# Patient Record
Sex: FEMALE | Race: WHITE | Employment: OTHER | URBAN - METROPOLITAN AREA
[De-identification: names, ages, dates, MRNs, and addresses within clinical notes are randomized per-mention and may not be internally consistent; named-entity substitution may affect disease eponyms.]

---

## 2016-02-28 LAB — EJECTION FRACTION: 63

## 2017-01-01 ENCOUNTER — HOSPITAL ENCOUNTER (OUTPATIENT)
Dept: ULTRASOUND IMAGING | Facility: CLINIC | Age: 57
Discharge: HOME | End: 2017-11-15
Attending: INTERNAL MEDICINE | Admitting: INTERNAL MEDICINE
Payer: MEDICARE

## 2017-01-01 ENCOUNTER — TRANSFERRED RECORDS (OUTPATIENT)
Dept: HEALTH INFORMATION MANAGEMENT | Facility: CLINIC | Age: 57
End: 2017-01-01

## 2017-01-01 ENCOUNTER — TRANSFERRED RECORDS (OUTPATIENT)
Dept: SURGERY | Facility: CLINIC | Age: 57
End: 2017-01-01

## 2017-01-01 ENCOUNTER — OFFICE VISIT (OUTPATIENT)
Dept: SURGERY | Facility: CLINIC | Age: 57
End: 2017-01-01
Payer: MEDICARE

## 2017-01-01 VITALS
HEIGHT: 65 IN | WEIGHT: 127 LBS | DIASTOLIC BLOOD PRESSURE: 84 MMHG | OXYGEN SATURATION: 99 % | BODY MASS INDEX: 21.16 KG/M2 | HEART RATE: 107 BPM | SYSTOLIC BLOOD PRESSURE: 126 MMHG

## 2017-01-01 DIAGNOSIS — C50.919 BREAST CANCER (H): ICD-10-CM

## 2017-01-01 DIAGNOSIS — C50.919 BREAST CANCER METASTASIZED TO MULTIPLE SITES, UNSPECIFIED LATERALITY (H): Primary | ICD-10-CM

## 2017-01-01 LAB
COPATH REPORT: NORMAL
COPATH REPORT: NORMAL

## 2017-01-01 PROCEDURE — 00000159 ZZHCL STATISTIC H-SEND OUTS PREP: Performed by: RADIOLOGY

## 2017-01-01 PROCEDURE — 88342 IMHCHEM/IMCYTCHM 1ST ANTB: CPT | Performed by: RADIOLOGY

## 2017-01-01 PROCEDURE — 88360 TUMOR IMMUNOHISTOCHEM/MANUAL: CPT | Mod: 26 | Performed by: RADIOLOGY

## 2017-01-01 PROCEDURE — 88377 M/PHMTRC ALYS ISHQUANT/SEMIQ: CPT | Performed by: PATHOLOGY

## 2017-01-01 PROCEDURE — 88342 IMHCHEM/IMCYTCHM 1ST ANTB: CPT | Mod: 26 | Performed by: RADIOLOGY

## 2017-01-01 PROCEDURE — 88307 TISSUE EXAM BY PATHOLOGIST: CPT | Mod: 26,76 | Performed by: RADIOLOGY

## 2017-01-01 PROCEDURE — 99214 OFFICE O/P EST MOD 30 MIN: CPT | Performed by: SURGERY

## 2017-01-01 PROCEDURE — 88341 IMHCHEM/IMCYTCHM EA ADD ANTB: CPT | Performed by: RADIOLOGY

## 2017-01-01 PROCEDURE — 88307 TISSUE EXAM BY PATHOLOGIST: CPT | Performed by: RADIOLOGY

## 2017-01-01 PROCEDURE — 76942 ECHO GUIDE FOR BIOPSY: CPT

## 2017-01-01 PROCEDURE — 88341 IMHCHEM/IMCYTCHM EA ADD ANTB: CPT | Mod: 26,XU | Performed by: RADIOLOGY

## 2017-01-01 PROCEDURE — 88360 TUMOR IMMUNOHISTOCHEM/MANUAL: CPT | Mod: 26,76 | Performed by: RADIOLOGY

## 2017-01-01 PROCEDURE — 88360 TUMOR IMMUNOHISTOCHEM/MANUAL: CPT | Performed by: RADIOLOGY

## 2017-01-01 PROCEDURE — 25000125 ZZHC RX 250: Performed by: INTERNAL MEDICINE

## 2017-01-01 PROCEDURE — 00000158 ZZHCL STATISTIC H-FISH PROCESS B/S: Performed by: RADIOLOGY

## 2017-01-01 RX ORDER — LIDOCAINE HYDROCHLORIDE 10 MG/ML
10 INJECTION, SOLUTION EPIDURAL; INFILTRATION; INTRACAUDAL; PERINEURAL ONCE
Status: COMPLETED | OUTPATIENT
Start: 2017-01-01 | End: 2017-01-01

## 2017-01-01 RX ADMIN — LIDOCAINE HYDROCHLORIDE 100 MG: 10 INJECTION, SOLUTION EPIDURAL; INFILTRATION; INTRACAUDAL; PERINEURAL at 14:38

## 2017-01-01 ASSESSMENT — ENCOUNTER SYMPTOMS: NAUSEA: 1

## 2017-01-04 ENCOUNTER — TRANSFERRED RECORDS (OUTPATIENT)
Dept: HEALTH INFORMATION MANAGEMENT | Facility: CLINIC | Age: 57
End: 2017-01-04

## 2017-01-12 ENCOUNTER — TELEPHONE (OUTPATIENT)
Dept: FAMILY MEDICINE | Facility: CLINIC | Age: 57
End: 2017-01-12
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
## 2017-01-12 NOTE — TELEPHONE ENCOUNTER
Called # [phone]    Left  A non detailed VM     Elsie Mares RN, BSN  [facility]

## 2017-01-13 NOTE — TELEPHONE ENCOUNTER
Called # [phone]    Left a non detailed VM   3rd attempt - encounter closed    Elsie Mares RN, BSN  [facility]

## 2017-01-25 ENCOUNTER — TRANSFERRED RECORDS (OUTPATIENT)
Dept: HEALTH INFORMATION MANAGEMENT | Facility: CLINIC | Age: 57
End: 2017-01-25

## 2017-02-15 ENCOUNTER — TRANSFERRED RECORDS (OUTPATIENT)
Dept: HEALTH INFORMATION MANAGEMENT | Facility: CLINIC | Age: 57
End: 2017-02-15

## 2017-02-28 ENCOUNTER — HOSPITAL ENCOUNTER (OUTPATIENT)
Dept: CARDIOLOGY | Facility: CLINIC | Age: 57
Discharge: HOME | End: 2017-02-28
Attending: NURSE PRACTITIONER | Admitting: NURSE PRACTITIONER
Payer: MEDICARE

## 2017-02-28 DIAGNOSIS — C79.51 BONE METASTASIS: ICD-10-CM

## 2017-02-28 DIAGNOSIS — Z79.899 LONG TERM USE OF DRUG: ICD-10-CM

## 2017-02-28 DIAGNOSIS — C50.919 MALIGNANT NEOPLASM OF FEMALE BREAST, UNSPECIFIED LATERALITY, UNSPECIFIED SITE OF BREAST: ICD-10-CM

## 2017-02-28 PROCEDURE — 93306 TTE W/DOPPLER COMPLETE: CPT

## 2017-02-28 PROCEDURE — 0399T ZZHC MYOCARDIAL STRAIN IMAGING: CPT | Performed by: INTERNAL MEDICINE

## 2017-02-28 PROCEDURE — 93306 TTE W/DOPPLER COMPLETE: CPT | Mod: 26 | Performed by: INTERNAL MEDICINE

## 2017-03-08 ENCOUNTER — TRANSFERRED RECORDS (OUTPATIENT)
Dept: HEALTH INFORMATION MANAGEMENT | Facility: CLINIC | Age: 57
End: 2017-03-08

## 2017-03-29 ENCOUNTER — TRANSFERRED RECORDS (OUTPATIENT)
Dept: HEALTH INFORMATION MANAGEMENT | Facility: CLINIC | Age: 57
End: 2017-03-29

## 2017-04-19 ENCOUNTER — TRANSFERRED RECORDS (OUTPATIENT)
Dept: HEALTH INFORMATION MANAGEMENT | Facility: CLINIC | Age: 57
End: 2017-04-19

## 2017-04-27 DIAGNOSIS — I10 ESSENTIAL HYPERTENSION WITH GOAL BLOOD PRESSURE LESS THAN 140/90: Primary | ICD-10-CM

## 2017-05-01 NOTE — TELEPHONE ENCOUNTER
Pt has not followed through from last refill. Break in medication as well.    LOV 7/25/16. Advised to follow up in 1-2 months per PCP.     Attempt #1.    Olga Dove contacted Eyal on 05/01/17 and left a message. If patient calls back please schedule appointment as soon as possible and contact RN team.  Jaclyn Dove RN

## 2017-05-01 NOTE — TELEPHONE ENCOUNTER
Atenolol      Last Written Prescription Date: 09/14/2016  Last Fill Quantity: 30, # refills: 0  Last Office Visit with G, P or [facility] prescribing provider: 07/25/2016       Potassium   Date Value Ref Range Status   07/25/2016 4.5 3.4 - 5.3 mmol/L Final     Creatinine   Date Value Ref Range Status   07/25/2016 0.56 0.52 - 1.04 mg/dL Final     BP Readings from Last 3 Encounters:   09/26/16 (!) 135/94   08/09/16 (!) 123/93   08/01/16 124/80

## 2017-05-02 ENCOUNTER — TRANSFERRED RECORDS (OUTPATIENT)
Dept: HEALTH INFORMATION MANAGEMENT | Facility: CLINIC | Age: 57
End: 2017-05-02

## 2017-05-07 RX ORDER — ATENOLOL 25 MG/1
TABLET ORAL
Qty: 30 TABLET | Refills: 0 | Status: SHIPPED | OUTPATIENT
Start: 2017-05-07 | End: 2017-06-30

## 2017-05-08 ENCOUNTER — HOSPITAL ENCOUNTER (OUTPATIENT)
Dept: MRI IMAGING | Facility: CLINIC | Age: 57
End: 2017-05-08
Attending: NURSE PRACTITIONER
Payer: MEDICARE

## 2017-05-08 ENCOUNTER — HOSPITAL ENCOUNTER (OUTPATIENT)
Dept: CT IMAGING | Facility: CLINIC | Age: 57
Discharge: HOME | End: 2017-05-08
Attending: NURSE PRACTITIONER | Admitting: NURSE PRACTITIONER
Payer: MEDICARE

## 2017-05-08 DIAGNOSIS — C50.919 MALIGNANT NEOPLASM OF FEMALE BREAST, UNSPECIFIED LATERALITY, UNSPECIFIED SITE OF BREAST: ICD-10-CM

## 2017-05-08 DIAGNOSIS — R51.9 HEADACHE, UNSPECIFIED HEADACHE TYPE: ICD-10-CM

## 2017-05-08 DIAGNOSIS — R11.2 NAUSEA AND VOMITING: ICD-10-CM

## 2017-05-08 PROCEDURE — 74177 CT ABD & PELVIS W/CONTRAST: CPT

## 2017-05-08 PROCEDURE — A9585 GADOBUTROL INJECTION: HCPCS | Performed by: RADIOLOGY

## 2017-05-08 PROCEDURE — 25000128 H RX IP 250 OP 636: Performed by: RADIOLOGY

## 2017-05-08 PROCEDURE — 25500064 ZZH RX 255 OP 636: Performed by: RADIOLOGY

## 2017-05-08 PROCEDURE — 70553 MRI BRAIN STEM W/O & W/DYE: CPT

## 2017-05-08 PROCEDURE — 71260 CT THORAX DX C+: CPT

## 2017-05-08 RX ORDER — GADOBUTROL 604.72 MG/ML
7.5 INJECTION INTRAVENOUS ONCE
Status: COMPLETED | OUTPATIENT
Start: 2017-05-08 | End: 2017-05-08

## 2017-05-08 RX ORDER — IOPAMIDOL 755 MG/ML
500 INJECTION, SOLUTION INTRAVASCULAR ONCE
Status: COMPLETED | OUTPATIENT
Start: 2017-05-08 | End: 2017-05-08

## 2017-05-08 RX ADMIN — GADOBUTROL 6 ML: 604.72 INJECTION INTRAVENOUS at 18:41

## 2017-05-08 RX ADMIN — SODIUM CHLORIDE 56 ML: 9 INJECTION, SOLUTION INTRAVENOUS at 14:23

## 2017-05-08 RX ADMIN — IOPAMIDOL 63 ML: 755 INJECTION, SOLUTION INTRAVENOUS at 14:23

## 2017-05-08 NOTE — TELEPHONE ENCOUNTER
done x 1 month only. Pt needs recheck office visit before more refills. Please inform pt and  Please assist pt in making appt to be seen.   BP Readings from Last 3 Encounters:   09/26/16 (!) 135/94   08/09/16 (!) 123/93   08/01/16 124/80

## 2017-05-08 NOTE — TELEPHONE ENCOUNTER
The patient indicates understanding of these issues and agrees with the plan.  Pt did not want to schedule at this time  Jaclyn Dove RN

## 2017-05-10 ENCOUNTER — TRANSFERRED RECORDS (OUTPATIENT)
Dept: HEALTH INFORMATION MANAGEMENT | Facility: CLINIC | Age: 57
End: 2017-05-10

## 2017-05-31 ENCOUNTER — TRANSFERRED RECORDS (OUTPATIENT)
Dept: HEALTH INFORMATION MANAGEMENT | Facility: CLINIC | Age: 57
End: 2017-05-31

## 2017-06-21 ENCOUNTER — TRANSFERRED RECORDS (OUTPATIENT)
Dept: HEALTH INFORMATION MANAGEMENT | Facility: CLINIC | Age: 57
End: 2017-06-21

## 2017-06-30 DIAGNOSIS — I10 ESSENTIAL HYPERTENSION WITH GOAL BLOOD PRESSURE LESS THAN 140/90: ICD-10-CM

## 2017-06-30 NOTE — TELEPHONE ENCOUNTER
atenolol (TENORMIN) 25 MG tablet      Last Written Prescription Date: 5/7/2017  Last Fill Quantity: 30 tablet, # refills: 0    Last Office Visit with [facility], [facility] or [facility] prescribing provider:  7/25/2016   Future Office Visit:        BP Readings from Last 3 Encounters:   09/26/16 (!) 135/94   08/09/16 (!) 123/93   08/01/16 124/80

## 2017-07-03 RX ORDER — ATENOLOL 25 MG/1
TABLET ORAL
Qty: 15 TABLET | Refills: 0 | Status: SHIPPED | OUTPATIENT
Start: 2017-07-03 | End: 2017-07-30

## 2017-07-03 NOTE — TELEPHONE ENCOUNTER
Due for an Office visit for further refills, only fill for 30 days     Elsie Mares RN, BSN  [facility]

## 2017-07-05 ENCOUNTER — MEDICAL CORRESPONDENCE (OUTPATIENT)
Dept: CARDIOLOGY | Facility: CLINIC | Age: 57
End: 2017-07-05

## 2017-07-18 ENCOUNTER — TRANSFERRED RECORDS (OUTPATIENT)
Dept: HEALTH INFORMATION MANAGEMENT | Facility: CLINIC | Age: 57
End: 2017-07-18

## 2017-07-24 ENCOUNTER — HOSPITAL ENCOUNTER (OUTPATIENT)
Dept: CARDIOLOGY | Facility: CLINIC | Age: 57
Discharge: HOME | End: 2017-07-24
Attending: NURSE PRACTITIONER | Admitting: NURSE PRACTITIONER
Payer: MEDICARE

## 2017-07-24 DIAGNOSIS — Z09 CHEMOTHERAPY FOLLOW-UP EXAMINATION: ICD-10-CM

## 2017-07-24 PROCEDURE — 93321 DOPPLER ECHO F-UP/LMTD STD: CPT | Mod: 26 | Performed by: INTERNAL MEDICINE

## 2017-07-24 PROCEDURE — 93308 TTE F-UP OR LMTD: CPT

## 2017-07-24 PROCEDURE — 93325 DOPPLER ECHO COLOR FLOW MAPG: CPT | Mod: 26 | Performed by: INTERNAL MEDICINE

## 2017-07-24 PROCEDURE — 93308 TTE F-UP OR LMTD: CPT | Mod: 26 | Performed by: INTERNAL MEDICINE

## 2017-08-08 ENCOUNTER — TELEPHONE (OUTPATIENT)
Dept: FAMILY MEDICINE | Facility: CLINIC | Age: 57
End: 2017-08-08

## 2017-08-08 ENCOUNTER — TRANSFERRED RECORDS (OUTPATIENT)
Dept: HEALTH INFORMATION MANAGEMENT | Facility: CLINIC | Age: 57
End: 2017-08-08

## 2017-08-08 NOTE — TELEPHONE ENCOUNTER
8/8/2017    Call Regarding Preventive Health Screening Colonoscopy    Attempt 1    Message on voicemail     Comments:       Outreach   Nettie Guido

## 2017-08-23 ENCOUNTER — TELEPHONE (OUTPATIENT)
Dept: FAMILY MEDICINE | Facility: CLINIC | Age: 57
End: 2017-08-23

## 2017-08-23 DIAGNOSIS — I10 ESSENTIAL HYPERTENSION WITH GOAL BLOOD PRESSURE LESS THAN 140/90: Primary | ICD-10-CM

## 2017-08-23 RX ORDER — METOPROLOL SUCCINATE 25 MG/1
12.5 TABLET, EXTENDED RELEASE ORAL DAILY
Qty: 15 TABLET | Refills: 0 | Status: SHIPPED | OUTPATIENT
Start: 2017-08-23 | End: 2017-09-29

## 2017-08-23 NOTE — TELEPHONE ENCOUNTER
Due to national shortage of atenolol [facility] needs new comparable rx sent in.  Pharmacy pended.  Please advise.  Lori Slade

## 2017-08-29 ENCOUNTER — TRANSFERRED RECORDS (OUTPATIENT)
Dept: HEALTH INFORMATION MANAGEMENT | Facility: CLINIC | Age: 57
End: 2017-08-29

## 2017-09-06 ENCOUNTER — TRANSFERRED RECORDS (OUTPATIENT)
Dept: HEALTH INFORMATION MANAGEMENT | Facility: CLINIC | Age: 57
End: 2017-09-06

## 2017-09-19 ENCOUNTER — TRANSFERRED RECORDS (OUTPATIENT)
Dept: SURGERY | Facility: CLINIC | Age: 57
End: 2017-09-19

## 2017-09-19 ENCOUNTER — TRANSFERRED RECORDS (OUTPATIENT)
Dept: HEALTH INFORMATION MANAGEMENT | Facility: CLINIC | Age: 57
End: 2017-09-19

## 2017-09-29 ENCOUNTER — TELEPHONE (OUTPATIENT)
Dept: FAMILY MEDICINE | Facility: CLINIC | Age: 57
End: 2017-09-29

## 2017-09-29 DIAGNOSIS — I10 ESSENTIAL HYPERTENSION WITH GOAL BLOOD PRESSURE LESS THAN 140/90: ICD-10-CM

## 2017-09-29 NOTE — LETTER
[address] [address]                                                                                                       [phone]    October 10, 2017    Eyal Turner  [address] [facility] [address]      To Whom it May Concern:    My staff have been attempting to reach you in regards to a recent refill request for: metoprolol (TOPROL-XL) 25 MG 24 hr tablet.  After reviewing your chart, you are over due for a fasting physical.  You can schedule this via [employer] or by calling the clinic at [phone].     Thank you for your time.      Sincerely,        Sonny Restrepo M.D./ADELITA RN

## 2017-09-29 NOTE — TELEPHONE ENCOUNTER
Disp Refills Start End KEVIN   metoprolol (TOPROL-XL) 25 MG 24 hr tablet 15 tablet 0 8/23/2017  --   Sig: Take 0.5 tablets (12.5 mg) by mouth daily     Last Office Visit with G, P or [facility] prescribing provider:  07/25/2016     Future Office Visit:    None Scheduled    BP Readings from Last 3 Encounters:   09/26/16 (!) 135/94   08/09/16 (!) 123/93   08/01/16 124/80     Patient already given 1 x kadeem refill for atenolol and told to FU (patient due for fasting physical)    Attempt #1  Called   Telephone Information:   Mobile [phone]    Left a non-detailed message to call back and speak with any triage nurse.    Radha Momin RN  [facility]

## 2017-10-09 NOTE — TELEPHONE ENCOUNTER
Attempt #2  Called # below - Left a non-detailed message to call back and speak with any triage nurse.    Radha Momin RN  [place] [facility]

## 2017-10-10 NOTE — TELEPHONE ENCOUNTER
Attempt #3  called # below - Left a non-detailed message to call back and speak with any triage nurse.    Letter sent    Routing to PCP for further review/recommendations/orders - would you like to refill without a recent OV?    Radha Momin RN  [facility]

## 2017-10-11 RX ORDER — METOPROLOL SUCCINATE 25 MG/1
TABLET, EXTENDED RELEASE ORAL
Qty: 15 TABLET | Refills: 0 | Status: SHIPPED | OUTPATIENT
Start: 2017-10-11 | End: 2017-10-11

## 2017-10-11 RX ORDER — METOPROLOL SUCCINATE 25 MG/1
TABLET, EXTENDED RELEASE ORAL
Qty: 30 TABLET | Refills: 0 | Status: SHIPPED | OUTPATIENT
Start: 2017-10-11 | End: 2018-01-01

## 2017-10-11 NOTE — TELEPHONE ENCOUNTER
I believe these bp's were at her oncologist's office.   Ok to refill again and Recheck your blood pressure in our pharmacy in 1 week or sooner if needed.  Have pharmacy send me their note.    Then have pt see me for annual px in the next 1-2 months.

## 2017-10-23 NOTE — PROGRESS NOTES
I have been asked by Paulina Lundberg and Kade to evaluate this patient with known metastatic breast cancer regarding chest wall masses.  These have been increasing in size.  The patient's been on continuous chemotherapy for years.  The patient is known to me from a previous biopsy.    The patient's past medical and surgical histories are reviewed.    The patient's right neck reveals a large mass at the base of her sternocleidomastoid anterior head.  This appears to have fixation to the underlying bone as well.  There is also a 1.5 x 3 cm mass over the mid sternum.  This is also somewhat fixed to underlying tissue.  There is relatively little laxity of the surrounding skin.    This is a patient with probable metastatic disease.  The patient reports that she recently had a PET scan done in [place], though those results are not available to me or to Dr. Lundberg.  I did discuss the case with Dr. Lundberg while the patient was in the office today.  I explained that I did not think that either one of these masses was probably amenable to removal.  I think needle biopsy would therefore be most appropriate.  Hopefully adequate tissue could be obtained from the larger lesion under ultrasound guidance.  Fine-needle aspiration might be possible the chest wall lesion, but I'm concerned that an attempted excision here would result in an open wound.  This might be particularly problematic if there is malignancy at the base.  The patient will be getting the results of the PET scan to the oncology office.  Please let me know if I can be of further assistance.    Danie Forrester MD  [facility]    Please route or send letter to:  Primary Care Provider (PCP) and Dr. Lundberg

## 2017-10-23 NOTE — MR AVS SNAPSHOT
After Visit Summary   10/23/2017    Eyal Turner    MRN: [id number]           Patient Information     Date Of Birth          1960        Visit Information        Provider Department      10/23/2017 10:30 AM Danie Forrester MD [facility] General Surgery      Today's Diagnoses     Breast cancer metastasized to multiple sites, unspecified laterality (H)    -  1       Follow-ups after your visit        Future tests that were ordered for you today     Open Future Orders        Priority Expected Expires Ordered    MR Lumbar Spine w/o & w Contrast Routine  10/23/2018 10/23/2017    MR Thoracic Spine w/o & w Contrast Routine  10/23/2018 10/23/2017            Who to contact     If you have questions or need follow up information about today's clinic visit or your schedule please contact [facility] directly at [phone].  Normal or non-critical lab and imaging results will be communicated to you by Casenethart, letter or phone within 4 business days after the clinic has received the results. If you do not hear from us within 7 days, please contact the clinic through Casenethart or phone. If you have a critical or abnormal lab result, we will notify you by phone as soon as possible.  Submit refill requests through Qbaka or call your pharmacy and they will forward the refill request to us. Please allow 3 business days for your refill to be completed.          Additional Information About Your Visit        MyChart Information     Qbaka gives you secure access to your electronic health record. If you see a primary care provider, you can also send messages to your care team and make appointments. If you have questions, please call your primary care clinic.  If you do not have a primary care provider, please call [phone] and they will assist you.        Care EveryWhere ID     This is your Care EveryWhere ID. This could be used by  "other organizations to access your [place] medical records  [id number]        Your Vitals Were     Pulse Height Pulse Oximetry BMI (Body Mass Index)          107 5' 5\" (1.651 m) 99% 21.13 kg/m2         Blood Pressure from Last 3 Encounters:   10/23/17 126/84   09/26/16 (!) 135/94   08/09/16 (!) 123/93    Weight from Last 3 Encounters:   10/23/17 127 lb (57.6 kg)   08/09/16 127 lb (57.6 kg)   08/01/16 127 lb (57.6 kg)              Today, you had the following     No orders found for display       Primary Care Provider Office Phone # Fax #    Angie Heredia [phone]       [facility] [facility] [address]        Equal Access to Services     [facility] YOSELYN : Hadii pamella davis Solorena, waaxda luqadaha, qaybta kaalmada marvin, guillermina lawson . So [facility] [phone].    ATENCIÓN: Si habla español, tiene a arevalo disposición servicios gratuitos de asistencia lingüística. [facility] [phone].    We comply with applicable federal civil rights laws and [place] laws. We do not discriminate on the basis of race, color, national origin, age, disability, sex, sexual orientation, or gender identity.            Thank you!     Thank you for choosing [facility]  for your care. Our goal is always to provide you with excellent care. Hearing back from our patients is one way we can continue to improve our services. Please take a few minutes to complete the written survey that you may receive in the mail after your visit with us. Thank you!             Your Updated Medication List - Protect others around you: Learn how to safely use, store and throw away your medicines at www.disposemymeds.org.          This list is accurate as of: 10/23/17 12:52 PM.  Always use your most recent med list.                   Brand Name Dispense Instructions for use Diagnosis    ACIDOPHILUS PROBIOTIC BLEND Caps     90 capsule    Take 1 capsule by mouth daily.    " CARDIOVASCULAR SCREENING; LDL GOAL LESS THAN 160       metoprolol 25 MG 24 hr tablet    TOPROL-XL    30 tablet    TAKE 1/2 TABLET(12.5 MG) BY MOUTH DAILY    Essential hypertension with goal blood pressure less than 140/90       ondansetron 4 MG ODT tab    ZOFRAN ODT    20 tablet    Take 1-2 tablets (4-8 mg) by mouth every 8 hours as needed for nausea    Hematemesis       oxyCODONE 5 MG IR tablet    ROXICODONE    20 tablet    Take 1-2 tablets (5-10 mg) by mouth every 3 hours as needed for pain or other (Moderate to Severe)    Invasive ductal carcinoma of breast, unspecified laterality (H)       prenatal multivitamin plus iron 27-0.8 MG Tabs per tablet      Take 1 tablet by mouth daily

## 2017-10-23 NOTE — PROGRESS NOTES
HPI      ROS (Review of Systems):     Cardiovascular: Positive for hypertension.   GASTROINTESTINAL: Positive for nausea.   MUSCULOSKELETAL: Positive for back pain.          Physical Exam

## 2017-10-23 NOTE — LETTER
2017    [facility] 1960    I have been asked by Paulina Lundberg and Kade to evaluate this patient with known metastatic breast cancer regarding chest wall masses.  These have been increasing in size.  The patient's been on continuous chemotherapy for years.  The patient is known to me from a previous biopsy.     The patient's past medical and surgical histories are reviewed.     The patient's right neck reveals a large mass at the base of her sternocleidomastoid anterior head.  This appears to have fixation to the underlying bone as well.  There is also a 1.5 x 3 cm mass over the mid sternum.  This is also somewhat fixed to underlying tissue.  There is relatively little laxity of the surrounding skin.     This is a patient with probable metastatic disease.  The patient reports that she recently had a PET scan done in [place], though those results are not available to me or to Dr. Lundberg.  I did discuss the case with Dr. Lundberg while the patient was in the office today.  I explained that I did not think that either one of these masses was probably amenable to removal.  I think needle biopsy would therefore be most appropriate.  Hopefully adequate tissue could be obtained from the larger lesion under ultrasound guidance.  Fine-needle aspiration might be possible the chest wall lesion, but I'm concerned that an attempted excision here would result in an open wound.  This might be particularly problematic if there is malignancy at the base.  The patient will be getting the results of the PET scan to the oncology office.  Please let me know if I can be of further assistance.     Danie Forrester MD  [facility]

## 2017-11-15 NOTE — PROGRESS NOTES
Pt tolerated 2 chest wall lesions biopsy by DR. Brito well.  Sterile dressings to sites.  Pt states understanding of site care instructions.  DC ambulatory to home with .  No evident bleeding on discharge.  All questions at this time answered.

## 2018-01-01 ENCOUNTER — APPOINTMENT (OUTPATIENT)
Dept: CT IMAGING | Facility: CLINIC | Age: 58
DRG: 853 | End: 2018-01-01
Attending: INTERNAL MEDICINE
Payer: MEDICARE

## 2018-01-01 ENCOUNTER — APPOINTMENT (OUTPATIENT)
Dept: GENERAL RADIOLOGY | Facility: CLINIC | Age: 58
DRG: 853 | End: 2018-01-01
Attending: INTERNAL MEDICINE
Payer: MEDICARE

## 2018-01-01 ENCOUNTER — TRANSFERRED RECORDS (OUTPATIENT)
Dept: HEALTH INFORMATION MANAGEMENT | Facility: CLINIC | Age: 58
End: 2018-01-01

## 2018-01-01 ENCOUNTER — APPOINTMENT (OUTPATIENT)
Dept: CT IMAGING | Facility: CLINIC | Age: 58
DRG: 853 | End: 2018-01-01
Attending: ANESTHESIOLOGY
Payer: MEDICARE

## 2018-01-01 ENCOUNTER — HOSPITAL ENCOUNTER (EMERGENCY)
Facility: CLINIC | Age: 58
Discharge: HOME | End: 2018-06-15
Attending: EMERGENCY MEDICINE | Admitting: EMERGENCY MEDICINE
Payer: MEDICARE

## 2018-01-01 ENCOUNTER — APPOINTMENT (OUTPATIENT)
Dept: GENERAL RADIOLOGY | Facility: CLINIC | Age: 58
DRG: 853 | End: 2018-01-01
Attending: ANESTHESIOLOGY
Payer: MEDICARE

## 2018-01-01 ENCOUNTER — TELEPHONE (OUTPATIENT)
Dept: FAMILY MEDICINE | Facility: CLINIC | Age: 58
End: 2018-01-01

## 2018-01-01 ENCOUNTER — ANESTHESIA EVENT (OUTPATIENT)
Dept: INTENSIVE CARE | Facility: CLINIC | Age: 58
DRG: 853 | End: 2018-01-01
Payer: MEDICARE

## 2018-01-01 ENCOUNTER — APPOINTMENT (OUTPATIENT)
Dept: CARDIOLOGY | Facility: CLINIC | Age: 58
DRG: 853 | End: 2018-01-01
Attending: ANESTHESIOLOGY
Payer: MEDICARE

## 2018-01-01 ENCOUNTER — HOSPITAL ENCOUNTER (INPATIENT)
Facility: CLINIC | Age: 58
LOS: 20 days | DRG: 853 | End: 2018-10-12
Attending: INTERNAL MEDICINE | Admitting: INTERNAL MEDICINE
Payer: MEDICARE

## 2018-01-01 ENCOUNTER — ANESTHESIA (OUTPATIENT)
Dept: INTENSIVE CARE | Facility: CLINIC | Age: 58
DRG: 853 | End: 2018-01-01
Payer: MEDICARE

## 2018-01-01 ENCOUNTER — APPOINTMENT (OUTPATIENT)
Dept: CARDIOLOGY | Facility: CLINIC | Age: 58
DRG: 853 | End: 2018-01-01
Attending: INTERNAL MEDICINE
Payer: MEDICARE

## 2018-01-01 ENCOUNTER — APPOINTMENT (OUTPATIENT)
Dept: ULTRASOUND IMAGING | Facility: CLINIC | Age: 58
DRG: 853 | End: 2018-01-01
Attending: ANESTHESIOLOGY
Payer: MEDICARE

## 2018-01-01 ENCOUNTER — HOSPITAL ENCOUNTER (OUTPATIENT)
Dept: CARDIOLOGY | Facility: CLINIC | Age: 58
Discharge: HOME | End: 2018-02-07
Attending: INTERNAL MEDICINE | Admitting: INTERNAL MEDICINE
Payer: MEDICARE

## 2018-01-01 ENCOUNTER — APPOINTMENT (OUTPATIENT)
Dept: CT IMAGING | Facility: CLINIC | Age: 58
End: 2018-01-01
Attending: NURSE PRACTITIONER
Payer: MEDICARE

## 2018-01-01 VITALS
TEMPERATURE: 99.1 F | WEIGHT: 166.45 LBS | HEIGHT: 66 IN | HEART RATE: 123 BPM | DIASTOLIC BLOOD PRESSURE: 65 MMHG | SYSTOLIC BLOOD PRESSURE: 101 MMHG | OXYGEN SATURATION: 81 % | BODY MASS INDEX: 26.75 KG/M2 | RESPIRATION RATE: 19 BRPM

## 2018-01-01 VITALS
SYSTOLIC BLOOD PRESSURE: 135 MMHG | RESPIRATION RATE: 16 BRPM | OXYGEN SATURATION: 98 % | TEMPERATURE: 98.6 F | DIASTOLIC BLOOD PRESSURE: 99 MMHG

## 2018-01-01 DIAGNOSIS — K72.00 ACUTE LIVER FAILURE WITHOUT HEPATIC COMA: ICD-10-CM

## 2018-01-01 DIAGNOSIS — N39.0 URINARY TRACT INFECTION WITHOUT HEMATURIA, SITE UNSPECIFIED: ICD-10-CM

## 2018-01-01 DIAGNOSIS — S09.90XA MINOR HEAD INJURY, INITIAL ENCOUNTER: ICD-10-CM

## 2018-01-01 DIAGNOSIS — R79.89 ELEVATED LFTS: ICD-10-CM

## 2018-01-01 DIAGNOSIS — C50.919 METASTATIC BREAST CANCER: ICD-10-CM
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
## 2018-01-01 DIAGNOSIS — D64.9 ANEMIA, UNSPECIFIED TYPE: ICD-10-CM

## 2018-01-01 DIAGNOSIS — S40.021A CONTUSION OF RIGHT UPPER ARM, INITIAL ENCOUNTER: ICD-10-CM

## 2018-01-01 DIAGNOSIS — R55 SYNCOPE, UNSPECIFIED SYNCOPE TYPE: ICD-10-CM

## 2018-01-01 DIAGNOSIS — E86.0 DEHYDRATION: ICD-10-CM

## 2018-01-01 DIAGNOSIS — I10 ESSENTIAL HYPERTENSION WITH GOAL BLOOD PRESSURE LESS THAN 140/90: ICD-10-CM

## 2018-01-01 DIAGNOSIS — F10.939 ALCOHOL WITHDRAWAL SYNDROME WITH COMPLICATION (H): ICD-10-CM

## 2018-01-01 DIAGNOSIS — B37.0 CANDIDIASIS OF MOUTH: ICD-10-CM

## 2018-01-01 DIAGNOSIS — R53.83 FATIGUE, UNSPECIFIED TYPE: ICD-10-CM

## 2018-01-01 DIAGNOSIS — Z51.11 MAINTENANCE CHEMOTHERAPY: ICD-10-CM

## 2018-01-01 LAB
ABO + RH BLD: NORMAL
ACID FAST STN SPEC QL: NORMAL
ALBUMIN SERPL-MCNC: 1.6 G/DL (ref 3.4–5)
ALBUMIN SERPL-MCNC: 1.7 G/DL (ref 3.4–5)
ALBUMIN SERPL-MCNC: 1.8 G/DL (ref 3.4–5)
ALBUMIN SERPL-MCNC: 1.9 G/DL (ref 3.4–5)
ALBUMIN SERPL-MCNC: 2 G/DL (ref 3.4–5)
ALBUMIN SERPL-MCNC: 2.1 G/DL (ref 3.4–5)
ALBUMIN SERPL-MCNC: 2.4 G/DL (ref 3.4–5)
ALBUMIN SERPL-MCNC: 2.5 G/DL (ref 3.4–5)
ALBUMIN SERPL-MCNC: 2.7 G/DL (ref 3.4–5)
ALBUMIN SERPL-MCNC: 3.3 G/DL (ref 3.4–5)
ALBUMIN SERPL-MCNC: 3.3 G/DL (ref 3.4–5)
ALBUMIN SERPL-MCNC: 3.4 G/DL (ref 3.4–5)
ALBUMIN UR-MCNC: 30 MG/DL
ALBUMIN UR-MCNC: 30 MG/DL
ALBUMIN UR-MCNC: NEGATIVE MG/DL
ALBUMIN UR-MCNC: NEGATIVE MG/DL
ALP SERPL-CCNC: 300 U/L (ref 40–150)
ALP SERPL-CCNC: 333 U/L (ref 40–150)
ALP SERPL-CCNC: 401 U/L (ref 40–150)
ALP SERPL-CCNC: 409 U/L (ref 40–150)
ALP SERPL-CCNC: 464 U/L (ref 40–150)
ALP SERPL-CCNC: 482 U/L (ref 40–150)
ALP SERPL-CCNC: 500 U/L (ref 40–150)
ALP SERPL-CCNC: 518 U/L (ref 40–150)
ALP SERPL-CCNC: 548 U/L (ref 40–150)
ALP SERPL-CCNC: 549 U/L (ref 40–150)
ALP SERPL-CCNC: 581 U/L (ref 40–150)
ALP SERPL-CCNC: 594 U/L (ref 40–150)
ALP SERPL-CCNC: 624 U/L (ref 40–150)
ALP SERPL-CCNC: 630 U/L (ref 40–150)
ALP SERPL-CCNC: 667 U/L (ref 40–150)
ALP SERPL-CCNC: 688 U/L (ref 40–150)
ALT SERPL W P-5'-P-CCNC: 101 U/L (ref 0–50)
ALT SERPL W P-5'-P-CCNC: 127 U/L (ref 0–50)
ALT SERPL W P-5'-P-CCNC: 157 U/L (ref 0–50)
ALT SERPL W P-5'-P-CCNC: 163 U/L (ref 0–50)
ALT SERPL W P-5'-P-CCNC: 172 U/L (ref 0–50)
ALT SERPL W P-5'-P-CCNC: 57 U/L (ref 0–50)
ALT SERPL W P-5'-P-CCNC: 59 U/L (ref 0–50)
ALT SERPL W P-5'-P-CCNC: 60 U/L (ref 0–50)
ALT SERPL W P-5'-P-CCNC: 62 U/L (ref 0–50)
ALT SERPL W P-5'-P-CCNC: 66 U/L (ref 0–50)
ALT SERPL W P-5'-P-CCNC: 69 U/L (ref 0–50)
ALT SERPL W P-5'-P-CCNC: 70 U/L (ref 0–50)
ALT SERPL W P-5'-P-CCNC: 76 U/L (ref 0–50)
ALT SERPL W P-5'-P-CCNC: 86 U/L (ref 0–50)
ALT SERPL W P-5'-P-CCNC: 94 U/L (ref 0–50)
ALT SERPL W P-5'-P-CCNC: 94 U/L (ref 0–50)
AMMONIA PLAS-SCNC: 36 UMOL/L (ref 10–50)
AMMONIA PLAS-SCNC: 50 UMOL/L (ref 10–50)
ANION GAP SERPL CALCULATED.3IONS-SCNC: 10 MMOL/L (ref 3–14)
ANION GAP SERPL CALCULATED.3IONS-SCNC: 12 MMOL/L (ref 3–14)
ANION GAP SERPL CALCULATED.3IONS-SCNC: 2 MMOL/L (ref 3–14)
ANION GAP SERPL CALCULATED.3IONS-SCNC: 2 MMOL/L (ref 3–14)
ANION GAP SERPL CALCULATED.3IONS-SCNC: 22 MMOL/L (ref 3–14)
ANION GAP SERPL CALCULATED.3IONS-SCNC: 3 MMOL/L (ref 3–14)
ANION GAP SERPL CALCULATED.3IONS-SCNC: 4 MMOL/L (ref 3–14)
ANION GAP SERPL CALCULATED.3IONS-SCNC: 5 MMOL/L (ref 3–14)
ANION GAP SERPL CALCULATED.3IONS-SCNC: 5 MMOL/L (ref 3–14)
ANION GAP SERPL CALCULATED.3IONS-SCNC: 6 MMOL/L (ref 3–14)
ANION GAP SERPL CALCULATED.3IONS-SCNC: 7 MMOL/L (ref 3–14)
ANION GAP SERPL CALCULATED.3IONS-SCNC: 8 MMOL/L (ref 3–14)
ANION GAP SERPL CALCULATED.3IONS-SCNC: 8 MMOL/L (ref 3–14)
ANION GAP SERPL CALCULATED.3IONS-SCNC: 9 MMOL/L (ref 3–14)
ANION GAP SERPL CALCULATED.3IONS-SCNC: 9 MMOL/L (ref 3–14)
ANION GAP SERPL CALCULATED.3IONS-SCNC: NORMAL MMOL/L (ref 6–17)
ANISOCYTOSIS BLD QL SMEAR: ABNORMAL
ANISOCYTOSIS BLD QL SMEAR: SLIGHT
APPEARANCE FLD: NORMAL
APPEARANCE UR: ABNORMAL
APPEARANCE UR: CLEAR
APTT PPP: 29 SEC (ref 22–37)
AST SERPL W P-5'-P-CCNC: 106 U/L (ref 0–45)
AST SERPL W P-5'-P-CCNC: 113 U/L (ref 0–45)
AST SERPL W P-5'-P-CCNC: 136 U/L (ref 0–45)
AST SERPL W P-5'-P-CCNC: 137 U/L (ref 0–45)
AST SERPL W P-5'-P-CCNC: 137 U/L (ref 0–45)
AST SERPL W P-5'-P-CCNC: 145 U/L (ref 0–45)
AST SERPL W P-5'-P-CCNC: 158 U/L (ref 0–45)
AST SERPL W P-5'-P-CCNC: 164 U/L (ref 0–45)
AST SERPL W P-5'-P-CCNC: 166 U/L (ref 0–45)
AST SERPL W P-5'-P-CCNC: 172 U/L (ref 0–45)
AST SERPL W P-5'-P-CCNC: 182 U/L (ref 0–45)
AST SERPL W P-5'-P-CCNC: 203 U/L (ref 0–45)
AST SERPL W P-5'-P-CCNC: 264 U/L (ref 0–45)
AST SERPL W P-5'-P-CCNC: 67 U/L (ref 0–45)
AST SERPL W P-5'-P-CCNC: 89 U/L (ref 0–45)
AST SERPL W P-5'-P-CCNC: 89 U/L (ref 0–45)
AST SERPL W P-5'-P-CCNC: 97 U/L (ref 0–45)
BACTERIA #/AREA URNS HPF: ABNORMAL /HPF
BACTERIA #/AREA URNS HPF: ABNORMAL /HPF
BACTERIA SPEC CULT: ABNORMAL
BACTERIA SPEC CULT: NO GROWTH
BACTERIA SPEC CULT: NORMAL
BASE EXCESS BLDA CALC-SCNC: 14.4 MMOL/L
BASE EXCESS BLDA CALC-SCNC: 3.2 MMOL/L
BASE EXCESS BLDA CALC-SCNC: 3.2 MMOL/L
BASE EXCESS BLDA CALC-SCNC: 4.6 MMOL/L
BASE EXCESS BLDA CALC-SCNC: 4.6 MMOL/L
BASE EXCESS BLDA CALC-SCNC: 8.9 MMOL/L
BASE EXCESS BLDV CALC-SCNC: 3.4 MMOL/L
BASE EXCESS BLDV CALC-SCNC: 3.6 MMOL/L
BASE EXCESS BLDV CALC-SCNC: 6.3 MMOL/L
BASE EXCESS BLDV CALC-SCNC: 6.5 MMOL/L
BASOPHILS # BLD AUTO: 0 10E9/L (ref 0–0.2)
BASOPHILS # BLD AUTO: 0.1 10E9/L (ref 0–0.2)
BASOPHILS NFR BLD AUTO: 0 %
BASOPHILS NFR BLD AUTO: 0.8 %
BASOPHILS NFR BLD AUTO: 0.8 %
BILIRUB DIRECT SERPL-MCNC: 10.9 MG/DL (ref 0–0.2)
BILIRUB DIRECT SERPL-MCNC: 13.9 MG/DL (ref 0–0.2)
BILIRUB DIRECT SERPL-MCNC: 15 MG/DL (ref 0–0.2)
BILIRUB DIRECT SERPL-MCNC: 6.3 MG/DL (ref 0–0.2)
BILIRUB DIRECT SERPL-MCNC: 6.4 MG/DL (ref 0–0.2)
BILIRUB DIRECT SERPL-MCNC: 6.8 MG/DL (ref 0–0.2)
BILIRUB DIRECT SERPL-MCNC: 7.5 MG/DL (ref 0–0.2)
BILIRUB DIRECT SERPL-MCNC: 7.6 MG/DL (ref 0–0.2)
BILIRUB DIRECT SERPL-MCNC: 9.8 MG/DL (ref 0–0.2)
BILIRUB SERPL-MCNC: 10 MG/DL (ref 0.2–1.3)
BILIRUB SERPL-MCNC: 10.8 MG/DL (ref 0.2–1.3)
BILIRUB SERPL-MCNC: 11.9 MG/DL (ref 0.2–1.3)
BILIRUB SERPL-MCNC: 13.9 MG/DL (ref 0.2–1.3)
BILIRUB SERPL-MCNC: 17.3 MG/DL (ref 0.2–1.3)
BILIRUB SERPL-MCNC: 2.4 MG/DL (ref 0.2–1.3)
BILIRUB SERPL-MCNC: 20.5 MG/DL (ref 0.2–1.3)
BILIRUB SERPL-MCNC: 23.4 MG/DL (ref 0.2–1.3)
BILIRUB SERPL-MCNC: 7.6 MG/DL (ref 0.2–1.3)
BILIRUB SERPL-MCNC: 7.9 MG/DL (ref 0.2–1.3)
BILIRUB SERPL-MCNC: 8.3 MG/DL (ref 0.2–1.3)
BILIRUB SERPL-MCNC: 8.4 MG/DL (ref 0.2–1.3)
BILIRUB SERPL-MCNC: 8.4 MG/DL (ref 0.2–1.3)
BILIRUB SERPL-MCNC: 8.8 MG/DL (ref 0.2–1.3)
BILIRUB SERPL-MCNC: 8.8 MG/DL (ref 0.2–1.3)
BILIRUB SERPL-MCNC: 9.3 MG/DL (ref 0.2–1.3)
BILIRUB SERPL-MCNC: 9.8 MG/DL (ref 0.2–1.3)
BILIRUB UR QL STRIP: ABNORMAL
BILIRUB UR QL STRIP: NEGATIVE
BLD GP AB SCN SERPL QL: NORMAL
BLD PROD TYP BPU: NORMAL
BLD UNIT ID BPU: 0
BLOOD BANK CMNT PATIENT-IMP: NORMAL
BLOOD PRODUCT CODE: NORMAL
BPU ID: NORMAL
BUN SERPL-MCNC: 12 MG/DL (ref 7–30)
BUN SERPL-MCNC: 12 MG/DL (ref 7–30)
BUN SERPL-MCNC: 15 MG/DL (ref 7–30)
BUN SERPL-MCNC: 17 MG/DL (ref 7–30)
BUN SERPL-MCNC: 19 MG/DL (ref 7–30)
BUN SERPL-MCNC: 20 MG/DL (ref 7–30)
BUN SERPL-MCNC: 22 MG/DL (ref 7–30)
BUN SERPL-MCNC: 26 MG/DL (ref 7–30)
BUN SERPL-MCNC: 26 MG/DL (ref 7–30)
BUN SERPL-MCNC: 28 MG/DL (ref 7–30)
BUN SERPL-MCNC: 38 MG/DL (ref 7–30)
BUN SERPL-MCNC: 40 MG/DL (ref 7–30)
BUN SERPL-MCNC: 41 MG/DL (ref 7–30)
BUN SERPL-MCNC: 45 MG/DL (ref 7–30)
BUN SERPL-MCNC: 47 MG/DL (ref 7–30)
BUN SERPL-MCNC: 47 MG/DL (ref 7–30)
BUN SERPL-MCNC: 48 MG/DL (ref 7–30)
BUN SERPL-MCNC: 48 MG/DL (ref 7–30)
BUN SERPL-MCNC: 65 MG/DL (ref 7–30)
BUN SERPL-MCNC: 7 MG/DL (ref 7–30)
BUN SERPL-MCNC: 88 MG/DL (ref 7–30)
BUN SERPL-MCNC: NORMAL MG/DL (ref 7–30)
BURR CELLS BLD QL SMEAR: SLIGHT
C DIFF TOX B STL QL: NEGATIVE
C DIFF TOX B STL QL: NEGATIVE
CA-I BLD-MCNC: 4.3 MG/DL (ref 4.4–5.2)
CA-I BLD-MCNC: 4.7 MG/DL (ref 4.4–5.2)
CA-I BLD-SCNC: NORMAL MG/DL (ref 4.4–5.2)
CALCIUM SERPL-MCNC: 6.5 MG/DL (ref 8.5–10.1)
CALCIUM SERPL-MCNC: 7.3 MG/DL (ref 8.5–10.1)
CALCIUM SERPL-MCNC: 7.4 MG/DL (ref 8.5–10.1)
CALCIUM SERPL-MCNC: 7.5 MG/DL (ref 8.5–10.1)
CALCIUM SERPL-MCNC: 7.6 MG/DL (ref 8.5–10.1)
CALCIUM SERPL-MCNC: 7.7 MG/DL (ref 8.5–10.1)
CALCIUM SERPL-MCNC: 7.7 MG/DL (ref 8.5–10.1)
CALCIUM SERPL-MCNC: 7.8 MG/DL (ref 8.5–10.1)
CALCIUM SERPL-MCNC: 7.9 MG/DL (ref 8.5–10.1)
CALCIUM SERPL-MCNC: 7.9 MG/DL (ref 8.5–10.1)
CALCIUM SERPL-MCNC: 8 MG/DL (ref 8.5–10.1)
CALCIUM SERPL-MCNC: 8 MG/DL (ref 8.5–10.1)
CALCIUM SERPL-MCNC: 8.1 MG/DL (ref 8.5–10.1)
CALCIUM SERPL-MCNC: 8.2 MG/DL (ref 8.5–10.1)
CALCIUM SERPL-MCNC: 8.6 MG/DL (ref 8.5–10.1)
CHLORIDE BLD-SCNC: NORMAL MMOL/L (ref 94–109)
CHLORIDE SERPL-SCNC: 100 MMOL/L (ref 94–109)
CHLORIDE SERPL-SCNC: 101 MMOL/L (ref 94–109)
CHLORIDE SERPL-SCNC: 102 MMOL/L (ref 94–109)
CHLORIDE SERPL-SCNC: 102 MMOL/L (ref 94–109)
CHLORIDE SERPL-SCNC: 105 MMOL/L (ref 94–109)
CHLORIDE SERPL-SCNC: 105 MMOL/L (ref 94–109)
CHLORIDE SERPL-SCNC: 106 MMOL/L (ref 94–109)
CHLORIDE SERPL-SCNC: 107 MMOL/L (ref 94–109)
CHLORIDE SERPL-SCNC: 109 MMOL/L (ref 94–109)
CHLORIDE SERPL-SCNC: 112 MMOL/L (ref 94–109)
CHLORIDE SERPL-SCNC: 112 MMOL/L (ref 94–109)
CHLORIDE SERPL-SCNC: 113 MMOL/L (ref 94–109)
CHLORIDE SERPL-SCNC: 94 MMOL/L (ref 94–109)
CHLORIDE SERPL-SCNC: 95 MMOL/L (ref 94–109)
CHLORIDE SERPL-SCNC: 96 MMOL/L (ref 94–109)
CHLORIDE SERPL-SCNC: 99 MMOL/L (ref 94–109)
CK SERPL-CCNC: 37 U/L (ref 30–225)
CK SERPL-CCNC: 60 U/L (ref 30–225)
CMV DNA SPEC NAA+PROBE-ACNC: NORMAL [IU]/ML
CMV DNA SPEC NAA+PROBE-LOG#: NORMAL {LOG_IU}/ML
CO2 BLD-SCNC: NORMAL MMOL/L (ref 20–32)
CO2 SERPL-SCNC: 15 MMOL/L (ref 20–32)
CO2 SERPL-SCNC: 23 MMOL/L (ref 20–32)
CO2 SERPL-SCNC: 24 MMOL/L (ref 20–32)
CO2 SERPL-SCNC: 25 MMOL/L (ref 20–32)
CO2 SERPL-SCNC: 26 MMOL/L (ref 20–32)
CO2 SERPL-SCNC: 27 MMOL/L (ref 20–32)
CO2 SERPL-SCNC: 28 MMOL/L (ref 20–32)
CO2 SERPL-SCNC: 29 MMOL/L (ref 20–32)
CO2 SERPL-SCNC: 30 MMOL/L (ref 20–32)
CO2 SERPL-SCNC: 30 MMOL/L (ref 20–32)
CO2 SERPL-SCNC: 31 MMOL/L (ref 20–32)
CO2 SERPL-SCNC: 32 MMOL/L (ref 20–32)
CO2 SERPL-SCNC: 33 MMOL/L (ref 20–32)
CO2 SERPL-SCNC: 34 MMOL/L (ref 20–32)
CO2 SERPL-SCNC: 34 MMOL/L (ref 20–32)
CO2 SERPL-SCNC: 37 MMOL/L (ref 20–32)
CO2 SERPL-SCNC: 39 MMOL/L (ref 20–32)
COLOR FLD: COLORLESS
COLOR UR AUTO: ABNORMAL
COLOR UR AUTO: YELLOW
COPATH REPORT: NORMAL
COPATH REPORT: NORMAL
CREAT BLD-MCNC: NORMAL MG/DL (ref 0.52–1.04)
CREAT SERPL-MCNC: 0.3 MG/DL (ref 0.52–1.04)
CREAT SERPL-MCNC: 0.32 MG/DL (ref 0.52–1.04)
CREAT SERPL-MCNC: 0.33 MG/DL (ref 0.52–1.04)
CREAT SERPL-MCNC: 0.34 MG/DL (ref 0.52–1.04)
CREAT SERPL-MCNC: 0.35 MG/DL (ref 0.52–1.04)
CREAT SERPL-MCNC: 0.36 MG/DL (ref 0.52–1.04)
CREAT SERPL-MCNC: 0.36 MG/DL (ref 0.52–1.04)
CREAT SERPL-MCNC: 0.37 MG/DL (ref 0.52–1.04)
CREAT SERPL-MCNC: 0.37 MG/DL (ref 0.52–1.04)
CREAT SERPL-MCNC: 0.38 MG/DL (ref 0.52–1.04)
CREAT SERPL-MCNC: 0.38 MG/DL (ref 0.52–1.04)
CREAT SERPL-MCNC: 0.4 MG/DL (ref 0.52–1.04)
CREAT SERPL-MCNC: 0.41 MG/DL (ref 0.52–1.04)
CREAT SERPL-MCNC: 0.42 MG/DL (ref 0.52–1.04)
CREAT SERPL-MCNC: 0.42 MG/DL (ref 0.52–1.04)
CREAT SERPL-MCNC: 0.46 MG/DL (ref 0.52–1.04)
CREAT SERPL-MCNC: 0.69 MG/DL (ref 0.52–1.04)
CREAT SERPL-MCNC: 1.45 MG/DL (ref 0.52–1.04)
CRP SERPL-MCNC: 128 MG/L (ref 0–8)
CRP SERPL-MCNC: 52.6 MG/L (ref 0–8)
DIFFERENTIAL METHOD BLD: ABNORMAL
EOSINOPHIL # BLD AUTO: 0 10E9/L (ref 0–0.7)
EOSINOPHIL # BLD AUTO: 0.1 10E9/L (ref 0–0.7)
EOSINOPHIL # BLD AUTO: 0.1 10E9/L (ref 0–0.7)
EOSINOPHIL NFR BLD AUTO: 0 %
EOSINOPHIL NFR BLD AUTO: 1 %
EOSINOPHIL NFR BLD AUTO: 1 %
EOSINOPHIL NFR BLD AUTO: 2.8 %
ERYTHROCYTE [DISTWIDTH] IN BLOOD BY AUTOMATED COUNT: 16.5 % (ref 10–15)
ERYTHROCYTE [DISTWIDTH] IN BLOOD BY AUTOMATED COUNT: 16.9 % (ref 10–15)
ERYTHROCYTE [DISTWIDTH] IN BLOOD BY AUTOMATED COUNT: 18.6 % (ref 10–15)
ERYTHROCYTE [DISTWIDTH] IN BLOOD BY AUTOMATED COUNT: 18.8 % (ref 10–15)
ERYTHROCYTE [DISTWIDTH] IN BLOOD BY AUTOMATED COUNT: 21 % (ref 10–15)
ERYTHROCYTE [DISTWIDTH] IN BLOOD BY AUTOMATED COUNT: 21 % (ref 10–15)
ERYTHROCYTE [DISTWIDTH] IN BLOOD BY AUTOMATED COUNT: 21.6 % (ref 10–15)
ERYTHROCYTE [DISTWIDTH] IN BLOOD BY AUTOMATED COUNT: 21.6 % (ref 10–15)
ERYTHROCYTE [DISTWIDTH] IN BLOOD BY AUTOMATED COUNT: 21.7 % (ref 10–15)
ERYTHROCYTE [DISTWIDTH] IN BLOOD BY AUTOMATED COUNT: 21.8 % (ref 10–15)
ERYTHROCYTE [DISTWIDTH] IN BLOOD BY AUTOMATED COUNT: 21.9 % (ref 10–15)
ERYTHROCYTE [DISTWIDTH] IN BLOOD BY AUTOMATED COUNT: 21.9 % (ref 10–15)
ERYTHROCYTE [DISTWIDTH] IN BLOOD BY AUTOMATED COUNT: 22 % (ref 10–15)
ERYTHROCYTE [DISTWIDTH] IN BLOOD BY AUTOMATED COUNT: 22 % (ref 10–15)
ERYTHROCYTE [DISTWIDTH] IN BLOOD BY AUTOMATED COUNT: 22.3 % (ref 10–15)
ERYTHROCYTE [DISTWIDTH] IN BLOOD BY AUTOMATED COUNT: 22.7 % (ref 10–15)
ERYTHROCYTE [DISTWIDTH] IN BLOOD BY AUTOMATED COUNT: 23.5 % (ref 10–15)
ERYTHROCYTE [DISTWIDTH] IN BLOOD BY AUTOMATED COUNT: 24 % (ref 10–15)
ERYTHROCYTE [DISTWIDTH] IN BLOOD BY AUTOMATED COUNT: 24.6 % (ref 10–15)
ERYTHROCYTE [DISTWIDTH] IN BLOOD BY AUTOMATED COUNT: 25.1 % (ref 10–15)
ERYTHROCYTE [DISTWIDTH] IN BLOOD BY AUTOMATED COUNT: 26.6 % (ref 10–15)
ETHANOL SERPL-MCNC: 0.01 G/DL
ETHANOL SERPL-MCNC: <0.01 G/DL
FLUAV H1 2009 PAND RNA SPEC QL NAA+PROBE: NEGATIVE
FLUAV H1 RNA SPEC QL NAA+PROBE: NEGATIVE
FLUAV H3 RNA SPEC QL NAA+PROBE: NEGATIVE
FLUAV RNA SPEC QL NAA+PROBE: NEGATIVE
FLUBV RNA SPEC QL NAA+PROBE: NEGATIVE
GFR SERPL CREATININE-BSD FRML MDRD: 37 ML/MIN/1.7M2
GFR SERPL CREATININE-BSD FRML MDRD: 87 ML/MIN/1.7M2
GFR SERPL CREATININE-BSD FRML MDRD: >90 ML/MIN/1.7M2
GFR SERPL CREATININE-BSD FRML MDRD: NORMAL ML/MIN/1.7M2
GLUCOSE BLD-MCNC: NORMAL MG/DL (ref 70–99)
GLUCOSE BLDC GLUCOMTR-MCNC: 106 MG/DL (ref 70–99)
GLUCOSE BLDC GLUCOMTR-MCNC: 111 MG/DL (ref 70–99)
GLUCOSE BLDC GLUCOMTR-MCNC: 117 MG/DL (ref 70–99)
GLUCOSE BLDC GLUCOMTR-MCNC: 120 MG/DL (ref 70–99)
GLUCOSE BLDC GLUCOMTR-MCNC: 121 MG/DL (ref 70–99)
GLUCOSE BLDC GLUCOMTR-MCNC: 123 MG/DL (ref 70–99)
GLUCOSE BLDC GLUCOMTR-MCNC: 124 MG/DL (ref 70–99)
GLUCOSE BLDC GLUCOMTR-MCNC: 124 MG/DL (ref 70–99)
GLUCOSE BLDC GLUCOMTR-MCNC: 125 MG/DL (ref 70–99)
GLUCOSE BLDC GLUCOMTR-MCNC: 127 MG/DL (ref 70–99)
GLUCOSE BLDC GLUCOMTR-MCNC: 129 MG/DL (ref 70–99)
GLUCOSE BLDC GLUCOMTR-MCNC: 130 MG/DL (ref 70–99)
GLUCOSE BLDC GLUCOMTR-MCNC: 132 MG/DL (ref 70–99)
GLUCOSE BLDC GLUCOMTR-MCNC: 133 MG/DL (ref 70–99)
GLUCOSE BLDC GLUCOMTR-MCNC: 133 MG/DL (ref 70–99)
GLUCOSE BLDC GLUCOMTR-MCNC: 134 MG/DL (ref 70–99)
GLUCOSE BLDC GLUCOMTR-MCNC: 136 MG/DL (ref 70–99)
GLUCOSE BLDC GLUCOMTR-MCNC: 136 MG/DL (ref 70–99)
GLUCOSE BLDC GLUCOMTR-MCNC: 137 MG/DL (ref 70–99)
GLUCOSE BLDC GLUCOMTR-MCNC: 137 MG/DL (ref 70–99)
GLUCOSE BLDC GLUCOMTR-MCNC: 138 MG/DL (ref 70–99)
GLUCOSE BLDC GLUCOMTR-MCNC: 141 MG/DL (ref 70–99)
GLUCOSE BLDC GLUCOMTR-MCNC: 142 MG/DL (ref 70–99)
GLUCOSE BLDC GLUCOMTR-MCNC: 143 MG/DL (ref 70–99)
GLUCOSE BLDC GLUCOMTR-MCNC: 143 MG/DL (ref 70–99)
GLUCOSE BLDC GLUCOMTR-MCNC: 145 MG/DL (ref 70–99)
GLUCOSE BLDC GLUCOMTR-MCNC: 146 MG/DL (ref 70–99)
GLUCOSE BLDC GLUCOMTR-MCNC: 148 MG/DL (ref 70–99)
GLUCOSE BLDC GLUCOMTR-MCNC: 149 MG/DL (ref 70–99)
GLUCOSE BLDC GLUCOMTR-MCNC: 152 MG/DL (ref 70–99)
GLUCOSE BLDC GLUCOMTR-MCNC: 153 MG/DL (ref 70–99)
GLUCOSE BLDC GLUCOMTR-MCNC: 155 MG/DL (ref 70–99)
GLUCOSE BLDC GLUCOMTR-MCNC: 156 MG/DL (ref 70–99)
GLUCOSE BLDC GLUCOMTR-MCNC: 157 MG/DL (ref 70–99)
GLUCOSE BLDC GLUCOMTR-MCNC: 157 MG/DL (ref 70–99)
GLUCOSE BLDC GLUCOMTR-MCNC: 158 MG/DL (ref 70–99)
GLUCOSE BLDC GLUCOMTR-MCNC: 158 MG/DL (ref 70–99)
GLUCOSE BLDC GLUCOMTR-MCNC: 160 MG/DL (ref 70–99)
GLUCOSE BLDC GLUCOMTR-MCNC: 161 MG/DL (ref 70–99)
GLUCOSE BLDC GLUCOMTR-MCNC: 161 MG/DL (ref 70–99)
GLUCOSE BLDC GLUCOMTR-MCNC: 167 MG/DL (ref 70–99)
GLUCOSE BLDC GLUCOMTR-MCNC: 169 MG/DL (ref 70–99)
GLUCOSE BLDC GLUCOMTR-MCNC: 169 MG/DL (ref 70–99)
GLUCOSE BLDC GLUCOMTR-MCNC: 170 MG/DL (ref 70–99)
GLUCOSE BLDC GLUCOMTR-MCNC: 171 MG/DL (ref 70–99)
GLUCOSE BLDC GLUCOMTR-MCNC: 173 MG/DL (ref 70–99)
GLUCOSE BLDC GLUCOMTR-MCNC: 174 MG/DL (ref 70–99)
GLUCOSE BLDC GLUCOMTR-MCNC: 177 MG/DL (ref 70–99)
GLUCOSE BLDC GLUCOMTR-MCNC: 178 MG/DL (ref 70–99)
GLUCOSE BLDC GLUCOMTR-MCNC: 180 MG/DL (ref 70–99)
GLUCOSE BLDC GLUCOMTR-MCNC: 181 MG/DL (ref 70–99)
GLUCOSE BLDC GLUCOMTR-MCNC: 183 MG/DL (ref 70–99)
GLUCOSE BLDC GLUCOMTR-MCNC: 185 MG/DL (ref 70–99)
GLUCOSE BLDC GLUCOMTR-MCNC: 185 MG/DL (ref 70–99)
GLUCOSE BLDC GLUCOMTR-MCNC: 186 MG/DL (ref 70–99)
GLUCOSE BLDC GLUCOMTR-MCNC: 187 MG/DL (ref 70–99)
GLUCOSE BLDC GLUCOMTR-MCNC: 188 MG/DL (ref 70–99)
GLUCOSE BLDC GLUCOMTR-MCNC: 189 MG/DL (ref 70–99)
GLUCOSE BLDC GLUCOMTR-MCNC: 190 MG/DL (ref 70–99)
GLUCOSE BLDC GLUCOMTR-MCNC: 191 MG/DL (ref 70–99)
GLUCOSE BLDC GLUCOMTR-MCNC: 192 MG/DL (ref 70–99)
GLUCOSE BLDC GLUCOMTR-MCNC: 193 MG/DL (ref 70–99)
GLUCOSE BLDC GLUCOMTR-MCNC: 194 MG/DL (ref 70–99)
GLUCOSE BLDC GLUCOMTR-MCNC: 195 MG/DL (ref 70–99)
GLUCOSE BLDC GLUCOMTR-MCNC: 196 MG/DL (ref 70–99)
GLUCOSE BLDC GLUCOMTR-MCNC: 196 MG/DL (ref 70–99)
GLUCOSE BLDC GLUCOMTR-MCNC: 197 MG/DL (ref 70–99)
GLUCOSE BLDC GLUCOMTR-MCNC: 198 MG/DL (ref 70–99)
GLUCOSE BLDC GLUCOMTR-MCNC: 198 MG/DL (ref 70–99)
GLUCOSE BLDC GLUCOMTR-MCNC: 200 MG/DL (ref 70–99)
GLUCOSE BLDC GLUCOMTR-MCNC: 202 MG/DL (ref 70–99)
GLUCOSE BLDC GLUCOMTR-MCNC: 202 MG/DL (ref 70–99)
GLUCOSE BLDC GLUCOMTR-MCNC: 204 MG/DL (ref 70–99)
GLUCOSE BLDC GLUCOMTR-MCNC: 204 MG/DL (ref 70–99)
GLUCOSE BLDC GLUCOMTR-MCNC: 205 MG/DL (ref 70–99)
GLUCOSE BLDC GLUCOMTR-MCNC: 205 MG/DL (ref 70–99)
GLUCOSE BLDC GLUCOMTR-MCNC: 206 MG/DL (ref 70–99)
GLUCOSE BLDC GLUCOMTR-MCNC: 208 MG/DL (ref 70–99)
GLUCOSE BLDC GLUCOMTR-MCNC: 211 MG/DL (ref 70–99)
GLUCOSE BLDC GLUCOMTR-MCNC: 212 MG/DL (ref 70–99)
GLUCOSE BLDC GLUCOMTR-MCNC: 213 MG/DL (ref 70–99)
GLUCOSE BLDC GLUCOMTR-MCNC: 214 MG/DL (ref 70–99)
GLUCOSE BLDC GLUCOMTR-MCNC: 214 MG/DL (ref 70–99)
GLUCOSE BLDC GLUCOMTR-MCNC: 216 MG/DL (ref 70–99)
GLUCOSE BLDC GLUCOMTR-MCNC: 217 MG/DL (ref 70–99)
GLUCOSE BLDC GLUCOMTR-MCNC: 219 MG/DL (ref 70–99)
GLUCOSE BLDC GLUCOMTR-MCNC: 228 MG/DL (ref 70–99)
GLUCOSE BLDC GLUCOMTR-MCNC: 232 MG/DL (ref 70–99)
GLUCOSE BLDC GLUCOMTR-MCNC: 233 MG/DL (ref 70–99)
GLUCOSE BLDC GLUCOMTR-MCNC: 237 MG/DL (ref 70–99)
GLUCOSE BLDC GLUCOMTR-MCNC: 239 MG/DL (ref 70–99)
GLUCOSE BLDC GLUCOMTR-MCNC: 241 MG/DL (ref 70–99)
GLUCOSE BLDC GLUCOMTR-MCNC: 245 MG/DL (ref 70–99)
GLUCOSE BLDC GLUCOMTR-MCNC: 34 MG/DL (ref 70–99)
GLUCOSE BLDC GLUCOMTR-MCNC: 59 MG/DL (ref 70–99)
GLUCOSE BLDC GLUCOMTR-MCNC: 65 MG/DL (ref 70–99)
GLUCOSE BLDC GLUCOMTR-MCNC: 77 MG/DL (ref 70–99)
GLUCOSE BLDC GLUCOMTR-MCNC: 78 MG/DL (ref 70–99)
GLUCOSE BLDC GLUCOMTR-MCNC: 78 MG/DL (ref 70–99)
GLUCOSE BLDC GLUCOMTR-MCNC: 79 MG/DL (ref 70–99)
GLUCOSE BLDC GLUCOMTR-MCNC: 82 MG/DL (ref 70–99)
GLUCOSE BLDC GLUCOMTR-MCNC: 82 MG/DL (ref 70–99)
GLUCOSE BLDC GLUCOMTR-MCNC: 90 MG/DL (ref 70–99)
GLUCOSE SERPL-MCNC: 103 MG/DL (ref 70–99)
GLUCOSE SERPL-MCNC: 114 MG/DL (ref 70–99)
GLUCOSE SERPL-MCNC: 123 MG/DL (ref 70–99)
GLUCOSE SERPL-MCNC: 124 MG/DL (ref 70–99)
GLUCOSE SERPL-MCNC: 125 MG/DL (ref 70–99)
GLUCOSE SERPL-MCNC: 128 MG/DL (ref 70–99)
GLUCOSE SERPL-MCNC: 139 MG/DL (ref 70–99)
GLUCOSE SERPL-MCNC: 141 MG/DL (ref 70–99)
GLUCOSE SERPL-MCNC: 142 MG/DL (ref 70–99)
GLUCOSE SERPL-MCNC: 156 MG/DL (ref 70–99)
GLUCOSE SERPL-MCNC: 194 MG/DL (ref 70–99)
GLUCOSE SERPL-MCNC: 199 MG/DL (ref 70–99)
GLUCOSE SERPL-MCNC: 205 MG/DL (ref 70–99)
GLUCOSE SERPL-MCNC: 210 MG/DL (ref 70–99)
GLUCOSE SERPL-MCNC: 212 MG/DL (ref 70–99)
GLUCOSE SERPL-MCNC: 213 MG/DL (ref 70–99)
GLUCOSE SERPL-MCNC: 230 MG/DL (ref 70–99)
GLUCOSE SERPL-MCNC: 275 MG/DL (ref 70–99)
GLUCOSE SERPL-MCNC: 80 MG/DL (ref 70–99)
GLUCOSE SERPL-MCNC: 81 MG/DL (ref 70–99)
GLUCOSE SERPL-MCNC: 83 MG/DL (ref 70–99)
GLUCOSE UR STRIP-MCNC: NEGATIVE MG/DL
GRAM STN SPEC: NORMAL
HADV DNA SPEC QL NAA+PROBE: NEGATIVE
HADV DNA SPEC QL NAA+PROBE: NEGATIVE
HBA1C MFR BLD: 5 % (ref 0–5.6)
HCO3 BLD-SCNC: 27 MMOL/L (ref 21–28)
HCO3 BLD-SCNC: 28 MMOL/L (ref 21–28)
HCO3 BLD-SCNC: 29 MMOL/L (ref 21–28)
HCO3 BLD-SCNC: 31 MMOL/L (ref 21–28)
HCO3 BLD-SCNC: 34 MMOL/L (ref 21–28)
HCO3 BLD-SCNC: 40 MMOL/L (ref 21–28)
HCO3 BLDV-SCNC: 28 MMOL/L (ref 21–28)
HCO3 BLDV-SCNC: 31 MMOL/L (ref 21–28)
HCT VFR BLD AUTO: 20.6 % (ref 35–47)
HCT VFR BLD AUTO: 21.1 % (ref 35–47)
HCT VFR BLD AUTO: 21.7 % (ref 35–47)
HCT VFR BLD AUTO: 21.8 % (ref 35–47)
HCT VFR BLD AUTO: 23.4 % (ref 35–47)
HCT VFR BLD AUTO: 24.2 % (ref 35–47)
HCT VFR BLD AUTO: 24.4 % (ref 35–47)
HCT VFR BLD AUTO: 24.7 % (ref 35–47)
HCT VFR BLD AUTO: 24.8 % (ref 35–47)
HCT VFR BLD AUTO: 25.2 % (ref 35–47)
HCT VFR BLD AUTO: 26.1 % (ref 35–47)
HCT VFR BLD AUTO: 26.2 % (ref 35–47)
HCT VFR BLD AUTO: 26.4 % (ref 35–47)
HCT VFR BLD AUTO: 26.8 % (ref 35–47)
HCT VFR BLD AUTO: 27.1 % (ref 35–47)
HCT VFR BLD AUTO: 27.4 % (ref 35–47)
HCT VFR BLD AUTO: 27.5 % (ref 35–47)
HCT VFR BLD AUTO: 28.9 % (ref 35–47)
HCT VFR BLD AUTO: 34.6 % (ref 35–47)
HCT VFR BLD CALC: NORMAL %PCV (ref 35–47)
HGB BLD CALC-MCNC: NORMAL G/DL (ref 11.7–15.7)
HGB BLD-MCNC: 11.4 G/DL (ref 11.7–15.7)
HGB BLD-MCNC: 6.5 G/DL (ref 11.7–15.7)
HGB BLD-MCNC: 6.7 G/DL (ref 11.7–15.7)
HGB BLD-MCNC: 6.9 G/DL (ref 11.7–15.7)
HGB BLD-MCNC: 7 G/DL (ref 11.7–15.7)
HGB BLD-MCNC: 7.1 G/DL (ref 11.7–15.7)
HGB BLD-MCNC: 7.2 G/DL (ref 11.7–15.7)
HGB BLD-MCNC: 7.3 G/DL (ref 11.7–15.7)
HGB BLD-MCNC: 7.6 G/DL (ref 11.7–15.7)
HGB BLD-MCNC: 7.8 G/DL (ref 11.7–15.7)
HGB BLD-MCNC: 7.9 G/DL (ref 11.7–15.7)
HGB BLD-MCNC: 8 G/DL (ref 11.7–15.7)
HGB BLD-MCNC: 8.1 G/DL (ref 11.7–15.7)
HGB BLD-MCNC: 8.3 G/DL (ref 11.7–15.7)
HGB BLD-MCNC: 8.4 G/DL (ref 11.7–15.7)
HGB BLD-MCNC: 8.7 G/DL (ref 11.7–15.7)
HGB BLD-MCNC: 8.8 G/DL (ref 11.7–15.7)
HGB BLD-MCNC: 8.9 G/DL (ref 11.7–15.7)
HGB UR QL STRIP: NEGATIVE
HMPV RNA SPEC QL NAA+PROBE: NEGATIVE
HPIV1 RNA SPEC QL NAA+PROBE: NEGATIVE
HPIV2 RNA SPEC QL NAA+PROBE: NEGATIVE
HPIV3 RNA SPEC QL NAA+PROBE: NEGATIVE
IMM GRANULOCYTES # BLD: 0 10E9/L (ref 0–0.4)
IMM GRANULOCYTES # BLD: 0.3 10E9/L (ref 0–0.4)
IMM GRANULOCYTES NFR BLD: 0.3 %
IMM GRANULOCYTES NFR BLD: 2.4 %
INR PPP: 0.92 (ref 0.86–1.14)
INR PPP: 1.15 (ref 0.86–1.14)
INR PPP: 1.23 (ref 0.86–1.14)
INR PPP: 1.27 (ref 0.86–1.14)
INTERPRETATION ECG - MUSE: NORMAL
INTERPRETATION ECG - MUSE: NORMAL
KETONES UR STRIP-MCNC: 5 MG/DL
KETONES UR STRIP-MCNC: NEGATIVE MG/DL
KOH PREP SPEC: NORMAL
LACTATE BLD-SCNC: 0.9 MMOL/L (ref 0.7–2)
LACTATE BLD-SCNC: 1 MMOL/L (ref 0.7–2)
LACTATE BLD-SCNC: 1.1 MMOL/L (ref 0.7–2)
LACTATE BLD-SCNC: 1.6 MMOL/L (ref 0.7–2)
LACTATE BLD-SCNC: 1.6 MMOL/L (ref 0.7–2)
LACTATE BLD-SCNC: 14.5 MMOL/L (ref 0.7–2)
LACTATE BLD-SCNC: 3.8 MMOL/L (ref 0.7–2)
LACTATE BLD-SCNC: 7.6 MMOL/L (ref 0.7–2)
LDH SERPL L TO P-CCNC: 398 U/L (ref 81–234)
LEUKOCYTE ESTERASE UR QL STRIP: ABNORMAL
LEUKOCYTE ESTERASE UR QL STRIP: NEGATIVE
LIPASE SERPL-CCNC: 144 U/L (ref 73–393)
LYMPHOCYTES # BLD AUTO: 0.1 10E9/L (ref 0.8–5.3)
LYMPHOCYTES # BLD AUTO: 0.2 10E9/L (ref 0.8–5.3)
LYMPHOCYTES # BLD AUTO: 0.3 10E9/L (ref 0.8–5.3)
LYMPHOCYTES # BLD AUTO: 0.4 10E9/L (ref 0.8–5.3)
LYMPHOCYTES # BLD AUTO: 0.6 10E9/L (ref 0.8–5.3)
LYMPHOCYTES # BLD AUTO: 0.6 10E9/L (ref 0.8–5.3)
LYMPHOCYTES # BLD AUTO: 0.7 10E9/L (ref 0.8–5.3)
LYMPHOCYTES NFR BLD AUTO: 1 %
LYMPHOCYTES NFR BLD AUTO: 1 %
LYMPHOCYTES NFR BLD AUTO: 10 %
LYMPHOCYTES NFR BLD AUTO: 14 %
LYMPHOCYTES NFR BLD AUTO: 3.8 %
LYMPHOCYTES NFR BLD AUTO: 37 %
LYMPHOCYTES NFR BLD AUTO: 9 %
Lab: ABNORMAL
Lab: NORMAL
MACROCYTES BLD QL SMEAR: PRESENT
MAGNESIUM SERPL-MCNC: 1.4 MG/DL (ref 1.6–2.3)
MAGNESIUM SERPL-MCNC: 1.5 MG/DL (ref 1.6–2.3)
MAGNESIUM SERPL-MCNC: 1.6 MG/DL (ref 1.6–2.3)
MAGNESIUM SERPL-MCNC: 1.7 MG/DL (ref 1.6–2.3)
MAGNESIUM SERPL-MCNC: 1.8 MG/DL (ref 1.6–2.3)
MAGNESIUM SERPL-MCNC: 1.9 MG/DL (ref 1.6–2.3)
MAGNESIUM SERPL-MCNC: 2 MG/DL (ref 1.6–2.3)
MAGNESIUM SERPL-MCNC: 2.1 MG/DL (ref 1.6–2.3)
MAGNESIUM SERPL-MCNC: 2.2 MG/DL (ref 1.6–2.3)
MAGNESIUM SERPL-MCNC: 2.3 MG/DL (ref 1.6–2.3)
MAGNESIUM SERPL-MCNC: 2.3 MG/DL (ref 1.6–2.3)
MAGNESIUM SERPL-MCNC: 2.6 MG/DL (ref 1.6–2.3)
MAGNESIUM SERPL-MCNC: 2.6 MG/DL (ref 1.6–2.3)
MCH RBC QN AUTO: 29.3 PG (ref 26.5–33)
MCH RBC QN AUTO: 29.6 PG (ref 26.5–33)
MCH RBC QN AUTO: 29.7 PG (ref 26.5–33)
MCH RBC QN AUTO: 29.8 PG (ref 26.5–33)
MCH RBC QN AUTO: 29.8 PG (ref 26.5–33)
MCH RBC QN AUTO: 29.9 PG (ref 26.5–33)
MCH RBC QN AUTO: 29.9 PG (ref 26.5–33)
MCH RBC QN AUTO: 30 PG (ref 26.5–33)
MCH RBC QN AUTO: 30.1 PG (ref 26.5–33)
MCH RBC QN AUTO: 30.2 PG (ref 26.5–33)
MCH RBC QN AUTO: 30.3 PG (ref 26.5–33)
MCH RBC QN AUTO: 30.4 PG (ref 26.5–33)
MCH RBC QN AUTO: 30.5 PG (ref 26.5–33)
MCH RBC QN AUTO: 32 PG (ref 26.5–33)
MCH RBC QN AUTO: 32.2 PG (ref 26.5–33)
MCH RBC QN AUTO: 32.4 PG (ref 26.5–33)
MCH RBC QN AUTO: 32.8 PG (ref 26.5–33)
MCHC RBC AUTO-ENTMCNC: 28.7 G/DL (ref 31.5–36.5)
MCHC RBC AUTO-ENTMCNC: 29.9 G/DL (ref 31.5–36.5)
MCHC RBC AUTO-ENTMCNC: 30.5 G/DL (ref 31.5–36.5)
MCHC RBC AUTO-ENTMCNC: 30.7 G/DL (ref 31.5–36.5)
MCHC RBC AUTO-ENTMCNC: 30.8 G/DL (ref 31.5–36.5)
MCHC RBC AUTO-ENTMCNC: 31 G/DL (ref 31.5–36.5)
MCHC RBC AUTO-ENTMCNC: 31.3 G/DL (ref 31.5–36.5)
MCHC RBC AUTO-ENTMCNC: 31.4 G/DL (ref 31.5–36.5)
MCHC RBC AUTO-ENTMCNC: 31.8 G/DL (ref 31.5–36.5)
MCHC RBC AUTO-ENTMCNC: 32 G/DL (ref 31.5–36.5)
MCHC RBC AUTO-ENTMCNC: 32 G/DL (ref 31.5–36.5)
MCHC RBC AUTO-ENTMCNC: 32.1 G/DL (ref 31.5–36.5)
MCHC RBC AUTO-ENTMCNC: 32.2 G/DL (ref 31.5–36.5)
MCHC RBC AUTO-ENTMCNC: 32.3 G/DL (ref 31.5–36.5)
MCHC RBC AUTO-ENTMCNC: 32.5 G/DL (ref 31.5–36.5)
MCHC RBC AUTO-ENTMCNC: 32.5 G/DL (ref 31.5–36.5)
MCHC RBC AUTO-ENTMCNC: 32.8 G/DL (ref 31.5–36.5)
MCHC RBC AUTO-ENTMCNC: 32.9 G/DL (ref 31.5–36.5)
MCHC RBC AUTO-ENTMCNC: 33.2 G/DL (ref 31.5–36.5)
MCV RBC AUTO: 100 FL (ref 78–100)
MCV RBC AUTO: 101 FL (ref 78–100)
MCV RBC AUTO: 104 FL (ref 78–100)
MCV RBC AUTO: 92 FL (ref 78–100)
MCV RBC AUTO: 93 FL (ref 78–100)
MCV RBC AUTO: 94 FL (ref 78–100)
MCV RBC AUTO: 95 FL (ref 78–100)
MCV RBC AUTO: 96 FL (ref 78–100)
MCV RBC AUTO: 97 FL (ref 78–100)
MCV RBC AUTO: 97 FL (ref 78–100)
MCV RBC AUTO: 98 FL (ref 78–100)
MCV RBC AUTO: 98 FL (ref 78–100)
MCV RBC AUTO: 99 FL (ref 78–100)
MCV RBC AUTO: 99 FL (ref 78–100)
METAMYELOCYTES # BLD: 0.1 10E9/L
METAMYELOCYTES # BLD: 0.5 10E9/L
METAMYELOCYTES # BLD: 1.6 10E9/L
METAMYELOCYTES NFR BLD MANUAL: 5 %
METAMYELOCYTES NFR BLD MANUAL: 7 %
METAMYELOCYTES NFR BLD MANUAL: 7 %
MICROBIOLOGIST REVIEW: NORMAL
MICROCYTES BLD QL SMEAR: PRESENT
MONOCYTES # BLD AUTO: 0 10E9/L (ref 0–1.3)
MONOCYTES # BLD AUTO: 0 10E9/L (ref 0–1.3)
MONOCYTES # BLD AUTO: 0.3 10E9/L (ref 0–1.3)
MONOCYTES # BLD AUTO: 0.4 10E9/L (ref 0–1.3)
MONOCYTES # BLD AUTO: 0.6 10E9/L (ref 0–1.3)
MONOCYTES # BLD AUTO: 0.7 10E9/L (ref 0–1.3)
MONOCYTES # BLD AUTO: 0.8 10E9/L (ref 0–1.3)
MONOCYTES NFR BLD AUTO: 0 %
MONOCYTES NFR BLD AUTO: 14.7 %
MONOCYTES NFR BLD AUTO: 16 %
MONOCYTES NFR BLD AUTO: 2 %
MONOCYTES NFR BLD AUTO: 3 %
MONOCYTES NFR BLD AUTO: 7.7 %
MONOCYTES NFR BLD AUTO: 9 %
MRSA DNA SPEC QL NAA+PROBE: NEGATIVE
MUCOUS THREADS #/AREA URNS LPF: PRESENT /LPF
MUCOUS THREADS #/AREA URNS LPF: PRESENT /LPF
MYELOCYTES # BLD: 0.1 10E9/L
MYELOCYTES NFR BLD MANUAL: 2 %
NEUTROPHILS # BLD AUTO: 0.4 10E9/L (ref 1.6–8.3)
NEUTROPHILS # BLD AUTO: 0.7 10E9/L (ref 1.6–8.3)
NEUTROPHILS # BLD AUTO: 17.2 10E9/L (ref 1.6–8.3)
NEUTROPHILS # BLD AUTO: 2.7 10E9/L (ref 1.6–8.3)
NEUTROPHILS # BLD AUTO: 29.8 10E9/L (ref 1.6–8.3)
NEUTROPHILS # BLD AUTO: 5.3 10E9/L (ref 1.6–8.3)
NEUTROPHILS # BLD AUTO: 8.8 10E9/L (ref 1.6–8.3)
NEUTROPHILS NFR BLD AUTO: 40 %
NEUTROPHILS NFR BLD AUTO: 67.4 %
NEUTROPHILS NFR BLD AUTO: 72 %
NEUTROPHILS NFR BLD AUTO: 84.3 %
NEUTROPHILS NFR BLD AUTO: 86 %
NEUTROPHILS NFR BLD AUTO: 94 %
NEUTROPHILS NFR BLD AUTO: 97 %
NITRATE UR QL: NEGATIVE
NITRATE UR QL: POSITIVE
NRBC # BLD AUTO: 0 10*3/UL
NRBC # BLD AUTO: 0 10*3/UL
NRBC # BLD AUTO: 0.1 10*3/UL
NRBC # BLD AUTO: 0.1 10*3/UL
NRBC # BLD AUTO: 0.3 10*3/UL
NRBC # BLD AUTO: 0.3 10*3/UL
NRBC BLD AUTO-RTO: 0 /100
NRBC BLD AUTO-RTO: 1 /100
NRBC BLD AUTO-RTO: 4 /100
NRBC BLD AUTO-RTO: 5 /100
NUM BPU REQUESTED: 1
NUM BPU REQUESTED: 2
O2/TOTAL GAS SETTING VFR VENT: ABNORMAL %
OVALOCYTES BLD QL SMEAR: SLIGHT
OXYHGB MFR BLD: 49 % (ref 92–100)
OXYHGB MFR BLD: 93 % (ref 92–100)
OXYHGB MFR BLD: 94 % (ref 92–100)
OXYHGB MFR BLD: 95 % (ref 92–100)
OXYHGB MFR BLD: 98 % (ref 92–100)
OXYHGB MFR BLD: 98 % (ref 92–100)
OXYHGB MFR BLDV: 73 %
OXYHGB MFR BLDV: 75 %
OXYHGB MFR BLDV: 75 %
OXYHGB MFR BLDV: 82 %
PCO2 BLD: 38 MM HG (ref 35–45)
PCO2 BLD: 40 MM HG (ref 35–45)
PCO2 BLD: 48 MM HG (ref 35–45)
PCO2 BLD: 51 MM HG (ref 35–45)
PCO2 BLD: 57 MM HG (ref 35–45)
PCO2 BLD: 57 MM HG (ref 35–45)
PCO2 BLDV: 40 MM HG (ref 40–50)
PCO2 BLDV: 43 MM HG (ref 40–50)
PCO2 BLDV: 45 MM HG (ref 40–50)
PCO2 BLDV: 63 MM HG (ref 40–50)
PH BLD: 7.34 PH (ref 7.35–7.45)
PH BLD: 7.36 PH (ref 7.35–7.45)
PH BLD: 7.45 PH (ref 7.35–7.45)
PH BLD: 7.46 PH (ref 7.35–7.45)
PH BLD: 7.46 PH (ref 7.35–7.45)
PH BLD: 7.48 PH (ref 7.35–7.45)
PH BLDV: 7.3 PH (ref 7.32–7.43)
PH BLDV: 7.45 PH (ref 7.32–7.43)
PH BLDV: 7.45 PH (ref 7.32–7.43)
PH BLDV: 7.47 PH (ref 7.32–7.43)
PH UR STRIP: 5 PH (ref 5–7)
PH UR STRIP: 5 PH (ref 5–7)
PH UR STRIP: 7 PH (ref 5–7)
PH UR STRIP: 7 PH (ref 5–7)
PHOSPHATE SERPL-MCNC: 1.3 MG/DL (ref 2.5–4.5)
PHOSPHATE SERPL-MCNC: 1.5 MG/DL (ref 2.5–4.5)
PHOSPHATE SERPL-MCNC: 1.6 MG/DL (ref 2.5–4.5)
PHOSPHATE SERPL-MCNC: 1.7 MG/DL (ref 2.5–4.5)
PHOSPHATE SERPL-MCNC: 1.8 MG/DL (ref 2.5–4.5)
PHOSPHATE SERPL-MCNC: 1.9 MG/DL (ref 2.5–4.5)
PHOSPHATE SERPL-MCNC: 1.9 MG/DL (ref 2.5–4.5)
PHOSPHATE SERPL-MCNC: 2 MG/DL (ref 2.5–4.5)
PHOSPHATE SERPL-MCNC: 2 MG/DL (ref 2.5–4.5)
PHOSPHATE SERPL-MCNC: 2.1 MG/DL (ref 2.5–4.5)
PHOSPHATE SERPL-MCNC: 2.1 MG/DL (ref 2.5–4.5)
PHOSPHATE SERPL-MCNC: 2.2 MG/DL (ref 2.5–4.5)
PHOSPHATE SERPL-MCNC: 2.3 MG/DL (ref 2.5–4.5)
PHOSPHATE SERPL-MCNC: 2.3 MG/DL (ref 2.5–4.5)
PHOSPHATE SERPL-MCNC: 2.4 MG/DL (ref 2.5–4.5)
PHOSPHATE SERPL-MCNC: 2.4 MG/DL (ref 2.5–4.5)
PHOSPHATE SERPL-MCNC: 2.6 MG/DL (ref 2.5–4.5)
PHOSPHATE SERPL-MCNC: 3.2 MG/DL (ref 2.5–4.5)
PHOSPHATE SERPL-MCNC: 3.4 MG/DL (ref 2.5–4.5)
PHOSPHATE SERPL-MCNC: 3.9 MG/DL (ref 2.5–4.5)
PLATELET # BLD AUTO: 103 10E9/L (ref 150–450)
PLATELET # BLD AUTO: 105 10E9/L (ref 150–450)
PLATELET # BLD AUTO: 107 10E9/L (ref 150–450)
PLATELET # BLD AUTO: 108 10E9/L (ref 150–450)
PLATELET # BLD AUTO: 115 10E9/L (ref 150–450)
PLATELET # BLD AUTO: 124 10E9/L (ref 150–450)
PLATELET # BLD AUTO: 127 10E9/L (ref 150–450)
PLATELET # BLD AUTO: 142 10E9/L (ref 150–450)
PLATELET # BLD AUTO: 149 10E9/L (ref 150–450)
PLATELET # BLD AUTO: 171 10E9/L (ref 150–450)
PLATELET # BLD AUTO: 179 10E9/L (ref 150–450)
PLATELET # BLD AUTO: 187 10E9/L (ref 150–450)
PLATELET # BLD AUTO: 188 10E9/L (ref 150–450)
PLATELET # BLD AUTO: 195 10E9/L (ref 150–450)
PLATELET # BLD AUTO: 196 10E9/L (ref 150–450)
PLATELET # BLD AUTO: 209 10E9/L (ref 150–450)
PLATELET # BLD AUTO: 63 10E9/L (ref 150–450)
PLATELET # BLD AUTO: 63 10E9/L (ref 150–450)
PLATELET # BLD AUTO: 71 10E9/L (ref 150–450)
PLATELET # BLD AUTO: 86 10E9/L (ref 150–450)
PLATELET # BLD AUTO: 96 10E9/L (ref 150–450)
PLATELET # BLD EST: ABNORMAL 10*3/UL
PO2 BLD: 117 MM HG (ref 80–105)
PO2 BLD: 27 MM HG (ref 80–105)
PO2 BLD: 77 MM HG (ref 80–105)
PO2 BLD: 79 MM HG (ref 80–105)
PO2 BLD: 83 MM HG (ref 80–105)
PO2 BLD: 95 MM HG (ref 80–105)
PO2 BLDV: 41 MM HG (ref 25–47)
PO2 BLDV: 55 MM HG (ref 25–47)
POTASSIUM BLD-SCNC: NORMAL MMOL/L (ref 3.4–5.3)
POTASSIUM SERPL-SCNC: 2.5 MMOL/L (ref 3.4–5.3)
POTASSIUM SERPL-SCNC: 2.6 MMOL/L (ref 3.4–5.3)
POTASSIUM SERPL-SCNC: 2.7 MMOL/L (ref 3.4–5.3)
POTASSIUM SERPL-SCNC: 2.7 MMOL/L (ref 3.4–5.3)
POTASSIUM SERPL-SCNC: 2.8 MMOL/L (ref 3.4–5.3)
POTASSIUM SERPL-SCNC: 2.9 MMOL/L (ref 3.4–5.3)
POTASSIUM SERPL-SCNC: 3 MMOL/L (ref 3.4–5.3)
POTASSIUM SERPL-SCNC: 3.1 MMOL/L (ref 3.4–5.3)
POTASSIUM SERPL-SCNC: 3.1 MMOL/L (ref 3.4–5.3)
POTASSIUM SERPL-SCNC: 3.2 MMOL/L (ref 3.4–5.3)
POTASSIUM SERPL-SCNC: 3.3 MMOL/L (ref 3.4–5.3)
POTASSIUM SERPL-SCNC: 3.4 MMOL/L (ref 3.4–5.3)
POTASSIUM SERPL-SCNC: 3.5 MMOL/L (ref 3.4–5.3)
POTASSIUM SERPL-SCNC: 3.6 MMOL/L (ref 3.4–5.3)
POTASSIUM SERPL-SCNC: 3.6 MMOL/L (ref 3.4–5.3)
POTASSIUM SERPL-SCNC: 3.7 MMOL/L (ref 3.4–5.3)
POTASSIUM SERPL-SCNC: 3.7 MMOL/L (ref 3.4–5.3)
POTASSIUM SERPL-SCNC: 3.8 MMOL/L (ref 3.4–5.3)
POTASSIUM SERPL-SCNC: 3.9 MMOL/L (ref 3.4–5.3)
POTASSIUM SERPL-SCNC: 4.5 MMOL/L (ref 3.4–5.3)
POTASSIUM SERPL-SCNC: 4.6 MMOL/L (ref 3.4–5.3)
POTASSIUM SERPL-SCNC: 4.8 MMOL/L (ref 3.4–5.3)
POTASSIUM SERPL-SCNC: 4.9 MMOL/L (ref 3.4–5.3)
POTASSIUM SERPL-SCNC: 5.3 MMOL/L (ref 3.4–5.3)
POTASSIUM SERPL-SCNC: 5.7 MMOL/L (ref 3.4–5.3)
PROCALCITONIN SERPL-MCNC: 177.85 NG/ML
PROT SERPL-MCNC: 4.5 G/DL (ref 6.8–8.8)
PROT SERPL-MCNC: 4.8 G/DL (ref 6.8–8.8)
PROT SERPL-MCNC: 4.9 G/DL (ref 6.8–8.8)
PROT SERPL-MCNC: 5 G/DL (ref 6.8–8.8)
PROT SERPL-MCNC: 5.1 G/DL (ref 6.8–8.8)
PROT SERPL-MCNC: 5.2 G/DL (ref 6.8–8.8)
PROT SERPL-MCNC: 5.2 G/DL (ref 6.8–8.8)
PROT SERPL-MCNC: 5.3 G/DL (ref 6.8–8.8)
PROT SERPL-MCNC: 5.4 G/DL (ref 6.8–8.8)
PROT SERPL-MCNC: 5.9 G/DL (ref 6.8–8.8)
PROT SERPL-MCNC: 7.4 G/DL (ref 6.8–8.8)
RBC # BLD AUTO: 2.07 10E12/L (ref 3.8–5.2)
RBC # BLD AUTO: 2.14 10E12/L (ref 3.8–5.2)
RBC # BLD AUTO: 2.19 10E12/L (ref 3.8–5.2)
RBC # BLD AUTO: 2.31 10E12/L (ref 3.8–5.2)
RBC # BLD AUTO: 2.38 10E12/L (ref 3.8–5.2)
RBC # BLD AUTO: 2.46 10E12/L (ref 3.8–5.2)
RBC # BLD AUTO: 2.5 10E12/L (ref 3.8–5.2)
RBC # BLD AUTO: 2.56 10E12/L (ref 3.8–5.2)
RBC # BLD AUTO: 2.57 10E12/L (ref 3.8–5.2)
RBC # BLD AUTO: 2.6 10E12/L (ref 3.8–5.2)
RBC # BLD AUTO: 2.61 10E12/L (ref 3.8–5.2)
RBC # BLD AUTO: 2.68 10E12/L (ref 3.8–5.2)
RBC # BLD AUTO: 2.76 10E12/L (ref 3.8–5.2)
RBC # BLD AUTO: 2.77 10E12/L (ref 3.8–5.2)
RBC # BLD AUTO: 2.77 10E12/L (ref 3.8–5.2)
RBC # BLD AUTO: 2.82 10E12/L (ref 3.8–5.2)
RBC # BLD AUTO: 2.84 10E12/L (ref 3.8–5.2)
RBC # BLD AUTO: 2.88 10E12/L (ref 3.8–5.2)
RBC # BLD AUTO: 2.9 10E12/L (ref 3.8–5.2)
RBC # BLD AUTO: 2.96 10E12/L (ref 3.8–5.2)
RBC # BLD AUTO: 3.48 10E12/L (ref 3.8–5.2)
RBC #/AREA URNS AUTO: 1 /HPF (ref 0–2)
RBC #/AREA URNS AUTO: 1 /HPF (ref 0–2)
RBC #/AREA URNS AUTO: 2 /HPF (ref 0–2)
RBC #/AREA URNS AUTO: 6 /HPF (ref 0–2)
RETICS # AUTO: 8.7 10E9/L (ref 25–95)
RETICS/RBC NFR AUTO: 0.4 % (ref 0.5–2)
RHINOVIRUS RNA SPEC QL NAA+PROBE: NEGATIVE
RSV RNA SPEC QL NAA+PROBE: NEGATIVE
RSV RNA SPEC QL NAA+PROBE: NEGATIVE
SODIUM BLD-SCNC: NORMAL MMOL/L (ref 133–144)
SODIUM SERPL-SCNC: 127 MMOL/L (ref 133–144)
SODIUM SERPL-SCNC: 132 MMOL/L (ref 133–144)
SODIUM SERPL-SCNC: 132 MMOL/L (ref 133–144)
SODIUM SERPL-SCNC: 134 MMOL/L (ref 133–144)
SODIUM SERPL-SCNC: 135 MMOL/L (ref 133–144)
SODIUM SERPL-SCNC: 136 MMOL/L (ref 133–144)
SODIUM SERPL-SCNC: 137 MMOL/L (ref 133–144)
SODIUM SERPL-SCNC: 139 MMOL/L (ref 133–144)
SODIUM SERPL-SCNC: 141 MMOL/L (ref 133–144)
SODIUM SERPL-SCNC: 141 MMOL/L (ref 133–144)
SODIUM SERPL-SCNC: 142 MMOL/L (ref 133–144)
SODIUM SERPL-SCNC: 144 MMOL/L (ref 133–144)
SODIUM SERPL-SCNC: 144 MMOL/L (ref 133–144)
SODIUM SERPL-SCNC: 145 MMOL/L (ref 133–144)
SODIUM SERPL-SCNC: 145 MMOL/L (ref 133–144)
SODIUM SERPL-SCNC: 148 MMOL/L (ref 133–144)
SODIUM SERPL-SCNC: 149 MMOL/L (ref 133–144)
SODIUM SERPL-SCNC: 151 MMOL/L (ref 133–144)
SODIUM SERPL-SCNC: 153 MMOL/L (ref 133–144)
SODIUM SERPL-SCNC: 153 MMOL/L (ref 133–144)
SOURCE: ABNORMAL
SP GR UR STRIP: 1 (ref 1–1.03)
SP GR UR STRIP: 1.02 (ref 1–1.03)
SP GR UR STRIP: 1.03 (ref 1–1.03)
SP GR UR STRIP: 1.03 (ref 1–1.03)
SPECIMEN EXP DATE BLD: NORMAL
SPECIMEN SOURCE FLD: NORMAL
SPECIMEN SOURCE: ABNORMAL
SPECIMEN SOURCE: NORMAL
SQUAMOUS #/AREA URNS AUTO: 1 /HPF (ref 0–1)
SQUAMOUS #/AREA URNS AUTO: <1 /HPF (ref 0–1)
TARGETS BLD QL SMEAR: SLIGHT
TOXIC GRANULES BLD QL SMEAR: PRESENT
TRANS CELLS #/AREA URNS HPF: 5 /HPF (ref 0–1)
TRANSFUSION STATUS PATIENT QL: NORMAL
TRIGL SERPL-MCNC: 225 MG/DL
TRIGL SERPL-MCNC: 228 MG/DL
TROPONIN I BLD-MCNC: 0 UG/L (ref 0–0.1)
TROPONIN I SERPL-MCNC: <0.015 UG/L (ref 0–0.04)
UROBILINOGEN UR STRIP-MCNC: 0 MG/DL (ref 0–2)
UROBILINOGEN UR STRIP-MCNC: 2 MG/DL (ref 0–2)
UROBILINOGEN UR STRIP-MCNC: 2 MG/DL (ref 0–2)
UROBILINOGEN UR STRIP-MCNC: 4 MG/DL (ref 0–2)
VANCOMYCIN SERPL-MCNC: 11.8 MG/L
VANCOMYCIN SERPL-MCNC: 13.8 MG/L
VANCOMYCIN SERPL-MCNC: 19.9 MG/L
VANCOMYCIN SERPL-MCNC: 21.5 MG/L
VANCOMYCIN SERPL-MCNC: 35.9 MG/L
VANCOMYCIN SERPL-MCNC: 5.6 MG/L
WBC # BLD AUTO: 0.3 10E9/L (ref 4–11)
WBC # BLD AUTO: 0.3 10E9/L (ref 4–11)
WBC # BLD AUTO: 0.4 10E9/L (ref 4–11)
WBC # BLD AUTO: 0.5 10E9/L (ref 4–11)
WBC # BLD AUTO: 1.7 10E9/L (ref 4–11)
WBC # BLD AUTO: 10.3 10E9/L (ref 4–11)
WBC # BLD AUTO: 10.8 10E9/L (ref 4–11)
WBC # BLD AUTO: 13.2 10E9/L (ref 4–11)
WBC # BLD AUTO: 17.7 10E9/L (ref 4–11)
WBC # BLD AUTO: 20.2 10E9/L (ref 4–11)
WBC # BLD AUTO: 20.9 10E9/L (ref 4–11)
WBC # BLD AUTO: 21.4 10E9/L (ref 4–11)
WBC # BLD AUTO: 24.3 10E9/L (ref 4–11)
WBC # BLD AUTO: 24.4 10E9/L (ref 4–11)
WBC # BLD AUTO: 25.7 10E9/L (ref 4–11)
WBC # BLD AUTO: 26.4 10E9/L (ref 4–11)
WBC # BLD AUTO: 27 10E9/L (ref 4–11)
WBC # BLD AUTO: 27.4 10E9/L (ref 4–11)
WBC # BLD AUTO: 3.9 10E9/L (ref 4–11)
WBC # BLD AUTO: 31.7 10E9/L (ref 4–11)
WBC # BLD AUTO: 7.3 10E9/L (ref 4–11)
WBC # FLD AUTO: 715 /UL
WBC #/AREA URNS AUTO: 12 /HPF (ref 0–5)
WBC #/AREA URNS AUTO: 3 /HPF (ref 0–5)
WBC #/AREA URNS AUTO: 3 /HPF (ref 0–5)
WBC #/AREA URNS AUTO: 8 /HPF (ref 0–5)

## 2018-01-01 PROCEDURE — 85027 COMPLETE CBC AUTOMATED: CPT | Performed by: HOSPITALIST

## 2018-01-01 PROCEDURE — 99291 CRITICAL CARE FIRST HOUR: CPT | Performed by: INTERNAL MEDICINE

## 2018-01-01 PROCEDURE — 94003 VENT MGMT INPAT SUBQ DAY: CPT

## 2018-01-01 PROCEDURE — A9270 NON-COVERED ITEM OR SERVICE: HCPCS | Mod: GY | Performed by: INTERNAL MEDICINE

## 2018-01-01 PROCEDURE — 85025 COMPLETE CBC W/AUTO DIFF WBC: CPT | Performed by: INTERNAL MEDICINE

## 2018-01-01 PROCEDURE — 31624 DX BRONCHOSCOPE/LAVAGE: CPT | Performed by: ANESTHESIOLOGY

## 2018-01-01 PROCEDURE — 25000128 H RX IP 250 OP 636: Performed by: INTERNAL MEDICINE

## 2018-01-01 PROCEDURE — 25000128 H RX IP 250 OP 636: Performed by: SURGERY

## 2018-01-01 PROCEDURE — 25000132 ZZH RX MED GY IP 250 OP 250 PS 637: Mod: GY | Performed by: INTERNAL MEDICINE

## 2018-01-01 PROCEDURE — 99233 SBSQ HOSP IP/OBS HIGH 50: CPT | Performed by: HOSPITALIST

## 2018-01-01 PROCEDURE — 87070 CULTURE OTHR SPECIMN AEROBIC: CPT | Performed by: ANESTHESIOLOGY

## 2018-01-01 PROCEDURE — 87641 MR-STAPH DNA AMP PROBE: CPT | Performed by: INTERNAL MEDICINE

## 2018-01-01 PROCEDURE — 84100 ASSAY OF PHOSPHORUS: CPT | Performed by: HOSPITALIST

## 2018-01-01 PROCEDURE — 99233 SBSQ HOSP IP/OBS HIGH 50: CPT | Performed by: CLINICAL NURSE SPECIALIST

## 2018-01-01 PROCEDURE — 81001 URINALYSIS AUTO W/SCOPE: CPT | Performed by: NURSE PRACTITIONER

## 2018-01-01 PROCEDURE — 87210 SMEAR WET MOUNT SALINE/INK: CPT | Performed by: ANESTHESIOLOGY

## 2018-01-01 PROCEDURE — 86923 COMPATIBILITY TEST ELECTRIC: CPT | Performed by: INTERNAL MEDICINE

## 2018-01-01 PROCEDURE — 87040 BLOOD CULTURE FOR BACTERIA: CPT | Performed by: INTERNAL MEDICINE

## 2018-01-01 PROCEDURE — 25000128 H RX IP 250 OP 636: Performed by: ANESTHESIOLOGY

## 2018-01-01 PROCEDURE — 99233 SBSQ HOSP IP/OBS HIGH 50: CPT | Performed by: INTERNAL MEDICINE

## 2018-01-01 PROCEDURE — 82550 ASSAY OF CK (CPK): CPT | Performed by: HOSPITALIST

## 2018-01-01 PROCEDURE — 84145 PROCALCITONIN (PCT): CPT | Performed by: INTERNAL MEDICINE

## 2018-01-01 PROCEDURE — 25000132 ZZH RX MED GY IP 250 OP 250 PS 637: Mod: GY | Performed by: HOSPITALIST

## 2018-01-01 PROCEDURE — 80047 BASIC METABLC PNL IONIZED CA: CPT

## 2018-01-01 PROCEDURE — 84100 ASSAY OF PHOSPHORUS: CPT | Performed by: INTERNAL MEDICINE

## 2018-01-01 PROCEDURE — P9047 ALBUMIN (HUMAN), 25%, 50ML: HCPCS | Performed by: INTERNAL MEDICINE

## 2018-01-01 PROCEDURE — P9041 ALBUMIN (HUMAN),5%, 50ML: HCPCS | Performed by: INTERNAL MEDICINE

## 2018-01-01 PROCEDURE — 81001 URINALYSIS AUTO W/SCOPE: CPT | Performed by: ANESTHESIOLOGY

## 2018-01-01 PROCEDURE — 25000125 ZZHC RX 250: Performed by: INTERNAL MEDICINE

## 2018-01-01 PROCEDURE — 87102 FUNGUS ISOLATION CULTURE: CPT | Performed by: ANESTHESIOLOGY

## 2018-01-01 PROCEDURE — 80048 BASIC METABOLIC PNL TOTAL CA: CPT | Performed by: INTERNAL MEDICINE

## 2018-01-01 PROCEDURE — 80202 ASSAY OF VANCOMYCIN: CPT

## 2018-01-01 PROCEDURE — 20000003 ZZH R&B ICU

## 2018-01-01 PROCEDURE — 85018 HEMOGLOBIN: CPT | Performed by: INTERNAL MEDICINE

## 2018-01-01 PROCEDURE — 87015 SPECIMEN INFECT AGNT CONCNTJ: CPT | Performed by: ANESTHESIOLOGY

## 2018-01-01 PROCEDURE — 85014 HEMATOCRIT: CPT

## 2018-01-01 PROCEDURE — 00000146 ZZHCL STATISTIC GLUCOSE BY METER IP

## 2018-01-01 PROCEDURE — 36415 COLL VENOUS BLD VENIPUNCTURE: CPT | Performed by: INTERNAL MEDICINE

## 2018-01-01 PROCEDURE — 85027 COMPLETE CBC AUTOMATED: CPT | Performed by: INTERNAL MEDICINE

## 2018-01-01 PROCEDURE — 25000125 ZZHC RX 250: Performed by: ANESTHESIOLOGY

## 2018-01-01 PROCEDURE — 40000809 ZZH STATISTIC NO DOCUMENTATION TO SUPPORT CHARGE

## 2018-01-01 PROCEDURE — 40000986 XR CHEST PORT 1 VW

## 2018-01-01 PROCEDURE — 84132 ASSAY OF SERUM POTASSIUM: CPT | Performed by: INTERNAL MEDICINE

## 2018-01-01 PROCEDURE — 84484 ASSAY OF TROPONIN QUANT: CPT | Performed by: NURSE PRACTITIONER

## 2018-01-01 PROCEDURE — 83735 ASSAY OF MAGNESIUM: CPT | Performed by: ANESTHESIOLOGY

## 2018-01-01 PROCEDURE — 40000275 ZZH STATISTIC RCP TIME EA 10 MIN

## 2018-01-01 PROCEDURE — P9016 RBC LEUKOCYTES REDUCED: HCPCS | Performed by: INTERNAL MEDICINE

## 2018-01-01 PROCEDURE — 0B9D8ZX DRAINAGE OF RIGHT MIDDLE LUNG LOBE, VIA NATURAL OR ARTIFICIAL OPENING ENDOSCOPIC, DIAGNOSTIC: ICD-10-PCS | Performed by: ANESTHESIOLOGY

## 2018-01-01 PROCEDURE — 82805 BLOOD GASES W/O2 SATURATION: CPT | Performed by: INTERNAL MEDICINE

## 2018-01-01 PROCEDURE — 25000131 ZZH RX MED GY IP 250 OP 636 PS 637: Mod: GY

## 2018-01-01 PROCEDURE — 85610 PROTHROMBIN TIME: CPT | Performed by: NURSE PRACTITIONER

## 2018-01-01 PROCEDURE — 87640 STAPH A DNA AMP PROBE: CPT | Performed by: INTERNAL MEDICINE

## 2018-01-01 PROCEDURE — 87493 C DIFF AMPLIFIED PROBE: CPT | Performed by: INTERNAL MEDICINE

## 2018-01-01 PROCEDURE — 80048 BASIC METABOLIC PNL TOTAL CA: CPT | Performed by: HOSPITALIST

## 2018-01-01 PROCEDURE — 82550 ASSAY OF CK (CPK): CPT | Performed by: INTERNAL MEDICINE

## 2018-01-01 PROCEDURE — 85045 AUTOMATED RETICULOCYTE COUNT: CPT | Performed by: INTERNAL MEDICINE

## 2018-01-01 PROCEDURE — 99291 CRITICAL CARE FIRST HOUR: CPT | Performed by: ANESTHESIOLOGY

## 2018-01-01 PROCEDURE — 83605 ASSAY OF LACTIC ACID: CPT | Performed by: ANESTHESIOLOGY

## 2018-01-01 PROCEDURE — P9016 RBC LEUKOCYTES REDUCED: HCPCS | Performed by: HOSPITALIST

## 2018-01-01 PROCEDURE — 0B9D8ZZ DRAINAGE OF RIGHT MIDDLE LUNG LOBE, VIA NATURAL OR ARTIFICIAL OPENING ENDOSCOPIC: ICD-10-PCS | Performed by: ANESTHESIOLOGY

## 2018-01-01 PROCEDURE — 0B938ZZ DRAINAGE OF RIGHT MAIN BRONCHUS, VIA NATURAL OR ARTIFICIAL OPENING ENDOSCOPIC: ICD-10-PCS | Performed by: ANESTHESIOLOGY

## 2018-01-01 PROCEDURE — 82247 BILIRUBIN TOTAL: CPT | Performed by: INTERNAL MEDICINE

## 2018-01-01 PROCEDURE — 80053 COMPREHEN METABOLIC PANEL: CPT | Performed by: INTERNAL MEDICINE

## 2018-01-01 PROCEDURE — 25000128 H RX IP 250 OP 636: Performed by: HOSPITALIST

## 2018-01-01 PROCEDURE — 36415 COLL VENOUS BLD VENIPUNCTURE: CPT | Performed by: ANESTHESIOLOGY

## 2018-01-01 PROCEDURE — 93325 DOPPLER ECHO COLOR FLOW MAPG: CPT | Mod: 26 | Performed by: INTERNAL MEDICINE

## 2018-01-01 PROCEDURE — 86900 BLOOD TYPING SEROLOGIC ABO: CPT | Performed by: HOSPITALIST

## 2018-01-01 PROCEDURE — 86140 C-REACTIVE PROTEIN: CPT | Performed by: ANESTHESIOLOGY

## 2018-01-01 PROCEDURE — 25000131 ZZH RX MED GY IP 250 OP 636 PS 637: Mod: GY | Performed by: INTERNAL MEDICINE

## 2018-01-01 PROCEDURE — C9113 INJ PANTOPRAZOLE SODIUM, VIA: HCPCS | Performed by: INTERNAL MEDICINE

## 2018-01-01 PROCEDURE — 94645 CONT INHLJ TX EACH ADDL HOUR: CPT

## 2018-01-01 PROCEDURE — 87077 CULTURE AEROBIC IDENTIFY: CPT | Performed by: SURGERY

## 2018-01-01 PROCEDURE — 00000155 ZZHCL STATISTIC H-CELL BLOCK W/STAIN: Performed by: ANESTHESIOLOGY

## 2018-01-01 PROCEDURE — 86901 BLOOD TYPING SEROLOGIC RH(D): CPT | Performed by: INTERNAL MEDICINE

## 2018-01-01 PROCEDURE — 83605 ASSAY OF LACTIC ACID: CPT | Performed by: INTERNAL MEDICINE

## 2018-01-01 PROCEDURE — G0463 HOSPITAL OUTPT CLINIC VISIT: HCPCS

## 2018-01-01 PROCEDURE — 87116 MYCOBACTERIA CULTURE: CPT | Performed by: ANESTHESIOLOGY

## 2018-01-01 PROCEDURE — 87086 URINE CULTURE/COLONY COUNT: CPT | Performed by: NURSE PRACTITIONER

## 2018-01-01 PROCEDURE — 25000128 H RX IP 250 OP 636

## 2018-01-01 PROCEDURE — 86850 RBC ANTIBODY SCREEN: CPT | Performed by: INTERNAL MEDICINE

## 2018-01-01 PROCEDURE — A9270 NON-COVERED ITEM OR SERVICE: HCPCS | Mod: GY | Performed by: NURSE PRACTITIONER

## 2018-01-01 PROCEDURE — 0B9J8ZX DRAINAGE OF LEFT LOWER LUNG LOBE, VIA NATURAL OR ARTIFICIAL OPENING ENDOSCOPIC, DIAGNOSTIC: ICD-10-PCS | Performed by: ANESTHESIOLOGY

## 2018-01-01 PROCEDURE — 80076 HEPATIC FUNCTION PANEL: CPT | Performed by: ANESTHESIOLOGY

## 2018-01-01 PROCEDURE — 83735 ASSAY OF MAGNESIUM: CPT | Performed by: INTERNAL MEDICINE

## 2018-01-01 PROCEDURE — 96375 TX/PRO/DX INJ NEW DRUG ADDON: CPT

## 2018-01-01 PROCEDURE — 85018 HEMOGLOBIN: CPT | Performed by: HOSPITALIST

## 2018-01-01 PROCEDURE — 5A1955Z RESPIRATORY VENTILATION, GREATER THAN 96 CONSECUTIVE HOURS: ICD-10-PCS | Performed by: ANESTHESIOLOGY

## 2018-01-01 PROCEDURE — 84100 ASSAY OF PHOSPHORUS: CPT | Performed by: ANESTHESIOLOGY

## 2018-01-01 PROCEDURE — A9270 NON-COVERED ITEM OR SERVICE: HCPCS | Mod: GY | Performed by: HOSPITALIST

## 2018-01-01 PROCEDURE — 25000132 ZZH RX MED GY IP 250 OP 250 PS 637: Mod: GY | Performed by: ANESTHESIOLOGY

## 2018-01-01 PROCEDURE — 85018 HEMOGLOBIN: CPT | Performed by: SURGERY

## 2018-01-01 PROCEDURE — 99239 HOSP IP/OBS DSCHRG MGMT >30: CPT | Performed by: INTERNAL MEDICINE

## 2018-01-01 PROCEDURE — 3E043XZ INTRODUCTION OF VASOPRESSOR INTO CENTRAL VEIN, PERCUTANEOUS APPROACH: ICD-10-PCS | Performed by: ANESTHESIOLOGY

## 2018-01-01 PROCEDURE — 40000281 ZZH STATISTIC TRANSPORT TIME EA 15 MIN

## 2018-01-01 PROCEDURE — 83735 ASSAY OF MAGNESIUM: CPT | Performed by: NURSE PRACTITIONER

## 2018-01-01 PROCEDURE — 93308 TTE F-UP OR LMTD: CPT | Mod: 26 | Performed by: INTERNAL MEDICINE

## 2018-01-01 PROCEDURE — P9047 ALBUMIN (HUMAN), 25%, 50ML: HCPCS | Performed by: SURGERY

## 2018-01-01 PROCEDURE — 96360 HYDRATION IV INFUSION INIT: CPT

## 2018-01-01 PROCEDURE — 25500064 ZZH RX 255 OP 636: Performed by: INTERNAL MEDICINE

## 2018-01-01 PROCEDURE — 84075 ASSAY ALKALINE PHOSPHATASE: CPT | Performed by: INTERNAL MEDICINE

## 2018-01-01 PROCEDURE — 81001 URINALYSIS AUTO W/SCOPE: CPT | Performed by: INTERNAL MEDICINE

## 2018-01-01 PROCEDURE — 25000125 ZZHC RX 250: Performed by: SURGERY

## 2018-01-01 PROCEDURE — 87106 FUNGI IDENTIFICATION YEAST: CPT | Performed by: INTERNAL MEDICINE

## 2018-01-01 PROCEDURE — 86900 BLOOD TYPING SEROLOGIC ABO: CPT | Performed by: INTERNAL MEDICINE

## 2018-01-01 PROCEDURE — 36620 INSERTION CATHETER ARTERY: CPT | Performed by: ANESTHESIOLOGY

## 2018-01-01 PROCEDURE — 82330 ASSAY OF CALCIUM: CPT | Performed by: INTERNAL MEDICINE

## 2018-01-01 PROCEDURE — 83036 HEMOGLOBIN GLYCOSYLATED A1C: CPT | Performed by: INTERNAL MEDICINE

## 2018-01-01 PROCEDURE — 82805 BLOOD GASES W/O2 SATURATION: CPT | Performed by: SURGERY

## 2018-01-01 PROCEDURE — 93005 ELECTROCARDIOGRAM TRACING: CPT

## 2018-01-01 PROCEDURE — 93308 TTE F-UP OR LMTD: CPT

## 2018-01-01 PROCEDURE — 25000128 H RX IP 250 OP 636: Performed by: NURSE PRACTITIONER

## 2018-01-01 PROCEDURE — 99291 CRITICAL CARE FIRST HOUR: CPT | Performed by: SURGERY

## 2018-01-01 PROCEDURE — 87205 SMEAR GRAM STAIN: CPT | Performed by: ANESTHESIOLOGY

## 2018-01-01 PROCEDURE — 25000125 ZZHC RX 250: Performed by: CLINICAL NURSE SPECIALIST

## 2018-01-01 PROCEDURE — 85027 COMPLETE CBC AUTOMATED: CPT | Performed by: ANESTHESIOLOGY

## 2018-01-01 PROCEDURE — 83735 ASSAY OF MAGNESIUM: CPT | Performed by: HOSPITALIST

## 2018-01-01 PROCEDURE — 99223 1ST HOSP IP/OBS HIGH 75: CPT | Performed by: INTERNAL MEDICINE

## 2018-01-01 PROCEDURE — 87186 SC STD MICRODIL/AGAR DIL: CPT | Performed by: SURGERY

## 2018-01-01 PROCEDURE — 82248 BILIRUBIN DIRECT: CPT | Performed by: INTERNAL MEDICINE

## 2018-01-01 PROCEDURE — 71046 X-RAY EXAM CHEST 2 VIEWS: CPT

## 2018-01-01 PROCEDURE — 84450 TRANSFERASE (AST) (SGOT): CPT | Performed by: INTERNAL MEDICINE

## 2018-01-01 PROCEDURE — 87181 SC STD AGAR DILUTION PER AGT: CPT | Performed by: INTERNAL MEDICINE

## 2018-01-01 PROCEDURE — 80048 BASIC METABOLIC PNL TOTAL CA: CPT | Performed by: ANESTHESIOLOGY

## 2018-01-01 PROCEDURE — 87633 RESP VIRUS 12-25 TARGETS: CPT | Performed by: ANESTHESIOLOGY

## 2018-01-01 PROCEDURE — 27210429 ZZH NUTRITION PRODUCT INTERMEDIATE LITER

## 2018-01-01 PROCEDURE — 82248 BILIRUBIN DIRECT: CPT | Performed by: HOSPITALIST

## 2018-01-01 PROCEDURE — 89051 BODY FLUID CELL COUNT: CPT | Performed by: ANESTHESIOLOGY

## 2018-01-01 PROCEDURE — 71045 X-RAY EXAM CHEST 1 VIEW: CPT

## 2018-01-01 PROCEDURE — 80053 COMPREHEN METABOLIC PANEL: CPT | Performed by: HOSPITALIST

## 2018-01-01 PROCEDURE — 87086 URINE CULTURE/COLONY COUNT: CPT | Performed by: SURGERY

## 2018-01-01 PROCEDURE — 99285 EMERGENCY DEPT VISIT HI MDM: CPT

## 2018-01-01 PROCEDURE — 87070 CULTURE OTHR SPECIMN AEROBIC: CPT | Performed by: SURGERY

## 2018-01-01 PROCEDURE — 88305 TISSUE EXAM BY PATHOLOGIST: CPT | Performed by: ANESTHESIOLOGY

## 2018-01-01 PROCEDURE — 99231 SBSQ HOSP IP/OBS SF/LOW 25: CPT | Performed by: CLINICAL NURSE SPECIALIST

## 2018-01-01 PROCEDURE — 86923 COMPATIBILITY TEST ELECTRIC: CPT | Performed by: HOSPITALIST

## 2018-01-01 PROCEDURE — 87086 URINE CULTURE/COLONY COUNT: CPT | Performed by: INTERNAL MEDICINE

## 2018-01-01 PROCEDURE — 87040 BLOOD CULTURE FOR BACTERIA: CPT | Performed by: SURGERY

## 2018-01-01 PROCEDURE — 84132 ASSAY OF SERUM POTASSIUM: CPT | Performed by: SURGERY

## 2018-01-01 PROCEDURE — 80202 ASSAY OF VANCOMYCIN: CPT | Performed by: INTERNAL MEDICINE

## 2018-01-01 PROCEDURE — 96361 HYDRATE IV INFUSION ADD-ON: CPT

## 2018-01-01 PROCEDURE — 25800025 ZZH RX 258: Performed by: INTERNAL MEDICINE

## 2018-01-01 PROCEDURE — 88108 CYTOPATH CONCENTRATE TECH: CPT | Performed by: ANESTHESIOLOGY

## 2018-01-01 PROCEDURE — 36600 WITHDRAWAL OF ARTERIAL BLOOD: CPT

## 2018-01-01 PROCEDURE — 31500 INSERT EMERGENCY AIRWAY: CPT

## 2018-01-01 PROCEDURE — 25000125 ZZHC RX 250: Performed by: HOSPITALIST

## 2018-01-01 PROCEDURE — P9041 ALBUMIN (HUMAN),5%, 50ML: HCPCS | Performed by: ANESTHESIOLOGY

## 2018-01-01 PROCEDURE — 83605 ASSAY OF LACTIC ACID: CPT | Performed by: HOSPITALIST

## 2018-01-01 PROCEDURE — 25000128 H RX IP 250 OP 636: Performed by: NURSE ANESTHETIST, CERTIFIED REGISTERED

## 2018-01-01 PROCEDURE — 86901 BLOOD TYPING SEROLOGIC RH(D): CPT | Performed by: HOSPITALIST

## 2018-01-01 PROCEDURE — P9040 RBC LEUKOREDUCED IRRADIATED: HCPCS | Performed by: INTERNAL MEDICINE

## 2018-01-01 PROCEDURE — 94002 VENT MGMT INPAT INIT DAY: CPT

## 2018-01-01 PROCEDURE — 12000000 ZZH R&B MED SURG/OB

## 2018-01-01 PROCEDURE — 83615 LACTATE (LD) (LDH) ENZYME: CPT | Performed by: INTERNAL MEDICINE

## 2018-01-01 PROCEDURE — 83735 ASSAY OF MAGNESIUM: CPT | Performed by: SURGERY

## 2018-01-01 PROCEDURE — 85730 THROMBOPLASTIN TIME PARTIAL: CPT | Performed by: NURSE PRACTITIONER

## 2018-01-01 PROCEDURE — 85025 COMPLETE CBC W/AUTO DIFF WBC: CPT | Performed by: NURSE PRACTITIONER

## 2018-01-01 PROCEDURE — 87106 FUNGI IDENTIFICATION YEAST: CPT | Performed by: ANESTHESIOLOGY

## 2018-01-01 PROCEDURE — 85025 COMPLETE CBC W/AUTO DIFF WBC: CPT | Performed by: ANESTHESIOLOGY

## 2018-01-01 PROCEDURE — 87106 FUNGI IDENTIFICATION YEAST: CPT | Performed by: SURGERY

## 2018-01-01 PROCEDURE — 84460 ALANINE AMINO (ALT) (SGPT): CPT | Performed by: INTERNAL MEDICINE

## 2018-01-01 PROCEDURE — 0DH97UZ INSERTION OF FEEDING DEVICE INTO DUODENUM, VIA NATURAL OR ARTIFICIAL OPENING: ICD-10-PCS | Performed by: ANESTHESIOLOGY

## 2018-01-01 PROCEDURE — 94660 CPAP INITIATION&MGMT: CPT

## 2018-01-01 PROCEDURE — P9041 ALBUMIN (HUMAN),5%, 50ML: HCPCS | Performed by: SURGERY

## 2018-01-01 PROCEDURE — 84484 ASSAY OF TROPONIN QUANT: CPT | Mod: 91

## 2018-01-01 PROCEDURE — 87206 SMEAR FLUORESCENT/ACID STAI: CPT | Performed by: ANESTHESIOLOGY

## 2018-01-01 PROCEDURE — 80076 HEPATIC FUNCTION PANEL: CPT | Performed by: INTERNAL MEDICINE

## 2018-01-01 PROCEDURE — 86850 RBC ANTIBODY SCREEN: CPT | Performed by: HOSPITALIST

## 2018-01-01 PROCEDURE — 85025 COMPLETE CBC W/AUTO DIFF WBC: CPT | Performed by: HOSPITALIST

## 2018-01-01 PROCEDURE — 82140 ASSAY OF AMMONIA: CPT | Performed by: INTERNAL MEDICINE

## 2018-01-01 PROCEDURE — A9270 NON-COVERED ITEM OR SERVICE: HCPCS | Mod: GY | Performed by: ANESTHESIOLOGY

## 2018-01-01 PROCEDURE — 85060 BLOOD SMEAR INTERPRETATION: CPT | Performed by: INTERNAL MEDICINE

## 2018-01-01 PROCEDURE — 74018 RADEX ABDOMEN 1 VIEW: CPT

## 2018-01-01 PROCEDURE — 70450 CT HEAD/BRAIN W/O DYE: CPT

## 2018-01-01 PROCEDURE — 85027 COMPLETE CBC AUTOMATED: CPT

## 2018-01-01 PROCEDURE — 85610 PROTHROMBIN TIME: CPT | Performed by: ANESTHESIOLOGY

## 2018-01-01 PROCEDURE — 99285 EMERGENCY DEPT VISIT HI MDM: CPT | Mod: 25

## 2018-01-01 PROCEDURE — 71260 CT THORAX DX C+: CPT

## 2018-01-01 PROCEDURE — 82330 ASSAY OF CALCIUM: CPT | Performed by: HOSPITALIST

## 2018-01-01 PROCEDURE — 99223 1ST HOSP IP/OBS HIGH 75: CPT | Performed by: CLINICAL NURSE SPECIALIST

## 2018-01-01 PROCEDURE — 40000671 ZZH STATISTIC ANESTHESIA CASE

## 2018-01-01 PROCEDURE — 99291 CRITICAL CARE FIRST HOUR: CPT | Mod: 25 | Performed by: ANESTHESIOLOGY

## 2018-01-01 PROCEDURE — 27211040 ZZH CONTINUOUS NEBULIZER MICRO PUMP

## 2018-01-01 PROCEDURE — 0B9J8ZZ DRAINAGE OF LEFT LOWER LUNG LOBE, VIA NATURAL OR ARTIFICIAL OPENING ENDOSCOPIC: ICD-10-PCS | Performed by: ANESTHESIOLOGY

## 2018-01-01 PROCEDURE — 80320 DRUG SCREEN QUANTALCOHOLS: CPT | Performed by: NURSE PRACTITIONER

## 2018-01-01 PROCEDURE — 74177 CT ABD & PELVIS W/CONTRAST: CPT

## 2018-01-01 PROCEDURE — 85610 PROTHROMBIN TIME: CPT | Performed by: INTERNAL MEDICINE

## 2018-01-01 PROCEDURE — 83690 ASSAY OF LIPASE: CPT | Performed by: INTERNAL MEDICINE

## 2018-01-01 PROCEDURE — 93321 DOPPLER ECHO F-UP/LMTD STD: CPT | Mod: 26 | Performed by: INTERNAL MEDICINE

## 2018-01-01 PROCEDURE — 0B9B8ZZ DRAINAGE OF LEFT LOWER LOBE BRONCHUS, VIA NATURAL OR ARTIFICIAL OPENING ENDOSCOPIC: ICD-10-PCS | Performed by: ANESTHESIOLOGY

## 2018-01-01 PROCEDURE — 87040 BLOOD CULTURE FOR BACTERIA: CPT | Performed by: ANESTHESIOLOGY

## 2018-01-01 PROCEDURE — 76700 US EXAM ABDOM COMPLETE: CPT

## 2018-01-01 PROCEDURE — 93306 TTE W/DOPPLER COMPLETE: CPT | Mod: 26 | Performed by: INTERNAL MEDICINE

## 2018-01-01 PROCEDURE — 84295 ASSAY OF SERUM SODIUM: CPT | Performed by: HOSPITALIST

## 2018-01-01 PROCEDURE — 87081 CULTURE SCREEN ONLY: CPT | Performed by: ANESTHESIOLOGY

## 2018-01-01 PROCEDURE — 25000132 ZZH RX MED GY IP 250 OP 250 PS 637: Mod: GY | Performed by: NURSE PRACTITIONER

## 2018-01-01 PROCEDURE — 80053 COMPREHEN METABOLIC PANEL: CPT | Performed by: NURSE PRACTITIONER

## 2018-01-01 PROCEDURE — P9041 ALBUMIN (HUMAN),5%, 50ML: HCPCS | Performed by: HOSPITALIST

## 2018-01-01 PROCEDURE — 94644 CONT INHLJ TX 1ST HOUR: CPT

## 2018-01-01 PROCEDURE — 40000847 ZZHCL STATISTIC MORPHOLOGY W/INTERP HISTOLOGY TC 85060: Performed by: INTERNAL MEDICINE

## 2018-01-01 PROCEDURE — 84478 ASSAY OF TRIGLYCERIDES: CPT | Performed by: INTERNAL MEDICINE

## 2018-01-01 PROCEDURE — 82805 BLOOD GASES W/O2 SATURATION: CPT | Performed by: ANESTHESIOLOGY

## 2018-01-01 PROCEDURE — 87205 SMEAR GRAM STAIN: CPT | Performed by: INTERNAL MEDICINE

## 2018-01-01 PROCEDURE — 40000986 XR ABDOMEN PORT 1 VW

## 2018-01-01 PROCEDURE — 87077 CULTURE AEROBIC IDENTIFY: CPT | Performed by: INTERNAL MEDICINE

## 2018-01-01 PROCEDURE — 84478 ASSAY OF TRIGLYCERIDES: CPT | Performed by: HOSPITALIST

## 2018-01-01 PROCEDURE — 80320 DRUG SCREEN QUANTALCOHOLS: CPT | Performed by: INTERNAL MEDICINE

## 2018-01-01 PROCEDURE — 87070 CULTURE OTHR SPECIMN AEROBIC: CPT | Performed by: INTERNAL MEDICINE

## 2018-01-01 PROCEDURE — 96376 TX/PRO/DX INJ SAME DRUG ADON: CPT

## 2018-01-01 PROCEDURE — 99207 ZZC CDG-CODE CATEGORY CHANGED: CPT | Performed by: HOSPITALIST

## 2018-01-01 PROCEDURE — 84132 ASSAY OF SERUM POTASSIUM: CPT | Performed by: HOSPITALIST

## 2018-01-01 PROCEDURE — 96365 THER/PROPH/DIAG IV INF INIT: CPT

## 2018-01-01 RX ORDER — METHYLPREDNISOLONE SODIUM SUCCINATE 40 MG/ML
40 INJECTION, POWDER, LYOPHILIZED, FOR SOLUTION INTRAMUSCULAR; INTRAVENOUS EVERY 12 HOURS
Status: DISCONTINUED | OUTPATIENT
Start: 2018-01-01 | End: 2018-01-01 | Stop reason: HOSPADM

## 2018-01-01 RX ORDER — CEFAZOLIN SODIUM 1 G/50ML
1250 SOLUTION INTRAVENOUS EVERY 12 HOURS
Status: DISCONTINUED | OUTPATIENT
Start: 2018-01-01 | End: 2018-01-01 | Stop reason: DRUGHIGH

## 2018-01-01 RX ORDER — ONDANSETRON 2 MG/ML
4 INJECTION INTRAMUSCULAR; INTRAVENOUS EVERY 6 HOURS PRN
Status: DISCONTINUED | OUTPATIENT
Start: 2018-01-01 | End: 2018-01-01 | Stop reason: HOSPADM

## 2018-01-01 RX ORDER — HEPARIN SODIUM,PORCINE 10 UNIT/ML
3 VIAL (ML) INTRAVENOUS
Status: DISCONTINUED | OUTPATIENT
Start: 2018-01-01 | End: 2018-01-01 | Stop reason: HOSPADM

## 2018-01-01 RX ORDER — ALBUMIN, HUMAN INJ 5% 5 %
500 SOLUTION INTRAVENOUS ONCE
Status: COMPLETED | OUTPATIENT
Start: 2018-01-01 | End: 2018-01-01

## 2018-01-01 RX ORDER — POTASSIUM CHLORIDE 1.5 G/1.58G
20 POWDER, FOR SOLUTION ORAL 2 TIMES DAILY
Status: DISCONTINUED | OUTPATIENT
Start: 2018-01-01 | End: 2018-01-01 | Stop reason: HOSPADM

## 2018-01-01 RX ORDER — DIPHENHYDRAMINE HYDROCHLORIDE AND LIDOCAINE HYDROCHLORIDE AND ALUMINUM HYDROXIDE AND MAGNESIUM HYDRO
10 KIT EVERY 6 HOURS PRN
Status: DISCONTINUED | OUTPATIENT
Start: 2018-01-01 | End: 2018-01-01

## 2018-01-01 RX ORDER — IOPAMIDOL 755 MG/ML
76 INJECTION, SOLUTION INTRAVASCULAR ONCE
Status: COMPLETED | OUTPATIENT
Start: 2018-01-01 | End: 2018-01-01

## 2018-01-01 RX ORDER — ALBUMIN, HUMAN INJ 5% 5 %
12.5 SOLUTION INTRAVENOUS ONCE
Status: COMPLETED | OUTPATIENT
Start: 2018-01-01 | End: 2018-01-01

## 2018-01-01 RX ORDER — POTASSIUM CHLORIDE 29.8 MG/ML
20 INJECTION INTRAVENOUS
Status: DISCONTINUED | OUTPATIENT
Start: 2018-01-01 | End: 2018-01-01

## 2018-01-01 RX ORDER — DEXTROSE MONOHYDRATE 50 MG/ML
INJECTION, SOLUTION INTRAVENOUS CONTINUOUS
Status: DISCONTINUED | OUTPATIENT
Start: 2018-01-01 | End: 2018-01-01 | Stop reason: HOSPADM

## 2018-01-01 RX ORDER — POTASSIUM CL/LIDO/0.9 % NACL 10MEQ/0.1L
10 INTRAVENOUS SOLUTION, PIGGYBACK (ML) INTRAVENOUS
Status: DISCONTINUED | OUTPATIENT
Start: 2018-01-01 | End: 2018-01-01 | Stop reason: HOSPADM

## 2018-01-01 RX ORDER — POTASSIUM CHLORIDE 1500 MG/1
20-40 TABLET, EXTENDED RELEASE ORAL
Status: DISCONTINUED | OUTPATIENT
Start: 2018-01-01 | End: 2018-01-01 | Stop reason: HOSPADM

## 2018-01-01 RX ORDER — QUETIAPINE FUMARATE 25 MG/1
25 TABLET, FILM COATED ORAL 2 TIMES DAILY
Status: DISCONTINUED | OUTPATIENT
Start: 2018-01-01 | End: 2018-01-01

## 2018-01-01 RX ORDER — NYSTATIN 100000/ML
500000 SUSPENSION, ORAL (FINAL DOSE FORM) ORAL 4 TIMES DAILY
Status: DISCONTINUED | OUTPATIENT
Start: 2018-01-01 | End: 2018-01-01

## 2018-01-01 RX ORDER — OXYCODONE HYDROCHLORIDE 5 MG/1
5 TABLET ORAL EVERY 4 HOURS PRN
Status: DISCONTINUED | OUTPATIENT
Start: 2018-01-01 | End: 2018-01-01

## 2018-01-01 RX ORDER — NOREPINEPHRINE BITARTRATE/D5W 16MG/250ML
0.03-0.4 PLASTIC BAG, INJECTION (ML) INTRAVENOUS CONTINUOUS
Status: DISCONTINUED | OUTPATIENT
Start: 2018-01-01 | End: 2018-01-01

## 2018-01-01 RX ORDER — ONDANSETRON 8 MG/1
8 TABLET, ORALLY DISINTEGRATING ORAL EVERY 8 HOURS PRN
COMMUNITY

## 2018-01-01 RX ORDER — POTASSIUM CHLORIDE 7.45 MG/ML
10 INJECTION INTRAVENOUS
Status: DISCONTINUED | OUTPATIENT
Start: 2018-01-01 | End: 2018-01-01 | Stop reason: HOSPADM

## 2018-01-01 RX ORDER — POTASSIUM CHLORIDE 1.5 G/1.58G
20-40 POWDER, FOR SOLUTION ORAL
Status: DISCONTINUED | OUTPATIENT
Start: 2018-01-01 | End: 2018-01-01

## 2018-01-01 RX ORDER — LANOLIN ALCOHOL/MO/W.PET/CERES
100 CREAM (GRAM) TOPICAL DAILY
Status: DISCONTINUED | OUTPATIENT
Start: 2018-01-01 | End: 2018-01-01

## 2018-01-01 RX ORDER — GUAR GUM
1 PACKET (EA) ORAL 2 TIMES DAILY
Status: DISCONTINUED | OUTPATIENT
Start: 2018-01-01 | End: 2018-01-01

## 2018-01-01 RX ORDER — FUROSEMIDE 10 MG/ML
40 INJECTION INTRAMUSCULAR; INTRAVENOUS EVERY 6 HOURS
Status: DISCONTINUED | OUTPATIENT
Start: 2018-01-01 | End: 2018-01-01

## 2018-01-01 RX ORDER — POTASSIUM CHLORIDE 29.8 MG/ML
20 INJECTION INTRAVENOUS ONCE
Status: COMPLETED | OUTPATIENT
Start: 2018-01-01 | End: 2018-01-01

## 2018-01-01 RX ORDER — FUROSEMIDE 10 MG/ML
40 INJECTION INTRAMUSCULAR; INTRAVENOUS EVERY 8 HOURS
Status: DISCONTINUED | OUTPATIENT
Start: 2018-01-01 | End: 2018-01-01

## 2018-01-01 RX ORDER — ALBUMIN, HUMAN INJ 5% 5 %
25 SOLUTION INTRAVENOUS ONCE
Status: COMPLETED | OUTPATIENT
Start: 2018-01-01 | End: 2018-01-01

## 2018-01-01 RX ORDER — NALOXONE HYDROCHLORIDE 0.4 MG/ML
.1-.4 INJECTION, SOLUTION INTRAMUSCULAR; INTRAVENOUS; SUBCUTANEOUS
Status: DISCONTINUED | OUTPATIENT
Start: 2018-01-01 | End: 2018-01-01 | Stop reason: HOSPADM

## 2018-01-01 RX ORDER — ONDANSETRON 4 MG/1
4 TABLET, ORALLY DISINTEGRATING ORAL EVERY 6 HOURS PRN
Status: DISCONTINUED | OUTPATIENT
Start: 2018-01-01 | End: 2018-01-01 | Stop reason: HOSPADM

## 2018-01-01 RX ORDER — POTASSIUM CHLORIDE 29.8 MG/ML
20 INJECTION INTRAVENOUS
Status: DISCONTINUED | OUTPATIENT
Start: 2018-01-01 | End: 2018-01-01 | Stop reason: HOSPADM

## 2018-01-01 RX ORDER — SODIUM CHLORIDE 9 MG/ML
INJECTION, SOLUTION INTRAVENOUS CONTINUOUS
Status: DISCONTINUED | OUTPATIENT
Start: 2018-01-01 | End: 2018-01-01 | Stop reason: HOSPADM

## 2018-01-01 RX ORDER — POTASSIUM CL/LIDO/0.9 % NACL 10MEQ/0.1L
10 INTRAVENOUS SOLUTION, PIGGYBACK (ML) INTRAVENOUS
Status: DISCONTINUED | OUTPATIENT
Start: 2018-01-01 | End: 2018-01-01

## 2018-01-01 RX ORDER — FUROSEMIDE 10 MG/ML
20 INJECTION INTRAMUSCULAR; INTRAVENOUS ONCE
Status: COMPLETED | OUTPATIENT
Start: 2018-01-01 | End: 2018-01-01

## 2018-01-01 RX ORDER — FLUCONAZOLE 2 MG/ML
200 INJECTION, SOLUTION INTRAVENOUS ONCE
Status: COMPLETED | OUTPATIENT
Start: 2018-01-01 | End: 2018-01-01

## 2018-01-01 RX ORDER — NICOTINE POLACRILEX 4 MG
15-30 LOZENGE BUCCAL
Status: DISCONTINUED | OUTPATIENT
Start: 2018-01-01 | End: 2018-01-01

## 2018-01-01 RX ORDER — CHLORDIAZEPOXIDE HYDROCHLORIDE 5 MG/1
25 CAPSULE, GELATIN COATED ORAL ONCE
Status: COMPLETED | OUTPATIENT
Start: 2018-01-01 | End: 2018-01-01

## 2018-01-01 RX ORDER — LORAZEPAM 2 MG/ML
1-2 INJECTION INTRAMUSCULAR EVERY 30 MIN PRN
Status: DISCONTINUED | OUTPATIENT
Start: 2018-01-01 | End: 2018-01-01

## 2018-01-01 RX ORDER — POLYETHYLENE GLYCOL 3350 17 G/17G
17 POWDER, FOR SOLUTION ORAL DAILY PRN
Status: DISCONTINUED | OUTPATIENT
Start: 2018-01-01 | End: 2018-01-01 | Stop reason: HOSPADM

## 2018-01-01 RX ORDER — METOPROLOL SUCCINATE 25 MG/1
TABLET, EXTENDED RELEASE ORAL
Qty: 30 TABLET | Refills: 0 | Status: SHIPPED | OUTPATIENT
Start: 2018-01-01 | End: 2018-01-01

## 2018-01-01 RX ORDER — VANCOMYCIN HYDROCHLORIDE 1 G/200ML
1000 INJECTION, SOLUTION INTRAVENOUS
Status: DISCONTINUED | OUTPATIENT
Start: 2018-01-01 | End: 2018-01-01 | Stop reason: HOSPADM

## 2018-01-01 RX ORDER — DIPHENHYDRAMINE HYDROCHLORIDE AND LIDOCAINE HYDROCHLORIDE AND ALUMINUM HYDROXIDE AND MAGNESIUM HYDRO
10 KIT EVERY 6 HOURS
Status: DISCONTINUED | OUTPATIENT
Start: 2018-01-01 | End: 2018-01-01 | Stop reason: HOSPADM

## 2018-01-01 RX ORDER — PROPOFOL 10 MG/ML
10-80 INJECTION, EMULSION INTRAVENOUS CONTINUOUS
Status: DISCONTINUED | OUTPATIENT
Start: 2018-01-01 | End: 2018-01-01

## 2018-01-01 RX ORDER — NOREPINEPHRINE BITARTRATE/D5W 16MG/250ML
0.03-0.4 PLASTIC BAG, INJECTION (ML) INTRAVENOUS CONTINUOUS
Status: DISCONTINUED | OUTPATIENT
Start: 2018-01-01 | End: 2018-01-01 | Stop reason: HOSPADM

## 2018-01-01 RX ORDER — VANCOMYCIN HYDROCHLORIDE 1 G/200ML
1000 INJECTION, SOLUTION INTRAVENOUS EVERY 8 HOURS
Status: DISCONTINUED | OUTPATIENT
Start: 2018-01-01 | End: 2018-01-01 | Stop reason: DRUGHIGH

## 2018-01-01 RX ORDER — IOPAMIDOL 755 MG/ML
500 INJECTION, SOLUTION INTRAVASCULAR ONCE
Status: COMPLETED | OUTPATIENT
Start: 2018-01-01 | End: 2018-01-01

## 2018-01-01 RX ORDER — CHLORHEXIDINE GLUCONATE ORAL RINSE 1.2 MG/ML
15 SOLUTION DENTAL 2 TIMES DAILY
Status: DISCONTINUED | OUTPATIENT
Start: 2018-01-01 | End: 2018-01-01

## 2018-01-01 RX ORDER — MORPHINE SULFATE 2 MG/ML
2 INJECTION, SOLUTION INTRAMUSCULAR; INTRAVENOUS ONCE
Status: COMPLETED | OUTPATIENT
Start: 2018-01-01 | End: 2018-01-01

## 2018-01-01 RX ORDER — LIDOCAINE 40 MG/G
CREAM TOPICAL
Status: DISCONTINUED | OUTPATIENT
Start: 2018-01-01 | End: 2018-01-01 | Stop reason: HOSPADM

## 2018-01-01 RX ORDER — ALBUMIN (HUMAN) 12.5 G/50ML
12.5 SOLUTION INTRAVENOUS EVERY 8 HOURS
Status: DISCONTINUED | OUTPATIENT
Start: 2018-01-01 | End: 2018-01-01

## 2018-01-01 RX ORDER — NALOXONE HYDROCHLORIDE 0.4 MG/ML
.1-.4 INJECTION, SOLUTION INTRAMUSCULAR; INTRAVENOUS; SUBCUTANEOUS
Status: DISCONTINUED | OUTPATIENT
Start: 2018-01-01 | End: 2018-01-01

## 2018-01-01 RX ORDER — ALBUMIN (HUMAN) 12.5 G/50ML
12.5 SOLUTION INTRAVENOUS EVERY 6 HOURS
Status: DISCONTINUED | OUTPATIENT
Start: 2018-01-01 | End: 2018-01-01

## 2018-01-01 RX ORDER — HEPARIN SODIUM (PORCINE) LOCK FLUSH IV SOLN 100 UNIT/ML 100 UNIT/ML
5 SOLUTION INTRAVENOUS
Status: DISCONTINUED | OUTPATIENT
Start: 2018-01-01 | End: 2018-01-01 | Stop reason: HOSPADM

## 2018-01-01 RX ORDER — POTASSIUM CHLORIDE 1500 MG/1
20-40 TABLET, EXTENDED RELEASE ORAL
Status: DISCONTINUED | OUTPATIENT
Start: 2018-01-01 | End: 2018-01-01

## 2018-01-01 RX ORDER — FOLIC ACID 1 MG/1
1 TABLET ORAL DAILY
Status: DISCONTINUED | OUTPATIENT
Start: 2018-01-01 | End: 2018-01-01

## 2018-01-01 RX ORDER — FENTANYL CITRATE 50 UG/ML
50 INJECTION, SOLUTION INTRAMUSCULAR; INTRAVENOUS
Status: DISCONTINUED | OUTPATIENT
Start: 2018-01-01 | End: 2018-01-01 | Stop reason: HOSPADM

## 2018-01-01 RX ORDER — SODIUM CHLORIDE, SODIUM LACTATE, POTASSIUM CHLORIDE, CALCIUM CHLORIDE 600; 310; 30; 20 MG/100ML; MG/100ML; MG/100ML; MG/100ML
INJECTION, SOLUTION INTRAVENOUS CONTINUOUS
Status: DISCONTINUED | OUTPATIENT
Start: 2018-01-01 | End: 2018-01-01

## 2018-01-01 RX ORDER — FUROSEMIDE 10 MG/ML
10 INJECTION INTRAMUSCULAR; INTRAVENOUS ONCE
Status: COMPLETED | OUTPATIENT
Start: 2018-01-01 | End: 2018-01-01

## 2018-01-01 RX ORDER — DEXTROSE MONOHYDRATE 25 G/50ML
25-50 INJECTION, SOLUTION INTRAVENOUS
Status: DISCONTINUED | OUTPATIENT
Start: 2018-01-01 | End: 2018-01-01 | Stop reason: HOSPADM

## 2018-01-01 RX ORDER — DRONABINOL 2.5 MG/1
5 CAPSULE ORAL 2 TIMES DAILY
Status: DISCONTINUED | OUTPATIENT
Start: 2018-01-01 | End: 2018-01-01

## 2018-01-01 RX ORDER — POTASSIUM CHLORIDE 1.5 G/1.58G
20-40 POWDER, FOR SOLUTION ORAL
Status: DISCONTINUED | OUTPATIENT
Start: 2018-01-01 | End: 2018-01-01 | Stop reason: HOSPADM

## 2018-01-01 RX ORDER — ALBUMIN (HUMAN) 12.5 G/50ML
50 SOLUTION INTRAVENOUS EVERY 8 HOURS
Status: COMPLETED | OUTPATIENT
Start: 2018-01-01 | End: 2018-01-01

## 2018-01-01 RX ORDER — MAGNESIUM SULFATE HEPTAHYDRATE 40 MG/ML
4 INJECTION, SOLUTION INTRAVENOUS EVERY 4 HOURS PRN
Status: DISCONTINUED | OUTPATIENT
Start: 2018-01-01 | End: 2018-01-01

## 2018-01-01 RX ORDER — AMINO ACIDS/PROTEIN HYDROLYS 11G-40/45
1 LIQUID IN PACKET (ML) ORAL 2 TIMES DAILY WITH MEALS
Status: DISCONTINUED | OUTPATIENT
Start: 2018-01-01 | End: 2018-01-01

## 2018-01-01 RX ORDER — METHYLPREDNISOLONE SODIUM SUCCINATE 125 MG/2ML
60 INJECTION, POWDER, LYOPHILIZED, FOR SOLUTION INTRAMUSCULAR; INTRAVENOUS EVERY 12 HOURS
Status: DISCONTINUED | OUTPATIENT
Start: 2018-01-01 | End: 2018-01-01

## 2018-01-01 RX ORDER — CEFAZOLIN SODIUM 1 G/50ML
1250 SOLUTION INTRAVENOUS EVERY 8 HOURS
Status: DISCONTINUED | OUTPATIENT
Start: 2018-01-01 | End: 2018-01-01

## 2018-01-01 RX ORDER — AMINO ACIDS/PROTEIN HYDROLYS 11G-40/45
2 LIQUID IN PACKET (ML) ORAL 2 TIMES DAILY WITH MEALS
Status: DISCONTINUED | OUTPATIENT
Start: 2018-01-01 | End: 2018-01-01

## 2018-01-01 RX ORDER — FENTANYL CITRATE 50 UG/ML
INJECTION, SOLUTION INTRAMUSCULAR; INTRAVENOUS
Status: COMPLETED
Start: 2018-01-01 | End: 2018-01-01

## 2018-01-01 RX ORDER — FUROSEMIDE 10 MG/ML
40 INJECTION INTRAMUSCULAR; INTRAVENOUS ONCE
Status: COMPLETED | OUTPATIENT
Start: 2018-01-01 | End: 2018-01-01

## 2018-01-01 RX ORDER — NOREPINEPHRINE BITARTRATE/D5W 16MG/250ML
0.03-0.4 PLASTIC BAG, INJECTION (ML) INTRAVENOUS CONTINUOUS
Status: DISCONTINUED | OUTPATIENT
Start: 2018-01-01 | End: 2018-01-01 | Stop reason: ALTCHOICE

## 2018-01-01 RX ORDER — PROCHLORPERAZINE MALEATE 5 MG
10 TABLET ORAL EVERY 6 HOURS PRN
Status: DISCONTINUED | OUTPATIENT
Start: 2018-01-01 | End: 2018-01-01 | Stop reason: HOSPADM

## 2018-01-01 RX ORDER — METOLAZONE 5 MG/1
5 TABLET ORAL DAILY
Status: DISCONTINUED | OUTPATIENT
Start: 2018-01-01 | End: 2018-01-01

## 2018-01-01 RX ORDER — POTASSIUM CHLORIDE 7.45 MG/ML
10 INJECTION INTRAVENOUS
Status: DISCONTINUED | OUTPATIENT
Start: 2018-01-01 | End: 2018-01-01

## 2018-01-01 RX ORDER — FUROSEMIDE 10 MG/ML
20 INJECTION INTRAMUSCULAR; INTRAVENOUS EVERY 12 HOURS
Status: DISCONTINUED | OUTPATIENT
Start: 2018-01-01 | End: 2018-01-01

## 2018-01-01 RX ORDER — SODIUM CHLORIDE, SODIUM LACTATE, POTASSIUM CHLORIDE, CALCIUM CHLORIDE 600; 310; 30; 20 MG/100ML; MG/100ML; MG/100ML; MG/100ML
1000 INJECTION, SOLUTION INTRAVENOUS CONTINUOUS
Status: DISCONTINUED | OUTPATIENT
Start: 2018-01-01 | End: 2018-01-01

## 2018-01-01 RX ORDER — METOLAZONE 5 MG/1
5 TABLET ORAL DAILY
Status: DISCONTINUED | OUTPATIENT
Start: 2018-01-01 | End: 2018-01-01 | Stop reason: HOSPADM

## 2018-01-01 RX ORDER — FUROSEMIDE 10 MG/ML
40 INJECTION INTRAMUSCULAR; INTRAVENOUS EVERY 12 HOURS
Status: DISCONTINUED | OUTPATIENT
Start: 2018-01-01 | End: 2018-01-01

## 2018-01-01 RX ORDER — LORAZEPAM 1 MG/1
1-2 TABLET ORAL EVERY 30 MIN PRN
Status: DISCONTINUED | OUTPATIENT
Start: 2018-01-01 | End: 2018-01-01

## 2018-01-01 RX ORDER — LORAZEPAM 2 MG/ML
0.5 INJECTION INTRAMUSCULAR ONCE
Status: COMPLETED | OUTPATIENT
Start: 2018-01-01 | End: 2018-01-01

## 2018-01-01 RX ORDER — POTASSIUM CHLORIDE 1500 MG/1
1 TABLET, EXTENDED RELEASE ORAL DAILY
COMMUNITY

## 2018-01-01 RX ORDER — CEPHALEXIN 500 MG/1
500 CAPSULE ORAL 2 TIMES DAILY
Qty: 20 CAPSULE | Refills: 0 | Status: SHIPPED | OUTPATIENT
Start: 2018-01-01 | End: 2018-01-01

## 2018-01-01 RX ORDER — PROPOFOL 10 MG/ML
INJECTION, EMULSION INTRAVENOUS PRN
Status: DISCONTINUED | OUTPATIENT
Start: 2018-01-01 | End: 2018-01-01

## 2018-01-01 RX ORDER — CHLORDIAZEPOXIDE HYDROCHLORIDE 25 MG/1
25 CAPSULE, GELATIN COATED ORAL 3 TIMES DAILY PRN
Qty: 30 CAPSULE | Refills: 0 | Status: SHIPPED | OUTPATIENT
Start: 2018-01-01 | End: 2018-01-01

## 2018-01-01 RX ORDER — METHYLPREDNISOLONE SODIUM SUCCINATE 125 MG/2ML
62.5 INJECTION, POWDER, LYOPHILIZED, FOR SOLUTION INTRAMUSCULAR; INTRAVENOUS EVERY 8 HOURS
Status: DISCONTINUED | OUTPATIENT
Start: 2018-01-01 | End: 2018-01-01

## 2018-01-01 RX ORDER — PROPOFOL 10 MG/ML
5-75 INJECTION, EMULSION INTRAVENOUS CONTINUOUS
Status: DISCONTINUED | OUTPATIENT
Start: 2018-01-01 | End: 2018-01-01

## 2018-01-01 RX ORDER — MULTIVITAMIN,THERAPEUTIC
1 TABLET ORAL DAILY
COMMUNITY

## 2018-01-01 RX ORDER — FUROSEMIDE 10 MG/ML
20 INJECTION INTRAMUSCULAR; INTRAVENOUS EVERY 8 HOURS
Status: DISCONTINUED | OUTPATIENT
Start: 2018-01-01 | End: 2018-01-01

## 2018-01-01 RX ORDER — POTASSIUM CHLORIDE 1.5 G/1.58G
20 POWDER, FOR SOLUTION ORAL 2 TIMES DAILY
Status: DISCONTINUED | OUTPATIENT
Start: 2018-01-01 | End: 2018-01-01

## 2018-01-01 RX ORDER — METOCLOPRAMIDE 10 MG/1
10 TABLET ORAL 3 TIMES DAILY PRN
Qty: 20 TABLET | Refills: 0 | Status: SHIPPED | OUTPATIENT
Start: 2018-01-01

## 2018-01-01 RX ORDER — LORAZEPAM 2 MG/ML
0.5 INJECTION INTRAMUSCULAR EVERY 4 HOURS PRN
Status: DISCONTINUED | OUTPATIENT
Start: 2018-01-01 | End: 2018-01-01 | Stop reason: HOSPADM

## 2018-01-01 RX ORDER — GINSENG 100 MG
CAPSULE ORAL 2 TIMES DAILY
Status: DISCONTINUED | OUTPATIENT
Start: 2018-01-01 | End: 2018-01-01 | Stop reason: HOSPADM

## 2018-01-01 RX ORDER — METHYLPREDNISOLONE SODIUM SUCCINATE 125 MG/2ML
62.5 INJECTION, POWDER, LYOPHILIZED, FOR SOLUTION INTRAMUSCULAR; INTRAVENOUS EVERY 12 HOURS
Status: DISCONTINUED | OUTPATIENT
Start: 2018-01-01 | End: 2018-01-01

## 2018-01-01 RX ORDER — FUROSEMIDE 10 MG/ML
20 INJECTION INTRAMUSCULAR; INTRAVENOUS EVERY 8 HOURS
Status: DISCONTINUED | OUTPATIENT
Start: 2018-01-01 | End: 2018-01-01 | Stop reason: CLARIF

## 2018-01-01 RX ORDER — DEXTROSE MONOHYDRATE 25 G/50ML
25-50 INJECTION, SOLUTION INTRAVENOUS
Status: DISCONTINUED | OUTPATIENT
Start: 2018-01-01 | End: 2018-01-01

## 2018-01-01 RX ORDER — POLYETHYLENE GLYCOL 3350 17 G/17G
17 POWDER, FOR SOLUTION ORAL DAILY PRN
Status: DISCONTINUED | OUTPATIENT
Start: 2018-01-01 | End: 2018-01-01

## 2018-01-01 RX ORDER — FENTANYL CITRATE 50 UG/ML
50 INJECTION, SOLUTION INTRAMUSCULAR; INTRAVENOUS ONCE
Status: COMPLETED | OUTPATIENT
Start: 2018-01-01 | End: 2018-01-01

## 2018-01-01 RX ORDER — HEPARIN SODIUM,PORCINE 10 UNIT/ML
5-10 VIAL (ML) INTRAVENOUS
Status: DISCONTINUED | OUTPATIENT
Start: 2018-01-01 | End: 2018-01-01 | Stop reason: HOSPADM

## 2018-01-01 RX ORDER — QUETIAPINE FUMARATE 25 MG/1
25 TABLET, FILM COATED ORAL 2 TIMES DAILY
Status: DISCONTINUED | OUTPATIENT
Start: 2018-01-01 | End: 2018-01-01 | Stop reason: HOSPADM

## 2018-01-01 RX ORDER — PANTOPRAZOLE SODIUM 40 MG/1
40 TABLET, DELAYED RELEASE ORAL 2 TIMES DAILY
Status: DISCONTINUED | OUTPATIENT
Start: 2018-01-01 | End: 2018-01-01

## 2018-01-01 RX ORDER — NICOTINE POLACRILEX 4 MG
15-30 LOZENGE BUCCAL
Status: DISCONTINUED | OUTPATIENT
Start: 2018-01-01 | End: 2018-01-01 | Stop reason: HOSPADM

## 2018-01-01 RX ORDER — AMINO ACIDS/PROTEIN HYDROLYS 11G-40/45
1 LIQUID IN PACKET (ML) ORAL 2 TIMES DAILY WITH MEALS
Status: DISCONTINUED | OUTPATIENT
Start: 2018-01-01 | End: 2018-01-01 | Stop reason: HOSPADM

## 2018-01-01 RX ORDER — HEPARIN SODIUM,PORCINE 10 UNIT/ML
5-10 VIAL (ML) INTRAVENOUS EVERY 24 HOURS
Status: DISCONTINUED | OUTPATIENT
Start: 2018-01-01 | End: 2018-01-01 | Stop reason: HOSPADM

## 2018-01-01 RX ORDER — MAGNESIUM SULFATE HEPTAHYDRATE 40 MG/ML
4 INJECTION, SOLUTION INTRAVENOUS EVERY 4 HOURS PRN
Status: DISCONTINUED | OUTPATIENT
Start: 2018-01-01 | End: 2018-01-01 | Stop reason: HOSPADM

## 2018-01-01 RX ORDER — METOPROLOL SUCCINATE 25 MG/1
TABLET, EXTENDED RELEASE ORAL
Qty: 30 TABLET | Refills: 0 | OUTPATIENT
Start: 2018-01-01

## 2018-01-01 RX ORDER — LANOLIN ALCOHOL/MO/W.PET/CERES
100 CREAM (GRAM) TOPICAL DAILY
Status: COMPLETED | OUTPATIENT
Start: 2018-01-01 | End: 2018-01-01

## 2018-01-01 RX ORDER — PROCHLORPERAZINE 25 MG
25 SUPPOSITORY, RECTAL RECTAL EVERY 12 HOURS PRN
Status: DISCONTINUED | OUTPATIENT
Start: 2018-01-01 | End: 2018-01-01 | Stop reason: HOSPADM

## 2018-01-01 RX ORDER — ALBUMIN (HUMAN) 12.5 G/50ML
12.5 SOLUTION INTRAVENOUS EVERY 8 HOURS
Status: COMPLETED | OUTPATIENT
Start: 2018-01-01 | End: 2018-01-01

## 2018-01-01 RX ORDER — MULTIPLE VITAMINS W/ MINERALS TAB 9MG-400MCG
1 TAB ORAL DAILY
Status: DISCONTINUED | OUTPATIENT
Start: 2018-01-01 | End: 2018-01-01 | Stop reason: ALTCHOICE

## 2018-01-01 RX ADMIN — DIPHENHYDRAMINE HYDROCHLORIDE AND LIDOCAINE HYDROCHLORIDE AND ALUMINUM HYDROXIDE AND MAGNESIUM HYDRO 10 ML: KIT at 09:58

## 2018-01-01 RX ADMIN — DIPHENHYDRAMINE HYDROCHLORIDE AND LIDOCAINE HYDROCHLORIDE AND ALUMINUM HYDROXIDE AND MAGNESIUM HYDRO 10 ML: KIT at 21:00

## 2018-01-01 RX ADMIN — ALBUMIN HUMAN 12.5 G: 0.25 SOLUTION INTRAVENOUS at 11:18

## 2018-01-01 RX ADMIN — EPOPROSTENOL 20 NG/KG/MIN: 1.5 INJECTION, POWDER, LYOPHILIZED, FOR SOLUTION INTRAVENOUS at 15:12

## 2018-01-01 RX ADMIN — DIPHENHYDRAMINE HYDROCHLORIDE AND LIDOCAINE HYDROCHLORIDE AND ALUMINUM HYDROXIDE AND MAGNESIUM HYDRO 10 ML: KIT at 10:33

## 2018-01-01 RX ADMIN — Medication 100 MG: at 16:28

## 2018-01-01 RX ADMIN — METHYLPREDNISOLONE SODIUM SUCCINATE 40 MG: 40 INJECTION, POWDER, FOR SOLUTION INTRAMUSCULAR; INTRAVENOUS at 08:40

## 2018-01-01 RX ADMIN — FOLIC ACID 1 MG: 1 TABLET ORAL at 09:32

## 2018-01-01 RX ADMIN — METOLAZONE 5 MG: 5 TABLET ORAL at 08:52

## 2018-01-01 RX ADMIN — Medication 40 MG: at 21:21

## 2018-01-01 RX ADMIN — NYSTATIN: 100000 SUSPENSION ORAL at 11:27

## 2018-01-01 RX ADMIN — VANCOMYCIN HYDROCHLORIDE 1250 MG: 10 INJECTION, POWDER, LYOPHILIZED, FOR SOLUTION INTRAVENOUS at 06:11

## 2018-01-01 RX ADMIN — DEXMEDETOMIDINE 0.7 MCG/KG/HR: 100 INJECTION, SOLUTION, CONCENTRATE INTRAVENOUS at 01:20

## 2018-01-01 RX ADMIN — DEXTRAN 70 AND HYPROMELLOSE 2910 2 DROP: 1; 3 SOLUTION/ DROPS OPHTHALMIC at 17:40

## 2018-01-01 RX ADMIN — FOLIC ACID 1 MG: 1 TABLET ORAL at 09:22

## 2018-01-01 RX ADMIN — POTASSIUM CHLORIDE 20 MEQ: 1.5 POWDER, FOR SOLUTION ORAL at 17:40

## 2018-01-01 RX ADMIN — QUETIAPINE FUMARATE 25 MG: 25 TABLET ORAL at 13:31

## 2018-01-01 RX ADMIN — Medication 1 PACKET: at 18:10

## 2018-01-01 RX ADMIN — Medication 2 PACKET: at 17:26

## 2018-01-01 RX ADMIN — BACITRACIN: 500 OINTMENT TOPICAL at 21:56

## 2018-01-01 RX ADMIN — PROPOFOL 25 MCG/KG/MIN: 10 INJECTION, EMULSION INTRAVENOUS at 17:37

## 2018-01-01 RX ADMIN — ALBUMIN HUMAN 50 G: 0.25 SOLUTION INTRAVENOUS at 13:55

## 2018-01-01 RX ADMIN — DIPHENHYDRAMINE HYDROCHLORIDE AND LIDOCAINE HYDROCHLORIDE AND ALUMINUM HYDROXIDE AND MAGNESIUM HYDRO 10 ML: KIT at 04:45

## 2018-01-01 RX ADMIN — DEXMEDETOMIDINE 0.7 MCG/KG/HR: 100 INJECTION, SOLUTION, CONCENTRATE INTRAVENOUS at 08:16

## 2018-01-01 RX ADMIN — FOLIC ACID 1 MG: 1 TABLET ORAL at 08:12

## 2018-01-01 RX ADMIN — DIPHENHYDRAMINE HYDROCHLORIDE AND LIDOCAINE HYDROCHLORIDE AND ALUMINUM HYDROXIDE AND MAGNESIUM HYDRO 10 ML: KIT at 10:15

## 2018-01-01 RX ADMIN — MIDAZOLAM HYDROCHLORIDE 1 MG: 1 INJECTION, SOLUTION INTRAMUSCULAR; INTRAVENOUS at 21:37

## 2018-01-01 RX ADMIN — POTASSIUM PHOSPHATE, MONOBASIC AND POTASSIUM PHOSPHATE, DIBASIC 20 MMOL: 224; 236 INJECTION, SOLUTION INTRAVENOUS at 18:47

## 2018-01-01 RX ADMIN — DIPHENHYDRAMINE HYDROCHLORIDE AND LIDOCAINE HYDROCHLORIDE AND ALUMINUM HYDROXIDE AND MAGNESIUM HYDRO 10 ML: KIT at 10:31

## 2018-01-01 RX ADMIN — PHENYLEPHRINE HYDROCHLORIDE 6 MCG/KG/MIN: 10 INJECTION INTRAVENOUS at 08:24

## 2018-01-01 RX ADMIN — SODIUM CHLORIDE: 9 INJECTION, SOLUTION INTRAVENOUS at 03:15

## 2018-01-01 RX ADMIN — FUROSEMIDE 5 MG/HR: 10 INJECTION, SOLUTION INTRAVENOUS at 03:16

## 2018-01-01 RX ADMIN — Medication 2 PACKET: at 09:08

## 2018-01-01 RX ADMIN — CHLORHEXIDINE GLUCONATE 15 ML: 1.2 RINSE ORAL at 20:21

## 2018-01-01 RX ADMIN — MULTIVITAMIN 15 ML: LIQUID ORAL at 09:06

## 2018-01-01 RX ADMIN — PANTOPRAZOLE SODIUM 40 MG: 40 INJECTION, POWDER, FOR SOLUTION INTRAVENOUS at 20:36

## 2018-01-01 RX ADMIN — NYSTATIN 500000 UNITS: 100000 SUSPENSION ORAL at 09:29

## 2018-01-01 RX ADMIN — DIPHENHYDRAMINE HYDROCHLORIDE AND LIDOCAINE HYDROCHLORIDE AND ALUMINUM HYDROXIDE AND MAGNESIUM HYDRO 10 ML: KIT at 10:19

## 2018-01-01 RX ADMIN — QUETIAPINE FUMARATE 25 MG: 25 TABLET ORAL at 20:20

## 2018-01-01 RX ADMIN — SODIUM CHLORIDE 250 MG: 9 INJECTION, SOLUTION INTRAVENOUS at 22:44

## 2018-01-01 RX ADMIN — ALBUMIN HUMAN 50 G: 0.25 SOLUTION INTRAVENOUS at 04:03

## 2018-01-01 RX ADMIN — VANCOMYCIN HYDROCHLORIDE 1250 MG: 5 INJECTION, POWDER, LYOPHILIZED, FOR SOLUTION INTRAVENOUS at 01:53

## 2018-01-01 RX ADMIN — FUROSEMIDE 40 MG: 10 INJECTION, SOLUTION INTRAVENOUS at 17:38

## 2018-01-01 RX ADMIN — POTASSIUM CHLORIDE 20 MEQ: 1.5 POWDER, FOR SOLUTION ORAL at 08:50

## 2018-01-01 RX ADMIN — DIPHENHYDRAMINE HYDROCHLORIDE AND LIDOCAINE HYDROCHLORIDE AND ALUMINUM HYDROXIDE AND MAGNESIUM HYDRO 10 ML: KIT at 03:19

## 2018-01-01 RX ADMIN — FOLIC ACID 1 MG: 1 TABLET ORAL at 09:47

## 2018-01-01 RX ADMIN — SODIUM CHLORIDE: 9 INJECTION, SOLUTION INTRAVENOUS at 14:11

## 2018-01-01 RX ADMIN — MAGNESIUM SULFATE HEPTAHYDRATE 4 G: 40 INJECTION, SOLUTION INTRAVENOUS at 12:20

## 2018-01-01 RX ADMIN — TAZOBACTAM SODIUM AND PIPERACILLIN SODIUM 3.38 G: 375; 3 INJECTION, SOLUTION INTRAVENOUS at 11:25

## 2018-01-01 RX ADMIN — QUETIAPINE FUMARATE 25 MG: 25 TABLET ORAL at 09:29

## 2018-01-01 RX ADMIN — METHYLPREDNISOLONE SODIUM SUCCINATE 62.5 MG: 125 INJECTION, POWDER, FOR SOLUTION INTRAMUSCULAR; INTRAVENOUS at 08:51

## 2018-01-01 RX ADMIN — Medication 2 PACKET: at 18:19

## 2018-01-01 RX ADMIN — SODIUM CHLORIDE 59 ML: 9 INJECTION, SOLUTION INTRAVENOUS at 09:35

## 2018-01-01 RX ADMIN — POTASSIUM CHLORIDE 20 MEQ: 400 INJECTION, SOLUTION INTRAVENOUS at 23:24

## 2018-01-01 RX ADMIN — ALBUMIN HUMAN 50 G: 0.25 SOLUTION INTRAVENOUS at 04:45

## 2018-01-01 RX ADMIN — FENTANYL CITRATE 50 MCG: 50 INJECTION INTRAMUSCULAR; INTRAVENOUS at 04:24

## 2018-01-01 RX ADMIN — Medication 40 MG: at 08:28

## 2018-01-01 RX ADMIN — TAZOBACTAM SODIUM AND PIPERACILLIN SODIUM 3.38 G: 375; 3 INJECTION, SOLUTION INTRAVENOUS at 11:44

## 2018-01-01 RX ADMIN — METOLAZONE 5 MG: 5 TABLET ORAL at 09:22

## 2018-01-01 RX ADMIN — DEXTRAN 70 AND HYPROMELLOSE 2910 2 DROP: 1; 3 SOLUTION/ DROPS OPHTHALMIC at 06:12

## 2018-01-01 RX ADMIN — BACITRACIN: 500 OINTMENT TOPICAL at 09:21

## 2018-01-01 RX ADMIN — PROPOFOL 40 MCG/KG/MIN: 10 INJECTION, EMULSION INTRAVENOUS at 07:26

## 2018-01-01 RX ADMIN — Medication 40 MG: at 21:18

## 2018-01-01 RX ADMIN — DEXTRAN 70 AND HYPROMELLOSE 2910 2 DROP: 1; 3 SOLUTION/ DROPS OPHTHALMIC at 15:20

## 2018-01-01 RX ADMIN — SODIUM CHLORIDE 1000 ML: 9 INJECTION, SOLUTION INTRAVENOUS at 18:32

## 2018-01-01 RX ADMIN — Medication 1 PACKET: at 20:19

## 2018-01-01 RX ADMIN — DEXMEDETOMIDINE 0.7 MCG/KG/HR: 100 INJECTION, SOLUTION, CONCENTRATE INTRAVENOUS at 12:07

## 2018-01-01 RX ADMIN — ALBUMIN HUMAN 12.5 G: 0.25 SOLUTION INTRAVENOUS at 15:43

## 2018-01-01 RX ADMIN — BACITRACIN: 500 OINTMENT TOPICAL at 20:50

## 2018-01-01 RX ADMIN — Medication 1 PACKET: at 12:21

## 2018-01-01 RX ADMIN — CHLORHEXIDINE GLUCONATE 15 ML: 1.2 RINSE ORAL at 20:49

## 2018-01-01 RX ADMIN — DEXTRAN 70 AND HYPROMELLOSE 2910 2 DROP: 1; 3 SOLUTION/ DROPS OPHTHALMIC at 08:51

## 2018-01-01 RX ADMIN — DIPHENHYDRAMINE HYDROCHLORIDE AND LIDOCAINE HYDROCHLORIDE AND ALUMINUM HYDROXIDE AND MAGNESIUM HYDRO 10 ML: KIT at 03:59

## 2018-01-01 RX ADMIN — PROPOFOL 30 MCG/KG/MIN: 10 INJECTION, EMULSION INTRAVENOUS at 22:25

## 2018-01-01 RX ADMIN — MULTIVITAMIN 15 ML: LIQUID ORAL at 09:22

## 2018-01-01 RX ADMIN — FLUCONAZOLE 200 MG: 2 INJECTION, SOLUTION INTRAVENOUS at 11:14

## 2018-01-01 RX ADMIN — Medication 2 PACKET: at 10:34

## 2018-01-01 RX ADMIN — BACITRACIN: 500 OINTMENT TOPICAL at 08:01

## 2018-01-01 RX ADMIN — NYSTATIN 500000 UNITS: 100000 SUSPENSION ORAL at 12:48

## 2018-01-01 RX ADMIN — MICAFUNGIN SODIUM 100 MG: 10 INJECTION, POWDER, LYOPHILIZED, FOR SOLUTION INTRAVENOUS at 18:39

## 2018-01-01 RX ADMIN — Medication 40 MG: at 08:13

## 2018-01-01 RX ADMIN — Medication 0.03 MCG/KG/MIN: at 11:06

## 2018-01-01 RX ADMIN — INSULIN GLARGINE 5 UNITS: 100 INJECTION, SOLUTION SUBCUTANEOUS at 10:27

## 2018-01-01 RX ADMIN — FUROSEMIDE 40 MG: 10 INJECTION, SOLUTION INTRAVENOUS at 01:20

## 2018-01-01 RX ADMIN — SODIUM CHLORIDE 250 MG: 9 INJECTION, SOLUTION INTRAVENOUS at 09:01

## 2018-01-01 RX ADMIN — BACITRACIN: 500 OINTMENT TOPICAL at 20:26

## 2018-01-01 RX ADMIN — Medication 0.4 MG/HR: at 17:49

## 2018-01-01 RX ADMIN — FUROSEMIDE 20 MG: 10 INJECTION, SOLUTION INTRAMUSCULAR; INTRAVENOUS at 11:23

## 2018-01-01 RX ADMIN — Medication 40 MG: at 08:40

## 2018-01-01 RX ADMIN — DIPHENHYDRAMINE HYDROCHLORIDE AND LIDOCAINE HYDROCHLORIDE AND ALUMINUM HYDROXIDE AND MAGNESIUM HYDRO 10 ML: KIT at 09:55

## 2018-01-01 RX ADMIN — FOLIC ACID 1 MG: 1 TABLET ORAL at 08:19

## 2018-01-01 RX ADMIN — SODIUM CHLORIDE 250 ML: 9 INJECTION, SOLUTION INTRAVENOUS at 18:37

## 2018-01-01 RX ADMIN — VANCOMYCIN HYDROCHLORIDE 1250 MG: 10 INJECTION, POWDER, LYOPHILIZED, FOR SOLUTION INTRAVENOUS at 02:27

## 2018-01-01 RX ADMIN — BACITRACIN: 500 OINTMENT TOPICAL at 08:19

## 2018-01-01 RX ADMIN — TAZOBACTAM SODIUM AND PIPERACILLIN SODIUM 3.38 G: 375; 3 INJECTION, SOLUTION INTRAVENOUS at 20:36

## 2018-01-01 RX ADMIN — NOREPINEPHRINE BITARTRATE 0.4 MCG/KG/MIN: 1 INJECTION INTRAVENOUS at 06:42

## 2018-01-01 RX ADMIN — PHENYLEPHRINE HYDROCHLORIDE 6 MCG/KG/MIN: 10 INJECTION INTRAVENOUS at 21:34

## 2018-01-01 RX ADMIN — BACITRACIN: 500 OINTMENT TOPICAL at 20:06

## 2018-01-01 RX ADMIN — DIPHENHYDRAMINE HYDROCHLORIDE AND LIDOCAINE HYDROCHLORIDE AND ALUMINUM HYDROXIDE AND MAGNESIUM HYDRO 10 ML: KIT at 04:05

## 2018-01-01 RX ADMIN — QUETIAPINE FUMARATE 25 MG: 25 TABLET ORAL at 22:30

## 2018-01-01 RX ADMIN — PHENYLEPHRINE HYDROCHLORIDE 3.5 MCG/KG/MIN: 10 INJECTION INTRAVENOUS at 18:56

## 2018-01-01 RX ADMIN — DEXMEDETOMIDINE 0.7 MCG/KG/HR: 100 INJECTION, SOLUTION, CONCENTRATE INTRAVENOUS at 01:13

## 2018-01-01 RX ADMIN — DEXMEDETOMIDINE 0.7 MCG/KG/HR: 100 INJECTION, SOLUTION, CONCENTRATE INTRAVENOUS at 15:59

## 2018-01-01 RX ADMIN — FUROSEMIDE 20 MG: 10 INJECTION, SOLUTION INTRAMUSCULAR; INTRAVENOUS at 09:37

## 2018-01-01 RX ADMIN — DEXMEDETOMIDINE 0.7 MCG/KG/HR: 100 INJECTION, SOLUTION, CONCENTRATE INTRAVENOUS at 16:32

## 2018-01-01 RX ADMIN — FUROSEMIDE 20 MG: 10 INJECTION, SOLUTION INTRAMUSCULAR; INTRAVENOUS at 17:29

## 2018-01-01 RX ADMIN — Medication 2 PACKET: at 18:27

## 2018-01-01 RX ADMIN — QUETIAPINE FUMARATE 25 MG: 25 TABLET ORAL at 10:18

## 2018-01-01 RX ADMIN — POTASSIUM CHLORIDE 40 MEQ: 1.5 POWDER, FOR SOLUTION ORAL at 10:36

## 2018-01-01 RX ADMIN — NYSTATIN 500000 UNITS: 100000 SUSPENSION ORAL at 15:27

## 2018-01-01 RX ADMIN — BACITRACIN: 500 OINTMENT TOPICAL at 20:29

## 2018-01-01 RX ADMIN — FUROSEMIDE 40 MG: 10 INJECTION, SOLUTION INTRAMUSCULAR; INTRAVENOUS at 21:41

## 2018-01-01 RX ADMIN — DIPHENHYDRAMINE HYDROCHLORIDE AND LIDOCAINE HYDROCHLORIDE AND ALUMINUM HYDROXIDE AND MAGNESIUM HYDRO 10 ML: KIT at 17:31

## 2018-01-01 RX ADMIN — LORAZEPAM 0.5 MG: 2 INJECTION INTRAMUSCULAR; INTRAVENOUS at 19:05

## 2018-01-01 RX ADMIN — DEXTRAN 70 AND HYPROMELLOSE 2910 2 DROP: 1; 3 SOLUTION/ DROPS OPHTHALMIC at 16:40

## 2018-01-01 RX ADMIN — Medication 40 MG: at 23:11

## 2018-01-01 RX ADMIN — FOLIC ACID 1 MG: 1 TABLET ORAL at 08:02

## 2018-01-01 RX ADMIN — DEXMEDETOMIDINE 0.4 MCG/KG/HR: 100 INJECTION, SOLUTION, CONCENTRATE INTRAVENOUS at 12:15

## 2018-01-01 RX ADMIN — Medication 2 PACKET: at 10:57

## 2018-01-01 RX ADMIN — NYSTATIN 500000 UNITS: 100000 SUSPENSION ORAL at 18:16

## 2018-01-01 RX ADMIN — DIPHENHYDRAMINE HYDROCHLORIDE AND LIDOCAINE HYDROCHLORIDE AND ALUMINUM HYDROXIDE AND MAGNESIUM HYDRO 10 ML: KIT at 18:10

## 2018-01-01 RX ADMIN — LORAZEPAM 2 MG: 2 INJECTION INTRAMUSCULAR; INTRAVENOUS at 03:54

## 2018-01-01 RX ADMIN — DRONABINOL 5 MG: 2.5 CAPSULE ORAL at 21:33

## 2018-01-01 RX ADMIN — VANCOMYCIN HYDROCHLORIDE 1500 MG: 10 INJECTION, POWDER, LYOPHILIZED, FOR SOLUTION INTRAVENOUS at 11:02

## 2018-01-01 RX ADMIN — METHYLPREDNISOLONE SODIUM SUCCINATE 62.5 MG: 125 INJECTION, POWDER, FOR SOLUTION INTRAMUSCULAR; INTRAVENOUS at 08:21

## 2018-01-01 RX ADMIN — NYSTATIN 500000 UNITS: 100000 SUSPENSION ORAL at 17:10

## 2018-01-01 RX ADMIN — DEXMEDETOMIDINE 0.7 MCG/KG/HR: 100 INJECTION, SOLUTION, CONCENTRATE INTRAVENOUS at 02:00

## 2018-01-01 RX ADMIN — Medication 1 PACKET: at 20:48

## 2018-01-01 RX ADMIN — Medication 2 PACKET: at 18:23

## 2018-01-01 RX ADMIN — PROPOFOL 50 MCG/KG/MIN: 10 INJECTION, EMULSION INTRAVENOUS at 03:24

## 2018-01-01 RX ADMIN — QUETIAPINE FUMARATE 25 MG: 25 TABLET ORAL at 20:19

## 2018-01-01 RX ADMIN — DEXTRAN 70 AND HYPROMELLOSE 2910 2 DROP: 1; 3 SOLUTION/ DROPS OPHTHALMIC at 20:49

## 2018-01-01 RX ADMIN — MICAFUNGIN SODIUM 100 MG: 10 INJECTION, POWDER, LYOPHILIZED, FOR SOLUTION INTRAVENOUS at 15:41

## 2018-01-01 RX ADMIN — QUETIAPINE FUMARATE 25 MG: 25 TABLET ORAL at 08:39

## 2018-01-01 RX ADMIN — FUROSEMIDE 40 MG: 10 INJECTION, SOLUTION INTRAMUSCULAR; INTRAVENOUS at 20:20

## 2018-01-01 RX ADMIN — DEXTRAN 70 AND HYPROMELLOSE 2910 2 DROP: 1; 3 SOLUTION/ DROPS OPHTHALMIC at 08:04

## 2018-01-01 RX ADMIN — VASOPRESSIN 2.4 UNITS/HR: 20 INJECTION INTRAVENOUS at 00:40

## 2018-01-01 RX ADMIN — FUROSEMIDE 5 MG/HR: 10 INJECTION, SOLUTION INTRAVENOUS at 05:37

## 2018-01-01 RX ADMIN — IOPAMIDOL 64 ML: 755 INJECTION, SOLUTION INTRAVENOUS at 17:56

## 2018-01-01 RX ADMIN — POTASSIUM CHLORIDE 20 MEQ: 1.5 POWDER, FOR SOLUTION ORAL at 09:35

## 2018-01-01 RX ADMIN — SODIUM CHLORIDE 250 MG: 9 INJECTION, SOLUTION INTRAVENOUS at 10:16

## 2018-01-01 RX ADMIN — TAZOBACTAM SODIUM AND PIPERACILLIN SODIUM 3.38 G: 375; 3 INJECTION, SOLUTION INTRAVENOUS at 09:54

## 2018-01-01 RX ADMIN — DEXTROSE MONOHYDRATE 50 ML: 25 INJECTION, SOLUTION INTRAVENOUS at 00:17

## 2018-01-01 RX ADMIN — TAZOBACTAM SODIUM AND PIPERACILLIN SODIUM 3.38 G: 375; 3 INJECTION, SOLUTION INTRAVENOUS at 22:56

## 2018-01-01 RX ADMIN — FENTANYL CITRATE 50 MCG: 50 INJECTION INTRAMUSCULAR; INTRAVENOUS at 01:02

## 2018-01-01 RX ADMIN — Medication 1 PACKET: at 08:39

## 2018-01-01 RX ADMIN — DIPHENHYDRAMINE HYDROCHLORIDE AND LIDOCAINE HYDROCHLORIDE AND ALUMINUM HYDROXIDE AND MAGNESIUM HYDRO 10 ML: KIT at 15:30

## 2018-01-01 RX ADMIN — FENTANYL CITRATE 50 MCG: 50 INJECTION INTRAMUSCULAR; INTRAVENOUS at 01:31

## 2018-01-01 RX ADMIN — BACITRACIN: 500 OINTMENT TOPICAL at 11:19

## 2018-01-01 RX ADMIN — BACITRACIN: 500 OINTMENT TOPICAL at 09:29

## 2018-01-01 RX ADMIN — BACITRACIN: 500 OINTMENT TOPICAL at 22:48

## 2018-01-01 RX ADMIN — NYSTATIN 500000 UNITS: 100000 SUSPENSION ORAL at 22:41

## 2018-01-01 RX ADMIN — POTASSIUM CHLORIDE 20 MEQ: 1.5 POWDER, FOR SOLUTION ORAL at 08:14

## 2018-01-01 RX ADMIN — ALBUMIN HUMAN 12.5 G: 0.25 SOLUTION INTRAVENOUS at 07:58

## 2018-01-01 RX ADMIN — CEFEPIME HYDROCHLORIDE 2 G: 2 INJECTION, POWDER, FOR SOLUTION INTRAVENOUS at 16:49

## 2018-01-01 RX ADMIN — DIPHENHYDRAMINE HYDROCHLORIDE AND LIDOCAINE HYDROCHLORIDE AND ALUMINUM HYDROXIDE AND MAGNESIUM HYDRO 10 ML: KIT at 09:53

## 2018-01-01 RX ADMIN — VASOPRESSIN 2.4 UNITS/HR: 20 INJECTION INTRAVENOUS at 09:49

## 2018-01-01 RX ADMIN — QUETIAPINE FUMARATE 25 MG: 25 TABLET ORAL at 10:51

## 2018-01-01 RX ADMIN — QUETIAPINE FUMARATE 25 MG: 25 TABLET ORAL at 20:08

## 2018-01-01 RX ADMIN — DEXTRAN 70 AND HYPROMELLOSE 2910 2 DROP: 1; 3 SOLUTION/ DROPS OPHTHALMIC at 07:58

## 2018-01-01 RX ADMIN — DEXTRAN 70 AND HYPROMELLOSE 2910 2 DROP: 1; 3 SOLUTION/ DROPS OPHTHALMIC at 16:25

## 2018-01-01 RX ADMIN — FOLIC ACID 1 MG: 1 TABLET ORAL at 16:28

## 2018-01-01 RX ADMIN — LORAZEPAM 0.5 MG: 2 INJECTION INTRAMUSCULAR; INTRAVENOUS at 12:50

## 2018-01-01 RX ADMIN — Medication 2 PACKET: at 17:39

## 2018-01-01 RX ADMIN — DIPHENHYDRAMINE HYDROCHLORIDE AND LIDOCAINE HYDROCHLORIDE AND ALUMINUM HYDROXIDE AND MAGNESIUM HYDRO 10 ML: KIT at 16:10

## 2018-01-01 RX ADMIN — FUROSEMIDE 5 MG/HR: 10 INJECTION, SOLUTION INTRAVENOUS at 16:23

## 2018-01-01 RX ADMIN — POTASSIUM CHLORIDE 40 MEQ: 1.5 POWDER, FOR SOLUTION ORAL at 14:50

## 2018-01-01 RX ADMIN — Medication 30 MG: at 10:13

## 2018-01-01 RX ADMIN — INSULIN GLARGINE 5 UNITS: 100 INJECTION, SOLUTION SUBCUTANEOUS at 10:59

## 2018-01-01 RX ADMIN — MULTIVITAMIN 15 ML: LIQUID ORAL at 10:35

## 2018-01-01 RX ADMIN — LORAZEPAM 2 MG: 2 INJECTION INTRAMUSCULAR; INTRAVENOUS at 01:53

## 2018-01-01 RX ADMIN — TAZOBACTAM SODIUM AND PIPERACILLIN SODIUM 3.38 G: 375; 3 INJECTION, SOLUTION INTRAVENOUS at 05:50

## 2018-01-01 RX ADMIN — IOPAMIDOL 76 ML: 755 INJECTION, SOLUTION INTRAVENOUS at 09:36

## 2018-01-01 RX ADMIN — ALBUMIN HUMAN 12.5 G: 0.25 SOLUTION INTRAVENOUS at 02:51

## 2018-01-01 RX ADMIN — ALBUMIN HUMAN 50 G: 0.25 SOLUTION INTRAVENOUS at 13:03

## 2018-01-01 RX ADMIN — METHYLPREDNISOLONE SODIUM SUCCINATE 62.5 MG: 125 INJECTION, POWDER, FOR SOLUTION INTRAMUSCULAR; INTRAVENOUS at 20:08

## 2018-01-01 RX ADMIN — VANCOMYCIN HYDROCHLORIDE 1250 MG: 10 INJECTION, POWDER, LYOPHILIZED, FOR SOLUTION INTRAVENOUS at 12:46

## 2018-01-01 RX ADMIN — POTASSIUM CHLORIDE 20 MEQ: 1.5 POWDER, FOR SOLUTION ORAL at 20:15

## 2018-01-01 RX ADMIN — VASOPRESSIN 2.4 UNITS/HR: 20 INJECTION INTRAVENOUS at 08:02

## 2018-01-01 RX ADMIN — Medication 1 PACKET: at 17:39

## 2018-01-01 RX ADMIN — FENTANYL CITRATE 50 MCG: 50 INJECTION INTRAMUSCULAR; INTRAVENOUS at 14:55

## 2018-01-01 RX ADMIN — BACITRACIN: 500 OINTMENT TOPICAL at 20:48

## 2018-01-01 RX ADMIN — VANCOMYCIN HYDROCHLORIDE 1250 MG: 10 INJECTION, POWDER, LYOPHILIZED, FOR SOLUTION INTRAVENOUS at 04:08

## 2018-01-01 RX ADMIN — MIDAZOLAM HYDROCHLORIDE 1 MG/HR: 5 INJECTION, SOLUTION INTRAMUSCULAR; INTRAVENOUS at 23:25

## 2018-01-01 RX ADMIN — Medication 2 PACKET: at 17:21

## 2018-01-01 RX ADMIN — DEXMEDETOMIDINE 0.7 MCG/KG/HR: 100 INJECTION, SOLUTION, CONCENTRATE INTRAVENOUS at 05:13

## 2018-01-01 RX ADMIN — Medication 2 PACKET: at 17:12

## 2018-01-01 RX ADMIN — DIPHENHYDRAMINE HYDROCHLORIDE AND LIDOCAINE HYDROCHLORIDE AND ALUMINUM HYDROXIDE AND MAGNESIUM HYDRO 10 ML: KIT at 23:06

## 2018-01-01 RX ADMIN — LORAZEPAM 0.5 MG: 2 INJECTION INTRAMUSCULAR; INTRAVENOUS at 20:15

## 2018-01-01 RX ADMIN — VANCOMYCIN HYDROCHLORIDE 1500 MG: 10 INJECTION, POWDER, LYOPHILIZED, FOR SOLUTION INTRAVENOUS at 03:45

## 2018-01-01 RX ADMIN — NOREPINEPHRINE BITARTRATE 0.03 MCG/KG/MIN: 1 INJECTION INTRAVENOUS at 02:28

## 2018-01-01 RX ADMIN — QUETIAPINE FUMARATE 25 MG: 25 TABLET ORAL at 09:47

## 2018-01-01 RX ADMIN — Medication 40 MG: at 07:57

## 2018-01-01 RX ADMIN — DEXTRAN 70 AND HYPROMELLOSE 2910 2 DROP: 1; 3 SOLUTION/ DROPS OPHTHALMIC at 13:40

## 2018-01-01 RX ADMIN — HUMAN ALBUMIN MICROSPHERES AND PERFLUTREN 6 ML: 10; .22 INJECTION, SOLUTION INTRAVENOUS at 11:30

## 2018-01-01 RX ADMIN — PROPOFOL 45 MCG/KG/MIN: 10 INJECTION, EMULSION INTRAVENOUS at 14:12

## 2018-01-01 RX ADMIN — DEXTRAN 70 AND HYPROMELLOSE 2910 2 DROP: 1; 3 SOLUTION/ DROPS OPHTHALMIC at 16:17

## 2018-01-01 RX ADMIN — NYSTATIN 500000 UNITS: 100000 SUSPENSION ORAL at 10:19

## 2018-01-01 RX ADMIN — POTASSIUM PHOSPHATE, MONOBASIC AND POTASSIUM PHOSPHATE, DIBASIC 15 MMOL: 224; 236 INJECTION, SOLUTION INTRAVENOUS at 08:59

## 2018-01-01 RX ADMIN — DEXTRAN 70 AND HYPROMELLOSE 2910 2 DROP: 1; 3 SOLUTION/ DROPS OPHTHALMIC at 12:53

## 2018-01-01 RX ADMIN — DEXTRAN 70 AND HYPROMELLOSE 2910 2 DROP: 1; 3 SOLUTION/ DROPS OPHTHALMIC at 11:50

## 2018-01-01 RX ADMIN — POTASSIUM CHLORIDE 20 MEQ: 1.5 POWDER, FOR SOLUTION ORAL at 11:46

## 2018-01-01 RX ADMIN — ALBUMIN HUMAN 12.5 G: 0.25 SOLUTION INTRAVENOUS at 10:29

## 2018-01-01 RX ADMIN — Medication 2 PACKET: at 20:23

## 2018-01-01 RX ADMIN — DEXTRAN 70 AND HYPROMELLOSE 2910 2 DROP: 1; 3 SOLUTION/ DROPS OPHTHALMIC at 09:56

## 2018-01-01 RX ADMIN — BACITRACIN: 500 OINTMENT TOPICAL at 10:19

## 2018-01-01 RX ADMIN — FENTANYL CITRATE 50 MCG: 50 INJECTION INTRAMUSCULAR; INTRAVENOUS at 20:02

## 2018-01-01 RX ADMIN — MICAFUNGIN SODIUM 100 MG: 10 INJECTION, POWDER, LYOPHILIZED, FOR SOLUTION INTRAVENOUS at 16:54

## 2018-01-01 RX ADMIN — VANCOMYCIN HYDROCHLORIDE 1250 MG: 10 INJECTION, POWDER, LYOPHILIZED, FOR SOLUTION INTRAVENOUS at 21:15

## 2018-01-01 RX ADMIN — ALBUMIN HUMAN 12.5 G: 0.25 SOLUTION INTRAVENOUS at 07:42

## 2018-01-01 RX ADMIN — FUROSEMIDE 40 MG: 10 INJECTION, SOLUTION INTRAMUSCULAR; INTRAVENOUS at 22:52

## 2018-01-01 RX ADMIN — INSULIN ASPART 2 UNITS: 100 INJECTION, SOLUTION INTRAVENOUS; SUBCUTANEOUS at 07:53

## 2018-01-01 RX ADMIN — CHLORDIAZEPOXIDE HYDROCHLORIDE 25 MG: 5 CAPSULE ORAL at 20:36

## 2018-01-01 RX ADMIN — FUROSEMIDE 20 MG: 10 INJECTION, SOLUTION INTRAMUSCULAR; INTRAVENOUS at 16:10

## 2018-01-01 RX ADMIN — Medication 2 PACKET: at 08:21

## 2018-01-01 RX ADMIN — VANCOMYCIN HYDROCHLORIDE 1250 MG: 5 INJECTION, POWDER, LYOPHILIZED, FOR SOLUTION INTRAVENOUS at 14:53

## 2018-01-01 RX ADMIN — VASOPRESSIN 2.4 UNITS/HR: 20 INJECTION INTRAVENOUS at 20:32

## 2018-01-01 RX ADMIN — MICAFUNGIN SODIUM 100 MG: 10 INJECTION, POWDER, LYOPHILIZED, FOR SOLUTION INTRAVENOUS at 17:15

## 2018-01-01 RX ADMIN — NYSTATIN 500000 UNITS: 100000 SUSPENSION ORAL at 14:03

## 2018-01-01 RX ADMIN — NOREPINEPHRINE BITARTRATE 0.4 MCG/KG/MIN: 1 INJECTION INTRAVENOUS at 03:02

## 2018-01-01 RX ADMIN — POTASSIUM PHOSPHATE, MONOBASIC AND POTASSIUM PHOSPHATE, DIBASIC 15 MMOL: 224; 236 INJECTION, SOLUTION INTRAVENOUS at 17:34

## 2018-01-01 RX ADMIN — PROPOFOL 20 MCG/KG/MIN: 10 INJECTION, EMULSION INTRAVENOUS at 05:59

## 2018-01-01 RX ADMIN — POTASSIUM CHLORIDE 40 MEQ: 1.5 POWDER, FOR SOLUTION ORAL at 06:08

## 2018-01-01 RX ADMIN — MIDAZOLAM HYDROCHLORIDE 1 MG: 1 INJECTION, SOLUTION INTRAMUSCULAR; INTRAVENOUS at 22:41

## 2018-01-01 RX ADMIN — Medication 2 PACKET: at 09:07

## 2018-01-01 RX ADMIN — LORAZEPAM 1 MG: 2 INJECTION INTRAMUSCULAR; INTRAVENOUS at 01:41

## 2018-01-01 RX ADMIN — INSULIN ASPART 1 UNITS: 100 INJECTION, SOLUTION INTRAVENOUS; SUBCUTANEOUS at 20:08

## 2018-01-01 RX ADMIN — ALBUMIN HUMAN 12.5 G: 0.25 SOLUTION INTRAVENOUS at 17:33

## 2018-01-01 RX ADMIN — Medication 40 MG: at 20:21

## 2018-01-01 RX ADMIN — QUETIAPINE FUMARATE 25 MG: 25 TABLET ORAL at 20:29

## 2018-01-01 RX ADMIN — FENTANYL CITRATE 50 MCG: 50 INJECTION INTRAMUSCULAR; INTRAVENOUS at 10:21

## 2018-01-01 RX ADMIN — FENTANYL CITRATE 50 MCG: 50 INJECTION INTRAMUSCULAR; INTRAVENOUS at 05:23

## 2018-01-01 RX ADMIN — TAZOBACTAM SODIUM AND PIPERACILLIN SODIUM 3.38 G: 375; 3 INJECTION, SOLUTION INTRAVENOUS at 03:41

## 2018-01-01 RX ADMIN — VASOPRESSIN 2.4 UNITS/HR: 20 INJECTION INTRAVENOUS at 01:57

## 2018-01-01 RX ADMIN — PROPOFOL 10 MCG/KG/MIN: 10 INJECTION, EMULSION INTRAVENOUS at 13:18

## 2018-01-01 RX ADMIN — SODIUM CHLORIDE, POTASSIUM CHLORIDE, SODIUM LACTATE AND CALCIUM CHLORIDE: 600; 310; 30; 20 INJECTION, SOLUTION INTRAVENOUS at 09:19

## 2018-01-01 RX ADMIN — DEXMEDETOMIDINE 0.7 MCG/KG/HR: 100 INJECTION, SOLUTION, CONCENTRATE INTRAVENOUS at 06:46

## 2018-01-01 RX ADMIN — VANCOMYCIN HYDROCHLORIDE 1250 MG: 10 INJECTION, POWDER, LYOPHILIZED, FOR SOLUTION INTRAVENOUS at 22:21

## 2018-01-01 RX ADMIN — NYSTATIN 500000 UNITS: 100000 SUSPENSION ORAL at 13:08

## 2018-01-01 RX ADMIN — DEXMEDETOMIDINE 0.7 MCG/KG/HR: 100 INJECTION, SOLUTION, CONCENTRATE INTRAVENOUS at 17:47

## 2018-01-01 RX ADMIN — DIPHENHYDRAMINE HYDROCHLORIDE AND LIDOCAINE HYDROCHLORIDE AND ALUMINUM HYDROXIDE AND MAGNESIUM HYDRO 10 ML: KIT at 21:40

## 2018-01-01 RX ADMIN — CEFEPIME HYDROCHLORIDE 2 G: 2 INJECTION, POWDER, FOR SOLUTION INTRAVENOUS at 03:16

## 2018-01-01 RX ADMIN — METHYLPREDNISOLONE SODIUM SUCCINATE 62.5 MG: 125 INJECTION, POWDER, FOR SOLUTION INTRAMUSCULAR; INTRAVENOUS at 09:34

## 2018-01-01 RX ADMIN — MICAFUNGIN SODIUM 100 MG: 10 INJECTION, POWDER, LYOPHILIZED, FOR SOLUTION INTRAVENOUS at 16:41

## 2018-01-01 RX ADMIN — AZITHROMYCIN MONOHYDRATE 500 MG: 500 INJECTION, POWDER, LYOPHILIZED, FOR SOLUTION INTRAVENOUS at 09:50

## 2018-01-01 RX ADMIN — NYSTATIN 500000 UNITS: 100000 SUSPENSION ORAL at 18:36

## 2018-01-01 RX ADMIN — POTASSIUM CHLORIDE 40 MEQ: 1.5 POWDER, FOR SOLUTION ORAL at 07:48

## 2018-01-01 RX ADMIN — POTASSIUM CHLORIDE 40 MEQ: 1.5 POWDER, FOR SOLUTION ORAL at 17:21

## 2018-01-01 RX ADMIN — MULTIVITAMIN 15 ML: LIQUID ORAL at 08:21

## 2018-01-01 RX ADMIN — Medication 40 MG: at 20:19

## 2018-01-01 RX ADMIN — DEXTROSE MONOHYDRATE 50 ML: 25 INJECTION, SOLUTION INTRAVENOUS at 07:58

## 2018-01-01 RX ADMIN — ALBUMIN HUMAN 12.5 G: 0.05 INJECTION, SOLUTION INTRAVENOUS at 19:04

## 2018-01-01 RX ADMIN — Medication 0.4 MG/HR: at 05:44

## 2018-01-01 RX ADMIN — Medication 40 MG: at 08:29

## 2018-01-01 RX ADMIN — INSULIN GLARGINE 15 UNITS: 100 INJECTION, SOLUTION SUBCUTANEOUS at 08:29

## 2018-01-01 RX ADMIN — MICAFUNGIN SODIUM 100 MG: 10 INJECTION, POWDER, LYOPHILIZED, FOR SOLUTION INTRAVENOUS at 15:56

## 2018-01-01 RX ADMIN — DIPHENHYDRAMINE HYDROCHLORIDE AND LIDOCAINE HYDROCHLORIDE AND ALUMINUM HYDROXIDE AND MAGNESIUM HYDRO 10 ML: KIT at 21:42

## 2018-01-01 RX ADMIN — METOLAZONE 5 MG: 5 TABLET ORAL at 08:39

## 2018-01-01 RX ADMIN — MORPHINE SULFATE 2 MG: 2 INJECTION, SOLUTION INTRAMUSCULAR; INTRAVENOUS at 04:33

## 2018-01-01 RX ADMIN — DIPHENHYDRAMINE HYDROCHLORIDE AND LIDOCAINE HYDROCHLORIDE AND ALUMINUM HYDROXIDE AND MAGNESIUM HYDRO 10 ML: KIT at 12:04

## 2018-01-01 RX ADMIN — TAZOBACTAM SODIUM AND PIPERACILLIN SODIUM 3.38 G: 375; 3 INJECTION, SOLUTION INTRAVENOUS at 05:10

## 2018-01-01 RX ADMIN — DEXTRAN 70 AND HYPROMELLOSE 2910 2 DROP: 1; 3 SOLUTION/ DROPS OPHTHALMIC at 13:29

## 2018-01-01 RX ADMIN — Medication 100 MCG/HR: at 02:56

## 2018-01-01 RX ADMIN — DEXMEDETOMIDINE 0.7 MCG/KG/HR: 100 INJECTION, SOLUTION, CONCENTRATE INTRAVENOUS at 11:52

## 2018-01-01 RX ADMIN — FUROSEMIDE 40 MG: 10 INJECTION, SOLUTION INTRAVENOUS at 08:12

## 2018-01-01 RX ADMIN — DEXMEDETOMIDINE 0.2 MCG/KG/HR: 100 INJECTION, SOLUTION, CONCENTRATE INTRAVENOUS at 09:24

## 2018-01-01 RX ADMIN — ALBUMIN HUMAN 12.5 G: 0.25 SOLUTION INTRAVENOUS at 01:20

## 2018-01-01 RX ADMIN — POTASSIUM CHLORIDE 40 MEQ: 1.5 POWDER, FOR SOLUTION ORAL at 21:55

## 2018-01-01 RX ADMIN — MULTIVITAMIN 15 ML: LIQUID ORAL at 09:46

## 2018-01-01 RX ADMIN — SODIUM CHLORIDE: 9 INJECTION, SOLUTION INTRAVENOUS at 12:25

## 2018-01-01 RX ADMIN — Medication 1 PACKET: at 20:33

## 2018-01-01 RX ADMIN — VANCOMYCIN HYDROCHLORIDE 1250 MG: 10 INJECTION, POWDER, LYOPHILIZED, FOR SOLUTION INTRAVENOUS at 17:10

## 2018-01-01 RX ADMIN — HUMAN ALBUMIN MICROSPHERES AND PERFLUTREN 2 ML: 10; .22 INJECTION, SOLUTION INTRAVENOUS at 12:47

## 2018-01-01 RX ADMIN — DIPHENHYDRAMINE HYDROCHLORIDE AND LIDOCAINE HYDROCHLORIDE AND ALUMINUM HYDROXIDE AND MAGNESIUM HYDRO 10 ML: KIT at 09:23

## 2018-01-01 RX ADMIN — POTASSIUM CHLORIDE 20 MEQ: 400 INJECTION, SOLUTION INTRAVENOUS at 04:37

## 2018-01-01 RX ADMIN — TAZOBACTAM SODIUM AND PIPERACILLIN SODIUM 3.38 G: 375; 3 INJECTION, SOLUTION INTRAVENOUS at 06:30

## 2018-01-01 RX ADMIN — TAZOBACTAM SODIUM AND PIPERACILLIN SODIUM 3.38 G: 375; 3 INJECTION, SOLUTION INTRAVENOUS at 13:53

## 2018-01-01 RX ADMIN — QUETIAPINE FUMARATE 25 MG: 25 TABLET ORAL at 20:15

## 2018-01-01 RX ADMIN — DIPHENHYDRAMINE HYDROCHLORIDE AND LIDOCAINE HYDROCHLORIDE AND ALUMINUM HYDROXIDE AND MAGNESIUM HYDRO 10 ML: KIT at 21:25

## 2018-01-01 RX ADMIN — Medication 2 PACKET: at 09:52

## 2018-01-01 RX ADMIN — LORAZEPAM 2 MG: 2 INJECTION INTRAMUSCULAR; INTRAVENOUS at 00:22

## 2018-01-01 RX ADMIN — DIPHENHYDRAMINE HYDROCHLORIDE AND LIDOCAINE HYDROCHLORIDE AND ALUMINUM HYDROXIDE AND MAGNESIUM HYDRO 10 ML: KIT at 16:27

## 2018-01-01 RX ADMIN — DIPHENHYDRAMINE HYDROCHLORIDE AND LIDOCAINE HYDROCHLORIDE AND ALUMINUM HYDROXIDE AND MAGNESIUM HYDRO 10 ML: KIT at 22:48

## 2018-01-01 RX ADMIN — PROPOFOL 50 MCG/KG/MIN: 10 INJECTION, EMULSION INTRAVENOUS at 22:33

## 2018-01-01 RX ADMIN — DEXMEDETOMIDINE 0.7 MCG/KG/HR: 100 INJECTION, SOLUTION, CONCENTRATE INTRAVENOUS at 16:09

## 2018-01-01 RX ADMIN — POTASSIUM CHLORIDE 40 MEQ: 1.5 POWDER, FOR SOLUTION ORAL at 06:36

## 2018-01-01 RX ADMIN — TAZOBACTAM SODIUM AND PIPERACILLIN SODIUM 3.38 G: 375; 3 INJECTION, SOLUTION INTRAVENOUS at 23:55

## 2018-01-01 RX ADMIN — POTASSIUM PHOSPHATE, MONOBASIC AND POTASSIUM PHOSPHATE, DIBASIC 15 MMOL: 224; 236 INJECTION, SOLUTION INTRAVENOUS at 09:56

## 2018-01-01 RX ADMIN — PROPOFOL 30 MCG/KG/MIN: 10 INJECTION, EMULSION INTRAVENOUS at 15:25

## 2018-01-01 RX ADMIN — QUETIAPINE FUMARATE 25 MG: 25 TABLET ORAL at 10:35

## 2018-01-01 RX ADMIN — QUETIAPINE FUMARATE 25 MG: 25 TABLET ORAL at 20:50

## 2018-01-01 RX ADMIN — ALBUMIN HUMAN 12.5 G: 0.05 INJECTION, SOLUTION INTRAVENOUS at 10:32

## 2018-01-01 RX ADMIN — DEXMEDETOMIDINE 0.7 MCG/KG/HR: 100 INJECTION, SOLUTION, CONCENTRATE INTRAVENOUS at 22:40

## 2018-01-01 RX ADMIN — ALBUMIN HUMAN 12.5 G: 0.25 SOLUTION INTRAVENOUS at 21:06

## 2018-01-01 RX ADMIN — MULTIVITAMIN 15 ML: LIQUID ORAL at 08:02

## 2018-01-01 RX ADMIN — Medication 1 PACKET: at 08:08

## 2018-01-01 RX ADMIN — TAZOBACTAM SODIUM AND PIPERACILLIN SODIUM 3.38 G: 375; 3 INJECTION, SOLUTION INTRAVENOUS at 12:03

## 2018-01-01 RX ADMIN — NYSTATIN 500000 UNITS: 100000 SUSPENSION ORAL at 17:46

## 2018-01-01 RX ADMIN — METOLAZONE 5 MG: 5 TABLET ORAL at 16:40

## 2018-01-01 RX ADMIN — METHYLPREDNISOLONE SODIUM SUCCINATE 62.5 MG: 125 INJECTION, POWDER, FOR SOLUTION INTRAMUSCULAR; INTRAVENOUS at 08:22

## 2018-01-01 RX ADMIN — Medication 100 MG: at 09:18

## 2018-01-01 RX ADMIN — POTASSIUM CHLORIDE 20 MEQ: 400 INJECTION, SOLUTION INTRAVENOUS at 05:41

## 2018-01-01 RX ADMIN — TAZOBACTAM SODIUM AND PIPERACILLIN SODIUM 3.38 G: 375; 3 INJECTION, SOLUTION INTRAVENOUS at 05:21

## 2018-01-01 RX ADMIN — PROPOFOL 30 MCG/KG/MIN: 10 INJECTION, EMULSION INTRAVENOUS at 23:11

## 2018-01-01 RX ADMIN — FENTANYL CITRATE 50 MCG: 50 INJECTION INTRAMUSCULAR; INTRAVENOUS at 00:31

## 2018-01-01 RX ADMIN — TAZOBACTAM SODIUM AND PIPERACILLIN SODIUM 3.38 G: 375; 3 INJECTION, SOLUTION INTRAVENOUS at 05:58

## 2018-01-01 RX ADMIN — NYSTATIN 500000 UNITS: 100000 SUSPENSION ORAL at 09:37

## 2018-01-01 RX ADMIN — BACITRACIN: 500 OINTMENT TOPICAL at 21:18

## 2018-01-01 RX ADMIN — BACITRACIN: 500 OINTMENT TOPICAL at 20:27

## 2018-01-01 RX ADMIN — DIPHENHYDRAMINE HYDROCHLORIDE AND LIDOCAINE HYDROCHLORIDE AND ALUMINUM HYDROXIDE AND MAGNESIUM HYDRO 10 ML: KIT at 04:23

## 2018-01-01 RX ADMIN — DIPHENHYDRAMINE HYDROCHLORIDE AND LIDOCAINE HYDROCHLORIDE AND ALUMINUM HYDROXIDE AND MAGNESIUM HYDRO 10 ML: KIT at 04:07

## 2018-01-01 RX ADMIN — INSULIN GLARGINE 7 UNITS: 100 INJECTION, SOLUTION SUBCUTANEOUS at 08:12

## 2018-01-01 RX ADMIN — VANCOMYCIN HYDROCHLORIDE 1250 MG: 10 INJECTION, POWDER, LYOPHILIZED, FOR SOLUTION INTRAVENOUS at 10:28

## 2018-01-01 RX ADMIN — TAZOBACTAM SODIUM AND PIPERACILLIN SODIUM 3.38 G: 375; 3 INJECTION, SOLUTION INTRAVENOUS at 16:43

## 2018-01-01 RX ADMIN — MICAFUNGIN SODIUM 100 MG: 10 INJECTION, POWDER, LYOPHILIZED, FOR SOLUTION INTRAVENOUS at 16:27

## 2018-01-01 RX ADMIN — NYSTATIN 500000 UNITS: 100000 SUSPENSION ORAL at 08:33

## 2018-01-01 RX ADMIN — SODIUM CHLORIDE: 9 INJECTION, SOLUTION INTRAVENOUS at 13:51

## 2018-01-01 RX ADMIN — POTASSIUM CHLORIDE 20 MEQ: 1.5 POWDER, FOR SOLUTION ORAL at 20:08

## 2018-01-01 RX ADMIN — METOLAZONE 5 MG: 5 TABLET ORAL at 08:18

## 2018-01-01 RX ADMIN — FUROSEMIDE 40 MG: 10 INJECTION, SOLUTION INTRAMUSCULAR; INTRAVENOUS at 18:33

## 2018-01-01 RX ADMIN — INSULIN GLARGINE 3 UNITS: 100 INJECTION, SOLUTION SUBCUTANEOUS at 10:03

## 2018-01-01 RX ADMIN — DEXMEDETOMIDINE 0.7 MCG/KG/HR: 100 INJECTION, SOLUTION, CONCENTRATE INTRAVENOUS at 10:29

## 2018-01-01 RX ADMIN — DEXMEDETOMIDINE 0.7 MCG/KG/HR: 100 INJECTION, SOLUTION, CONCENTRATE INTRAVENOUS at 09:24

## 2018-01-01 RX ADMIN — FOLIC ACID 1 MG: 1 TABLET ORAL at 10:32

## 2018-01-01 RX ADMIN — PANTOPRAZOLE SODIUM 40 MG: 40 INJECTION, POWDER, FOR SOLUTION INTRAVENOUS at 09:24

## 2018-01-01 RX ADMIN — NOREPINEPHRINE BITARTRATE 0.2 MCG/KG/MIN: 1 INJECTION INTRAVENOUS at 16:05

## 2018-01-01 RX ADMIN — NYSTATIN 500000 UNITS: 100000 SUSPENSION ORAL at 23:56

## 2018-01-01 RX ADMIN — NYSTATIN 500000 UNITS: 100000 SUSPENSION ORAL at 08:19

## 2018-01-01 RX ADMIN — TAZOBACTAM SODIUM AND PIPERACILLIN SODIUM 3.38 G: 375; 3 INJECTION, SOLUTION INTRAVENOUS at 00:07

## 2018-01-01 RX ADMIN — TAZOBACTAM SODIUM AND PIPERACILLIN SODIUM 3.38 G: 375; 3 INJECTION, SOLUTION INTRAVENOUS at 09:06

## 2018-01-01 RX ADMIN — DIPHENHYDRAMINE HYDROCHLORIDE AND LIDOCAINE HYDROCHLORIDE AND ALUMINUM HYDROXIDE AND MAGNESIUM HYDRO 10 ML: KIT at 16:07

## 2018-01-01 RX ADMIN — QUETIAPINE FUMARATE 25 MG: 25 TABLET ORAL at 08:12

## 2018-01-01 RX ADMIN — NYSTATIN 500000 UNITS: 100000 SUSPENSION ORAL at 09:47

## 2018-01-01 RX ADMIN — FOLIC ACID 1 MG: 1 TABLET ORAL at 08:30

## 2018-01-01 RX ADMIN — DEXTROSE MONOHYDRATE: 50 INJECTION, SOLUTION INTRAVENOUS at 00:07

## 2018-01-01 RX ADMIN — DIPHENHYDRAMINE HYDROCHLORIDE AND LIDOCAINE HYDROCHLORIDE AND ALUMINUM HYDROXIDE AND MAGNESIUM HYDRO 10 ML: KIT at 03:57

## 2018-01-01 RX ADMIN — DIPHENHYDRAMINE HYDROCHLORIDE AND LIDOCAINE HYDROCHLORIDE AND ALUMINUM HYDROXIDE AND MAGNESIUM HYDRO 10 ML: KIT at 21:02

## 2018-01-01 RX ADMIN — DEXMEDETOMIDINE 0.7 MCG/KG/HR: 100 INJECTION, SOLUTION, CONCENTRATE INTRAVENOUS at 04:03

## 2018-01-01 RX ADMIN — FOLIC ACID 1 MG: 1 TABLET ORAL at 08:39

## 2018-01-01 RX ADMIN — Medication 30 MG: at 09:10

## 2018-01-01 RX ADMIN — MULTIVITAMIN 15 ML: LIQUID ORAL at 08:52

## 2018-01-01 RX ADMIN — Medication 2 PACKET: at 18:37

## 2018-01-01 RX ADMIN — MULTIVITAMIN 15 ML: LIQUID ORAL at 10:32

## 2018-01-01 RX ADMIN — METHYLPREDNISOLONE SODIUM SUCCINATE 40 MG: 40 INJECTION, POWDER, FOR SOLUTION INTRAMUSCULAR; INTRAVENOUS at 09:22

## 2018-01-01 RX ADMIN — POTASSIUM PHOSPHATE, MONOBASIC AND POTASSIUM PHOSPHATE, DIBASIC 20 MMOL: 224; 236 INJECTION, SOLUTION INTRAVENOUS at 10:42

## 2018-01-01 RX ADMIN — Medication 1 PACKET: at 21:06

## 2018-01-01 RX ADMIN — VANCOMYCIN HYDROCHLORIDE 1000 MG: 1 INJECTION, SOLUTION INTRAVENOUS at 14:47

## 2018-01-01 RX ADMIN — DIPHENHYDRAMINE HYDROCHLORIDE AND LIDOCAINE HYDROCHLORIDE AND ALUMINUM HYDROXIDE AND MAGNESIUM HYDRO 10 ML: KIT at 09:48

## 2018-01-01 RX ADMIN — DEXMEDETOMIDINE 0.7 MCG/KG/HR: 100 INJECTION, SOLUTION, CONCENTRATE INTRAVENOUS at 01:06

## 2018-01-01 RX ADMIN — BACITRACIN: 500 OINTMENT TOPICAL at 08:08

## 2018-01-01 RX ADMIN — Medication 40 MG: at 09:07

## 2018-01-01 RX ADMIN — DIPHENHYDRAMINE HYDROCHLORIDE AND LIDOCAINE HYDROCHLORIDE AND ALUMINUM HYDROXIDE AND MAGNESIUM HYDRO 10 ML: KIT at 20:21

## 2018-01-01 RX ADMIN — MAGNESIUM SULFATE HEPTAHYDRATE 4 G: 40 INJECTION, SOLUTION INTRAVENOUS at 17:19

## 2018-01-01 RX ADMIN — Medication 2 PACKET: at 17:47

## 2018-01-01 RX ADMIN — NYSTATIN 500000 UNITS: 100000 SUSPENSION ORAL at 14:12

## 2018-01-01 RX ADMIN — FUROSEMIDE 10 MG: 10 INJECTION, SOLUTION INTRAMUSCULAR; INTRAVENOUS at 10:32

## 2018-01-01 RX ADMIN — VANCOMYCIN HYDROCHLORIDE 1000 MG: 1 INJECTION, SOLUTION INTRAVENOUS at 09:00

## 2018-01-01 RX ADMIN — PANTOPRAZOLE SODIUM 40 MG: 40 INJECTION, POWDER, FOR SOLUTION INTRAVENOUS at 09:06

## 2018-01-01 RX ADMIN — FOLIC ACID 1 MG: 1 TABLET ORAL at 09:18

## 2018-01-01 RX ADMIN — TAZOBACTAM SODIUM AND PIPERACILLIN SODIUM 3.38 G: 375; 3 INJECTION, SOLUTION INTRAVENOUS at 15:25

## 2018-01-01 RX ADMIN — Medication 1 PACKET: at 12:25

## 2018-01-01 RX ADMIN — BACITRACIN: 500 OINTMENT TOPICAL at 10:35

## 2018-01-01 RX ADMIN — Medication 1 PACKET: at 23:11

## 2018-01-01 RX ADMIN — PROPOFOL 50 MCG/KG/MIN: 10 INJECTION, EMULSION INTRAVENOUS at 16:06

## 2018-01-01 RX ADMIN — POTASSIUM CHLORIDE 20 MEQ: 400 INJECTION, SOLUTION INTRAVENOUS at 00:23

## 2018-01-01 RX ADMIN — POTASSIUM CHLORIDE 20 MEQ: 1.5 POWDER, FOR SOLUTION ORAL at 21:55

## 2018-01-01 RX ADMIN — VASOPRESSIN 2.4 UNITS/HR: 20 INJECTION INTRAVENOUS at 14:36

## 2018-01-01 RX ADMIN — NYSTATIN 500000 UNITS: 100000 SUSPENSION ORAL at 22:26

## 2018-01-01 RX ADMIN — FUROSEMIDE 20 MG: 10 INJECTION, SOLUTION INTRAVENOUS at 16:42

## 2018-01-01 RX ADMIN — POTASSIUM CHLORIDE 20 MEQ: 1.5 POWDER, FOR SOLUTION ORAL at 01:57

## 2018-01-01 RX ADMIN — CALCIUM GLUCONATE 1 G: 98 INJECTION, SOLUTION INTRAVENOUS at 18:36

## 2018-01-01 RX ADMIN — POTASSIUM CHLORIDE 40 MEQ: 1.5 POWDER, FOR SOLUTION ORAL at 08:04

## 2018-01-01 RX ADMIN — DEXMEDETOMIDINE 0.6 MCG/KG/HR: 100 INJECTION, SOLUTION, CONCENTRATE INTRAVENOUS at 18:54

## 2018-01-01 RX ADMIN — DEXTRAN 70 AND HYPROMELLOSE 2910 2 DROP: 1; 3 SOLUTION/ DROPS OPHTHALMIC at 15:57

## 2018-01-01 RX ADMIN — TAZOBACTAM SODIUM AND PIPERACILLIN SODIUM 3.38 G: 375; 3 INJECTION, SOLUTION INTRAVENOUS at 00:11

## 2018-01-01 RX ADMIN — POTASSIUM PHOSPHATE, MONOBASIC AND POTASSIUM PHOSPHATE, DIBASIC 20 MMOL: 224; 236 INJECTION, SOLUTION INTRAVENOUS at 04:45

## 2018-01-01 RX ADMIN — POTASSIUM CHLORIDE 20 MEQ: 1.5 POWDER, FOR SOLUTION ORAL at 09:06

## 2018-01-01 RX ADMIN — FENTANYL CITRATE 50 MCG: 50 INJECTION INTRAMUSCULAR; INTRAVENOUS at 11:51

## 2018-01-01 RX ADMIN — Medication 40 MG: at 20:49

## 2018-01-01 RX ADMIN — DEXMEDETOMIDINE 0.7 MCG/KG/HR: 100 INJECTION, SOLUTION, CONCENTRATE INTRAVENOUS at 06:18

## 2018-01-01 RX ADMIN — BACITRACIN: 500 OINTMENT TOPICAL at 11:32

## 2018-01-01 RX ADMIN — MULTIVITAMIN 15 ML: LIQUID ORAL at 08:29

## 2018-01-01 RX ADMIN — DEXTRAN 70 AND HYPROMELLOSE 2910 2 DROP: 1; 3 SOLUTION/ DROPS OPHTHALMIC at 14:05

## 2018-01-01 RX ADMIN — VASOPRESSIN 2.4 UNITS/HR: 20 INJECTION INTRAVENOUS at 00:12

## 2018-01-01 RX ADMIN — POTASSIUM PHOSPHATE, MONOBASIC AND POTASSIUM PHOSPHATE, DIBASIC 15 MMOL: 224; 236 INJECTION, SOLUTION INTRAVENOUS at 03:49

## 2018-01-01 RX ADMIN — TAZOBACTAM SODIUM AND PIPERACILLIN SODIUM 3.38 G: 375; 3 INJECTION, SOLUTION INTRAVENOUS at 11:00

## 2018-01-01 RX ADMIN — TAZOBACTAM SODIUM AND PIPERACILLIN SODIUM 3.38 G: 375; 3 INJECTION, SOLUTION INTRAVENOUS at 07:21

## 2018-01-01 RX ADMIN — DEXMEDETOMIDINE 0.7 MCG/KG/HR: 100 INJECTION, SOLUTION, CONCENTRATE INTRAVENOUS at 19:39

## 2018-01-01 RX ADMIN — PHENYLEPHRINE HYDROCHLORIDE 1 MCG/KG/MIN: 10 INJECTION INTRAVENOUS at 10:58

## 2018-01-01 RX ADMIN — BACITRACIN: 500 OINTMENT TOPICAL at 20:08

## 2018-01-01 RX ADMIN — METHYLPREDNISOLONE SODIUM SUCCINATE 62.5 MG: 125 INJECTION, POWDER, FOR SOLUTION INTRAMUSCULAR; INTRAVENOUS at 21:54

## 2018-01-01 RX ADMIN — PROPOFOL 30 MCG/KG/MIN: 10 INJECTION, EMULSION INTRAVENOUS at 07:49

## 2018-01-01 RX ADMIN — PROPOFOL 30 MCG/KG/MIN: 10 INJECTION, EMULSION INTRAVENOUS at 15:14

## 2018-01-01 RX ADMIN — NYSTATIN 500000 UNITS: 100000 SUSPENSION ORAL at 22:50

## 2018-01-01 RX ADMIN — Medication 100 MG: at 11:03

## 2018-01-01 RX ADMIN — DIPHENHYDRAMINE HYDROCHLORIDE AND LIDOCAINE HYDROCHLORIDE AND ALUMINUM HYDROXIDE AND MAGNESIUM HYDRO 10 ML: KIT at 21:52

## 2018-01-01 RX ADMIN — PANTOPRAZOLE SODIUM 40 MG: 40 INJECTION, POWDER, FOR SOLUTION INTRAVENOUS at 22:09

## 2018-01-01 RX ADMIN — DEXMEDETOMIDINE 0.7 MCG/KG/HR: 100 INJECTION, SOLUTION, CONCENTRATE INTRAVENOUS at 13:20

## 2018-01-01 RX ADMIN — POTASSIUM CHLORIDE 20 MEQ: 400 INJECTION, SOLUTION INTRAVENOUS at 16:09

## 2018-01-01 RX ADMIN — VANCOMYCIN HYDROCHLORIDE 1250 MG: 10 INJECTION, POWDER, LYOPHILIZED, FOR SOLUTION INTRAVENOUS at 03:30

## 2018-01-01 RX ADMIN — POTASSIUM CHLORIDE 20 MEQ: 1.5 POWDER, FOR SOLUTION ORAL at 10:00

## 2018-01-01 RX ADMIN — PROPOFOL 45 MCG/KG/MIN: 10 INJECTION, EMULSION INTRAVENOUS at 01:38

## 2018-01-01 RX ADMIN — MULTIVITAMIN 15 ML: LIQUID ORAL at 08:39

## 2018-01-01 RX ADMIN — POTASSIUM CHLORIDE 40 MEQ: 1.5 POWDER, FOR SOLUTION ORAL at 07:57

## 2018-01-01 RX ADMIN — DIPHENHYDRAMINE HYDROCHLORIDE AND LIDOCAINE HYDROCHLORIDE AND ALUMINUM HYDROXIDE AND MAGNESIUM HYDRO 10 ML: KIT at 16:24

## 2018-01-01 RX ADMIN — POTASSIUM CHLORIDE 20 MEQ: 1.5 POWDER, FOR SOLUTION ORAL at 02:07

## 2018-01-01 RX ADMIN — QUETIAPINE FUMARATE 25 MG: 25 TABLET ORAL at 21:21

## 2018-01-01 RX ADMIN — VANCOMYCIN HYDROCHLORIDE 1250 MG: 10 INJECTION, POWDER, LYOPHILIZED, FOR SOLUTION INTRAVENOUS at 13:37

## 2018-01-01 RX ADMIN — QUETIAPINE FUMARATE 25 MG: 25 TABLET ORAL at 20:21

## 2018-01-01 RX ADMIN — CEFEPIME HYDROCHLORIDE 2 G: 2 INJECTION, POWDER, FOR SOLUTION INTRAVENOUS at 19:42

## 2018-01-01 RX ADMIN — BACITRACIN: 500 OINTMENT TOPICAL at 20:15

## 2018-01-01 RX ADMIN — FOLIC ACID 1 MG: 1 TABLET ORAL at 08:52

## 2018-01-01 RX ADMIN — Medication 40 MG: at 20:27

## 2018-01-01 RX ADMIN — Medication 1 PACKET: at 08:51

## 2018-01-01 RX ADMIN — ALBUMIN HUMAN 50 G: 0.25 SOLUTION INTRAVENOUS at 20:15

## 2018-01-01 RX ADMIN — ALBUMIN HUMAN 12.5 G: 0.25 SOLUTION INTRAVENOUS at 20:47

## 2018-01-01 RX ADMIN — FENTANYL CITRATE 50 MCG: 50 INJECTION INTRAMUSCULAR; INTRAVENOUS at 08:22

## 2018-01-01 RX ADMIN — FUROSEMIDE 20 MG: 10 INJECTION, SOLUTION INTRAMUSCULAR; INTRAVENOUS at 16:27

## 2018-01-01 RX ADMIN — BACITRACIN: 500 OINTMENT TOPICAL at 09:47

## 2018-01-01 RX ADMIN — DEXTRAN 70 AND HYPROMELLOSE 2910 2 DROP: 1; 3 SOLUTION/ DROPS OPHTHALMIC at 09:49

## 2018-01-01 RX ADMIN — TAZOBACTAM SODIUM AND PIPERACILLIN SODIUM 3.38 G: 375; 3 INJECTION, SOLUTION INTRAVENOUS at 17:01

## 2018-01-01 RX ADMIN — QUETIAPINE FUMARATE 25 MG: 25 TABLET ORAL at 20:44

## 2018-01-01 RX ADMIN — TAZOBACTAM SODIUM AND PIPERACILLIN SODIUM 3.38 G: 375; 3 INJECTION, SOLUTION INTRAVENOUS at 23:54

## 2018-01-01 RX ADMIN — QUETIAPINE FUMARATE 25 MG: 25 TABLET ORAL at 09:22

## 2018-01-01 RX ADMIN — METHYLPREDNISOLONE SODIUM SUCCINATE 62.5 MG: 125 INJECTION, POWDER, FOR SOLUTION INTRAMUSCULAR; INTRAVENOUS at 14:06

## 2018-01-01 RX ADMIN — POTASSIUM CHLORIDE 20 MEQ: 400 INJECTION, SOLUTION INTRAVENOUS at 15:48

## 2018-01-01 RX ADMIN — INSULIN GLARGINE 12 UNITS: 100 INJECTION, SOLUTION SUBCUTANEOUS at 08:08

## 2018-01-01 RX ADMIN — POTASSIUM CHLORIDE 40 MEQ: 1.5 POWDER, FOR SOLUTION ORAL at 22:17

## 2018-01-01 RX ADMIN — DIPHENHYDRAMINE HYDROCHLORIDE AND LIDOCAINE HYDROCHLORIDE AND ALUMINUM HYDROXIDE AND MAGNESIUM HYDRO 3 ML: KIT at 17:40

## 2018-01-01 RX ADMIN — PHENYLEPHRINE HYDROCHLORIDE 6 MCG/KG/MIN: 10 INJECTION INTRAVENOUS at 01:47

## 2018-01-01 RX ADMIN — TAZOBACTAM SODIUM AND PIPERACILLIN SODIUM 3.38 G: 375; 3 INJECTION, SOLUTION INTRAVENOUS at 17:35

## 2018-01-01 RX ADMIN — CEFEPIME HYDROCHLORIDE 2 G: 2 INJECTION, POWDER, FOR SOLUTION INTRAVENOUS at 00:02

## 2018-01-01 RX ADMIN — DIPHENHYDRAMINE HYDROCHLORIDE AND LIDOCAINE HYDROCHLORIDE AND ALUMINUM HYDROXIDE AND MAGNESIUM HYDRO 10 ML: KIT at 15:44

## 2018-01-01 RX ADMIN — METHYLPREDNISOLONE SODIUM SUCCINATE 40 MG: 40 INJECTION, POWDER, FOR SOLUTION INTRAMUSCULAR; INTRAVENOUS at 20:19

## 2018-01-01 RX ADMIN — FUROSEMIDE 40 MG: 10 INJECTION, SOLUTION INTRAMUSCULAR; INTRAVENOUS at 15:53

## 2018-01-01 RX ADMIN — SODIUM CHLORIDE, POTASSIUM CHLORIDE, SODIUM LACTATE AND CALCIUM CHLORIDE 500 ML: 600; 310; 30; 20 INJECTION, SOLUTION INTRAVENOUS at 14:33

## 2018-01-01 RX ADMIN — NOREPINEPHRINE BITARTRATE 0.08 MCG/KG/MIN: 1 INJECTION INTRAVENOUS at 17:48

## 2018-01-01 RX ADMIN — INSULIN GLARGINE 12 UNITS: 100 INJECTION, SOLUTION SUBCUTANEOUS at 09:07

## 2018-01-01 RX ADMIN — LORAZEPAM 2 MG: 1 TABLET ORAL at 09:43

## 2018-01-01 RX ADMIN — QUETIAPINE FUMARATE 25 MG: 25 TABLET ORAL at 21:54

## 2018-01-01 RX ADMIN — QUETIAPINE FUMARATE 25 MG: 25 TABLET ORAL at 09:32

## 2018-01-01 RX ADMIN — Medication 40 MG: at 22:29

## 2018-01-01 RX ADMIN — BACITRACIN: 500 OINTMENT TOPICAL at 08:12

## 2018-01-01 RX ADMIN — POTASSIUM PHOSPHATE, MONOBASIC AND POTASSIUM PHOSPHATE, DIBASIC 20 MMOL: 224; 236 INJECTION, SOLUTION INTRAVENOUS at 19:04

## 2018-01-01 RX ADMIN — NOREPINEPHRINE BITARTRATE 0.03 MCG/KG/MIN: 1 INJECTION INTRAVENOUS at 06:53

## 2018-01-01 RX ADMIN — DEXTROSE MONOHYDRATE: 50 INJECTION, SOLUTION INTRAVENOUS at 08:35

## 2018-01-01 RX ADMIN — POTASSIUM CHLORIDE 20 MEQ: 1.5 POWDER, FOR SOLUTION ORAL at 20:49

## 2018-01-01 RX ADMIN — FENTANYL CITRATE 50 MCG: 50 INJECTION INTRAMUSCULAR; INTRAVENOUS at 20:05

## 2018-01-01 RX ADMIN — TAZOBACTAM SODIUM AND PIPERACILLIN SODIUM 3.38 G: 375; 3 INJECTION, SOLUTION INTRAVENOUS at 18:30

## 2018-01-01 RX ADMIN — SODIUM CHLORIDE, POTASSIUM CHLORIDE, SODIUM LACTATE AND CALCIUM CHLORIDE 1000 ML: 600; 310; 30; 20 INJECTION, SOLUTION INTRAVENOUS at 10:28

## 2018-01-01 RX ADMIN — DEXMEDETOMIDINE 0.3 MCG/KG/HR: 100 INJECTION, SOLUTION, CONCENTRATE INTRAVENOUS at 18:49

## 2018-01-01 RX ADMIN — Medication 2 G: at 16:27

## 2018-01-01 RX ADMIN — POTASSIUM CHLORIDE 40 MEQ: 1.5 POWDER, FOR SOLUTION ORAL at 06:04

## 2018-01-01 RX ADMIN — FOLIC ACID: 5 INJECTION, SOLUTION INTRAMUSCULAR; INTRAVENOUS; SUBCUTANEOUS at 17:13

## 2018-01-01 RX ADMIN — BACITRACIN: 500 OINTMENT TOPICAL at 22:29

## 2018-01-01 RX ADMIN — FUROSEMIDE 40 MG: 10 INJECTION, SOLUTION INTRAMUSCULAR; INTRAVENOUS at 01:58

## 2018-01-01 RX ADMIN — OXYCODONE HYDROCHLORIDE 5 MG: 5 TABLET ORAL at 19:07

## 2018-01-01 RX ADMIN — POTASSIUM CHLORIDE 20 MEQ: 1.5 POWDER, FOR SOLUTION ORAL at 00:17

## 2018-01-01 RX ADMIN — VANCOMYCIN HYDROCHLORIDE 1500 MG: 10 INJECTION, POWDER, LYOPHILIZED, FOR SOLUTION INTRAVENOUS at 12:15

## 2018-01-01 RX ADMIN — FUROSEMIDE 40 MG: 10 INJECTION, SOLUTION INTRAVENOUS at 09:37

## 2018-01-01 RX ADMIN — POTASSIUM CHLORIDE 20 MEQ: 1.5 POWDER, FOR SOLUTION ORAL at 09:32

## 2018-01-01 RX ADMIN — DEXMEDETOMIDINE 0.7 MCG/KG/HR: 100 INJECTION, SOLUTION, CONCENTRATE INTRAVENOUS at 05:21

## 2018-01-01 RX ADMIN — NYSTATIN 500000 UNITS: 100000 SUSPENSION ORAL at 09:06

## 2018-01-01 RX ADMIN — DIPHENHYDRAMINE HYDROCHLORIDE AND LIDOCAINE HYDROCHLORIDE AND ALUMINUM HYDROXIDE AND MAGNESIUM HYDRO 10 ML: KIT at 14:05

## 2018-01-01 RX ADMIN — TAZOBACTAM SODIUM AND PIPERACILLIN SODIUM 3.38 G: 375; 3 INJECTION, SOLUTION INTRAVENOUS at 18:23

## 2018-01-01 RX ADMIN — POTASSIUM PHOSPHATE, MONOBASIC AND POTASSIUM PHOSPHATE, DIBASIC 20 MMOL: 224; 236 INJECTION, SOLUTION INTRAVENOUS at 20:38

## 2018-01-01 RX ADMIN — MICAFUNGIN SODIUM 100 MG: 10 INJECTION, POWDER, LYOPHILIZED, FOR SOLUTION INTRAVENOUS at 17:51

## 2018-01-01 RX ADMIN — DEXTRAN 70 AND HYPROMELLOSE 2910 2 DROP: 1; 3 SOLUTION/ DROPS OPHTHALMIC at 09:28

## 2018-01-01 RX ADMIN — TAZOBACTAM SODIUM AND PIPERACILLIN SODIUM 3.38 G: 375; 3 INJECTION, SOLUTION INTRAVENOUS at 12:10

## 2018-01-01 RX ADMIN — FENTANYL CITRATE 50 MCG: 50 INJECTION INTRAMUSCULAR; INTRAVENOUS at 13:11

## 2018-01-01 RX ADMIN — Medication 100 MG: at 08:30

## 2018-01-01 RX ADMIN — Medication 50 MCG/HR: at 14:15

## 2018-01-01 RX ADMIN — NYSTATIN 500000 UNITS: 100000 SUSPENSION ORAL at 16:49

## 2018-01-01 RX ADMIN — Medication 1 PACKET: at 18:26

## 2018-01-01 RX ADMIN — PHENYLEPHRINE HYDROCHLORIDE 6 MCG/KG/MIN: 10 INJECTION INTRAVENOUS at 23:36

## 2018-01-01 RX ADMIN — CEFEPIME HYDROCHLORIDE 2 G: 2 INJECTION, POWDER, FOR SOLUTION INTRAVENOUS at 09:13

## 2018-01-01 RX ADMIN — DEXMEDETOMIDINE 0.6 MCG/KG/HR: 100 INJECTION, SOLUTION, CONCENTRATE INTRAVENOUS at 03:56

## 2018-01-01 RX ADMIN — POTASSIUM PHOSPHATE, MONOBASIC AND POTASSIUM PHOSPHATE, DIBASIC 20 MMOL: 224; 236 INJECTION, SOLUTION INTRAVENOUS at 09:06

## 2018-01-01 RX ADMIN — SODIUM CHLORIDE 250 MG: 9 INJECTION, SOLUTION INTRAVENOUS at 21:49

## 2018-01-01 RX ADMIN — QUETIAPINE FUMARATE 25 MG: 25 TABLET ORAL at 20:26

## 2018-01-01 RX ADMIN — VANCOMYCIN HYDROCHLORIDE 1250 MG: 10 INJECTION, POWDER, LYOPHILIZED, FOR SOLUTION INTRAVENOUS at 18:33

## 2018-01-01 RX ADMIN — MULTIVITAMIN 15 ML: LIQUID ORAL at 09:29

## 2018-01-01 RX ADMIN — SODIUM CHLORIDE 57 ML: 9 INJECTION, SOLUTION INTRAVENOUS at 17:56

## 2018-01-01 RX ADMIN — FENTANYL CITRATE 50 MCG: 50 INJECTION INTRAMUSCULAR; INTRAVENOUS at 18:52

## 2018-01-01 RX ADMIN — VANCOMYCIN HYDROCHLORIDE 1250 MG: 10 INJECTION, POWDER, LYOPHILIZED, FOR SOLUTION INTRAVENOUS at 12:47

## 2018-01-01 RX ADMIN — TAZOBACTAM SODIUM AND PIPERACILLIN SODIUM 3.38 G: 375; 3 INJECTION, SOLUTION INTRAVENOUS at 22:20

## 2018-01-01 RX ADMIN — Medication 1 PACKET: at 09:22

## 2018-01-01 RX ADMIN — PROPOFOL 45 MCG/KG/MIN: 10 INJECTION, EMULSION INTRAVENOUS at 09:52

## 2018-01-01 RX ADMIN — DEXTROSE MONOHYDRATE: 50 INJECTION, SOLUTION INTRAVENOUS at 13:52

## 2018-01-01 RX ADMIN — INSULIN GLARGINE 7 UNITS: 100 INJECTION, SOLUTION SUBCUTANEOUS at 08:07

## 2018-01-01 RX ADMIN — FOLIC ACID 1 MG: 1 TABLET ORAL at 10:18

## 2018-01-01 RX ADMIN — BACITRACIN: 500 OINTMENT TOPICAL at 08:21

## 2018-01-01 RX ADMIN — DEXTRAN 70 AND HYPROMELLOSE 2910 2 DROP: 1; 3 SOLUTION/ DROPS OPHTHALMIC at 06:05

## 2018-01-01 RX ADMIN — DEXTROSE MONOHYDRATE: 50 INJECTION, SOLUTION INTRAVENOUS at 03:56

## 2018-01-01 RX ADMIN — METOLAZONE 5 MG: 5 TABLET ORAL at 08:26

## 2018-01-01 RX ADMIN — DIPHENHYDRAMINE HYDROCHLORIDE AND LIDOCAINE HYDROCHLORIDE AND ALUMINUM HYDROXIDE AND MAGNESIUM HYDRO 10 ML: KIT at 21:44

## 2018-01-01 RX ADMIN — NYSTATIN 500000 UNITS: 100000 SUSPENSION ORAL at 21:40

## 2018-01-01 RX ADMIN — DEXTRAN 70 AND HYPROMELLOSE 2910 2 DROP: 1; 3 SOLUTION/ DROPS OPHTHALMIC at 12:28

## 2018-01-01 RX ADMIN — Medication 40 MG: at 20:44

## 2018-01-01 RX ADMIN — BACITRACIN: 500 OINTMENT TOPICAL at 20:56

## 2018-01-01 RX ADMIN — SODIUM CHLORIDE, POTASSIUM CHLORIDE, SODIUM LACTATE AND CALCIUM CHLORIDE: 600; 310; 30; 20 INJECTION, SOLUTION INTRAVENOUS at 16:49

## 2018-01-01 RX ADMIN — Medication 1 PACKET: at 08:21

## 2018-01-01 RX ADMIN — POTASSIUM CHLORIDE 40 MEQ: 1.5 POWDER, FOR SOLUTION ORAL at 18:46

## 2018-01-01 RX ADMIN — POTASSIUM PHOSPHATE, MONOBASIC AND POTASSIUM PHOSPHATE, DIBASIC 15 MMOL: 224; 236 INJECTION, SOLUTION INTRAVENOUS at 12:11

## 2018-01-01 RX ADMIN — DEXTRAN 70 AND HYPROMELLOSE 2910 2 DROP: 1; 3 SOLUTION/ DROPS OPHTHALMIC at 21:19

## 2018-01-01 RX ADMIN — CHLORHEXIDINE GLUCONATE 15 ML: 1.2 RINSE ORAL at 10:19

## 2018-01-01 RX ADMIN — Medication 2 PACKET: at 17:30

## 2018-01-01 RX ADMIN — FUROSEMIDE 20 MG: 10 INJECTION, SOLUTION INTRAMUSCULAR; INTRAVENOUS at 14:08

## 2018-01-01 RX ADMIN — DIPHENHYDRAMINE HYDROCHLORIDE AND LIDOCAINE HYDROCHLORIDE AND ALUMINUM HYDROXIDE AND MAGNESIUM HYDRO 10 ML: KIT at 03:44

## 2018-01-01 RX ADMIN — QUETIAPINE FUMARATE 25 MG: 25 TABLET ORAL at 10:32

## 2018-01-01 RX ADMIN — POTASSIUM CHLORIDE 20 MEQ: 29.8 INJECTION, SOLUTION INTRAVENOUS at 10:54

## 2018-01-01 RX ADMIN — DEXTRAN 70 AND HYPROMELLOSE 2910 2 DROP: 1; 3 SOLUTION/ DROPS OPHTHALMIC at 03:59

## 2018-01-01 RX ADMIN — DEXMEDETOMIDINE 0.3 MCG/KG/HR: 100 INJECTION, SOLUTION, CONCENTRATE INTRAVENOUS at 14:18

## 2018-01-01 RX ADMIN — POTASSIUM CHLORIDE 20 MEQ: 1.5 POWDER, FOR SOLUTION ORAL at 08:39

## 2018-01-01 RX ADMIN — DEXTROSE MONOHYDRATE: 50 INJECTION, SOLUTION INTRAVENOUS at 16:55

## 2018-01-01 RX ADMIN — INSULIN GLARGINE 12 UNITS: 100 INJECTION, SOLUTION SUBCUTANEOUS at 08:14

## 2018-01-01 RX ADMIN — Medication 40 MG: at 08:14

## 2018-01-01 RX ADMIN — PROPOFOL 45 MCG/KG/MIN: 10 INJECTION, EMULSION INTRAVENOUS at 04:37

## 2018-01-01 RX ADMIN — NYSTATIN 500000 UNITS: 100000 SUSPENSION ORAL at 20:47

## 2018-01-01 RX ADMIN — VASOPRESSIN 2.4 UNITS/HR: 20 INJECTION INTRAVENOUS at 04:04

## 2018-01-01 RX ADMIN — Medication 2 PACKET: at 08:13

## 2018-01-01 RX ADMIN — DIPHENHYDRAMINE HYDROCHLORIDE AND LIDOCAINE HYDROCHLORIDE AND ALUMINUM HYDROXIDE AND MAGNESIUM HYDRO 10 ML: KIT at 04:34

## 2018-01-01 RX ADMIN — Medication 50 MCG/HR: at 20:18

## 2018-01-01 RX ADMIN — Medication 100 MG: at 09:06

## 2018-01-01 RX ADMIN — FILGRASTIM 300 MCG: 300 INJECTION, SOLUTION INTRAVENOUS; SUBCUTANEOUS at 12:48

## 2018-01-01 RX ADMIN — MULTIVITAMIN 15 ML: LIQUID ORAL at 09:31

## 2018-01-01 RX ADMIN — EPOPROSTENOL 20 NG/KG/MIN: 1.5 INJECTION, POWDER, LYOPHILIZED, FOR SOLUTION INTRAVENOUS at 08:34

## 2018-01-01 RX ADMIN — POTASSIUM CHLORIDE 40 MEQ: 1.5 POWDER, FOR SOLUTION ORAL at 00:52

## 2018-01-01 RX ADMIN — TAZOBACTAM SODIUM AND PIPERACILLIN SODIUM 3.38 G: 375; 3 INJECTION, SOLUTION INTRAVENOUS at 17:50

## 2018-01-01 RX ADMIN — ALBUMIN HUMAN 12.5 G: 0.25 SOLUTION INTRAVENOUS at 13:50

## 2018-01-01 RX ADMIN — POTASSIUM CHLORIDE 40 MEQ: 1.5 POWDER, FOR SOLUTION ORAL at 08:00

## 2018-01-01 RX ADMIN — TAZOBACTAM SODIUM AND PIPERACILLIN SODIUM 3.38 G: 375; 3 INJECTION, SOLUTION INTRAVENOUS at 18:16

## 2018-01-01 RX ADMIN — Medication 2 PACKET: at 08:02

## 2018-01-01 RX ADMIN — MULTIVITAMIN 15 ML: LIQUID ORAL at 16:28

## 2018-01-01 RX ADMIN — Medication 40 MG: at 20:09

## 2018-01-01 RX ADMIN — FUROSEMIDE 40 MG: 10 INJECTION, SOLUTION INTRAMUSCULAR; INTRAVENOUS at 08:27

## 2018-01-01 RX ADMIN — NYSTATIN 500000 UNITS: 100000 SUSPENSION ORAL at 22:29

## 2018-01-01 RX ADMIN — DIPHENHYDRAMINE HYDROCHLORIDE AND LIDOCAINE HYDROCHLORIDE AND ALUMINUM HYDROXIDE AND MAGNESIUM HYDRO 10 ML: KIT at 22:45

## 2018-01-01 RX ADMIN — MICAFUNGIN SODIUM 100 MG: 10 INJECTION, POWDER, LYOPHILIZED, FOR SOLUTION INTRAVENOUS at 16:04

## 2018-01-01 RX ADMIN — ALBUMIN HUMAN 25 G: 0.05 INJECTION, SOLUTION INTRAVENOUS at 00:11

## 2018-01-01 RX ADMIN — DIPHENHYDRAMINE HYDROCHLORIDE AND LIDOCAINE HYDROCHLORIDE AND ALUMINUM HYDROXIDE AND MAGNESIUM HYDRO 10 ML: KIT at 03:56

## 2018-01-01 RX ADMIN — FOLIC ACID 1 MG: 1 TABLET ORAL at 09:06

## 2018-01-01 RX ADMIN — DEXMEDETOMIDINE 0.7 MCG/KG/HR: 100 INJECTION, SOLUTION, CONCENTRATE INTRAVENOUS at 02:28

## 2018-01-01 RX ADMIN — NYSTATIN 500000 UNITS: 100000 SUSPENSION ORAL at 18:02

## 2018-01-01 RX ADMIN — FENTANYL CITRATE 50 MCG: 50 INJECTION INTRAMUSCULAR; INTRAVENOUS at 17:19

## 2018-01-01 RX ADMIN — FOLIC ACID 1 MG: 1 TABLET ORAL at 10:35

## 2018-01-01 RX ADMIN — VANCOMYCIN HYDROCHLORIDE 1500 MG: 10 INJECTION, POWDER, LYOPHILIZED, FOR SOLUTION INTRAVENOUS at 19:58

## 2018-01-01 RX ADMIN — SODIUM CHLORIDE 250 MG: 9 INJECTION, SOLUTION INTRAVENOUS at 20:14

## 2018-01-01 RX ADMIN — VASOPRESSIN 2.4 UNITS/HR: 20 INJECTION INTRAVENOUS at 10:37

## 2018-01-01 RX ADMIN — DIPHENHYDRAMINE HYDROCHLORIDE AND LIDOCAINE HYDROCHLORIDE AND ALUMINUM HYDROXIDE AND MAGNESIUM HYDRO 10 ML: KIT at 10:11

## 2018-01-01 RX ADMIN — NYSTATIN 500000 UNITS: 100000 SUSPENSION ORAL at 09:17

## 2018-01-01 RX ADMIN — POTASSIUM CHLORIDE 40 MEQ: 1.5 POWDER, FOR SOLUTION ORAL at 15:49

## 2018-01-01 RX ADMIN — DEXTRAN 70 AND HYPROMELLOSE 2910 2 DROP: 1; 3 SOLUTION/ DROPS OPHTHALMIC at 08:21

## 2018-01-01 RX ADMIN — DEXMEDETOMIDINE 0.7 MCG/KG/HR: 100 INJECTION, SOLUTION, CONCENTRATE INTRAVENOUS at 22:24

## 2018-01-01 RX ADMIN — ALBUMIN HUMAN 12.5 G: 0.25 SOLUTION INTRAVENOUS at 20:20

## 2018-01-01 RX ADMIN — Medication 40 MG: at 10:59

## 2018-01-01 RX ADMIN — QUETIAPINE FUMARATE 25 MG: 25 TABLET ORAL at 20:06

## 2018-01-01 RX ADMIN — METHYLPREDNISOLONE SODIUM SUCCINATE 62.5 MG: 125 INJECTION, POWDER, FOR SOLUTION INTRAMUSCULAR; INTRAVENOUS at 20:06

## 2018-01-01 RX ADMIN — PHENYLEPHRINE HYDROCHLORIDE 100 MCG: 10 INJECTION, SOLUTION INTRAMUSCULAR; INTRAVENOUS; SUBCUTANEOUS at 09:15

## 2018-01-01 RX ADMIN — FUROSEMIDE 40 MG: 10 INJECTION, SOLUTION INTRAMUSCULAR; INTRAVENOUS at 01:56

## 2018-01-01 RX ADMIN — ALBUMIN HUMAN 50 G: 0.25 SOLUTION INTRAVENOUS at 21:55

## 2018-01-01 RX ADMIN — Medication 2 PACKET: at 17:40

## 2018-01-01 RX ADMIN — FUROSEMIDE 40 MG: 10 INJECTION, SOLUTION INTRAMUSCULAR; INTRAVENOUS at 10:11

## 2018-01-01 RX ADMIN — TAZOBACTAM SODIUM AND PIPERACILLIN SODIUM 3.38 G: 375; 3 INJECTION, SOLUTION INTRAVENOUS at 10:07

## 2018-01-01 RX ADMIN — VASOPRESSIN 2.4 UNITS/HR: 20 INJECTION INTRAVENOUS at 13:42

## 2018-01-01 RX ADMIN — BACITRACIN: 500 OINTMENT TOPICAL at 21:23

## 2018-01-01 RX ADMIN — ALBUMIN HUMAN 500 ML: 0.05 INJECTION, SOLUTION INTRAVENOUS at 22:38

## 2018-01-01 RX ADMIN — INSULIN ASPART 2 UNITS: 100 INJECTION, SOLUTION INTRAVENOUS; SUBCUTANEOUS at 04:54

## 2018-01-01 RX ADMIN — PROPOFOL 50 MCG/KG/MIN: 10 INJECTION, EMULSION INTRAVENOUS at 20:43

## 2018-01-01 RX ADMIN — MULTIVITAMIN 15 ML: LIQUID ORAL at 10:51

## 2018-01-01 RX ADMIN — POTASSIUM CHLORIDE 40 MEQ: 1.5 POWDER, FOR SOLUTION ORAL at 15:13

## 2018-01-01 RX ADMIN — Medication 0.4 MG/HR: at 23:42

## 2018-01-01 RX ADMIN — TAZOBACTAM SODIUM AND PIPERACILLIN SODIUM 3.38 G: 375; 3 INJECTION, SOLUTION INTRAVENOUS at 12:07

## 2018-01-01 RX ADMIN — QUETIAPINE FUMARATE 25 MG: 25 TABLET ORAL at 08:02

## 2018-01-01 RX ADMIN — POTASSIUM PHOSPHATE, MONOBASIC AND POTASSIUM PHOSPHATE, DIBASIC 20 MMOL: 224; 236 INJECTION, SOLUTION INTRAVENOUS at 08:04

## 2018-01-01 RX ADMIN — BACITRACIN: 500 OINTMENT TOPICAL at 08:29

## 2018-01-01 RX ADMIN — Medication 40 MG: at 20:48

## 2018-01-01 RX ADMIN — Medication 40 MG: at 09:29

## 2018-01-01 RX ADMIN — MICAFUNGIN SODIUM 100 MG: 10 INJECTION, POWDER, LYOPHILIZED, FOR SOLUTION INTRAVENOUS at 15:27

## 2018-01-01 RX ADMIN — QUETIAPINE FUMARATE 25 MG: 25 TABLET ORAL at 07:49

## 2018-01-01 RX ADMIN — VANCOMYCIN HYDROCHLORIDE 1250 MG: 10 INJECTION, POWDER, LYOPHILIZED, FOR SOLUTION INTRAVENOUS at 06:44

## 2018-01-01 RX ADMIN — DEXTROSE MONOHYDRATE: 50 INJECTION, SOLUTION INTRAVENOUS at 10:36

## 2018-01-01 RX ADMIN — Medication 40 MG: at 10:47

## 2018-01-01 RX ADMIN — PROPOFOL 30 MCG/KG/MIN: 10 INJECTION, EMULSION INTRAVENOUS at 07:03

## 2018-01-01 RX ADMIN — POTASSIUM CHLORIDE 40 MEQ: 1.5 POWDER, FOR SOLUTION ORAL at 06:30

## 2018-01-01 RX ADMIN — TAZOBACTAM SODIUM AND PIPERACILLIN SODIUM 3.38 G: 375; 3 INJECTION, SOLUTION INTRAVENOUS at 17:46

## 2018-01-01 RX ADMIN — POTASSIUM PHOSPHATE, MONOBASIC AND POTASSIUM PHOSPHATE, DIBASIC 15 MMOL: 224; 236 INJECTION, SOLUTION INTRAVENOUS at 22:48

## 2018-01-01 RX ADMIN — VASOPRESSIN 2.4 UNITS/HR: 20 INJECTION INTRAVENOUS at 12:16

## 2018-01-01 RX ADMIN — Medication 100 MG: at 09:32

## 2018-01-01 RX ADMIN — TAZOBACTAM SODIUM AND PIPERACILLIN SODIUM 3.38 G: 375; 3 INJECTION, SOLUTION INTRAVENOUS at 22:29

## 2018-01-01 RX ADMIN — DIPHENHYDRAMINE HYDROCHLORIDE AND LIDOCAINE HYDROCHLORIDE AND ALUMINUM HYDROXIDE AND MAGNESIUM HYDRO 10 ML: KIT at 09:57

## 2018-01-01 RX ADMIN — POTASSIUM PHOSPHATE, MONOBASIC AND POTASSIUM PHOSPHATE, DIBASIC 20 MMOL: 224; 236 INJECTION, SOLUTION INTRAVENOUS at 00:40

## 2018-01-01 RX ADMIN — Medication 40 MG: at 10:32

## 2018-01-01 RX ADMIN — ALBUMIN HUMAN 12.5 G: 0.25 SOLUTION INTRAVENOUS at 01:57

## 2018-01-01 RX ADMIN — Medication 2 PACKET: at 10:40

## 2018-01-01 RX ADMIN — BACITRACIN: 500 OINTMENT TOPICAL at 09:32

## 2018-01-01 RX ADMIN — Medication 1 MG/HR: at 14:56

## 2018-01-01 RX ADMIN — VANCOMYCIN HYDROCHLORIDE 1250 MG: 10 INJECTION, POWDER, LYOPHILIZED, FOR SOLUTION INTRAVENOUS at 14:10

## 2018-01-01 RX ADMIN — PROPOFOL 15 MCG/KG/MIN: 10 INJECTION, EMULSION INTRAVENOUS at 16:40

## 2018-01-01 RX ADMIN — Medication 2 PACKET: at 08:36

## 2018-01-01 RX ADMIN — MIDAZOLAM HYDROCHLORIDE 1 MG: 1 INJECTION, SOLUTION INTRAMUSCULAR; INTRAVENOUS at 20:57

## 2018-01-01 RX ADMIN — BACITRACIN: 500 OINTMENT TOPICAL at 08:51

## 2018-01-01 RX ADMIN — DIPHENHYDRAMINE HYDROCHLORIDE AND LIDOCAINE HYDROCHLORIDE AND ALUMINUM HYDROXIDE AND MAGNESIUM HYDRO 10 ML: KIT at 16:00

## 2018-01-01 RX ADMIN — Medication 40 MG: at 10:51

## 2018-01-01 RX ADMIN — VANCOMYCIN HYDROCHLORIDE 1250 MG: 5 INJECTION, POWDER, LYOPHILIZED, FOR SOLUTION INTRAVENOUS at 15:37

## 2018-01-01 RX ADMIN — DEXTROSE MONOHYDRATE 50 ML: 25 INJECTION, SOLUTION INTRAVENOUS at 03:36

## 2018-01-01 RX ADMIN — DIPHENHYDRAMINE HYDROCHLORIDE AND LIDOCAINE HYDROCHLORIDE AND ALUMINUM HYDROXIDE AND MAGNESIUM HYDRO 10 ML: KIT at 22:53

## 2018-01-01 RX ADMIN — Medication 40 MG: at 07:45

## 2018-01-01 RX ADMIN — POTASSIUM CHLORIDE 40 MEQ: 1.5 POWDER, FOR SOLUTION ORAL at 09:56

## 2018-01-01 RX ADMIN — INSULIN GLARGINE 15 UNITS: 100 INJECTION, SOLUTION SUBCUTANEOUS at 08:27

## 2018-01-01 RX ADMIN — TAZOBACTAM SODIUM AND PIPERACILLIN SODIUM 3.38 G: 375; 3 INJECTION, SOLUTION INTRAVENOUS at 06:11

## 2018-01-01 RX ADMIN — NYSTATIN 500000 UNITS: 100000 SUSPENSION ORAL at 23:04

## 2018-01-01 RX ADMIN — ALBUMIN HUMAN 12.5 G: 0.25 SOLUTION INTRAVENOUS at 04:00

## 2018-01-01 RX ADMIN — Medication 50 MCG/HR: at 08:37

## 2018-01-01 RX ADMIN — Medication 100 MG: at 08:12

## 2018-01-01 RX ADMIN — POTASSIUM CHLORIDE 20 MEQ: 1.5 POWDER, FOR SOLUTION ORAL at 17:39

## 2018-01-01 RX ADMIN — DEXMEDETOMIDINE 0.7 MCG/KG/HR: 100 INJECTION, SOLUTION, CONCENTRATE INTRAVENOUS at 20:01

## 2018-01-01 RX ADMIN — Medication 25 MCG/HR: at 09:54

## 2018-01-01 RX ADMIN — DEXMEDETOMIDINE 0.7 MCG/KG/HR: 100 INJECTION, SOLUTION, CONCENTRATE INTRAVENOUS at 13:07

## 2018-01-01 RX ADMIN — POTASSIUM PHOSPHATE, MONOBASIC AND POTASSIUM PHOSPHATE, DIBASIC 20 MMOL: 224; 236 INJECTION, SOLUTION INTRAVENOUS at 08:45

## 2018-01-01 RX ADMIN — BACITRACIN: 500 OINTMENT TOPICAL at 10:51

## 2018-01-01 RX ADMIN — VANCOMYCIN HYDROCHLORIDE 1500 MG: 10 INJECTION, POWDER, LYOPHILIZED, FOR SOLUTION INTRAVENOUS at 04:32

## 2018-01-01 RX ADMIN — Medication 2 PACKET: at 17:18

## 2018-01-01 RX ADMIN — TAZOBACTAM SODIUM AND PIPERACILLIN SODIUM 3.38 G: 375; 3 INJECTION, SOLUTION INTRAVENOUS at 04:35

## 2018-01-01 RX ADMIN — MULTIVITAMIN 15 ML: LIQUID ORAL at 07:48

## 2018-01-01 RX ADMIN — DIPHENHYDRAMINE HYDROCHLORIDE AND LIDOCAINE HYDROCHLORIDE AND ALUMINUM HYDROXIDE AND MAGNESIUM HYDRO 10 ML: KIT at 20:25

## 2018-01-01 RX ADMIN — DIPHENHYDRAMINE HYDROCHLORIDE AND LIDOCAINE HYDROCHLORIDE AND ALUMINUM HYDROXIDE AND MAGNESIUM HYDRO 10 ML: KIT at 13:10

## 2018-01-01 RX ADMIN — Medication 2 PACKET: at 18:16

## 2018-01-01 RX ADMIN — MULTIPLE VITAMINS W/ MINERALS TAB 1 TABLET: TAB at 09:18

## 2018-01-01 RX ADMIN — PROPOFOL 50 MCG/KG/MIN: 10 INJECTION, EMULSION INTRAVENOUS at 07:39

## 2018-01-01 RX ADMIN — DEXTRAN 70 AND HYPROMELLOSE 2910 2 DROP: 1; 3 SOLUTION/ DROPS OPHTHALMIC at 07:49

## 2018-01-01 RX ADMIN — EPOPROSTENOL 20 NG/KG/MIN: 1.5 INJECTION, POWDER, LYOPHILIZED, FOR SOLUTION INTRAVENOUS at 06:05

## 2018-01-01 RX ADMIN — NYSTATIN 500000 UNITS: 100000 SUSPENSION ORAL at 18:26

## 2018-01-01 RX ADMIN — MICAFUNGIN SODIUM 100 MG: 10 INJECTION, POWDER, LYOPHILIZED, FOR SOLUTION INTRAVENOUS at 16:30

## 2018-01-01 RX ADMIN — DIPHENHYDRAMINE HYDROCHLORIDE AND LIDOCAINE HYDROCHLORIDE AND ALUMINUM HYDROXIDE AND MAGNESIUM HYDRO 10 ML: KIT at 14:15

## 2018-01-01 RX ADMIN — TAZOBACTAM SODIUM AND PIPERACILLIN SODIUM 3.38 G: 375; 3 INJECTION, SOLUTION INTRAVENOUS at 22:01

## 2018-01-01 RX ADMIN — Medication 0.75 MG/HR: at 18:01

## 2018-01-01 RX ADMIN — DIPHENHYDRAMINE HYDROCHLORIDE AND LIDOCAINE HYDROCHLORIDE AND ALUMINUM HYDROXIDE AND MAGNESIUM HYDRO 10 ML: KIT at 15:40

## 2018-01-01 RX ADMIN — LORAZEPAM 0.5 MG: 2 INJECTION INTRAMUSCULAR; INTRAVENOUS at 10:27

## 2018-01-01 RX ADMIN — METHYLPREDNISOLONE SODIUM SUCCINATE 40 MG: 40 INJECTION, POWDER, FOR SOLUTION INTRAMUSCULAR; INTRAVENOUS at 20:15

## 2018-01-01 RX ADMIN — Medication 40 MG: at 21:08

## 2018-01-01 RX ADMIN — Medication 40 MG: at 21:05

## 2018-01-01 RX ADMIN — DEXMEDETOMIDINE 0.7 MCG/KG/HR: 100 INJECTION, SOLUTION, CONCENTRATE INTRAVENOUS at 08:08

## 2018-01-01 RX ADMIN — NYSTATIN 500000 UNITS: 100000 SUSPENSION ORAL at 13:31

## 2018-01-01 RX ADMIN — Medication 1 PACKET: at 09:23

## 2018-01-01 RX ADMIN — MULTIVITAMIN 15 ML: LIQUID ORAL at 10:19

## 2018-01-01 RX ADMIN — POTASSIUM CHLORIDE 20 MEQ: 1.5 POWDER, FOR SOLUTION ORAL at 08:12

## 2018-01-01 RX ADMIN — POTASSIUM CHLORIDE 40 MEQ: 1.5 POWDER, FOR SOLUTION ORAL at 00:05

## 2018-01-01 RX ADMIN — TAZOBACTAM SODIUM AND PIPERACILLIN SODIUM 3.38 G: 375; 3 INJECTION, SOLUTION INTRAVENOUS at 00:24

## 2018-01-01 RX ADMIN — POTASSIUM CHLORIDE 40 MEQ: 1.5 POWDER, FOR SOLUTION ORAL at 03:19

## 2018-01-01 RX ADMIN — VANCOMYCIN HYDROCHLORIDE 1000 MG: 1 INJECTION, SOLUTION INTRAVENOUS at 23:54

## 2018-01-01 RX ADMIN — PROPOFOL 100 MG: 10 INJECTION, EMULSION INTRAVENOUS at 09:10

## 2018-01-01 RX ADMIN — PHENYLEPHRINE HYDROCHLORIDE 6 MCG/KG/MIN: 10 INJECTION INTRAVENOUS at 06:07

## 2018-01-01 RX ADMIN — Medication 40 MG: at 09:22

## 2018-01-01 RX ADMIN — FUROSEMIDE 40 MG: 10 INJECTION, SOLUTION INTRAMUSCULAR; INTRAVENOUS at 03:01

## 2018-01-01 RX ADMIN — Medication 40 MG: at 09:48

## 2018-01-01 RX ADMIN — POTASSIUM CHLORIDE 20 MEQ: 1.5 POWDER, FOR SOLUTION ORAL at 20:06

## 2018-01-01 RX ADMIN — Medication 40 MG: at 20:34

## 2018-01-01 RX ADMIN — POTASSIUM CHLORIDE 40 MEQ: 1.5 POWDER, FOR SOLUTION ORAL at 23:44

## 2018-01-01 RX ADMIN — Medication 40 MG: at 20:28

## 2018-01-01 RX ADMIN — MICAFUNGIN SODIUM 100 MG: 10 INJECTION, POWDER, LYOPHILIZED, FOR SOLUTION INTRAVENOUS at 16:07

## 2018-01-01 RX ADMIN — Medication 2 PACKET: at 12:12

## 2018-01-01 RX ADMIN — TAZOBACTAM SODIUM AND PIPERACILLIN SODIUM 3.38 G: 375; 3 INJECTION, SOLUTION INTRAVENOUS at 14:18

## 2018-01-01 RX ADMIN — Medication 2 G: at 16:21

## 2018-01-01 RX ADMIN — DEXMEDETOMIDINE 0.7 MCG/KG/HR: 100 INJECTION, SOLUTION, CONCENTRATE INTRAVENOUS at 18:05

## 2018-01-01 RX ADMIN — DEXTROSE MONOHYDRATE: 50 INJECTION, SOLUTION INTRAVENOUS at 04:01

## 2018-01-01 RX ADMIN — DEXTRAN 70 AND HYPROMELLOSE 2910 2 DROP: 1; 3 SOLUTION/ DROPS OPHTHALMIC at 16:19

## 2018-01-01 RX ADMIN — METOLAZONE 5 MG: 5 TABLET ORAL at 10:35

## 2018-01-01 RX ADMIN — PROPOFOL 5 MCG/KG/MIN: 10 INJECTION, EMULSION INTRAVENOUS at 02:27

## 2018-01-01 RX ADMIN — DIPHENHYDRAMINE HYDROCHLORIDE AND LIDOCAINE HYDROCHLORIDE AND ALUMINUM HYDROXIDE AND MAGNESIUM HYDRO 10 ML: KIT at 03:17

## 2018-01-01 RX ADMIN — Medication 1 PACKET: at 21:05

## 2018-01-01 RX ADMIN — TAZOBACTAM SODIUM AND PIPERACILLIN SODIUM 3.38 G: 375; 3 INJECTION, SOLUTION INTRAVENOUS at 06:04

## 2018-01-01 RX ADMIN — INSULIN ASPART 1 UNITS: 100 INJECTION, SOLUTION INTRAVENOUS; SUBCUTANEOUS at 00:29

## 2018-01-01 RX ADMIN — DEXTRAN 70 AND HYPROMELLOSE 2910 2 DROP: 1; 3 SOLUTION/ DROPS OPHTHALMIC at 08:29

## 2018-01-01 RX ADMIN — TAZOBACTAM SODIUM AND PIPERACILLIN SODIUM 3.38 G: 375; 3 INJECTION, SOLUTION INTRAVENOUS at 22:49

## 2018-01-01 RX ADMIN — FUROSEMIDE 5 MG/HR: 10 INJECTION, SOLUTION INTRAVENOUS at 00:21

## 2018-01-01 RX ADMIN — PROPOFOL 10 MCG/KG/MIN: 10 INJECTION, EMULSION INTRAVENOUS at 20:43

## 2018-01-01 RX ADMIN — PHENYLEPHRINE HYDROCHLORIDE 6 MCG/KG/MIN: 10 INJECTION INTRAVENOUS at 04:03

## 2018-01-01 RX ADMIN — FOLIC ACID 1 MG: 1 TABLET ORAL at 10:51

## 2018-01-01 RX ADMIN — AZITHROMYCIN MONOHYDRATE 250 MG: 500 INJECTION, POWDER, LYOPHILIZED, FOR SOLUTION INTRAVENOUS at 11:33

## 2018-01-01 RX ADMIN — MULTIVITAMIN 15 ML: LIQUID ORAL at 08:12

## 2018-01-01 RX ADMIN — NYSTATIN 500000 UNITS: 100000 SUSPENSION ORAL at 18:23

## 2018-01-01 RX ADMIN — SODIUM CHLORIDE 250 MG: 9 INJECTION, SOLUTION INTRAVENOUS at 21:22

## 2018-01-01 RX ADMIN — ALBUMIN HUMAN 12.5 G: 0.05 INJECTION, SOLUTION INTRAVENOUS at 11:03

## 2018-01-01 RX ADMIN — MICAFUNGIN SODIUM 100 MG: 10 INJECTION, POWDER, LYOPHILIZED, FOR SOLUTION INTRAVENOUS at 15:45

## 2018-01-01 RX ADMIN — LORAZEPAM 1 MG: 2 INJECTION INTRAMUSCULAR; INTRAVENOUS at 08:22

## 2018-01-01 RX ADMIN — SODIUM CHLORIDE 250 MG: 9 INJECTION, SOLUTION INTRAVENOUS at 09:24

## 2018-01-01 RX ADMIN — DEXMEDETOMIDINE 0.7 MCG/KG/HR: 100 INJECTION, SOLUTION, CONCENTRATE INTRAVENOUS at 06:24

## 2018-01-01 RX ADMIN — QUETIAPINE FUMARATE 25 MG: 25 TABLET ORAL at 21:19

## 2018-01-01 RX ADMIN — DIPHENHYDRAMINE HYDROCHLORIDE AND LIDOCAINE HYDROCHLORIDE AND ALUMINUM HYDROXIDE AND MAGNESIUM HYDRO 2 ML: KIT at 08:31

## 2018-01-01 RX ADMIN — INSULIN GLARGINE 7 UNITS: 100 INJECTION, SOLUTION SUBCUTANEOUS at 07:58

## 2018-01-01 RX ADMIN — EPOPROSTENOL 20 NG/KG/MIN: 1.5 INJECTION, POWDER, LYOPHILIZED, FOR SOLUTION INTRAVENOUS at 02:17

## 2018-01-01 RX ADMIN — QUETIAPINE FUMARATE 25 MG: 25 TABLET ORAL at 20:48

## 2018-01-01 RX ADMIN — DEXTRAN 70 AND HYPROMELLOSE 2910 2 DROP: 1; 3 SOLUTION/ DROPS OPHTHALMIC at 23:54

## 2018-01-01 RX ADMIN — DRONABINOL 5 MG: 2.5 CAPSULE ORAL at 09:18

## 2018-01-01 RX ADMIN — BACITRACIN: 500 OINTMENT TOPICAL at 20:21

## 2018-01-01 RX ADMIN — FOLIC ACID 1 MG: 1 TABLET ORAL at 09:29

## 2018-01-01 RX ADMIN — PROPOFOL 10 MCG/KG/MIN: 10 INJECTION, EMULSION INTRAVENOUS at 02:57

## 2018-01-01 RX ADMIN — VANCOMYCIN HYDROCHLORIDE 1500 MG: 10 INJECTION, POWDER, LYOPHILIZED, FOR SOLUTION INTRAVENOUS at 20:18

## 2018-01-01 RX ADMIN — QUETIAPINE FUMARATE 25 MG: 25 TABLET ORAL at 08:52

## 2018-01-01 RX ADMIN — FUROSEMIDE 40 MG: 10 INJECTION, SOLUTION INTRAMUSCULAR; INTRAVENOUS at 17:18

## 2018-01-01 RX ADMIN — DIPHENHYDRAMINE HYDROCHLORIDE AND LIDOCAINE HYDROCHLORIDE AND ALUMINUM HYDROXIDE AND MAGNESIUM HYDRO 10 ML: KIT at 04:26

## 2018-01-01 RX ADMIN — FUROSEMIDE 40 MG: 10 INJECTION, SOLUTION INTRAMUSCULAR; INTRAVENOUS at 14:09

## 2018-01-01 RX ADMIN — METHYLPREDNISOLONE SODIUM SUCCINATE 62.5 MG: 125 INJECTION, POWDER, FOR SOLUTION INTRAMUSCULAR; INTRAVENOUS at 04:07

## 2018-01-01 RX ADMIN — TAZOBACTAM SODIUM AND PIPERACILLIN SODIUM 3.38 G: 375; 3 INJECTION, SOLUTION INTRAVENOUS at 06:05

## 2018-01-01 RX ADMIN — Medication 40 MG: at 21:06

## 2018-01-01 RX ADMIN — LORAZEPAM 1 MG: 2 INJECTION INTRAMUSCULAR; INTRAVENOUS at 23:48

## 2018-01-01 RX ADMIN — METOLAZONE 5 MG: 5 TABLET ORAL at 09:32

## 2018-01-01 RX ADMIN — DIPHENHYDRAMINE HYDROCHLORIDE AND LIDOCAINE HYDROCHLORIDE AND ALUMINUM HYDROXIDE AND MAGNESIUM HYDRO 10 ML: KIT at 16:41

## 2018-01-01 RX ADMIN — ALBUMIN HUMAN 12.5 G: 0.05 INJECTION, SOLUTION INTRAVENOUS at 13:49

## 2018-01-01 RX ADMIN — PANTOPRAZOLE SODIUM 40 MG: 40 INJECTION, POWDER, FOR SOLUTION INTRAVENOUS at 09:07

## 2018-01-01 RX ADMIN — NOREPINEPHRINE BITARTRATE 0.4 MCG/KG/MIN: 1 INJECTION INTRAVENOUS at 21:34

## 2018-01-01 RX ADMIN — NOREPINEPHRINE BITARTRATE 0.4 MCG/KG/MIN: 1 INJECTION INTRAVENOUS at 12:08

## 2018-01-01 RX ADMIN — FUROSEMIDE 40 MG: 10 INJECTION, SOLUTION INTRAMUSCULAR; INTRAVENOUS at 12:10

## 2018-01-01 RX ADMIN — DEXMEDETOMIDINE 0.6 MCG/KG/HR: 100 INJECTION, SOLUTION, CONCENTRATE INTRAVENOUS at 18:00

## 2018-01-01 RX ADMIN — SODIUM CHLORIDE 250 MG: 9 INJECTION, SOLUTION INTRAVENOUS at 09:55

## 2018-01-01 RX ADMIN — FOLIC ACID 1 MG: 1 TABLET ORAL at 08:21

## 2018-01-01 RX ADMIN — METHYLPREDNISOLONE SODIUM SUCCINATE 62.5 MG: 125 INJECTION, POWDER, FOR SOLUTION INTRAMUSCULAR; INTRAVENOUS at 20:48

## 2018-01-01 RX ADMIN — Medication 2 PACKET: at 09:39

## 2018-01-01 RX ADMIN — QUETIAPINE FUMARATE 25 MG: 25 TABLET ORAL at 08:19

## 2018-01-01 RX ADMIN — FOLIC ACID 1 MG: 1 TABLET ORAL at 07:48

## 2018-01-01 RX ADMIN — VASOPRESSIN 2.4 UNITS/HR: 20 INJECTION INTRAVENOUS at 23:07

## 2018-01-01 RX ADMIN — MULTIVITAMIN 15 ML: LIQUID ORAL at 08:19

## 2018-01-01 RX ADMIN — DEXMEDETOMIDINE 0.3 MCG/KG/HR: 100 INJECTION, SOLUTION, CONCENTRATE INTRAVENOUS at 14:35

## 2018-01-01 RX ADMIN — Medication 40 MG: at 08:19

## 2018-01-01 RX ADMIN — DEXTRAN 70 AND HYPROMELLOSE 2910 2 DROP: 1; 3 SOLUTION/ DROPS OPHTHALMIC at 12:30

## 2018-01-01 RX ADMIN — Medication 1 PACKET: at 20:08

## 2018-01-01 RX ADMIN — POTASSIUM CHLORIDE 20 MEQ: 400 INJECTION, SOLUTION INTRAVENOUS at 13:14

## 2018-01-01 RX ADMIN — DIPHENHYDRAMINE HYDROCHLORIDE AND LIDOCAINE HYDROCHLORIDE AND ALUMINUM HYDROXIDE AND MAGNESIUM HYDRO 10 ML: KIT at 23:11

## 2018-01-01 RX ADMIN — TAZOBACTAM SODIUM AND PIPERACILLIN SODIUM 3.38 G: 375; 3 INJECTION, SOLUTION INTRAVENOUS at 16:35

## 2018-01-01 RX ADMIN — NYSTATIN 500000 UNITS: 100000 SUSPENSION ORAL at 21:33

## 2018-01-01 RX ADMIN — POTASSIUM PHOSPHATE, MONOBASIC AND POTASSIUM PHOSPHATE, DIBASIC 15 MMOL: 224; 236 INJECTION, SOLUTION INTRAVENOUS at 13:00

## 2018-01-01 RX ADMIN — LORAZEPAM 1 MG: 2 INJECTION INTRAMUSCULAR; INTRAVENOUS at 01:50

## 2018-01-01 RX ADMIN — NYSTATIN 500000 UNITS: 100000 SUSPENSION ORAL at 10:51

## 2018-01-01 RX ADMIN — PHENYLEPHRINE HYDROCHLORIDE 0.5 MCG/KG/MIN: 10 INJECTION INTRAVENOUS at 23:20

## 2018-01-01 RX ADMIN — LORAZEPAM 2 MG: 2 INJECTION INTRAMUSCULAR; INTRAVENOUS at 04:24

## 2018-01-01 RX ADMIN — Medication 40 MG: at 08:52

## 2018-01-01 RX ADMIN — PROPOFOL 45 MCG/KG/MIN: 10 INJECTION, EMULSION INTRAVENOUS at 17:46

## 2018-01-01 RX ADMIN — SODIUM CHLORIDE 250 MG: 9 INJECTION, SOLUTION INTRAVENOUS at 10:33

## 2018-01-01 RX ADMIN — PANTOPRAZOLE SODIUM 40 MG: 40 INJECTION, POWDER, FOR SOLUTION INTRAVENOUS at 21:33

## 2018-01-01 RX ADMIN — FUROSEMIDE 40 MG: 10 INJECTION, SOLUTION INTRAMUSCULAR; INTRAVENOUS at 11:19

## 2018-01-01 RX ADMIN — QUETIAPINE FUMARATE 25 MG: 25 TABLET ORAL at 08:21

## 2018-01-01 RX ADMIN — PROPOFOL 50 MCG/KG/MIN: 10 INJECTION, EMULSION INTRAVENOUS at 12:34

## 2018-01-01 RX ADMIN — Medication 50 MCG/HR: at 02:38

## 2018-01-01 RX ADMIN — DEXTRAN 70 AND HYPROMELLOSE 2910 2 DROP: 1; 3 SOLUTION/ DROPS OPHTHALMIC at 15:05

## 2018-01-01 RX ADMIN — POTASSIUM PHOSPHATE, MONOBASIC AND POTASSIUM PHOSPHATE, DIBASIC 15 MMOL: 224; 236 INJECTION, SOLUTION INTRAVENOUS at 18:09

## 2018-01-01 RX ADMIN — Medication 1 PACKET: at 10:34

## 2018-01-01 RX ADMIN — DEXMEDETOMIDINE 0.7 MCG/KG/HR: 100 INJECTION, SOLUTION, CONCENTRATE INTRAVENOUS at 22:47

## 2018-01-01 RX ADMIN — Medication 2 PACKET: at 09:56

## 2018-01-01 RX ADMIN — FENTANYL CITRATE 50 MCG: 50 INJECTION INTRAMUSCULAR; INTRAVENOUS at 17:16

## 2018-01-01 RX ADMIN — FUROSEMIDE 20 MG: 10 INJECTION, SOLUTION INTRAVENOUS at 01:31

## 2018-01-01 RX ADMIN — FENTANYL CITRATE 50 MCG: 50 INJECTION INTRAMUSCULAR; INTRAVENOUS at 06:08

## 2018-01-01 ASSESSMENT — ACTIVITIES OF DAILY LIVING (ADL)
ADLS_ACUITY_SCORE: 14
ADLS_ACUITY_SCORE: 11
ADLS_ACUITY_SCORE: 16
ADLS_ACUITY_SCORE: 16
BATHING: 0-->INDEPENDENT
ADLS_ACUITY_SCORE: 18
ADLS_ACUITY_SCORE: 11
ADLS_ACUITY_SCORE: 16
ADLS_ACUITY_SCORE: 13
ADLS_ACUITY_SCORE: 12
ADLS_ACUITY_SCORE: 18
ADLS_ACUITY_SCORE: 16
ADLS_ACUITY_SCORE: 16
WHICH_OF_THE_ABOVE_FUNCTIONAL_RISKS_HAD_A_RECENT_ONSET_OR_CHANGE?: COGNITION;FALL HISTORY
ADLS_ACUITY_SCORE: 14
ADLS_ACUITY_SCORE: 16
RETIRED_EATING: 0-->INDEPENDENT
ADLS_ACUITY_SCORE: 14
ADLS_ACUITY_SCORE: 16
ADLS_ACUITY_SCORE: 12
ADLS_ACUITY_SCORE: 16
ADLS_ACUITY_SCORE: 18
ADLS_ACUITY_SCORE: 14
ADLS_ACUITY_SCORE: 14
ADLS_ACUITY_SCORE: 13
ADLS_ACUITY_SCORE: 14
ADLS_ACUITY_SCORE: 16
FALL_HISTORY_WITHIN_LAST_SIX_MONTHS: YES
ADLS_ACUITY_SCORE: 16
ADLS_ACUITY_SCORE: 18
ADLS_ACUITY_SCORE: 16
ADLS_ACUITY_SCORE: 12
ADLS_ACUITY_SCORE: 16
ADLS_ACUITY_SCORE: 12
ADLS_ACUITY_SCORE: 12
SWALLOWING: 0-->SWALLOWS FOODS/LIQUIDS WITHOUT DIFFICULTY
ADLS_ACUITY_SCORE: 12
RETIRED_COMMUNICATION: 0-->UNDERSTANDS/COMMUNICATES WITHOUT DIFFICULTY
ADLS_ACUITY_SCORE: 16
ADLS_ACUITY_SCORE: 11
ADLS_ACUITY_SCORE: 16
ADLS_ACUITY_SCORE: 12
ADLS_ACUITY_SCORE: 16
ADLS_ACUITY_SCORE: 16
COGNITION: 1 - ATTENTION OR MEMORY DEFICITS
ADLS_ACUITY_SCORE: 16
ADLS_ACUITY_SCORE: 12
NUMBER_OF_TIMES_PATIENT_HAS_FALLEN_WITHIN_LAST_SIX_MONTHS: 10
ADLS_ACUITY_SCORE: 11
ADLS_ACUITY_SCORE: 16
ADLS_ACUITY_SCORE: 14
ADLS_ACUITY_SCORE: 16
ADLS_ACUITY_SCORE: 16
ADLS_ACUITY_SCORE: 13
ADLS_ACUITY_SCORE: 16
ADLS_ACUITY_SCORE: 14
ADLS_ACUITY_SCORE: 16
ADLS_ACUITY_SCORE: 16
ADLS_ACUITY_SCORE: 14
ADLS_ACUITY_SCORE: 16
ADLS_ACUITY_SCORE: 16
ADLS_ACUITY_SCORE: 13
ADLS_ACUITY_SCORE: 16
ADLS_ACUITY_SCORE: 13
TOILETING: 0-->INDEPENDENT
ADLS_ACUITY_SCORE: 12
ADLS_ACUITY_SCORE: 16
ADLS_ACUITY_SCORE: 12
ADLS_ACUITY_SCORE: 11
ADLS_ACUITY_SCORE: 14
ADLS_ACUITY_SCORE: 14
ADLS_ACUITY_SCORE: 16
ADLS_ACUITY_SCORE: 16
ADLS_ACUITY_SCORE: 12
ADLS_ACUITY_SCORE: 16
ADLS_ACUITY_SCORE: 11
ADLS_ACUITY_SCORE: 18
ADLS_ACUITY_SCORE: 16
ADLS_ACUITY_SCORE: 16
ADLS_ACUITY_SCORE: 14
ADLS_ACUITY_SCORE: 14
ADLS_ACUITY_SCORE: 11
DRESS: 0-->INDEPENDENT
ADLS_ACUITY_SCORE: 18
ADLS_ACUITY_SCORE: 14
ADLS_ACUITY_SCORE: 12
ADLS_ACUITY_SCORE: 16
ADLS_ACUITY_SCORE: 16
ADLS_ACUITY_SCORE: 12
ADLS_ACUITY_SCORE: 16
ADLS_ACUITY_SCORE: 16
ADLS_ACUITY_SCORE: 12
ADLS_ACUITY_SCORE: 14
ADLS_ACUITY_SCORE: 14
ADLS_ACUITY_SCORE: 16
ADLS_ACUITY_SCORE: 13
ADLS_ACUITY_SCORE: 16
ADLS_ACUITY_SCORE: 12
TRANSFERRING: 0-->INDEPENDENT
ADLS_ACUITY_SCORE: 18
ADLS_ACUITY_SCORE: 16
ADLS_ACUITY_SCORE: 12
ADLS_ACUITY_SCORE: 12
ADLS_ACUITY_SCORE: 16
ADLS_ACUITY_SCORE: 16
ADLS_ACUITY_SCORE: 13
ADLS_ACUITY_SCORE: 12
AMBULATION: 0-->INDEPENDENT
ADLS_ACUITY_SCORE: 16
ADLS_ACUITY_SCORE: 14
ADLS_ACUITY_SCORE: 16
ADLS_ACUITY_SCORE: 13

## 2018-01-01 ASSESSMENT — ENCOUNTER SYMPTOMS
APPETITE CHANGE: 1
COUGH: 0
DIARRHEA: 0
CHILLS: 0
WEAKNESS: 1
SHORTNESS OF BREATH: 1
PALPITATIONS: 0
FATIGUE: 1
WEAKNESS: 1
HEADACHES: 0
SHORTNESS OF BREATH: 0
DYSURIA: 0
FREQUENCY: 0
NAUSEA: 0
FEVER: 0
VOMITING: 0
ABDOMINAL PAIN: 0
FEVER: 0

## 2018-01-01 ASSESSMENT — PAIN DESCRIPTION - DESCRIPTORS
DESCRIPTORS: ACHING
DESCRIPTORS: ACHING

## 2018-01-26 NOTE — LETTER
[address] [address]                                                                                                       [phone]    February 2, 2018    Eyal Turner  [address] [facility] [place] [address]      To Whom it May Concern:    My staff have been attempting to reach you in regards to a recent refill request for: metoprolol succinate (TOPROL-XL) 25 MG 24 hr tablet.  After reviewing your chart, you are overdue for a fasting physical.  Please contact my office at [phone] to schedule an appointment.     Thank you for your time.        Sincerely,       Angie Heredia M.D./ADELITA RN

## 2018-01-26 NOTE — TELEPHONE ENCOUNTER
"Requested Prescriptions   Pending Prescriptions Disp Refills     metoprolol succinate (TOPROL-XL) 25 MG 24 hr tablet [Pharmacy Med Name: METOPROLOL ER SUCCINATE 25MG TABS] 30 tablet 0    Last Written Prescription Date:  10.11.17  Last Fill Quantity: 30 TABLET,  # refills: 0   Last Office Visit with [facility] provider:  7.25.17   Future Office Visit:      Sig: TAKE 1/2 TABLET BY MOUTH DAILY    Beta-Blockers Protocol Failed    1/26/2018 11:53 AM       Failed - Recent or future visit with authorizing provider's specialty    Patient had office visit in the last year or has a visit in the next 30 days with authorizing provider.  See \"Patient Info\" tab in inbasket, or \"Choose Columns\" in Meds & Orders section of the refill encounter.            Passed - Blood pressure under 140/90    BP Readings from Last 3 Encounters:   10/23/17 126/84   09/26/16 (!) 135/94   08/09/16 (!) 123/93                Passed - Patient is age 6 or older          "

## 2018-02-01 NOTE — TELEPHONE ENCOUNTER
No Future OV scheduled (Patient due for fasting physical)    Attempt #2  MyChart Message sent    Radha Momin RN  [place] [facility]

## 2018-02-02 NOTE — TELEPHONE ENCOUNTER
3 attempts have been made to call patient, letter sent  Encounter closed    Radha Momin, CAROLYN  [facility]

## 2018-02-02 NOTE — TELEPHONE ENCOUNTER
No future OV scheduled yet, has not viewed last 2 "[employer] Messages    Attempt #3  Called [phone] - Left a non-detailed message to call back and speak with any triage nurse.    Letter sent    Routing to PCP for further review/recommendations/orders - would you like to refill?    Radha Momin RN  [place] [facility]

## 2018-02-02 NOTE — TELEPHONE ENCOUNTER
BP Readings from Last 3 Encounters:   10/23/17 126/84   09/26/16 (!) 135/94   08/09/16 (!) 123/93   please call pt to schedule appt.  She's being seen monthly at [facility] for her breast cancer, but still needs f/u here.

## 2018-03-30 NOTE — TELEPHONE ENCOUNTER
"Requested Prescriptions   Pending Prescriptions Disp Refills     metoprolol succinate (TOPROL-XL) 25 MG 24 hr tablet [Pharmacy Med Name: METOPROLOL ER SUCCINATE 25MG TABS] 30 tablet 0     Sig: TAKE 1/2 TABLET BY MOUTH DAILY    Last Refill:   Medication Detail         Disp Refills Start End KEVIN     metoprolol succinate (TOPROL-XL) 25 MG 24 hr tablet 30 tablet 0 2/2/2018  No     Sig: TAKE 1/2 TABLET BY MOUTH DAILY       Beta-Blockers Protocol Failed    3/30/2018  9:54 AM       Failed - Recent (12 mo) or future (30 days) visit within the authorizing provider's specialty    Patient had office visit in the last 12 months or has a visit in the next 30 days with authorizing provider or within the authorizing provider's specialty.  See \"Patient Info\" tab in inbasket, or \"Choose Columns\" in Meds & Orders section of the refill encounter.      LOV:          Passed - Blood pressure under 140/90 in past 12 months    BP Readings from Last 3 Encounters:   10/23/17 126/84   09/26/16 (!) 135/94   08/09/16 (!) 123/93          Passed - Patient is age 6 or older        Patient already given a 1 x kadeem refill and did not FU - no future appt scheduled  Routing to response pool to call/schedule    Radha Momin RN  [place] [facility]    "

## 2018-03-30 NOTE — TELEPHONE ENCOUNTER
I spoke with patient and advised her that we have not seen her since 7/2016 and that office visit due for refill.  She said she will call back on Monday to schedule.  Can give one refill if she schedules.      MARIBELL Valdez, RN, N  [facility]) [phone]

## 2018-06-15 NOTE — ED PROVIDER NOTES
History     Chief Complaint:  Altered Mental Status      HPI   Eyal Turner is a 58 year old female who presents with family for evaluation of confusion.  Per family patient was slumped in her car she appeared to be confused when they tried to awaken her.  She did have a couple falls over the last couple of days.  Patient herself states that she has just been extremely tired.  She has no history of seizure activity.  She is a breast cancer patient.  She has not had chemo for a week.  She complains of no pain.  She states she is just extremely tired.  She has no focal neurologic complaints.  She has had no cough or cold symptoms.  She did have one episode of vomiting on the way here.  She has had no urinary symptoms abdominal pain or diarrhea.  No other concerns or complaints at this time.    Allergies:  No Known Allergies     Medications:      cephALEXin (KEFLEX) 500 MG capsule   chlordiazePOXIDE (LIBRIUM) 25 MG capsule   metoclopramide (REGLAN) 10 MG tablet   metoprolol succinate (TOPROL-XL) 25 MG 24 hr tablet   ondansetron (ZOFRAN ODT) 4 MG disintegrating tablet   oxyCODONE (ROXICODONE) 5 MG immediate release tablet   Prenatal Vit-Fe Fumarate-FA (PRENATAL MULTIVITAMIN  PLUS IRON) 27-0.8 MG TABS   Probiotic Product (ACIDOPHILUS PROBIOTIC BLEND) CAPS       Problem List:    Patient Active Problem List    Diagnosis Date Noted     Essential hypertension with goal blood pressure less than 140/90 -- lisinopril 5mg = dizziness 05/13/2013     Priority: High     Liver palpable 07/25/2016     Priority: Medium     Alcohol-induced acute pancreatitis 07/16/2016     Priority: Medium     Alcohol dependence with uncomplicated intoxication (H) 07/16/2016     Priority: Medium     Hypokalemia 07/16/2016     Priority: Medium     Hypomagnesemia 07/16/2016     Priority: Medium     Acute alcoholic pancreatitis 07/16/2016     Priority: Medium     Anxiety 03/08/2016     Priority: Medium     ETOH abuse      Priority: Medium      Martha-Stafford tear 10/12/2015     Priority: Medium     with hematemesis - hospitalized       GI bleed 10/11/2015     Priority: Medium     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        S/P mastectomy, bilateral in 2000 from first breast cancer occurrence      Priority: Medium     Breast cancer metastasized to bone- left breast invasive ductal carcinoma- since 2005- Dr. Charbel [facility]berg- [facility]       Priority: Medium     Health Care Home 07/28/2016     Priority: Low        Past Medical History:    Past Medical History:   Diagnosis Date     Anemia      Breast cancer metastasized to bone (H) 2005     ETOH abuse      History of blood transfusion      Hypertension      Invasive ductal carcinoma of breast (H) 2000     Martha-Stafford tear 10/12/2015     S/P mastectomy, bilateral 2000       Past Surgical History:    Past Surgical History:   Procedure Laterality Date     APPENDECTOMY  1999 - age 39      ESOPHAGOSCOPY, GASTROSCOPY, DUODENOSCOPY (EGD), COMBINED N/A 10/13/2015    Procedure: COMBINED ESOPHAGOSCOPY, GASTROSCOPY, DUODENOSCOPY (EGD);  Surgeon: Rashad Rossi MD;  Location: [facility]     EXCISE MASS TRUNK Right 8/9/2016    Procedure: EXCISE MASS TRUNK;  Surgeon: Danie Forrester MD;  Location: [facility] OR     HYSTERECTOMY, NORIS  2002    with BSO      MASTECTOMY MODIFIED RADICAL BILATERAL  2000     REMOVE CATHETER VASCULAR ACCESS Right 8/9/2016    Procedure: REMOVE CATHETER VASCULAR ACCESS;  Surgeon: Danie Forrester MD;  Location: [facility] OR       Family History:    Family History    Problem Relation Age of Onset     CANCER Mother      hx of lung cancer - survived      Hypertension Mother      Circulatory Mother      s/p valve replacement      DIABETES Father      prediabetes      Prostate Cancer Father      Psychotic Disorder Daughter      ? adhd - maternal cousin with same        Social History:  Marital Status:   [2]  Social History   Substance Use Topics     Smoking status: Former Smoker     Packs/day: 0.50     Years: 15.00     Types: Cigarettes     Quit date: 3/18/1990     Smokeless tobacco: Never Used     Alcohol use 3.5 oz/week     7 Glasses of wine per week      Comment: drinks probably  a glass of wine a night         Review of Systems   Constitutional: Positive for fatigue. Negative for chills and fever.   HENT: Negative for congestion.    Respiratory: Negative for cough and shortness of breath.    Cardiovascular: Negative for chest pain and palpitations.   Gastrointestinal: Negative for abdominal pain, diarrhea, nausea and vomiting.   Genitourinary: Negative for dysuria and frequency.   Neurological: Positive for syncope and weakness. Negative for headaches.       Physical Exam     Patient Vitals for the past 24 hrs:   BP Temp Temp src Heart Rate Resp SpO2   06/15/18 2148 - - - - - 95 %   06/15/18 2131 - - - - - 96 %   06/15/18 2115 - - - - - 100 %   06/15/18 2100 - - - - - 100 %   06/15/18 2056 - - - - - 96 %   06/15/18 2040 - - - - - 98 %   06/15/18 2000 124/79 - - 112 16 -   06/15/18 1958 - - - - - 91 %   06/15/18 1945 110/72 - - 108 15 -   06/15/18 1930 110/74 - - 109 16 -   06/15/18 1915 122/82 - - 110 - -   06/15/18 1910 - - - 107 18 95 %   06/15/18 1907 117/82 - - - - -   06/15/18 1845 119/81 - - 107 - 95 %   06/15/18 1830 134/85 - - 110 12 95 %   06/15/18 1823 (!) 134/97 98.6  F (37  C) Oral 117 16 96 %     Physical Exam  General: Alert, Mild  discomfort, well kept, chronically ill-appearing and thin  Eyes: PERRL, conjunctivae pink no scleral icterus or conjunctival  injection  ENT:   Moist mucus membranes, posterior oropharynx clear without erythema or exudates, No lymphadenopathy, Normal voice, normal extraocular movement.  No hemotympanum.  No fischer signs.  Resp:  Lungs clear to auscultation bilaterally, no crackles/rubs/wheezes. Good air movement  CV:  Normal rate and rhythm, no murmurs/rubs/gallops  GI:  Abdomen soft and non-distended.  Normoactive BS.  No tenderness, guarding or rebound, No masses  Skin:  Right chest port, bruising from right clavicular area to two thirds of upper arm.  Small area of bruising and small hematoma right forehead.  Musculoskeletal: No peripheral edema or calf tenderness, Normal gross ROM   Neuro: Alert and oriented to person/place/time, normal sensation  Psychiatric: Normal affect, cooperative, good eye contact    Emergency Department Course     ECG:  ECG taken at 1823, ECG read at 1830  Sinus tachycardia   Possible inferior infarct, age undetermined   Abnormal ECG   No significant change as compared to ECG dated 10/11/2015.  Rate 115 bpm. IN interval 166 ms. QRS duration 88 ms. QT/QTc 324/448 ms. P-R-T axes 48 39 -5.    Imaging:  Radiology findings were communicated with the patient and family who voiced understanding of the findings.    Head CT w/o contrast   Final Result   IMPRESSION: Probable calcified hemangioma in the high anterior aspect   of the right frontal lobe again noted. Diffuse cerebral volume loss   and cerebral white matter changes consistent with chronic small vessel   ischemic disease. No evidence for acute intracranial pathology.            Radiation dose for this scan was reduced using automated exposure   control, adjustment of the mA and/or kV according to patient size, or   iterative reconstruction technique      KYREE MCGOWAN MD        Laboratory:  Laboratory findings were communicated with the patient who voiced understanding of the findings.    Recent Results (from the past 8 hour(s))   Glucose by meter     Collection Time: 06/15/18  6:23 PM   Result Value Ref Range    Glucose 125 (H) 70 - 99 mg/dL   EKG 12 lead    Collection Time: 06/15/18  6:23 PM   Result Value Ref Range    Interpretation ECG Click View Image link to view waveform and result    Troponin I    Collection Time: 06/15/18  6:25 PM   Result Value Ref Range    Troponin I ES <0.015 0.000 - 0.045 ug/L   INR    Collection Time: 06/15/18  6:25 PM   Result Value Ref Range    INR 0.92 0.86 - 1.14   PTT    Collection Time: 06/15/18  6:25 PM   Result Value Ref Range    PTT 29 22 - 37 sec   CBC with platelets differential    Collection Time: 06/15/18  6:25 PM   Result Value Ref Range    WBC 3.9 (L) 4.0 - 11.0 10e9/L    RBC Count 3.48 (L) 3.8 - 5.2 10e12/L    Hemoglobin 11.4 (L) 11.7 - 15.7 g/dL    Hematocrit 34.6 (L) 35.0 - 47.0 %    MCV 99 78 - 100 fl    MCH 32.8 26.5 - 33.0 pg    MCHC 32.9 31.5 - 36.5 g/dL    RDW 18.8 (H) 10.0 - 15.0 %    Platelet Count 108 (L) 150 - 450 10e9/L    Diff Method Automated Method     % Neutrophils 67.4 %    % Lymphocytes 14.0 %    % Monocytes 14.7 %    % Eosinophils 2.8 %    % Basophils 0.8 %    % Immature Granulocytes 0.3 %    Nucleated RBCs 0 0 /100    Absolute Neutrophil 2.7 1.6 - 8.3 10e9/L    Absolute Lymphocytes 0.6 (L) 0.8 - 5.3 10e9/L    Absolute Monocytes 0.6 0.0 - 1.3 10e9/L    Absolute Eosinophils 0.1 0.0 - 0.7 10e9/L    Absolute Basophils 0.0 0.0 - 0.2 10e9/L    Abs Immature Granulocytes 0.0 0 - 0.4 10e9/L    Absolute Nucleated RBC 0.0     Anisocytosis Moderate     Ovalocytes Slight     Macrocytes Present     Platelet Estimate       Automated count confirmed.  Platelet morphology is normal.   Comprehensive metabolic panel    Collection Time: 06/15/18  6:25 PM   Result Value Ref Range    Sodium 134 133 - 144 mmol/L    Potassium 3.2 (L) 3.4 - 5.3 mmol/L    Chloride 96 94 - 109 mmol/L    Carbon Dioxide 28 20 - 32 mmol/L    Anion Gap 10 3 - 14 mmol/L    Glucose 114 (H) 70 - 99 mg/dL    Urea Nitrogen 7 7 - 30 mg/dL     Creatinine 0.46 (L) 0.52 - 1.04 mg/dL    GFR Estimate >90 >60 mL/min/1.7m2    GFR Estimate If Black >90 >60 mL/min/1.7m2    Calcium 8.6 8.5 - 10.1 mg/dL    Bilirubin Total 2.4 (H) 0.2 - 1.3 mg/dL    Albumin 3.3 (L) 3.4 - 5.0 g/dL    Protein Total 7.4 6.8 - 8.8 g/dL    Alkaline Phosphatase 518 (H) 40 - 150 U/L    ALT 60 (H) 0 - 50 U/L     (H) 0 - 45 U/L   Magnesium    Collection Time: 06/15/18  6:25 PM   Result Value Ref Range    Magnesium 1.6 1.6 - 2.3 mg/dL   Alcohol ethyl    Collection Time: 06/15/18  6:25 PM   Result Value Ref Range    Ethanol g/dL 0.01 (H) <0.01 g/dL   Troponin POCT    Collection Time: 06/15/18  6:26 PM   Result Value Ref Range    Troponin I 0.00 0.00 - 0.10 ug/L   UA reflex to Microscopic    Collection Time: 06/15/18  7:08 PM   Result Value Ref Range    Color Urine Yellow     Appearance Urine Clear     Glucose Urine Negative NEG^Negative mg/dL    Bilirubin Urine Negative NEG^Negative    Ketones Urine Negative NEG^Negative mg/dL    Specific Gravity Urine 1.003 1.003 - 1.035    Blood Urine Negative NEG^Negative    pH Urine 7.0 5.0 - 7.0 pH    Protein Albumin Urine Negative NEG^Negative mg/dL    Urobilinogen mg/dL 0.0 0.0 - 2.0 mg/dL    Nitrite Urine Positive (A) NEG^Negative    Leukocyte Esterase Urine Moderate (A) NEG^Negative    Source Midstream Urine     RBC Urine 1 0 - 2 /HPF    WBC Urine 12 (H) 0 - 5 /HPF    Bacteria Urine Few (A) NEG^Negative /HPF    Squamous Epithelial /HPF Urine <1 0 - 1 /HPF    Mucous Urine Present (A) NEG^Negative /LPF           Interventions:  Medications   0.9% sodium chloride BOLUS (0 mLs Intravenous Stopped 6/15/18 2000)   chlordiazePOXIDE (LIBRIUM) capsule 25 mg (25 mg Oral Given 6/15/18 2036)     Emergency Department Course:  Recheck.  I discussed the laboratory and radiology results with the patient and she understands.  The patient felt improved after the above interventions.  The patient will be discharged home to follow up with primary care doctor per  discharge instructions.  Indications for return to the ED were discussed and the patient understands.  All questions were answered prior to discharge.       Impression & Plan      Medical Decision Making:  Eyal Turner is a 58 year old female who presents to the emergency department today with generalized weakness and possible syncopal/near syncopal episode.  Patient was brought in by family as they noted that she seems to be confused and slumped over in her car.  Patient states she has been extremely tired.  Patient does have a history of breast cancer has not had Chemotherapy for at least a week.  Her evaluation today showed bruising on her forehead and right shoulder.  She has had 2 falls over the last 2 days.  CT scan was obtained without acute findings.  There is a calcification.  Her laboratory studies showed a normal hemoglobin, noncontributory electrolytes.  She did have elevated LFTs consistent with her history of alcohol abuse.  She states she has had only beer over the last couple of days but usually drinks vodka.  Her blood alcohol level today was 0.01.  Urinalysis does show likely UTI.  Patient was placed on Keflex.  Urine culture is pending.  She was given fluids as above felt significantly improved.  There was no focal neurologic deficits no indication for CVA.  She had a nonacute EKG and negative troponin.  This is unlikely to represent ACS.  She does appear to be experiencing some alcohol withdrawal type symptoms therefore was given Librium.  She does feel again improved after the above medication.  She is ambulatory around the department on her own without difficulty.  She has family close around her.  We did discuss the need to stop alcohol.  She did not want help for this at this time.  As she is feeling improved she would prefer not to have an observation admission and she does appear to be safe and appropriate for outpatient management with close follow-up.  Plan will be to follow-up with  her primary care provider Monday or Tuesday.  She will otherwise return to the emergency department if she has new or worsening symptoms.  She is comfortable with this plan and clear on strict return protocols.  Again she does appear to be safe and appropriate for outpatient management at this time.    Diagnosis:      ICD-10-CM    1. Syncope, unspecified syncope type R55 Urine Culture   2. Elevated LFTs R79.89    3. Alcohol withdrawal syndrome with complication (H) F10.239    4. Fall, initial encounter W19.XXXA    5. Minor head injury, initial encounter S00.90XA    6. Contusion of right upper arm, initial encounter S40.021A    7. Fatigue, unspecified type R53.83    8. Urinary tract infection without hematuria, site unspecified N39.0      Disposition:   Discharged to home    Discharge Medications:  New Prescriptions    CEPHALEXIN (KEFLEX) 500 MG CAPSULE    Take 1 capsule (500 mg) by mouth 2 times daily for 10 days    CHLORDIAZEPOXIDE (LIBRIUM) 25 MG CAPSULE    Take 1 capsule (25 mg) by mouth 3 times daily as needed for anxiety or withdrawal    METOCLOPRAMIDE (REGLAN) 10 MG TABLET    Take 1 tablet (10 mg) by mouth 3 times daily as needed (Nausea or Vomiting)       Scribe Disclosure:  I, Clarice Chester, am serving as a scribe at 6:57 PM on 6/15/2018 to document services personally performed by Evaristo Miner APRN based on my observations and the provider's statements to me.    [facility] EMERGENCY DEPARTMENT       Evaristo Miner APRN CNP  06/15/18 3047

## 2018-06-15 NOTE — ED AVS SNAPSHOT
[facility] Emergency Department    [address] [facility] [address]    Phone:  [phone]    Fax:  [phone]                                       Eyal Turner   MRN: [id number]    Department:  [facility] Emergency Department   Date of Visit:  6/15/2018           Patient Information     Date Of Birth          1960        Your diagnoses for this visit were:     Syncope, unspecified syncope type     Elevated LFTs     Alcohol withdrawal syndrome with complication (H)     Fall, initial encounter     Minor head injury, initial encounter     Contusion of right upper arm, initial encounter     Fatigue, unspecified type     Urinary tract infection without hematuria, site unspecified        You were seen by Clint Ceja MD and Evaristo Miner APRN CNP.      Follow-up Information     Follow up with Angie Heredia MD.    Specialty:  Family Practice    Why:  on Monday to repeat labs    Contact information:    [address] [facility] [address] [phone]          Follow up with [facility] Emergency Department.    Specialty:  EMERGENCY MEDICINE    Why:  If symptoms worsen    Contact information:    [address] [facility] [address] [phone]        Discharge Instructions       Don't Drink Alcohol. Take medications as directed.    Discharge References/Attachments     ALCOHOL WITHDRAWAL (ENGLISH)    ALCOHOL ADDICTION (ALCOHOLISM), SIGNS OF (ENGLISH)    FATIGUE, MANAGING (ENGLISH)    FALL DUE TO DIZZINESS, WEAKNESS, OR LOSS OF BALANCE (ENGLISH)      24 Hour Appointment Hotline       To make an appointment at any [facility], call [phone] ([phone]). If you don't have a family doctor or clinic, we will help you find one. [facility] are conveniently located to serve the needs of you and your family.             Review of your medicines      START taking        Dose / Directions Last  dose taken    cephALEXin 500 MG capsule   Commonly known as:  KEFLEX   Dose:  500 mg   Quantity:  20 capsule        Take 1 capsule (500 mg) by mouth 2 times daily for 10 days   Refills:  0        chlordiazePOXIDE 25 MG capsule   Commonly known as:  LIBRIUM   Dose:  25 mg   Quantity:  30 capsule        Take 1 capsule (25 mg) by mouth 3 times daily as needed for anxiety or withdrawal   Refills:  0        metoclopramide 10 MG tablet   Commonly known as:  REGLAN   Dose:  10 mg   Quantity:  20 tablet        Take 1 tablet (10 mg) by mouth 3 times daily as needed (Nausea or Vomiting)   Refills:  0          Our records show that you are taking the medicines listed below. If these are incorrect, please call your family doctor or clinic.        Dose / Directions Last dose taken    ACIDOPHILUS PROBIOTIC BLEND Caps   Dose:  1 capsule   Quantity:  90 capsule        Take 1 capsule by mouth daily.   Refills:  1        metoprolol succinate 25 MG 24 hr tablet   Commonly known as:  TOPROL-XL   Quantity:  30 tablet        TAKE 1/2 TABLET BY MOUTH DAILY   Refills:  0        ondansetron 4 MG ODT tab   Commonly known as:  ZOFRAN ODT   Dose:  4-8 mg   Quantity:  20 tablet        Take 1-2 tablets (4-8 mg) by mouth every 8 hours as needed for nausea   Refills:  1        oxyCODONE IR 5 MG tablet   Commonly known as:  ROXICODONE   Dose:  5-10 mg   Quantity:  20 tablet        Take 1-2 tablets (5-10 mg) by mouth every 3 hours as needed for pain or other (Moderate to Severe)   Refills:  0        prenatal multivitamin plus iron 27-0.8 MG Tabs per tablet   Dose:  1 tablet        Take 1 tablet by mouth daily   Refills:  0                Prescriptions were sent or printed at these locations (3 Prescriptions)                   Other Prescriptions                Printed at Department/Unit printer (3 of 3)         cephALEXin (KEFLEX) 500 MG capsule               chlordiazePOXIDE (LIBRIUM) 25 MG capsule               metoclopramide (REGLAN) 10 MG tablet                 Procedures and tests performed during your visit     Alcohol ethyl    CBC with platelets differential    Comprehensive metabolic panel    EKG 12 lead    Glucose by meter    Head CT w/o contrast    INR    Magnesium    PTT    Saline Lock IV    Troponin I    Troponin POCT    UA reflex to Microscopic    Urine Culture      Orders Needing Specimen Collection     None      Pending Results     Date and Time Order Name Status Description    6/15/2018 2106 Urine Culture In process     6/15/2018 1817 EKG 12 lead Preliminary             Pending Culture Results     Date and Time Order Name Status Description    6/15/2018 2106 Urine Culture In process             Pending Results Instructions     If you had any lab results that were not finalized at the time of your Discharge, you can call the ED Lab Result RN at [phone]. You will be contacted by this team for any positive Lab results or changes in treatment. The nurses are available 7 days a week from 10A to 6:30P.  You can leave a message 24 hours per day and they will return your call.        Test Results From Your Hospital Stay        6/15/2018  6:56 PM      Component Results     Component Value Ref Range & Units Status    Troponin I ES <0.015 0.000 - 0.045 ug/L Final    The 99th percentile for upper reference range is 0.045 ug/L.  Troponin values   in the range of 0.045 - 0.120 ug/L may be associated with risks of adverse   clinical events.           6/15/2018  6:50 PM      Component Results     Component Value Ref Range & Units Status    INR 0.92 0.86 - 1.14 Final         6/15/2018  6:50 PM      Component Results     Component Value Ref Range & Units Status    PTT 29 22 - 37 sec Final         6/15/2018  9:59 PM      Narrative     CT OF THE HEAD WITHOUT CONTRAST  6/15/2018 7:03 PM     COMPARISON: Brain MRI 5/8/2017    HISTORY: Fall.    TECHNIQUE: 5 mm thick axial CT images of the head were acquired  without IV contrast material.    FINDINGS: Focus of  calcification in the high anterior right frontal  lobe is again noted likely representing calcification related to a  cavernous hemangioma. There is mild diffuse cerebral volume loss.  There are subtle patchy areas of decreased density in the cerebral  white matter bilaterally that are consistent with sequela of chronic  small vessel ischemic disease.    The ventricles and basal cisterns are within normal limits in  configuration given the degree of cerebral volume loss. There is no  midline shift. There are no extra-axial fluid collections. No  intracranial hemorrhage, mass or recent infarct.    The visualized paranasal sinuses are well-aerated. There is no  mastoiditis. There are no fractures of the visualized bones.        Impression     IMPRESSION: Probable calcified hemangioma in the high anterior aspect  of the right frontal lobe again noted. Diffuse cerebral volume loss  and cerebral white matter changes consistent with chronic small vessel  ischemic disease. No evidence for acute intracranial pathology.        Radiation dose for this scan was reduced using automated exposure  control, adjustment of the mA and/or kV according to patient size, or  iterative reconstruction technique    KYREE MCGOWAN MD         6/15/2018  7:26 PM      Component Results     Component Value Ref Range & Units Status    WBC 3.9 (L) 4.0 - 11.0 10e9/L Final    RBC Count 3.48 (L) 3.8 - 5.2 10e12/L Final    Hemoglobin 11.4 (L) 11.7 - 15.7 g/dL Final    Hematocrit 34.6 (L) 35.0 - 47.0 % Final    MCV 99 78 - 100 fl Final    MCH 32.8 26.5 - 33.0 pg Final    MCHC 32.9 31.5 - 36.5 g/dL Final    RDW 18.8 (H) 10.0 - 15.0 % Final    Platelet Count 108 (L) 150 - 450 10e9/L Final    Platelets clumped    Diff Method Automated Method  Final    % Neutrophils 67.4 % Final    % Lymphocytes 14.0 % Final    % Monocytes 14.7 % Final    % Eosinophils 2.8 % Final    % Basophils 0.8 % Final    % Immature Granulocytes 0.3 % Final    Nucleated RBCs 0 0 /100  Final    Absolute Neutrophil 2.7 1.6 - 8.3 10e9/L Final    Absolute Lymphocytes 0.6 (L) 0.8 - 5.3 10e9/L Final    Absolute Monocytes 0.6 0.0 - 1.3 10e9/L Final    Absolute Eosinophils 0.1 0.0 - 0.7 10e9/L Final    Absolute Basophils 0.0 0.0 - 0.2 10e9/L Final    Abs Immature Granulocytes 0.0 0 - 0.4 10e9/L Final    Absolute Nucleated RBC 0.0  Final    Anisocytosis Moderate  Final    Ovalocytes Slight  Final    Macrocytes Present  Final    Platelet Estimate   Final    Automated count confirmed.  Platelet morphology is normal.         6/15/2018  6:56 PM      Component Results     Component Value Ref Range & Units Status    Sodium 134 133 - 144 mmol/L Final    Potassium 3.2 (L) 3.4 - 5.3 mmol/L Final    Chloride 96 94 - 109 mmol/L Final    Carbon Dioxide 28 20 - 32 mmol/L Final    Anion Gap 10 3 - 14 mmol/L Final    Glucose 114 (H) 70 - 99 mg/dL Final    Urea Nitrogen 7 7 - 30 mg/dL Final    Creatinine 0.46 (L) 0.52 - 1.04 mg/dL Final    GFR Estimate >90 >60 mL/min/1.7m2 Final    Non  GFR Calc    GFR Estimate If Black >90 >60 mL/min/1.7m2 Final    African American GFR Calc    Calcium 8.6 8.5 - 10.1 mg/dL Final    Bilirubin Total 2.4 (H) 0.2 - 1.3 mg/dL Final    Albumin 3.3 (L) 3.4 - 5.0 g/dL Final    Protein Total 7.4 6.8 - 8.8 g/dL Final    Alkaline Phosphatase 518 (H) 40 - 150 U/L Final    ALT 60 (H) 0 - 50 U/L Final     (H) 0 - 45 U/L Final         6/15/2018  6:56 PM      Component Results     Component Value Ref Range & Units Status    Magnesium 1.6 1.6 - 2.3 mg/dL Final         6/15/2018  8:19 PM      Component Results     Component Value Ref Range & Units Status    Color Urine Yellow  Final    Appearance Urine Clear  Final    Glucose Urine Negative NEG^Negative mg/dL Final    Bilirubin Urine Negative NEG^Negative Final    Ketones Urine Negative NEG^Negative mg/dL Final    Specific Gravity Urine 1.003 1.003 - 1.035 Final    Blood Urine Negative NEG^Negative Final    pH Urine 7.0 5.0 - 7.0 pH  Final    Protein Albumin Urine Negative NEG^Negative mg/dL Final    Urobilinogen mg/dL 0.0 0.0 - 2.0 mg/dL Final    Nitrite Urine Positive (A) NEG^Negative Final    Leukocyte Esterase Urine Moderate (A) NEG^Negative Final    Source Midstream Urine  Final    RBC Urine 1 0 - 2 /HPF Final    WBC Urine 12 (H) 0 - 5 /HPF Final    Bacteria Urine Few (A) NEG^Negative /HPF Final    Squamous Epithelial /HPF Urine <1 0 - 1 /HPF Final    Mucous Urine Present (A) NEG^Negative /LPF Final         6/15/2018  6:34 PM      Component Results     Component Value Ref Range & Units Status    Glucose 125 (H) 70 - 99 mg/dL Final         6/15/2018  6:40 PM      Component Results     Component Value Ref Range & Units Status    Troponin I 0.00 0.00 - 0.10 ug/L Final         6/15/2018  7:37 PM      Component Results     Component Value Ref Range & Units Status    Ethanol g/dL 0.01 (H) <0.01 g/dL Final         6/15/2018  9:09 PM                Clinical Quality Measure: Blood Pressure Screening     Your blood pressure was checked while you were in the emergency department today. The last reading we obtained was  BP: 124/79 . Please read the guidelines below about what these numbers mean and what you should do about them.  If your systolic blood pressure (the top number) is less than 120 and your diastolic blood pressure (the bottom number) is less than 80, then your blood pressure is normal. There is nothing more that you need to do about it.  If your systolic blood pressure (the top number) is 120-139 or your diastolic blood pressure (the bottom number) is 80-89, your blood pressure may be higher than it should be. You should have your blood pressure rechecked within a year by a primary care provider.  If your systolic blood pressure (the top number) is 140 or greater or your diastolic blood pressure (the bottom number) is 90 or greater, you may have high blood pressure. High blood pressure is treatable, but if left untreated over time it can  put you at risk for heart attack, stroke, or kidney failure. You should have your blood pressure rechecked by a primary care provider within the next 4 weeks.  If your provider in the emergency department today gave you specific instructions to follow-up with your doctor or provider even sooner than that, you should follow that instruction and not wait for up to 4 weeks for your follow-up visit.        Thank you for choosing [facility]       Thank you for choosing [facility] for your care. Our goal is always to provide you with excellent care. Hearing back from our patients is one way we can continue to improve our services. Please take a few minutes to complete the written survey that you may receive in the mail after you visit with us. Thank you!        [employer] Information     [employer] gives you secure access to your electronic health record. If you see a primary care provider, you can also send messages to your care team and make appointments. If you have questions, please call your primary care clinic.  If you do not have a primary care provider, please call [phone] and they will assist you.        Care EveryWhere ID     This is your Care EveryWhere ID. This could be used by other organizations to access your [facility] medical records  [id number]        Equal Access to Services     JAMES TOPETE : Gorge Jean, hieu stevens, guillermina rizzo . So [facility] [phone].    ATENCIÓN: Si habla español, tiene a arevalo disposición servicios gratuitos de asistencia lingüística. Mikal al [phone].    We comply with applicable federal civil rights laws and [place] laws. We do not discriminate on the basis of race, color, national origin, age, disability, sex, sexual orientation, or gender identity.            After Visit Summary       This is your record. Keep this with you and show to your community pharmacist(s) and doctor(s) at your next visit.

## 2018-06-15 NOTE — ED TRIAGE NOTES
Per family, pt was found slumped over in car.  Tried to waken her, and she was confused.  Did not know who  was.  States she has fallen twice within last couple of days where she hit her head.  ABCs intact, alert and orientated.

## 2018-06-15 NOTE — LETTER
06/15/18      To Whom it may concern:    Deon Turner was in our Emergency Department today, 06/15/18. with a patient who needed their assistance.  Please excuse them from work/school.      Sincerely,

## 2018-06-15 NOTE — ED AVS SNAPSHOT
[facility] Emergency Department    [address]    [facility] [address]    Phone:  [phone]    Fax:  [phone]                                       Eyal Turner   MRN: [id number]    Department:  [facility] Emergency Department   Date of Visit:  6/15/2018           After Visit Summary Signature Page     I have received my discharge instructions, and my questions have been answered. I have discussed any challenges I see with this plan with the nurse or doctor.    ..........................................................................................................................................  Patient/Patient Representative Signature      ..........................................................................................................................................  Patient Representative Print Name and Relationship to Patient    ..................................................               ................................................  Date                                            Time    ..........................................................................................................................................  Reviewed by Signature/Title    ...................................................              ..............................................  Date                                                            Time

## 2018-06-15 NOTE — LETTER
06/15/18      To Whom it may concern:    Mary Yue was in our Emergency Department today, 06/15/18. with a patient who needed their assistance.  Please excuse them from work/school.      Sincerely,

## 2018-06-16 NOTE — ED NOTES
Pt started a 2nd liter per provider. Pt states wanting to leave, provider notified. Pt still tachy, frequently taking off pulse ox.

## 2018-09-22 PROBLEM — K72.00 ACUTE LIVER FAILURE: Status: ACTIVE | Noted: 2018-01-01

## 2018-09-22 NOTE — PROVIDER NOTIFICATION
Paged Dr. Suggs with labs results, critical hgb 6.7 but it was drawn during blood transfusion and WBC 0.3

## 2018-09-22 NOTE — ED PROVIDER NOTES
"  History     Chief Complaint:  Generalized Weakness and Shortness of Breath    HPI   Eyal Turner is a 58 year old female with a medical history of breast cancer since 2000, alcohol abuse, and hypertension who presents with generalized weakness and shortness of breath. The patient reports that she started a new chemotherapy for breast cancer 6 days ago, where she sees Dr. Braun of [place] [facility]. She states since the chemotherapy, \"it's really hitting me hard\" and that she can barely walk, and has increased shortness of breath when she does. Patient also has been having sore throat and has barely been able to drink fluid, and hasn't eaten. She does have some nausea also. Denies fevers. She has not been drinking alcohol for a few days.     Allergies:  No known drug allergies     Medications:    Reglan  Toprol XL  Acidophilus probiotic     Past Medical History:    Anemia  Breast cancer metastasized to one  ETOH abuse  Blood transfusion  Hypertension  ADD  GI bleed    Past Surgical History:    Appendectomy  Excise mass trunk  Hysterectomy  Mastectomy modified radical bilateral  Remove catheter vascular access     Family History:    Cancer - Lung  Hypertension  Circulatory  Diabetes  Cancer - Prostate  Psychotic disorder     Social History:  Smoking status: Former smoker, 1990  Alcohol use: Yes    Marital Status:   [2]  PCP: Angie Heredia   Accompanied to the ED by .      Review of Systems   Constitutional: Positive for appetite change. Negative for fever.   Respiratory: Positive for shortness of breath.    Neurological: Positive for weakness (Generalized).   All other systems reviewed and are negative.    Physical Exam   Patient Vitals for the past 24 hrs:   BP Temp Temp src Heart Rate Resp SpO2   09/22/18 1200 115/77 - - 125 - 93 %   09/22/18 1130 120/73 - - 118 - 94 %   09/22/18 1100 112/70 - - 122 - 95 %   09/22/18 1045 - - - 123 18 95 %   09/22/18 1030 100/64 - - 123 16 93 % "   09/22/18 1015 - - - - 19 95 %   09/22/18 1000 105/70 - - - 23 94 %   09/22/18 0945 - - - - - 94 %   09/22/18 0938 124/76 98.7  F (37.1  C) Oral 50 20 97 %      Physical Exam   Constitutional: She is cooperative.   HENT:   Right Ear: Tympanic membrane normal.   Left Ear: Tympanic membrane normal.   Mouth/Throat: Mucous membranes are dry.   Whitish adherent material consistent with candida stomatitis   Eyes:   Scleral icterus   Neck: Normal range of motion.   Cardiovascular: Regular rhythm and normal heart sounds.    Pulmonary/Chest: Effort normal and breath sounds normal.       Abdominal: Soft. Normal appearance and bowel sounds are normal. There is hepatomegaly. There is tenderness in the right upper quadrant. There is no rebound and no guarding.   Musculoskeletal: Normal range of motion.   Lymphadenopathy:     She has no cervical adenopathy.   Neurological: She is alert. She displays tremor.   Mildly tremulous   Skin: Skin is warm and dry.   Psychiatric: She has a normal mood and affect.       Emergency Department Course   ECG (09:42:59)  Sinus tachycardia with premature atrial complexes with aberrant conduction. Cannot rule out Anterior infarct, age undetermined. T wave abnormality, consider inferior ischemia. Abnormal ECG.   Agree with computer interpretation. No changes compared to EKG dated 6/15/18  Interpreted at 0950 by Melina Cruz MD.   Rate 129 bpm. NH interval 156. QRS duration 80. QT/QTc 288/421. P-R-T axes 18 27 -1.     Laboratory:  CBC: WBC HGB 6.9 (LL), PLT 63 (L)   CMP:  (L), Glucose 103 (H), Creatinine 0.33 (L), Calcium 7.7 (L), Bilirubin total 8.3 (H), Albumin 2.4 (L), Protein total 5.9 (L), Alkphos 594 (H), ALT 94 (H),  (H)  Lipase: 144  Magnesium: 1.4 (L)  ABO/Rh type: A, Rh positive.  UA: Pending.  Alcohol ethyl: <0.01  Blood culture x2: Pending.    Interventions:  1027: Ativan 0.5 mg IV  1028: Lactated ringers bolus 1L IV   1114: Diflucan 200 mg IV  1250: Ativan 0.5 mg  IV  1310: Magic mouthwash      Emergency Department Course:  Past medical records, nursing notes, and vitals reviewed.  0952: I performed an exam of the patient and obtained history, as documented above.  IV inserted and blood drawn for basic laboratory. Results as noted above.    1028: Patient given 1L IV lactated ringers while here in the emergency department.  1114: The patient given 200 mg IV Diflucan while here int he emergency department.   1155: I discussed the case with Dr. Suggs, hospitalist service who accepts the patient for further care, monitoring, and treatment.     Impression & Plan    Medical Decision Making:  Eyal Turner is a 58 year old female with widely metastatic cancer and ongoing alcohol abuse who presents to the emergency department with profound weakness after recent new chemotherapy.  Here she is obviously jaundiced.  Bilirubin has increased to over 8 along with elevation of hepatocellular enzymes.  This could be due to toxic effect on the liver of the chemotherapy, ongoing alcohol abuse with cirrhosis.  She is also significantly anemic and there may be intravascular hemolysis.  She is significantly thrombocytopenic as well as having neutropenia.  Although she has not had fever I did obtain blood cultures.  Clinically she appears dehydrated and has a history of poor oral intake for several days.  Some of this may be due to nausea but she has obvious candidal stomatitis and by symptoms may have esophagitis as well.  Given her inability to take orally have ordered IV fluconazole.  In addition she is given IV fluids.  I suspect early alcohol withdrawal.  Given her small body habitus and emaciated condition I have ordered Ativan 0.5 mg.  I spoke with Dr. Suggs of the hospitalist service.  We will admit the patient to a medical bed under protective isolation for further evaluation and management.    Diagnosis:    ICD-10-CM   1. Acute liver failure without hepatic coma K72.00   2. Anemia,  unspecified type D64.9   3. Dehydration E86.0   4. Candidiasis of mouth B37.0   5. Metastatic breast cancer (H) C50.919       Disposition:  Admitted to medicineLuis Alfredo Brambila Do  9/22/2018   [facility] EMERGENCY DEPARTMENT  Patty FREGOSO Do, am serving as a scribe at 9:52 AM on 9/22/2018 to document services personally performed by Melina Cruz MD based on my observations and the provider's statements to me.       Melina Cruz MD  09/22/18 6108

## 2018-09-22 NOTE — PLAN OF CARE
Problem: Patient Care Overview  Goal: Plan of Care/Patient Progress Review  Outcome: No Change  Pt admitted to floor around 1400  A/Ox4, forgetful and lethargic  Pain in mouth reported, nystatin ordered, will give  No nausea reported  Wound on left upper back, scabbed, clean dry and intact  Tmax 101.1, per MD no interventions at this time  Hemo level 6.9, transfusion of 1 unit PRBC started during shift  CIWA 6, no intervention taken   Mg level 1.4, bag ordered, will replace  Sepsis triggered, lactic level 1.0, no interventions at this time  UA needed to be collected   Up x2 with gaitbelt  NPO except meds  Discharge pending  Will continue to monitor

## 2018-09-22 NOTE — ED NOTES
"[facility]  ED Nurse Handoff Report    Eyal Turner is a 58 year old female   ED Chief complaint: Generalized Weakness and Shortness of Breath  . ED Diagnosis:   Final diagnoses:   Acute liver failure without hepatic coma   Anemia, unspecified type   Dehydration   Candidiasis of mouth   Metastatic breast cancer (H)     Allergies: No Known Allergies    Code Status: Full Code  Activity level - Baseline/Home:  Independent. Activity Level - Current:   Stand with Assist. Lift room needed: No. Bariatric: No   Needed: No   Isolation: Yes. Infection: Neutropenic precautions  .     Vital Signs:   Vitals:    09/22/18 1030 09/22/18 1045 09/22/18 1100 09/22/18 1130   BP: 100/64  112/70 120/73   Resp: 16 18     Temp:       TempSrc:       SpO2: 93% 95% 95% 94%       Cardiac Rhythm:  ,   Cardiac  Cardiac Rhythm: Sinus tachycardia  Pain level: 0-10 Pain Scale: 9  Patient confused: No. Patient Falls Risk: Yes.   Elimination Status: Has yet to void, voided prior to arrival   Patient Report - Initial Complaint: SOB, weakness. Focused Assessment: A&O. Sleeping much of time while ED, but easily arousable. C/O increased fatigue and weakness since starting new chemo treatment 6 days ago. Pt also reports exertional SOB. Skin/Sclera jaundiced. Hx of ETOH abuse, last drink \"a few days ago\" per pt.  Shaky-ativan given with relief. Blood to be given after type and cross for Hgb 6.9  Tests Performed:Labs. Abnormal Results:   Labs Ordered and Resulted from Time of ED Arrival Up to the Time of Departure from the ED   CBC WITH PLATELETS DIFFERENTIAL - Abnormal; Notable for the following:        Result Value    WBC 0.5 (*)     RBC Count 2.14 (*)     Hemoglobin 6.9 (*)     Hematocrit 21.7 (*)      (*)     RDW 16.5 (*)     Platelet Count 63 (*)     Nucleated RBCs 1 (*)     Absolute Neutrophil 0.4 (*)     Absolute Lymphocytes 0.1 (*)     All other components within normal limits   COMPREHENSIVE METABOLIC PANEL - " Abnormal; Notable for the following:     Sodium 127 (*)     Glucose 103 (*)     Creatinine 0.33 (*)     Calcium 7.7 (*)     Bilirubin Total 8.3 (*)     Albumin 2.4 (*)     Protein Total 5.9 (*)     Alkaline Phosphatase 594 (*)     ALT 94 (*)      (*)     All other components within normal limits   MAGNESIUM - Abnormal; Notable for the following:     Magnesium 1.4 (*)     All other components within normal limits   ALCOHOL ETHYL   LIPASE   ROUTINE UA WITH MICROSCOPIC   PULSE OXIMETRY NURSING   CARDIAC CONTINUOUS MONITORING   RED BLOOD CELL PREPARE ORDER UNIT   ABO/RH TYPE AND SCREEN   BLOOD CULTURE   BLOOD CULTURE     .   Treatments provided: Ativan, IVF, IVabx, blood transfusion to be done.   Family Comments: No family at bedside at this time, brought in by significant other  OBS brochure/video discussed/provided to patient:  N/A  ED Medications:   Medications   lactated ringers BOLUS 1,000 mL (1,000 mLs Intravenous New Bag 9/22/18 1028)     Followed by   lactated ringers infusion (not administered)   fluconazole (DIFLUCAN) intermittent infusion 200 mg in NaCl (200 mg Intravenous New Bag 9/22/18 1114)   LORazepam (ATIVAN) injection 0.5 mg (0.5 mg Intravenous Given 9/22/18 1027)     Drips infusing:  No  For the majority of the shift, the patient's behavior Green. Interventions performed were  NA.     Severe Sepsis OR Septic Shock Diagnosis Present: No      ED Nurse Name/Phone Number: Leora Helton,   11:49 AM    RECEIVING UNIT ED HANDOFF REVIEW    Above ED Nurse Handoff Report was reviewed: Yes  Reviewed by: Jo Young on September 22, 2018 at 1:24 PM

## 2018-09-22 NOTE — CONSULTS
"NUTRITION ASSESSMENT      REASON FOR NUTRITION CONSULT:  Provider Order  -  \"poor nutrition, etoh use, recent chemo\".       ASSESSMENT:  Unable to complete nutrition assessment at this time d/t no H+P yet available.       FOLLOW UP:   Will follow up to complete full assessment as able.      Ava Tiwari RD, LD  Clinical Dietitian  3rd floor/ICU: [phone]  All other floors: [phone]  Weekend/holiday: [phone]  "

## 2018-09-22 NOTE — PHARMACY-ADMISSION MEDICATION HISTORY
Admission medication history interview status for this patient is complete. See [facility] admission navigator for allergy information, prior to admission medications and immunization status.     Medication history interview source(s):Patient  Medication history resources (including written lists, pill bottles, clinic record):None  Primary pharmacy: [facility]     Changes made to PTA medication list:  Added: zofran 8mg, potassium, MVI   Deleted: probiotic, zofran 4mg, metoprolol   Changed: none    Actions taken by pharmacist (provider contacted, etc): Called [facility] [place] to check regularly filled meds to see if any were missing from what patient reported      Additional medication history information: Patient reported starting a new chemo regimen in the last week - per [facility] note - patient started new regimen of Herceptin, docetaxel, and perjeta.   She reports rotating between prenatal MVI and general MVI does take both.     Medication reconciliation/reorder completed by provider prior to medication history? No    Do you take OTC medications (eg tylenol, ibuprofen, fish oil, eye/ear drops, etc)? Y (Y/N)    For patients on insulin therapy: N (Y/N)    Prior to Admission medications    Medication Sig Last Dose Taking? Auth Provider   metoclopramide (REGLAN) 10 MG tablet Take 1 tablet (10 mg) by mouth 3 times daily as needed (Nausea or Vomiting) 9/22/2018 at am Yes Evaristo Miner APRN CNP   multivitamin, therapeutic (THERA-VIT) TABS tablet Take 1 tablet by mouth daily Past Week at Unknown time Yes Unknown, Entered By History   ondansetron (ZOFRAN-ODT) 8 MG ODT tab Take 8 mg by mouth every 8 hours as needed for nausea Past Week at Unknown time Yes Unknown, Entered By History   oxyCODONE (ROXICODONE) 5 MG immediate release tablet Take 1-2 tablets (5-10 mg) by mouth every 3 hours as needed for pain or other (Moderate to Severe) 9/22/2018 at 0600 Yes Danie Forrester MD   Potassium Chloride ER 20  MEQ TBCR Take 1 tablet by mouth daily 9/21/2018 at am Yes Unknown, Entered By History   Prenatal Vit-Fe Fumarate-FA (PRENATAL MULTIVITAMIN  PLUS IRON) 27-0.8 MG TABS Take 1 tablet by mouth daily Past Week at Unknown time Yes Unknown, Entered By History

## 2018-09-22 NOTE — ED NOTES
Patient presents with generalized weakness and increased shortness of breath since starting new chemo medications for breast cancer on Monday. Patient is so weak can't get off cough. ABCDs intact, alert and oriented x 4.

## 2018-09-22 NOTE — H&P
[facility]  Hospitalist Admission Note  Name: Eyal Turner    MRN: [id number]  YOB: 1960    Age: 58 year old  Date of admission: 9/22/2018  Primary care provider: Angie Heredia    Chief Complaint: Fatigue, shortness of breath    Assessment and Plan:   Acute liver failure: presenting with jaundice.  Bilirubin is 8.3 up from 2.43 months ago.  Alk phos elevated 594, AST elevated 264, ALT elevated at 94.  Does drink 2 bottles of wine daily so possible acute alcoholic hepatitis.  Also additionally started on chemotherapy with docetaxel, trastuzumab, and pertuzumab so possibility of drug toxicity.  Does not have a lot of abdominal pain although was a little tender in the upper quadrant.  Lipase is not elevated.  Obstructive process possible so will obtain imaging.  No history of cirrhosis on imaging as of a year ago.  Albumin is low and will add on INR to evaluate for synthetic function.  -Obtain CT abdomen pelvis with and without IV contrast to evaluate for obstructive disease  -IV fluids diet repeat CMP tomorrow  -GI consult    Alcohol dependence with acute withdrawal: Has been drinking 2 bottles of wine daily for some time.  Last use 1-2 days ago.  Presenting in alcohol withdrawal with tremor.  -CIWA with Ativan as needed  -Oral and IV vitamins    Shortness of breath, fatigue, neutropenic fever: Present since starting chemotherapy this last week.  Suspect related to this along with ongoing daily alcohol abuse and poor nutrition.  Her hemoglobin is below 7 and she received a transfusion to help with this as well.  Possibility of infection given temperature 100.8 and she is severely neutropenic.  Recently completed a course of Keflex for possible chest wall sinusitis of the side of her metastasis, does not look acutely infected now.  Denies any diarrhea so C. difficile unlikely.  Urinary symptoms.  No cough, but is short of breath.  -Obtain chest x-ray and CT abdomen  pelvis  -Check UA/UC  -Blood cultures obtained  -Transfuse to keep hemoglobin greater than 7  -PT and OT consult  -Temperature now 100.8 so will start IV cefepime 2g q 8 hrs empirically for neutropenic fever    Acute on chronic pancytopenia with neutropenia: On admission WBC 0.5 with ANC 0.4, hemoglobin 6.9, and platelets 63.  Hemoglobin was 11.4 three months ago and platelets were 108.  Suspect acute drop is from her chemotherapy she started this last Monday.  Also possible upper GI bleed as below.  -Giving 1 unit RBCs  -Repeat hemoglobin at 7 PM, menses are less than 7  -CBC with differential tomorrow  -Check LDH and peripheral smear given elevated bilirubin to make sure no hemolysis    Metastatic breast cancer: Initially diagnosed in 2000 periods S/B bilateral mastectomy.  Has undergone multiple rounds of chemotherapy and radiation.  Has known metastatic disease to her sternum and third left rib.  Had recent possible skin infection from the sternum met/mass and received Keflex.  Does not look actively infected at this time.  Has port in place.  Restarted on chemotherapy with docetaxel, trastuzumab, and pertuzumab on 9/17/18 per patient.  Follows with Dr. Braun of oncology.  Doing quite poorly following chemotherapy with poor oral intake, weakness, and now acute liver failure.  -[facility] consult  -Continue PTA 5 mg oxycodone every 6 hours as needed for chronic sternum and rib metastasis pain    Acute hyponatremia: Sodium 127.  Suspect hypovolemia due to decreased oral intake.  Also has low albumin and may actually have ascites.  Trace edema on exam.  Obtaining CT scan to look for ascites.  -Continue LR overnight and repeat sodium level    Mucositis versus thrush and possible Candida esophagitis: Patient erosive changes and plaque on the tongue and her soft palate.  Question whether this is mucositis from her docetaxel or Candida infection.  Sore throat for the past week so possible esophagitis as well.  -N.p.o.  at midnight and GI consultation in case of need for EGD  -Swish and swallow nystatin 4 times daily  -Management would typically be systemic Diflucan in case of esophagitis however some risk in doing this due to her acutely elevated bilirubin and transaminases with azole therapy.  Received Diflucan in the ED to 1 mg IV.  Repeat LFTs tomorrow and consider continuing daily oral Diflucan 100-200mg if labs improving and okay from GI team for 7-14 days.    Possible UGIB: Reports of vomiting with red blood in it.  Denies any melena or hematochezia.  Hemoglobin is just below 7 and platelets are also low which may be from chemo or some bleeding.  -Twice daily IV Protonix  -N.p.o. at midnight and GI consultation in case of need for EGD    Hypomagnesemia: Potassium and magnesium replacement protocol.    Suspect severe malnutrition: Evidence for fat loss on exam likely related to chronic alcohol use, metastatic breast cancer, and recent chemotherapy.  Suspect severe malnutrition.  -Dietitian consult  -Continue PTA 5 mg twice daily dronabinol as appetite stimulant    DVT Prophylaxis: Pneumatic Compression Devices, no heparin due to thrombocytopenia and anemia  Code Status: Full Code  FEN: Regular diet but n.p.o. at midnight in case of need for EGD.  Dietitian consult.  LR at 75 ml/hr  Discharge Dispo: Looks quite debilitated may need TCU.  PT, OT consult  Estimated Disch Date / # of Days until Disch: Admit inpatient.  Anticipate multiple night hospitalization      History of Present Illness:  Eyal Turner is a 58 year old female with PMH including metastatic breast cancer with bone metastases to sternum and rib who started a new chemo regimen 5 days ago, alcohol abuse, malnutrition, pancytopenia, and HTN who presents with fatigue and shortness of breath.  Per the patient she has been feeling quite unwell progressively since starting chemotherapy 5 days ago.  She has had decreased oral intake, mouth pain, and sore throat.   She has had some nausea and vomiting.  Does report some blood in her emesis.  Has not had any melena or hematochezia.  Has had very mild upper abdominal pain.  She noted that her eyes and skin are turning yellow.  She is felt progressively weak and could not get out of bed today prompting her evaluation here.  She has had tremor all day today.  She does report using alcohol almost every day approximately 2 bottles of wine.  Last alcohol use maybe 1-2 days ago but she is not sure.  She did not have any fevers or chills at home.  Has noted that her abdomen is becoming more distended.  Has bruising on her extremities and she says she frequently has bleeding from different spots on her skin.  She has felt progressively short of breath with walking as well.  Denies any cough.  Has chronic chest pain from her chest wall metastatic lesion.  Recently completed a course of Keflex for possible cellulitis in this area.  Denies any diarrhea.    History obtained from patient, medical record, and from Dr. Cruz in the emergency department.  Overall vitally stable in the ED.  Developed temperature 100.8 upon admission.  Is notable for acute liver failure with bilirubin 8.3, alk phos 594, ALT 94, .  Her sodium is low at 127.  Lipase is not elevated.  She is pancytopenic with a WC of 0.5 and ANC 0.4, hemoglobin 6.9, and platelets 63.  She is receiving 1 unit RBCs.  Received 1 L of LR as she looks dehydrated.  She was also given 1 dose of Ativan for tremor and suspected alcohol withdrawal which helped her symptoms.  Admit to inpatient for acute liver failure and now with neutropenic fever.  Start empiric cefepime and obtained chest x-ray and CT abdomen pelvis.  Will be on CIWA protocol with Ativan.  And hemoglobin.     Past Medical History reviewed:  Past Medical History:   Diagnosis Date     Anemia      Breast cancer metastasized to bone (H) 2005     sees Dr. Zach Romo/Dr. Toño [facility] q3mos - herceptin, zometa      ETOH  abuse      History of blood transfusion      Hypertension     lisinopril 5mg = dizziness      Invasive ductal carcinoma of breast (H) 2000    recurred 3x since.      Martha-Stafford tear 10/12/2015    with hematemesis - hospitalized     S/P mastectomy, bilateral 2000     Past Surgical History reviewed:  Past Surgical History:   Procedure Laterality Date     APPENDECTOMY  1999 - age 39      ESOPHAGOSCOPY, GASTROSCOPY, DUODENOSCOPY (EGD), COMBINED N/A 10/13/2015    Procedure: COMBINED ESOPHAGOSCOPY, GASTROSCOPY, DUODENOSCOPY (EGD);  Surgeon: Rashad Rossi MD;  Location: [facility] GI     EXCISE MASS TRUNK Right 8/9/2016    Procedure: EXCISE MASS TRUNK;  Surgeon: Danie Forrester MD;  Location: [facility] OR     HYSTERECTOMY, NORIS  2002    with BSO      MASTECTOMY MODIFIED RADICAL BILATERAL  2000     REMOVE CATHETER VASCULAR ACCESS Right 8/9/2016    Procedure: REMOVE CATHETER VASCULAR ACCESS;  Surgeon: Danie Forrester MD;  Location: [facility] OR     Social History reviewed:  Social History   Substance Use Topics     Smoking status: Former Smoker     Packs/day: 0.50     Years: 15.00     Types: Cigarettes     Quit date: 3/18/1990     Smokeless tobacco: Never Used     Alcohol use 3.5 oz/week     7 Glasses of wine per week      Comment: drinks probably  a glass of wine a night      Social History     Social History Narrative     Family History reviewed:  Family History   Problem Relation Age of Onset     Cancer Mother      hx of lung cancer - survived      Hypertension Mother      Circulatory Mother      s/p valve replacement      Diabetes Father      prediabetes      Prostate Cancer Father      Psychotic Disorder Daughter      ? adhd - maternal cousin with same      Allergies:  No Known Allergies  Medications:  Prior to Admission medications    Medication Sig Last Dose Taking? Auth Provider   metoclopramide (REGLAN) 10 MG tablet Take 1 tablet (10 mg) by mouth 3 times daily as needed (Nausea or Vomiting)   Evaristo Miner, MINISTERIO CNP    metoprolol succinate (TOPROL-XL) 25 MG 24 hr tablet TAKE 1/2 TABLET BY MOUTH DAILY   Angie Heredia MD   ondansetron (ZOFRAN ODT) 4 MG disintegrating tablet Take 1-2 tablets (4-8 mg) by mouth every 8 hours as needed for nausea  Patient not taking: Reported on 10/23/2017   Alen Garcia DO   oxyCODONE (ROXICODONE) 5 MG immediate release tablet Take 1-2 tablets (5-10 mg) by mouth every 3 hours as needed for pain or other (Moderate to Severe)  Patient not taking: Reported on 10/23/2017   Danie Forrester MD   Prenatal Vit-Fe Fumarate-FA (PRENATAL MULTIVITAMIN  PLUS IRON) 27-0.8 MG TABS Take 1 tablet by mouth daily   Unknown, Entered By History   Probiotic Product (ACIDOPHILUS PROBIOTIC BLEND) CAPS Take 1 capsule by mouth daily.   Angie Heredia MD   Dronabinol 5 mg twice daily    Review of Systems:  A Comprehensive greater than 10 system review of systems was carried out.  Pertinent positives and negatives are noted above.  Otherwise negative.     Physical Exam:  Blood pressure 120/73, temperature 98.7  F (37.1  C), temperature source Oral, resp. rate 18, SpO2 94 %, not currently breastfeeding.  Wt Readings from Last 1 Encounters:   10/23/17 57.6 kg (127 lb)     Exam:  Constitutional: Awake, NAD, chronically ill-appearing  Eyes: Scleral icterus  HEENT: Dry mucous membranes.  Thrush on the tongue and oropharynx  Respiratory:  lungs cta bilaterally, no crackles or wheeze  Cardiovascular: RRR.  No murmur   GI: Mildly distended, some upper abdominal tenderness without guarding, bowel sounds heard  Skin: Jaundiced, multiple ecchymoses on extremities and some scabbed lesions on the legs  Musculoskeletal/extremities: Trace bilateral lower extremity edema  Neurologic: A&O, speech clear, tremulous  Psychiatric: calm, flat affect    Lab and imaging data personally reviewed:  Labs:    Recent Labs  Lab 09/22/18  1022   WBC 0.5*   HGB 6.9*   HCT 21.7*   *   PLT 63*       Recent Labs  Lab  09/22/18  1022   *   POTASSIUM 3.8   CHLORIDE 94   CO2 24   ANIONGAP 9   *   BUN 12   CR 0.33*   GFRESTIMATED >90   GFRESTBLACK >90   REAGAN 7.7*   MAG 1.4*   PROTTOTAL 5.9*   ALBUMIN 2.4*   BILITOTAL 8.3*   ALKPHOS 594*   *   ALT 94*       Recent Labs  Lab 09/22/18  1022   LIPASE 144     Ethanol level not detectable      Imaging:  Obtaining chest x-ray and CT abdomen pelvis    Stan Suggs MD  Hospitalist  [facility]

## 2018-09-22 NOTE — PROGRESS NOTES
Hospital records and outpatient chart reviewed. Ms. Turner has a history of stage IIB breast cancer diagnosed in 12/2000 and for which she has had extensive prior treatment. Known sites of involvement include bone and chest wall. She is not known to have liver metastases. She was recently started on additional systemic therapy consisting of pertuzumab, trastuzumab and docetaxel with first dose on 9/17/2018.    Labs obtained prior to the start of treatment included an alkaline phosphatase of 573, AST of 237 and total bilirubin 2.5 along with a creatinine of 0.44. She is currently admitted with dyspnea and fatigue. Lab studies show further elevation in LFTs.  Agree with plan for CT abdomen to evaluate for obstructive causes of liver failure.  Her current chemotherapy is not typically associated with high rate of liver toxicity and current labs may reflect alcoholic hepatitis.    She also has pancytopenia which is almost certainly related to marrow suppression. Agree with plan for transfusion support and serial monitoring

## 2018-09-23 NOTE — PROGRESS NOTES
Respiratory Care Note:    RT paged to assess patient for increasing O2 needs and low O2 sats. (86-89% on 9 LPM oxymask.) Breath sounds are coarse, crackles throughout. RN gave lasix. After repositioning patient O2 able to be turned down to 7 LPM via oxymask and O2 sats at 93%.     /76  Pulse 124  Temp 99.7  F (37.6  C) (Temporal)  Resp (!) 36  SpO2 93%    Jacque Hall on 9/23/2018 at 5:24 PM

## 2018-09-23 NOTE — PLAN OF CARE
Problem: Patient Care Overview  Goal: Plan of Care/Patient Progress Review  Outcome: No Change  9057 Text page to MD: Increased O2 needs, currently on 5L/NC @ 93%. Pulse 122. Black stool. Hgb 1300 draw was 7.0. Please advise. Thank you     5178 Text page to MD: CHETNAI other meds found today, now at pharmacy. Were in purse. Agreed to have locked, not sure if she took any     Spoke to MD, informed pt also had ativan earlier today. For now will hold off on giving ativan and oxy.  Monitor patient closely.

## 2018-09-23 NOTE — PLAN OF CARE
Problem: Patient Care Overview  Goal: Plan of Care/Patient Progress Review  Outcome: No Change  Alert and oriented but forgetful at times. Complains of throat and mouth pain. Magic Mouthwash given. Up with assist of 2, seemed to be a little stronger and required less assist. O2 sats dropped to 88% on RA,. Using O2 at 2 L. NPO since midnight for GI consult today. Temp 100.1

## 2018-09-23 NOTE — PROGRESS NOTES
GI    She reported less painful swallowing since starting nystatin.  No c/o pain now.    Elevated temp, BP stable, respiratory rate increased.  Jaundiced  ABD: soft, +BS    Labs reviewed.    A/P:  Alcoholic hepatitis and chemo effect on LFTs.  Pancytopenic, low grade fever. Dark stool reported, Hgb stable, probable alcoholic gastritis.  EGD unlikely to be of therapeutic benefit with low platelets, continue IV protonix. Antibiotics have been started. Continue nystatin for thrush.  Prognosis remains poor.

## 2018-09-23 NOTE — CONSULTS
Consult Date:  09/23/2018      ONCOLOGY CONSULTATION       REASON FOR CONSULTATION:  I am asked by Dr. Stan Suggs to see Ms. Eyal Turner for medical oncology consultation regarding history of metastatic breast cancer and with new findings of liver failure.      HISTORY OF PRESENT ILLNESS:  Ms. Eyal Turner is a 58-year-old woman who has a history of stage III breast cancer diagnosed in December 2000.  She has had extensive prior treatment over time.  She initially received adjuvant therapy.  She has suddenly now developed disease recurrence.  Since that time, she has had extensive systemic therapy with multiple different regimens.  Metastatic disease appears to be confined to multiple bone sites, also the chest wall.  She recently had a followup PET scan which apparently showed no evidence of liver or other visceral sites of involvement.  She met with Dr. Richard Braun from our [place] office and began systemic therapy with pertuzumab, trastuzumab and docetaxel on 09/17/2018.  She was admitted to [facility] on 09/22/2018 with progressive fatigue and shortness of breath.        Laboratory studies at admission showed bilirubin 8.3, along with markedly elevated alkaline phosphatase of 594,  and ALT at 94.  She reports a history of substantial daily alcohol use with last alcohol use within 48 hours prior to admission.  She reports generalized achiness as well and feels this has been present for the last several days following her last treatment.  In addition, she describes some mouth sores and also difficulty with swallowing.      PAST MEDICAL HISTORY:  Includes:   1.  Stage IIB breast cancer with extensive treatment as summarized in her records.   2.  Alcohol dependence.   3.  Hypertension.   4.  History of Martha-Stafford tear with hematemesis.      PAST SURGICAL HISTORY   1.  Appendectomy.   2.  Previous EGD.   3.  Hysterectomy.   4.  Bilateral modified radical mastectomy.      HOME  MEDICATIONS:  Include metoclopramide, multivitamins, Zofran, oxycodone, potassium chloride and also prenatal vitamins along with systemic treatment with docetaxel, pertuzumab and trastuzumab.        ALLERGIES:  THERE ARE NO KNOWN MEDICATION ALLERGIES.      SOCIAL HISTORY:  She is a former smoker.  She reports daily alcohol use with records indicating both wine and also vodka on a daily basis.  She is  and is seen with her  and 2 of her sons.      FAMILY HISTORY:  Includes a history of lung cancer in her father and also prostate cancer in her father.  There is apparently no history of malignancy otherwise.      PHYSICAL EXAMINATION:   VITAL SIGNS:  From the time of the consultation show temperature of 100, blood pressure 104/61, respiratory rate 22 and oxygen saturation 88%, heart rate 120.   GENERAL:  Shows her to be a weak, chronically ill-appearing woman resting in bed.   HEENT:  Scalp hair is normal.  Lips are dry with some cracking and oral mucosa also appears dry.  Sclerae are jaundiced.  Ears and nose unremarkable.   LYMPHATIC NODE:  Exam shows no palpable adenopathy in the cervical or axillary regions.  There are several nodules on the central chest wall consistent with skin and soft tissue metastases.   CARDIOVASCULAR:  Exam shows a rapid heart rate, but regular rhythm without murmur.   ABDOMEN:  Soft.  The liver edge appears palpable several centimeters below the right costal margin.  Spleen tip not palpable.  Ascites is not evident on exam.   PELVIC AND RECTAL:  Exam not performed.   EXTREMITIES:  Exam shows a generalized muscle atrophy, but no areas of active arthritis.   NEUROLOGIC:  Exam shows her to be tremulous.  She is alert and oriented and able to answer questions appropriately.      LABORATORY STUDIES:  From admission shows sodium 127, repeat sodium earlier today is 132 with a potassium of 3.1, creatinine 0.34.  Liver chemistry studies at admission showed a total bilirubin of 8.3,  alkaline phosphatase 594, ALT 94 and  with an ammonia 36.  Liver chemistry studies from today show a total bilirubin of 8.4, alkaline phosphatase 464, ALT 76 and .  CBC from admission showed a white count of 300, hemoglobin 6.7 and platelet count of 63,000.  White cell differential shows 86% neutrophils and 10% lymphocytes.  INR from admission 1.15.  Urinalysis shows no hematuria or glycosuria.  In addition, there is no protein present.        Chest x-ray from admission showed patchy airspace opacities throughout the lungs, particularly in the lower left lung fields.  No significant pleural effusion or pneumothorax was noted.        CT of the abdomen and pelvis demonstrated blastic metastases at L5 and also in the sternum.  Patchy infiltrates were noted in both lung bases concerning for pneumonia.  The infiltrates were new compared with a study in 2017.  There was irregular enhancement in the liver consistent with multiple focal regenerating nodules, a geographic fatty infiltration and/or diffuse metastases to the liver.  No evidence of other sites of metastases were noted.      IMPRESSION:   1.  Stage IIB breast cancer with extensive prior treatment and with subsequent development of metastatic disease.  She has been recently started on palliative treatment with pertuzumab, trastuzumab and docetaxel with uncertain disease response.  She now has marked leukopenia which is likely related to extensive prior therapy.   2.  Hepatic failure.  Prior to the start of her treatment, her alkaline phosphatase was 573, , total bilirubin 2.5.  On this basis, liver toxicity from systemic therapy would not explain the entire picture and this may be related to alcohol-related hepatitis.  Elevated liver chemistry studies could also be related to nonspecific elevation due to developing an infection.  There is apparently no biliary obstruction noted on CT imaging.   3.  Alcohol dependence.  She is currently being  treated for alcohol withdrawal.  Alcohol withdrawal may explain some of the tremulousness that she exhibits   4.  Mucositis, likely treatment related.   5.  Hypokalemia.   6.  Anemia with no definite evidence of gastrointestinal bleeding.      RECOMMENDATIONS:   1.  Recommend continued supportive care with transfusion support.   2.  Serial monitoring of liver chemistry studies.   3.  Continue treatment for alcohol withdrawal.      I discussed these recommendations with Ms. Turner and with her family.  I appreciate the chance to meet her and to help in her care.         ESA NOYOLA MD             D: 2018   T: 2018   MT: VIVI      Name:     JEFF TURNER   MRN:      [id number]        Account:       [id number]   :      1960           Consult Date:  2018      Document: [id number]

## 2018-09-23 NOTE — PROGRESS NOTES
Md paged....  Found empty bottle of home Rx of oxycodone in patients bed when rolling her. Patient denied taking any pills and said bottle was empty in her purse. She denied having any other home medications in her purse except cough drops. Will monitor her sedation level and vitals. Any further orders? Thanks.     Md paged at 2006 FYI: Patients  is now here and told me that she had 6 pills in her oxycodone pill bottle. The patient is now requiring 2L of oxygen. Tachy in the 130s. But she is still alert and oriented. Any new orders?

## 2018-09-23 NOTE — CONSULTS
Consult Date:  09/22/2018      GASTROENTEROLOGY CONSULTATION       CHIEF COMPLAINT:  Abnormal liver function tests.      REASON FOR CONSULTATION:  We are asked to see this patient to participate in evaluation of her abnormal liver function tests.      HISTORY OF PRESENT ILLNESS:  This patient presented to the hospital with jaundice.  It was noted that her bilirubin is now over 8 and it was approximately 2.5 in June.  She has recently been started on a new chemotherapy regimen for her metastatic breast cancer.  The patient complains of sore throat.  The patient's nurse reported to me that she has been started on nystatin for oral thrush.      REVIEW OF SYSTEMS:  The patient had no other complaints other than the sore throat.  The patient's  volunteered that he was pretty sure the reason for her worsening liver function tests is her continued alcohol abuse in which she is consuming vodka on an almost daily basis.  The remainder of her review of systems is negative.      PAST MEDICAL HISTORY:  The patient has a past history of metastatic breast cancer first diagnosed in 2000.  She has had previous surgical therapy, also chemotherapy and radiation.  Most recent chemotherapy was on 09/17.  Additional past medical history includes chronic anemia, alcohol abuse, hypertension.      SOCIAL HISTORY:  The patient quit smoking in 1990.  Alcohol use:  Has multiple drinks per day, this was confirmed by her .      FAMILY HISTORY:  Reveals that the patient's mother had lung cancer as well as hypertension.  Father had a history of diabetes and prostate cancer.      ALLERGIES:  THE PATIENT HAS NO KNOWN DRUG ALLERGIES.      MEDICATIONS:  Prior to admission included Reglan, Toprol, Zofran, oxycodone, prenatal vitamins and dronabinol.      PHYSICAL EXAMINATION:   GENERAL:  The patient is ill-appearing and resting in her hospital bed.   SKIN:  Visibly jaundiced.   HEENT:  Exam is positive for scleral icterus.  Oral exam  revealed whitish plaques on her tongue and buccal membranes.   NECK:  Freely mobile.   CHEST:  Had decreased breath sounds bilaterally.   CARDIOVASCULAR:  Exam had a regular rate and rhythm.   ABDOMEN:  Had active bowel sounds, was soft.  There was no appreciable tenderness or mass.   NEUROLOGIC:  The patient was somewhat somnolent.       OBJECTIVE DATA:  Reviewed in the electronic medical record.  Of significance, the patient has a pancytopenia with a white count under 1000, hemoglobin 6.9, platelet count of 63.  Her BUN is 12, creatinine is 0.3, albumin 2.4, total protein 5.9, total bilirubin 8.3, alkaline phosphatase 594, ALT 94, .      IMPRESSION:  The patient is a 58-year-old female with metastatic breast cancer who is undergoing chemotherapy and has active alcohol abuse.  The patient's  confirmed an ongoing history of alcohol use.      The patient's abnormal liver function tests as well as some of her pancytopenia is likely the result of chronic alcohol use in addition to her chemotherapy.  It appears that she has oral thrush and antifungals have been ordered for the patient.      At this time, endoscopic studies are not indicated for evaluation of gastrointestinal tract bleeding.  She is also unlikely to benefit from the use of prednisolone for alcoholic hepatitis in the setting of her ongoing chemotherapy as well as continued alcohol abuse.      PLAN:  I agree with treating her with antifungals.  Overall, her prognosis remains poor.         ANTOINE KUO MD             D: 2018   T: 2018   MT: VIVI      Name:     JEFF RODRIGUEZ   MRN:      [id number]        Account:       [id number]   :      1960           Consult Date:  2018      Document: [id number]       cc: Angie Heredia MD

## 2018-09-23 NOTE — CONSULTS
Patient seen and full note dictated. Recommend continued supportive care and treatment of alcohol withdrawal. Pancytopenia is likely due to extensive prior treatment for metastatic cancer. Agree with empiric antibiotic given fever and imaging findings. Dr. Braun will see in follow up tomorrow.

## 2018-09-23 NOTE — PROGRESS NOTES
PT: Spoke with nsg, states today is not best day to see pt, Hgb is low, plan to re-schedule evaluation for tomorrow.

## 2018-09-23 NOTE — PROGRESS NOTES
Reevaluated the patient at the bedside due to increased oxygen needs up to 6 L to keep oxygen at 90%.  She appears mildly tachypneic and has some increase in bilateral crackles.  She is more alert now although speech is a little difficult to interpret.  I discussed at length with the patient, her spouse, and children regarding her current clinical condition along with her chronic medical problems.  I voiced my concern of her overall prognosis with the acute infection and liver decompensation along with her chronic issues of alcohol use and metastatic cancer.    Tachycardia not responding to fluid bolus.  At this time as I am concerned she is developing some pulmonary edema so I stopped the 500 mm fluid bolus before it finished.  She is going to receive 1 unit of RBCs and will give 20 mg of IV Lasix at the same time.  To receive additional 20 mg potassium given her hypokalemia this morning.  The tachycardia and moaning noted could be from alcohol or opiate withdrawal, however it sounds more like cramping and think is related to her respiratory distress.  Does have some persistent low-grade fevers.  She was very sedated after some Ativan earlier today.  I worry about decreasing her respiratory drive or causing somnolence if she may need BiPAP if respiratory status declines further.  Lactic acid just checked and not elevated despite liver failure and tachycardia.  She is not hypotensive.    --Transfuse 1 unit RBCs and give 20 mg IV Lasix  --Continue current antibiotics  --Stop IV fluids, but continue IV albumin q 8 hrs  --If worsening respiratory status overnight consider additional Lasix if not hypotensive and transferred to ICU for BiPAP initiation.  I discussed intubation with mechanical ventilation with her and her family.  They say she has had this before and would currently want this attempted if she needs it for respiratory failure.  --Continue n.p.o. status for now due to the dark stools and worsening  respiratory status.  Agree that EGD would be inappropriate at this time due to respiratory status and current anemia/thrombocytopenia.  We will continue the IV Protonix.  --For now hold off on further Ativan or opiates to prevent respiratory depression or somnolence

## 2018-09-23 NOTE — PROGRESS NOTES
[facility]  Hospitalist Progress Note  Stan Suggs MD 09/23/18    Reason for Stay (Diagnosis): neutropenic fever, acute liver failure         Assessment and Plan:      Summary of Stay: Eyal Turner is a 58 year old female with PMH including metastatic breast cancer with bone metastases to sternum and rib who started a new chemo regimen 5 days ago, alcohol abuse, malnutrition, pancytopenia, and HTN who presents with fatigue and shortness of breath.  Initially hypotensive that improved with IV fluids in the ED. Labs notable for acute liver failure with bilirubin of 8 and elevated transaminases.  Also found to be pancytopenic with neutropenia.  Hemoglobin was less than 7 she received 1 unit RBC transfusion in the ED. After admission she was found to have fever to 101 and chest x-ray obtained after admission showing bilateral infiltrate so started on cefepime for neutropenic fever and azithromycin added today.  A CT abdomen pelvis was obtained that did not show any ascites or obstructive liver disease.  Evidence for acute alcohol withdrawal initially with significant tremor that improved after a few doses of Ativan.  Overall withdrawal seems to be improving.  GI was consulted for possible upper GI bleed and for her liver failure.  No EGD planned.  Did start Protonix on admission, but no evidence for ongoing blood loss.  Oncology also consulted and recommended supportive cares.  PT, OT, dietitian consulted.  Overall patient is quite ill acutely and chronically.  Given second unit RBCs now for ongoing black stools and tachycardia.  Worsening hypoxemia.    Problem List/Assessment and Plan:   Acute liver failure:  presenting with jaundice.  Bilirubin is 8.3 up from 2.4 three months ago.  Alk phos elevated 594, AST elevated 264, ALT elevated at 94.  Does drink 2 bottles of wine daily so possible acute alcoholic hepatitis.  Also additionally started on chemotherapy with docetaxel, trastuzumab, and  pertuzumab so possibility of drug toxicity although per oncology with this regimen there is not typically significant liver toxicity.  Does not have a lot of abdominal pain although was a little tender in the upper quadrant.  Lipase is not elevated.  No obstructive process seen on CT.  Slight small nodularity and cannot rule out metastases but this seems less likely based on recent CT scan.  No history of cirrhosis on imaging as of a year ago.  Albumin is low.  Given her alcohol abuse suspect alcohol hepatitis playing a role here.  -GI consulted, at this point holding off on any steroids for possible alcoholic hepatitis due to neutropenic fever     Alcohol dependence with acute withdrawal: Has been drinking 2 bottles of wine daily for some time.  Last use 1-2 days ago.  Presenting in alcohol withdrawal with tremor.  -CIWA with Ativan as needed, only received a few doses of Ativan and is looking better.  -Oral and IV vitamins     Neutropenic fever, suspect CAP, acute hypoxemic respiratory failure: Weakness and shortness of breath as primary symptoms.  Developed intermittent fevers above 101  while here.  Recently completed a course of Keflex for possible chest wall sinusitis of the side of her metastasis, does not look acutely infected now.  Denies any diarrhea so C. difficile unlikely.  Urinary symptoms.    Chest x-ray was obtained that shows bilateral infiltrates so we will treat as CAP.  Now hypoxemic requiring 5 L oxygen.  -Continue cefepime for neutropenic fever and add azithromycin for possible CAP based on infiltrates on x-ray  -Blood cultures obtained  -12.5 g albumin IV every 8 hours ×3.  Bolus 500 ml LR now  -With her acute liver failure and thrombocytopenia/anemia with likely GI bleed unfortunately cannot give acetaminophen or NSAIDs for fever.  Ice packs as needed  -obtain repeat chest xray now     Acute on chronic pancytopenia with neutropenia: On admission WBC 0.5 with ANC 0.4, hemoglobin 6.9, and  platelets 63.  Hemoglobin was 11.4 three months ago and platelets were 108.  Suspect acute drop is from her chemotherapy she started this last Monday.  Also possible upper GI bleed as below.  -Received 1 unit RBCs 9/22  -Just had black stools now and hemoglobin 7.0 so give second unit RBCs now  -CBC with differential tomorrow     Metastatic breast cancer: Initially diagnosed in 2000 periods S/B bilateral mastectomy.  Has undergone multiple rounds of chemotherapy and radiation.  Has known metastatic disease to her sternum and third left rib.  Had recent possible skin infection from the sternum met/mass and received Keflex.  Does not look actively infected at this time.  Has port in place.  Restarted on chemotherapy with docetaxel, trastuzumab, and pertuzumab on 9/17/18 per patient.  Follows with Dr. Braun of oncology.  Doing quite poorly following chemotherapy with poor oral intake, weakness, and now acute liver failure.  -[facility] oncology consulted, appreciate recommendations  -Continue PTA 5 mg oxycodone every 6 hours as needed for chronic sternum and rib metastasis pain     Acute hyponatremia: Sodium 127 on admit.  Suspect hypovolemia due to decreased oral intake.    Improved with IV fluids up to 132.     Mucositis versus thrush and possible Candida esophagitis: Patient erosive changes and plaque on the tongue and her soft palate.  Question whether this is mucositis from her docetaxel or Candida infection.  Sore throat for the past week so possible esophagitis as well.  -Swish and swallow nystatin 4 times daily for thrush  -Management would typically be systemic Diflucan in case of esophagitis however some risk in doing this due to her acutely elevated bilirubin and transaminases with azole therapy.  Received Diflucan 2 mg in the ED.  Given liver toxicity will hold on further treatment as this may just be mucositis  -GI not planning EGD to investigate for esophagitis    Possible UGIB: Reports of vomiting with red blood  in it.  Denies any melena or hematochezia.  Hemoglobin is just below 7 and platelets are also low which may be from chemo or some bleeding.  No bleeding seen here and hemoglobin appears relatively stable after transfusion.  -Twice daily IV Protonix  -GI not planning an EGD as of this morning as black stools afternoon 9/23     Hypokalemia and hypomagnesemia: Potassium and magnesium replacement protocol.     Suspect severe malnutrition: Evidence for fat loss on exam likely related to chronic alcohol use, metastatic breast cancer, and recent chemotherapy.  Suspect severe malnutrition.  -Dietitian consult  -Continue PTA 5 mg twice daily dronabinol as appetite stimulant    Physical deconditioning: Malaise weakness was not able to get out of bed.  Likely combination of malnutrition, metastatic cancer, recent chemotherapy, and ongoing alcohol abuse/dependence.  -PT/OT consult     DVT Prophylaxis: Pneumatic Compression Devices, no heparin due to thrombocytopenia and anemia  Code Status: Full Code  FEN: NPO given ongoing black stools and worsening respiratory statust, IV albumin every 8 hours ×3.   ml bolus this afternoon  Discharge Dispo: Looks quite debilitated may need TCU.  PT, OT consult  Estimated Disch Date / # of Days until Disch: minimal 3 days        Interval History (Subjective):      Tremor improved and only received a few doses of Ativan.  More tachycardic today and febrile above 101 .  Overall feels quite poorly.  Shortness of breath persists but remains on room air.  No chest pain.  No abdominal pain.  Mouth feels sore.  Black stools this afternoon so received second unit RBCs.  Worsening respiratory status.                  Physical Exam:      Last Vital Signs:  /71 (BP Location: Right arm)  Pulse 125  Temp 99.8  F (37.7  C) (Oral)  Resp (!) 32  SpO2 92%      Intake/Output Summary (Last 24 hours) at 09/23/18 1326  Last data filed at 09/23/18 0831   Gross per 24 hour   Intake             1123  ml   Output              450 ml   Net              673 ml     Constitutional: Awake, NAD, chronically ill-appearing  Eyes: Scleral icterus  HEENT: Dry mucous membranes.  tongue ulceration/plaque  Respiratory:  few crackles bilaterally without wheeze  Cardiovascular: regular tachycardia.  No murmur   GI:  non-tender to palpation, BS present  Skin: Jaundiced, multiple ecchymoses on extremities and some scabbed lesions on the legs  Musculoskeletal/extremities: Trace bilateral lower extremity edema  Neurologic: A&O, speech clear, slight tremor  Psychiatric: calm, flat affect         Medications:      All current medications were reviewed with changes reflected in problem list.         Data:      All new lab and imaging data was reviewed.   Labs:    Recent Labs  Lab 09/22/18  1033 09/22/18  1021   CULT No growth after 17 hours No growth after 17 hours       Recent Labs  Lab 09/23/18  0620 09/22/18  1950 09/22/18  1500 09/22/18  1022   WBC 0.3*  --  0.3* 0.5*   HGB 7.0* 7.2* 6.7* 6.9*   HCT 21.1*  --  20.6* 21.7*   MCV 96  --  100 101*   PLT 71*  --  63* 63*       Recent Labs  Lab 09/23/18  0620 09/22/18  2130 09/22/18  1022   *  --  127*   POTASSIUM 3.1*  --  3.8   CHLORIDE 100  --  94   CO2 23  --  24   ANIONGAP 9  --  9   GLC 81  --  103*   BUN 12  --  12   CR 0.34*  --  0.33*   GFRESTIMATED >90  --  >90   GFRESTBLACK >90  --  >90   REAGAN 7.3*  --  7.7*   MAG 2.0 2.6* 1.4*   PROTTOTAL 5.3*  --  5.9*   ALBUMIN 2.0*  --  2.4*   BILITOTAL 8.4*  --  8.3*   ALKPHOS 464*  --  594*   *  --  264*   ALT 76*  --  94*      Imaging:   Recent Results (from the past 24 hour(s))   CT Abdomen Pelvis w Contrast    Narrative    CT ABDOMEN AND PELVIS WITH CONTRAST  9/22/2018 6:10 PM    HISTORY: Acute liver failure based on labs.  Some upper abdominal  pain.     TECHNIQUE: Scans obtained from the diaphragm through the pelvis with  IV contrast, 64 mL Isovue-370.   Radiation dose for this scan was reduced using automated  exposure  control, adjustment of the mA and/or kV according to patient size, or  iterative reconstruction technique.    COMPARISON:  CT abdomen and pelvis dated 5/8/2017.    FINDINGS: Patchy infiltrates are seen throughout the bilateral  visualized portions of the lung bases. This could represent infectious  or inflammatory infiltrates. Alveolar proteinosis is also in the  differential. No significant pleural effusion is identified. There are  coronary artery calcifications. Aortic calcifications are also noted.  There is a probable small hiatal hernia. Visualized mediastinal  contents are otherwise unremarkable. There are bilateral breast  prostheses.    Degenerative changes are seen in the spine. This is worst at L4-5.  Extensive sclerosis of the sternum is noted but there is also a  blastic lesion in the L5. These could represent metastases. Recommend  clinical correlation for possible history of breast cancer in the  presence of possible blastic metastases.    The liver enhances heterogeneously. This could represent innumerable  areas of nodular regeneration. Metastasis are difficult to exclude.    There is minimal thickening left adrenal gland, similar to the prior  study dated 5/8/2017 likely representing adenomatous change.  Calcifications are seen in the spleen indicating chronic granulomatous  disease. Otherwise, the gallbladder, pancreas, spleen, bilateral  adrenal glands and bilateral kidneys otherwise enhance normally.  Urinary bladder is distended, but otherwise unremarkable. No  hydronephrosis, nephrolithiasis, hydroureter or ureteral calculus is  seen.    There is a small hiatal hernia. No adenopathy, free fluid or free air  is seen in the peritoneal cavity. There is nonaneurysmal  atherosclerosis. The colon is grossly of normal caliber. There are a  few scattered diverticuli in the colon, most predominantly seen in the  sigmoid colon. No pericolonic inflammatory change to suggest  acute  diverticulitis. Appendix is not well seen. No evidence for pericecal  inflammatory change to suggest acute appendicitis.    There is focal thickening of the gastric antral wall (image 38 series  2 and image 33 series 3). This is of uncertain etiology and may be  better seen with direct visualization. (Upper endoscopy). Stomach and  small bowel are otherwise unremarkable.      Impression    IMPRESSION:  1. Blastic metastasis in L5 and in the sternum are again noted. These  were also seen on the prior study dated 5/8/2017. Patient has reported  history of metastatic breast cancer.  2. Patchy infiltrates bilateral lung bases concerning for pneumonia.  These are new since the prior study from 2017. Neoplastic infiltration  is considered less likely.  3. Irregularity enhancement pattern of liver could represent multiple  focal regenerating nodules that are too small to discretely measure,  geographic fatty infiltration and sparing or diffuse metastasis in the  liver. This finding is new since the prior study.  4. No other evidence for metastasis.    KEMAL BISWAS MD   XR Chest 2 Views    Narrative    CHEST TWO VIEW   9/22/2018 7:46 PM     HISTORY: Shortness of breath, neutropenia.     COMPARISON: Chest CT 11/30/2015.      Impression    IMPRESSION: Right IJ Port-A-Cath remains in place. Patchy airspace  opacities throughout the lungs particularly in the lower lung fields.  This may represent pneumonia. There may also be a background of  pulmonary nodules present, this would be better assessed with CT. No  significant pleural effusion or pneumothorax. Cardiac silhouette  borderline enlarged.    MD Stan CHAMPION MD

## 2018-09-24 NOTE — CONSULTS
CLINICAL NUTRITION SERVICES  -  ASSESSMENT NOTE      Recommendations Ordered by Registered Dietitian (RD):   Isosource 1.5 at 10 mL/hr  Prosource BID  Certavite  Fluid flush 30 mL q4 hours   Future/Additional Recommendations:    Pending tolerance, kcal from propofol   Malnutrition:   % Weight Loss:None noted  % Intake:</= 75% for >/= 1 month (severe malnutrition)  Subcutaneous Fat Loss:Orbital region mild to moderate depletion, Upper arm region moderate depletion and Thoracic region moderate depletion  Muscle Loss:Temporal region moderate to severe depletion, Clavicle bone region moderate depletion, Acromion bone region moderate depletion, Dorsal hand region moderate depletion, Patellar region moderate depletion, Anterior thigh region moderate or greater depletion and Posterior calf region moderate or greater depletion    Malnutrition Diagnosis: Severe malnutrition  In Context of:  Chronic illness or disease  Environmental or social circumstances     REASON FOR ASSESSMENT  Eyal Turner is a 58 year old female seen by the dietitian for Provider Order - poor nutrition, etoh use, recent chemo and Provider Order - Registered Dietitian to Assess and Order TF per Medical Nutrition protocol     H&P: Eyal Turner is a 58 year old female with PMH including metastatic breast cancer with bone metastases to sternum and rib who started a new chemo regimen 5 days ago, alcohol abuse, malnutrition, pancytopenia, and HTN who presents with fatigue and shortness of breath.  Per the patient she has been feeling quite unwell progressively since starting chemotherapy 5 days ago.  She has had decreased oral intake, mouth pain, and sore throat.  She has had some nausea and vomiting.  Does report some blood in her emesis.  Has not had any melena or hematochezia.  Has had very mild upper abdominal pain.  She noted that her eyes and skin are turning yellow.  She is felt progressively weak and could not get out of bed today prompting  "her evaluation here.  She has had tremor all day today.  She does report using alcohol almost every day approximately 2 bottles of wine.  Last alcohol use maybe 1-2 days ago but she is not sure.  She did not have any fevers or chills at home.  Has noted that her abdomen is becoming more distended.  Has bruising on her extremities and she says she frequently has bleeding from different spots on her skin.  She has felt progressively short of breath with walking as well.    NUTRITION HISTORY  - Information obtained from patient's family at bedside  - Food allergies/intolerances: NKFA   - Patient is on a regular diet at home although intake is minimal at best - takes only a few bites per meal. Heavy ETOH use PTA. 2 bottles of wine per day and/or hard liquor.    CURRENT NUTRITION ORDERS  - Diet: NPO, intubated 9/24 (Clear liquid 9/23)  - Factors affecting nutrition intake include baseline decreased appetite, early satiety; oral thrush and possible esophagitis    PHYSICAL FINDINGS  Observed  See malnutrition section below - fat and muscle wasting  Obtained from Chart/Interdisciplinary Team  Ori nutrition score: 2; total score: 18   BM: 9/23  Skin: coccyx red, non blanchable;  Jaundice, thrush      ANTHROPOMETRICS  Height: 5' 6\"  Weight: 59 kg   Body mass index is 21.24 kg/(m^2).  Weight Status:  Normal BMI  Ideal body weight: 59.1 kg +/- 10%, 101% of IBW   Weight History:  Weight appears relatively stable since last year, as noted below  Wt Readings from Last 10 Encounters:   09/23/18 59.7 kg (131 lb 9.8 oz)   10/23/17 57.6 kg (127 lb)   08/09/16 57.6 kg (127 lb)   08/01/16 57.6 kg (127 lb)   07/25/16 58.2 kg (128 lb 6 oz)   07/16/16 58.5 kg (129 lb)   02/29/16 61.7 kg (136 lb)   12/16/15 60.3 kg (133 lb)   10/12/15 62.4 kg (137 lb 9.1 oz)   09/25/15 58.5 kg (129 lb)       ASSESSED NUTRITION NEEDS (PER APPROVED PRACTICE GUIDELINES, Dosing weight: 60 kg):  Estimated Energy Needs: 2198-3926+ kcals (25-30 " Kcal/Kg)  Justification: maintenance and vented  Estimated Protein Needs:  grams protein (1.5-2 g pro/Kg)  Justification: hypercatabolism with critical illness  Estimated Fluid Needs: >1 mL/Kcal  Justification: maintenance    LABS  Labs reviewed    Recent Labs   Lab Test  09/24/18   0920  09/24/18   0520  09/24/18   0315  09/23/18 2050 09/23/18   0620   POTASSIUM  3.9  3.2*  3.2*  3.2*  3.1*     Recent Labs   Lab Test  09/24/18   0520  07/18/16   0600  07/17/16   0520  05/28/15   1200   PHOS  2.1*  1.8*  2.4*  4.8*     Recent Labs   Lab Test  09/24/18   0920  09/23/18   0620  09/22/18   2130  09/22/18   1022  06/15/18   1825   MAG  1.8  2.0  2.6*  1.4*  1.6     Recent Labs   Lab Test  09/24/18   0520  09/23/18   0620  09/22/18   1022  06/15/18   1828  06/15/18   1825   NA  135  132*  127*  Canceled, Test credited  134     Recent Labs   Lab Test  09/24/18   0520 09/23/18   0620  09/22/18   1022  06/15/18   1825  07/25/16   1044   CR  0.34*  0.34*  0.33*  0.46*  0.56       Recent Labs  Lab 09/24/18  0520 09/23/18  0620 09/22/18  1022   GLC 83 81 103*     Lab Results   Component Value Date    A1C 5.1 10/14/2015    A1C 5.2 10/13/2015       MEDICATIONS  Medications reviewed  Marinol 5 mg BID; Folic acid 1 mg, thiamine 100 mg, MVI+M  Precedex, fentanyl (chronic pain management at baseline)  Levophed (low dose)    PROCEDURES WITH NUTRITIONAL IMPLICATIONS  9/24: intubated. 10 fr NG placed (+ bridle)  Acute on chronic anemia, --> EGD deferred    MALNUTRITION:  % Weight Loss:None noted  % Intake:</= 75% for >/= 1 month (severe malnutrition)  Subcutaneous Fat Loss:Orbital region mild to moderate depletion, Upper arm region moderate depletion and Thoracic region moderate depletion  Muscle Loss:Temporal region moderate to severe depletion, Clavicle bone region moderate depletion, Acromion bone region moderate depletion, Dorsal hand region moderate depletion, Patellar region moderate depletion, Anterior thigh region  moderate or greater depletion and Posterior calf region moderate or greater depletion  Fluid Retention:None noted    Malnutrition Diagnosis: Severe malnutrition  In Context of:  Chronic illness or disease  Environmental or social circumstances    NUTRITION DIAGNOSIS:  Malnutrition related to baseline poor appetite, increased needs 2/2 metastatic disease and ETOH abuse as evidenced by fat and muscle wasting, intake likely meeting <75% of needs for >1 month and severe criteria met    INTERVENTIONS  Recommendations / Nutrition Prescription  Recommend TF as follows:     Type of Feeding Tube: NG (10 fr, bridle, 9/24)    Enteral Frequency:  Continuous    Enteral Regimen: Isosource at 45 mL/hr    Total Enteral Provisions: 1080 mL provides 1620 kcal, 73 gm protein, 190 g CHO, 15 g fiber and 821 mL H20.    Meets >100% of DRI's --> continue micronutrients per CIWA protocol.    Prosource BID for an addition 22 grams protein    Free Water Flush: 30 mL q4 hours    Risk for refeeding syndrome, P04 add on and daily electrolyte check    Start at 10 mL/hr and hold. Rate advancement pending tolerance, electrolyte trends.      Implementation  Nutrition education: Reviewed POC with family at bedside  EN Composition, EN Schedule and Feeding Tube Flush: Entered orders to reflect regimen outlined above  Biochemical data: P04 check  Multivitamin/Minerals: as above  Collaboration and Referral of care: Discussed patient during interdisciplinary care rounds this morning and with MD/RN, see procedure note (ok to use with gastric access)    Goals  TF to meet % of estimated nutrition needs in next 48-72 hours      MONITORING AND EVALUATION:  Progress towards goals will be monitored and evaluated per protocol and Practice Guidelines      Tiffanie Cronin RDN, LD, CNSC  Pager - 3rd floor/ICU: [phone]  Pager - All other floors: [phone]  Pager - Weekend/holiday: [phone]  Office: [phone]

## 2018-09-24 NOTE — PROGRESS NOTES
Respiratory Therapy    Patient intubated with a 7.0 ETT 24 @ the lip confirmed  With bilateral breath sounds, and positive EtCo2.  ETT was initially 22@ lip and was advanced 2cm per verbal MD order (Dr. Matute).Placed on mechanical ventilator settings as follows:  Ventilation Mode: SIMV/PS  (Synchronized Intermittent Mandatory Ventilation with Pressure Support)  FiO2 (%): 40 %  Rate Set (breaths/minute): 12 breaths/min  Tidal Volume Set (mL): 450 mL  PEEP (cm H2O): 6 cmH2O  Pressure Support (cm H2O): 6 cmH2O  Oxygen Concentration (%): 100 %  Resp: 44    Patient has thick oral secretions. Will continue to assess and monitor.    Beth Parry RRT  9/24/2018

## 2018-09-24 NOTE — PLAN OF CARE
Problem: Patient Care Overview  Goal: Plan of Care/Patient Progress Review  ICU End of Shift Summary.  For vital signs and complete assessments, please see documentation flowsheets.     Pertinent assessments: Pt lethargic with Precedex at 0.6, Intubated to day 2/2 increased work of breathing, RR 40-50, -130, currently RR 24-26 and HR . BP dropped post intubation, Levo started to keep MAP >65 currently running at 0.03 and tolerating well. Fentanyl gtt started for pain control. Family at bedside, updated by Dr. Matute, questions answered, support given, will continue to monitor.   Major Shift Events: Intubation at 0915  Plan (Upcoming Events):  IV ABX, monitor for s/s of bleeding, wean off Levo, pain management, Extubate when able  Discharge/Transfer Needs: TBD    Bedside Shift Report Completed : yes  Bedside Safety Check Completed: yes

## 2018-09-24 NOTE — PROGRESS NOTES
ABG drawn from right brachial artery after failed attempts from both right and left radial. BiPAP 10/5,  50% FiO2.    Estefanía Jordan  September 23, 2018.11:36 PM

## 2018-09-24 NOTE — PROGRESS NOTES
Oncology/Hematology Follow Up Note:    Assessment and Plan:  Eyal has a long history of metastatic HER2 positive breast cancer s/p multiple lines of treatment.  With recent progression of sternal metastases despite palliative radiation therapy, she was switched from Kadcyla to docetaxel, Herceptin, and Perjeta (has not had Perjeta before).  She received chemotherapy last Monday.  Over the weekend, she became progressively more tired and weaker.  She also developed worsening shortness of breath.  Evaluation in the ER demonstrated bilateral lower lung infiltrates concerning for volume overload.  She was given Lasix for diuresis and started on antibiotics for probable pneumonia.  She was also found to have significantly elevated liver enzymes and bilirubin likely due to hepatotoxicity from alcohol abuse and chemotherapy.    #1 Hepatotoxicity from alcohol abuse in the setting of chemotherapy    - Baseline LFTs before starting chemotherapy (bilirubin normal- 1.4, normal ALT- 51, elevated AST likely due to chronic alcohol abuse, and chronically elevated Alk Phos due to long standing bone mets)  - ALT, AST, and alk phos down since admission.  Bilirubin continues to trend up to 9.8 today (usually lags behind other liver function tests)  - hepatotoxicity from docetaxel is usually self limited.    PLAN:  - Continue to provide support care  - Monitor LFTs daily  - If the patient recovers from this, would use paclitaxel instead of docetaxel with the next cycle    #2  Respiratory failure    - Likely due to volume overload and possible pneumonia  - Last ECHO 2/7/2018:  Normal EF and strain pattern    PLAN:  - Currently intubated and sedated.  - Continue diuresis  - Continue to treat with antibiotics for presumed pneumonia  - Would benefit from repeat ECHO in the setting of chronic HER2 directed therapy (was supposed to get one prior to switching treatment last week, but the patient did not schedule it)    #3 Metastatic breast  cancer, ER weakly positive (5%), AK negative, HER2 positive  - Currently has sternal metastases and possible metastasis in left 5th rib  - Also has previously bone mets alone the spine which were no longer FDG avid on PET/CT from 7/23    PLAN:  - If the patient recovers from the current hospital stay, would consider switching from docetaxel to paclitaxel given lower risk of hepatotoxicity, and continue with Herceptin/Perjeta vs. Herceptin/Perjeta alone.    #4 Pancytopenia, chemotherapy-induced  - We usually do not see this degree with pancytopenia from docetaxel.  I suspect Eyal has experienced worse myelotoxicity due to alcohol abuse.  - Did not receive any Neulasta last week    PLAN:  - In the setting of neutropenic fever (no longer febrile now with anbitiobics), would administer Neupogen daily until ANC >1.5  - Transfuse as needed to keep Hgb >7.0.  In the setting of respiratory failure, would to reasonable to target a higher Hgb goal.    Discussed with the ICU team, bedside RN, and the patient's .    I will be out of office for the rest of the week, and will be back next Monday.  One of my partners from [facility] will follow along on a daily basis to provide recommendations.    Code status:  FULL CODE    Richard Braun M.D.  [facility]  [phone]            Subjective:    I visited Eyal in the ICU this morning, and I also had a long discussion with her .    She was on BiPAP when I visited this morning, but her respiratory rate was in the low 40s.  It appears she was intubated shortly after I visited.    She was diuresed overnight, and is currently -2 L since admission.  Afebrile since yesterday afternoon after starting antibiotics.    Scheduled Medications:  Reviewed active medications    Labs:  CBC RESULTS:   Recent Labs   Lab Test  09/24/18   0520  09/23/18   1300  09/23/18   0620   09/22/18   1500   WBC  0.4*   --   0.3*   --   0.3*   HGB  7.6*  7.0*  7.0*   < >  6.7*   HCT  23.4*   --   " 21.1*   --   20.6*   MCV  94   --   96   --   100   PLT  86*   --   71*   --   63*    < > = values in this interval not displayed.       CMP  Recent Labs  Lab 09/24/18  0520 09/24/18  0315 09/23/18 2050 09/23/18  0620 09/22/18  2130 09/22/18  1022     --   --  132*  --  127*   POTASSIUM 3.2* 3.2* 3.2* 3.1*  --  3.8   CHLORIDE 101  --   --  100  --  94   CO2 26  --   --  23  --  24   ANIONGAP 8  --   --  9  --  9   GLC 83  --   --  81  --  103*   BUN 15  --   --  12  --  12   CR 0.34*  --   --  0.34*  --  0.33*   GFRESTIMATED >90  --   --  >90  --  >90   GFRESTBLACK >90  --   --  >90  --  >90   REAGAN 7.4*  --   --  7.3*  --  7.7*   MAG  --   --   --  2.0 2.6* 1.4*   PROTTOTAL 5.3*  --   --  5.3*  --  5.9*   ALBUMIN 2.4*  --   --  2.0*  --  2.4*   BILITOTAL 9.8*  --   --  8.4*  --  8.3*   ALKPHOS 401*  --   --  464*  --  594*   *  --   --  182*  --  264*   ALT 70*  --   --  76*  --  94*       INR  Recent Labs  Lab 09/22/18  1500   INR 1.15*       Objective/Physical Exam:  Blood pressure 104/71, pulse 123, temperature 99.6  F (37.6  C), temperature source Axillary, resp. rate (!) 44, height 1.676 m (5' 6\"), weight 59.7 kg (131 lb 9.8 oz), SpO2 98 %, not currently breastfeeding.  General:  Difficult to arouse.  Was on BiPAP when I visited.  Lungs: Wheezing and rhonchi heard bilaterally.  Tachypneic  Abdomen: soft, nondistended.  Normal bowel sounds.  Ext: No edema    Richard Braun MD  [facility]  9/24/2018 10:10 AM        "

## 2018-09-24 NOTE — PROCEDURES
SMALL BOWEL FEEDING TUBE PLACEMENT ASSESSMENT    Reason for Feeding Tube Placement: Enteral nutrition  Chart reviewed for contraindications or high risk placements: Yes - small hiatal hernia, bedside placement ok with Dr. Matute after reviewing medical history     Medicine Delivered During Procedure: none  Procedure Complications:  Coiling in hiatal hernia when attempting post pyloric advancement  Placement Successful:  Gastric access likely, x-ray confirmation pending  Bridle secured: Yes  Final Placement Johnny at exit of R nare, 80 cm    Cortrak Start Time:  11:40 am  Cortrak End Time:  12:00 pm  Face to Face Time With Patient:  25 minutes  **Discussed with daren CASTANON to start feedings with gastric access, awaiting x-ray confirmation      Tiffanie Cronin, RYANN, LD, CNSC  Pager - 3rd floor/ICU: [phone]  Pager - All other floors: [phone]  Pager - Weekend/holiday: [phone]  Office: [phone]

## 2018-09-24 NOTE — PROGRESS NOTES
RT Note      Patient transferred to ICU via BIPAP 50% FIO2. Patient was very agitated and wanted to pee. Re-directed patient. Patient is in ICU.    Will continue to follow and monitor.      Lori Santos, RRT

## 2018-09-24 NOTE — PHARMACY
Pharmacy Tube Feeding Consult    Medication reviewed for administration by feeding tube and for potential food/drug interactions.    Recommendation: No changes are needed at this time. MARINOL CAPS CAN NOT BE GIVEN VIA FEEDING TUBE (soln is not available in pharmacy stock).     Pharmacy will continue to follow as new medications are ordered.

## 2018-09-24 NOTE — PROGRESS NOTES
"GASTROENTEROLOGY PROGRESS NOTE     SUBJECTIVE:  Transferred to ICU early this am due to acute respiratory failure, now intubated. No reports of melena or hematemesis. Received a unit of blood yesterday. HGB up slightly. Hypotensive. Getting ECHO on my exam. Spoke with family at bedside.      OBJECTIVE:  BP (!) 69/46  Pulse 123  Temp 99.6  F (37.6  C) (Axillary)  Resp (!) 44  Ht 1.676 m (5' 6\")  Wt 59.7 kg (131 lb 9.8 oz)  SpO2 100%  BMI 21.24 kg/m2  Temp (24hrs), Av.7  F (37.6  C), Min:98.8  F (37.1  C), Max:100.8  F (38.2  C)    Patient Vitals for the past 72 hrs:   Weight   18 2315 59.7 kg (131 lb 9.8 oz)       Intake/Output Summary (Last 24 hours) at 18 1109  Last data filed at 18 0800   Gross per 24 hour   Intake              250 ml   Output             2990 ml   Net            -2740 ml        PHYSICAL EXAM  Gen: intubated, jaundiced.         Additional Comments:  ROS, FH, SH: See initial GI consult for details.     I have reviewed the patient's new clinical lab results:     Recent Labs   Lab Test  18   0520  18   1300  18   0620   18   1500   06/15/18   1825   10/12/15   0438   WBC  0.4*   --   0.3*   --   0.3*   < >  3.9*   < >  5.6   HGB  7.6*  7.0*  7.0*   < >  6.7*   < >  11.4*   < >  7.5*   MCV  94   --   96   --   100   < >  99   < >  85   PLT  86*   --   71*   --   63*   < >  108*   < >  150   INR   --    --    --    --   1.15*   --   0.92   --   1.07    < > = values in this interval not displayed.     Recent Labs   Lab Test  18   0920  18   0520  18   0315   18   0620  18   1022   POTASSIUM  3.9  3.2*  3.2*   < >  3.1*  3.8   CHLORIDE   --   101   --    --   100  94   CO2   --   26   --    --   23  24   BUN   --   15   --    --   12  12   ANIONGAP   --   8   --    --   9  9    < > = values in this interval not displayed.     Recent Labs   Lab Test  18   0520  18   0620  18   1751  18   1022  " 06/15/18   1908   07/25/16   1044   07/17/16   0520  07/16/16   1745   ALBUMIN  2.4*  2.0*   --   2.4*   --    < >  4.0   < >   --    --    BILITOTAL  9.8*  8.4*   --   8.3*   --    < >  0.7   < >   --    --    ALT  70*  76*   --   94*   --    < >  50   < >   --    --    AST  145*  182*   --   264*   --    < >  78*   < >   --    --    PROTEIN   --    --   Negative   --   Negative   --    --    --    --   10*   LIPASE   --    --    --   144   --    --   1175*   --   1552*   --    AMYLASE   --    --    --    --    --    --   86   --    --    --     < > = values in this interval not displayed.     Assessment:  58 year old PMH including metastatic breast cancer with bone metastases to sternum and rib who started a new chemo regimen 5 days ago, alcohol abuse, malnutrition, pancytopenia, and HTN admitted 9/22 with fatigue and shortness of breath. Labs showed elevated bilirubin and transaminases although CT showed no significant liver findings. Noted to have significant alcohol intake prior to admission and treated for alcohol withdrawal this admission. Labs also notable for pancytopenia with development of neutropenic fever. Some concern for GI bleed with acute on chronic anemia but no overt GI bleeding. EGD deferred given neutropenia. HGB responding somewhat to transfusion. Most recently developed acute respiratory failure requiring mechanical ventilation support with hypotension. Oncology following.     1. Elevated LFTs. Multifactorial but likely some degree of alcohol related hepatitis given significant alcohol use prior to admission and drug induced injury with chemotherapy. Bilirubin up to 9.8 but transaminases trending down. Bilirubin typically lags behind. Has been hypotensive, therefore may have bump in LFTs related to shock liver. Platelets up 71-->86. INR 9/22 1.15. Creatinine normal.   --Monitor LFTs.   --CIWA  --Alcohol abstinence.     2. Acute on chronic anemia. GI bleed possible although no clear overt GI  bleeding. Deferred EGD given pancytopenia/neutropenic fever. HGB improved slightly after transfusion yesterday.   --Monitor HGB and transfuse prn.   --Monitor for overt GI bleeding.   --IV PPI BID.     3. Thrush. On Diflucan and previously Nystatin.     4. Not clear on goals of care given medical decompensation. At this point liver appears stable. ?Palliative care involvement.     D/w Dr. Morton.     Sommer Sinha PA-C  [facility]

## 2018-09-24 NOTE — PHARMACY-VANCOMYCIN DOSING SERVICE
Pharmacy Vancomycin Initial Note  Date of Service 2018  Patient's  1960  58 year old, female    Indication: Bacteremia    Current estimated CrCl = Estimated Creatinine Clearance: 170 mL/min (based on Cr of 0.34).    Creatinine for last 3 days  2018: 10:22 AM Creatinine 0.33 mg/dL  2018:  6:20 AM Creatinine 0.34 mg/dL  2018:  5:20 AM Creatinine 0.34 mg/dL    Recent Vancomycin Level(s) for last 3 days  No results found for requested labs within last 72 hours.      Vancomycin IV Administrations (past 72 hours)      No vancomycin orders with administrations in past 72 hours.                Nephrotoxins and other renal medications (Future)    Start     Dose/Rate Route Frequency Ordered Stop    18 1215  piperacillin-tazobactam (ZOSYN) infusion 3.375 g      3.375 g  100 mL/hr over 30 Minutes Intravenous EVERY 6 HOURS 18 1205      18 1115  norepinephrine (LEVOPHED) 16 mg in D5W 250 mL infusion      0.03-0.4 mcg/kg/min × 59.7 kg  1.7-22.4 mL/hr  Intravenous CONTINUOUS 18 1101            Contrast Orders - past 72 hours (72h ago through future)    Start     Dose/Rate Route Frequency Ordered Stop    18 1130  perflutren diluted 1mL to 2mL with saline (OPTISON) diluted injection 3 mL      3 mL Intravenous ONCE 18 1120 18 1130    18 1800  iopamidol (ISOVUE-370) solution 500 mL      500 mL Intravenous ONCE 18 1754 18 1756                Plan:  1.  Start vancomycin  1250 mg IV q12h.   2.  Goal Trough Level: 15-20 mg/L   3.  Pharmacy will check trough levels as appropriate in 1-3 days.    4. Serum creatinine levels will be ordered daily for the first week of therapy and at least twice weekly for subsequent weeks.    5. [place] method utilized to dose vancomycin therapy: Method 2    Shira Pittman      .

## 2018-09-24 NOTE — PROGRESS NOTES
Called to 1;1 patient while placed on bi-pap, patient to be transferred to ICU at 11pm.  VSS, on 50% O2, moans on and off, family present.  Is able to reach for Bi Pap mask at times, RR, 40's, sats 99%.

## 2018-09-24 NOTE — PLAN OF CARE
Problem: Patient Care Overview  Goal: Plan of Care/Patient Progress Review  PT/OT: Spoke with bedside RN who requests to hold therapy today. Will reschedule.

## 2018-09-24 NOTE — PROGRESS NOTES
"RT end of shift note:      Patient remains on BIPAP support.  Settings: 10/5 backup rate of 12, 50% FIO2.    /87  Pulse 123  Temp 99.1  F (37.3  C) (Axillary)  Resp (!) 40  Ht 1.676 m (5' 6\")  Wt 59.7 kg (131 lb 9.8 oz)  SpO2 99%  BMI 21.24 kg/m2      Recent Labs  Lab 09/23/18  2330   PH 7.45   PCO2 40   PO2 27*   HCO3 27   O2PER 50%     ABG was obtained from right brachial but may be venous mix. Breath sounds are clear slightly diminished. With some re-direction patient was willing to keep the BIPAP mask on. Some abdominal muscle use noted but only when patient becomes agitated.     Will continue to follow and monitor.      Lori Santos, RRT    "

## 2018-09-24 NOTE — PROGRESS NOTES
I was asked see patient earlier because of increasing oxygen needs.  Patient was discussed with Dr. Suggs earlier.  She did get a transfusion of blood late this afternoon as well as IV Lasix.  Oxygen needs were up to 9 L but when I saw her were down to 7 L.  Respiratory rate seems to be slowing.  She seems to maybe be improving with the blood and diuresis.  Continue to monitor.  If she declines I will transfer to the intensive care unit for trial of BiPAP.

## 2018-09-24 NOTE — PLAN OF CARE
Problem: Restraint for Non-Violent/Non-Self-Destructive Behavior  Goal: Prevent/Manage Potential Problems  Maintain safety of patient and others during period of restraint.  Promote psychological and physical wellbeing.  Prevent injury to skin and involved body parts.  Promote nutrition, hydration, and elimination.   Right wrist and Left wrist restraints initiated on patient on 9/24/2018 at 09:15 AM    Clinical Justification: Pulling lines, pulling tubes, and pulling equipment  Less Restrictive Alternative: Repositioning, Disguise equipment, Pain management, Reorientation, De-escalation  Attending Physician Notified: Yes, Attending Physician's Name: Dr. Matute   Order received: Yes     Family Notification: Spouse/significant other   Criteria explained to Patient and spouse  Patient's Response: Needs reinforcement  Restraint care Plan initiated: Yes    Anthony Woodward

## 2018-09-24 NOTE — PLAN OF CARE
Problem: Patient Care Overview  Goal: Plan of Care/Patient Progress Review  Outcome: Declining  ICU End of Shift Summary.  For vital signs and complete assessments, please see documentation flowsheets.     Pertinent assessments: Patient lethargic. Mumbling and moaning. Lung sounds clear throughout. Tele is ST. Abdomen is rounded, firm. Goddard catheter inserted due to retention, good urine output. Scattered bruising. Reddened coccyx.  Major Shift Events: Patient transferred from 5th floor for BiPAP and increased WOB. Respiratory rate has not improved despite multiple medication attempts (see MAR), repositioning, and comfort attempts. Potassium replaced, recheck was unchanged and is being replaced a 2nd time. Patient unable to void spontaneously, bladder scanned for 830, goddard catheter ordered and inserted. 1350 return following insertion. Elevated CIWA scores (see Flowsheets), Ativan administered as ordered.   Plan (Upcoming Events): Continue CIWA, continue Cefepime, BiPAP support, discuss with family goals of care.   Discharge/Transfer Needs: TBD    Bedside Shift Report Completed :   Bedside Safety Check Completed:

## 2018-09-24 NOTE — PROGRESS NOTES
RT Note:    RT paged to assess patient. O2 needs increased to 15LPM oxymask with SpO2 87-90% and increased WOB with RR in high 30s. Patient placed on BiPAP 10/5, FiO2 currently 50%. On these settings, RR is currently about 35 with tidal volumes of about 500. RT will continue to monitor.    Estefanía Jordan  September 23, 2018.10:03 PM

## 2018-09-24 NOTE — PROGRESS NOTES
[facility]  Hospitalist Progress Note  Natali Fay MD 09/23/18    Reason for Stay (Diagnosis): neutropenic fever, acute liver failure         Assessment and Plan:      Summary of Stay: Eyal Turner is a 58 year old female with PMH including metastatic breast cancer with bone metastases to sternum and rib who started a new chemo regimen 5 days ago, alcohol abuse, malnutrition, pancytopenia, and HTN who presents with fatigue and shortness of breath.  Initially hypotensive that improved with IV fluids in the ED. Labs notable for acute liver failure with bilirubin of 8 and elevated transaminases.  Also found to be pancytopenic with neutropenia.  Hemoglobin was less than 7 she received 1 unit RBC transfusion in the ED. After admission she was found to have fever to 101 and chest x-ray obtained after admission showing bilateral infiltrate so started on cefepime for neutropenic fever and azithromycin added today.  A CT abdomen pelvis was obtained that did not show any ascites or obstructive liver disease.  Evidence for acute alcohol withdrawal initially with significant tremor that improved after a few doses of Ativan.  Overall withdrawal seems to be improving.  GI was consulted for possible upper GI bleed and for her liver failure.  No EGD planned.  Did start Protonix on admission, but no evidence for ongoing blood loss.  Oncology also consulted and recommended supportive cares.  PT, OT, dietitian consulted.  Overall patient is quite ill acutely and chronically.  Has received 2 units of RBCs and IV albumin replacement now for ongoing black stools and tachycardia.  She was transferred to ICU on 9/23/2018 for worsening hypoxemia.  With respiratory distress she was intubated 9/24/2018    Problem List/Assessment and Plan:     Acute respiratory failure  --Transferred to ICU with worsening hypoxemia on 9/23/2018  --Intubated 9/24/2018  --secondary to Pnemonia and vascular congestion  --Continue IV  diuresis  --We will continue on antibiotics  --Vent management per intensivist: Wean as able  -- d/w oncologist at length will get cardiac echo    Acute liver failure: Worsening bilirubin up to 9.8 today  --presenting with jaundice.  Bilirubin is 9.8 up from 2.4 three months ago.  Alk phos elevated 594, AST elevated 264, ALT elevated at 94.    --Does drink 2 bottles of wine daily so possible acute alcoholic hepatitis.  Also additionally started on chemotherapy with docetaxel, trastuzumab, and pertuzumab so possibility of drug toxicity although per oncology with this regimen there is not typically significant liver toxicity.    --Did not have a lot of abdominal pain although was a little tender in the upper quadrant.  Lipase was not elevated.  No obstructive process seen on CT.  Slight small nodularity and cannot rule out metastases but this seems less likely based on recent CT scan.  No history of cirrhosis on imaging as of a year ago.  Albumin is low.  Given her alcohol abuse suspect alcohol hepatitis playing a role here.  -GI consulted, at this point holding off on any steroids for possible alcoholic hepatitis due to neutropenic fever     Alcohol dependence with acute withdrawal: Has been drinking 2 bottles of wine daily for some time.  Last use 1-2 days ago.  Presenting in alcohol withdrawal with tremor.  -CIWA with Ativan as needed, only received a few doses of Ativan and is looking better.  - IV vitamins     Neutropenic fever, ? Port infection ?suspect CAP, acute hypoxemic respiratory failure: Weakness and shortness of breath as primary symptoms.  Developed intermittent fevers above 101  while here.  Recently completed a course of Keflex for possible chest wall sinusitis of the side of her metastasis, does not look acutely infected now.    --Denied any diarrhea so C. difficile unlikely.  Urinary symptoms.      --Chest x-ray was obtained that shows bilateral infiltrates so we will treat as CAP.  Now hypoxemic  requiring 5 L oxygen.  -Continue cefepime for neutropenic fever and add azithromycin for possible CAP based on infiltrates on x-ray  -Blood cultures obtained  -- gram positive rods on culture : will consult ID  -12.5 g albumin IV every 8 hours ×3.  Started 9/23/2018  -With her acute liver failure and thrombocytopenia/anemia with likely GI bleed unfortunately cannot give acetaminophen or NSAIDs for fever.  Ice packs as needed  -- started on Nupogen       Acute on chronic pancytopenia with neutropenia: On admission WBC 0.5 with ANC 0.4, hemoglobin 6.9, and platelets 63.  Hemoglobin was 11.4 three months ago and platelets were 108.  Suspect acute drop is from her chemotherapy she started this last Monday.  Also possible upper GI bleed as below.  -Received 1 unit RBCs 9/22  -Just had black stools now and hemoglobin 7.0 received 2 units of PRBCs  -CBC with differential tomorrow     Metastatic breast cancer: Initially diagnosed in 2000 periods S/B bilateral mastectomy.  Has undergone multiple rounds of chemotherapy and radiation.  Has known metastatic disease to her sternum and third left rib.  Had recent possible skin infection from the sternum met/mass and received Keflex.  Does not look actively infected at this time.  Has port in place.  Restarted on chemotherapy with docetaxel, trastuzumab, and pertuzumab on 9/17/18 per patient.  Follows with Dr. Braun of oncology.  Doing quite poorly following chemotherapy with poor oral intake, weakness, and now acute liver failure.  -[facility] oncology consulted, appreciate recommendations  -Now intubated and sedated on fentanyl     Acute hyponatremia: Sodium 127 on admit.    --Suspect hypovolemia due to decreased oral intake.     -- Improved with IV fluids up to 132.     Mucositis versus thrush and possible Candida esophagitis: Patient erosive changes and plaque on the tongue and her soft palate.  Question whether this is mucositis from her docetaxel or Candida infection.  Sore throat  for the past week so possible esophagitis as well.  -Swish and swallow nystatin 4 times daily for thrush initially now n.p.o. and intubated  -Management would typically be systemic Diflucan in case of esophagitis however some risk in doing this due to her acutely elevated bilirubin and transaminases with azole therapy.  Received Diflucan 2 mg in the ED.  Given liver toxicity will hold on further treatment as this may just be mucositis  -GI not planning EGD to investigate for esophagitis    Possible UGIB: Reports of vomiting with red blood in it.  Denies any melena or hematochezia.  Hemoglobin is just below 7 and platelets are also low which may be from chemo or some bleeding.  No bleeding seen here and hemoglobin appears relatively stable after transfusion.  -Twice daily IV Protonix  -GI not planning an EGD      Hypokalemia and hypomagnesemia:   --Potassium and magnesium replacement protocol.     Suspect severe malnutrition:   --Evidence for fat loss on exam likely related to chronic alcohol use, metastatic breast cancer, and recent chemotherapy.  Suspect severe malnutrition.  -Dietitian consult  -Continue PTA 5 mg twice daily dronabinol as appetite stimulant  --Feeding tube as intubated    Physical deconditioning: Malaise weakness was not able to get out of bed.  Likely combination of malnutrition, metastatic cancer, recent chemotherapy, and ongoing alcohol abuse/dependence.  -PT/OT consult     DVT Prophylaxis: Pneumatic Compression Devices, no heparin due to thrombocytopenia and anemia  Code Status: Full Code  FEN: NPO given ongoing black stools and worsening respiratory statust, IV albumin every 8 hours ×3.   Discharge Dispo: To be decided now intubated  Estimated Disch Date / # of Days until Disch: TBD        Interval History (Subjective):      Assumed care reviewed chart.  Unable to get review of systems as patient is sedated and intubated.  Overnight patient had worsening respiratory status and was transferred  "to ICU.  This morning was intubated.  Initially blood pressure was low received propofol for intubation and sedation.  Responded to norepinephrine which was later weaned off.. She is now on fentanyl and Precedex                  Physical Exam:      Last Vital Signs:  BP (!) 69/46  Pulse 123  Temp 99.6  F (37.6  C) (Axillary)  Resp (!) 44  Ht 1.676 m (5' 6\")  Wt 59.7 kg (131 lb 9.8 oz)  SpO2 100%  BMI 21.24 kg/m2      Intake/Output Summary (Last 24 hours) at 09/23/18 1326  Last data filed at 09/23/18 0831   Gross per 24 hour   Intake             1123 ml   Output              450 ml   Net              673 ml     Constitutional: Sedated intubated  Eyes: Scleral icterus  HEENT: Dry mucous membranes.  tongue ulceration/plaque  Respiratory:  few crackles bilaterally without wheeze  Cardiovascular: regular tachycardia.  No murmur   GI:  non-tender to palpation, BS present  Skin: Jaundiced, multiple ecchymoses on extremities and some scabbed lesions on the legs  Musculoskeletal/extremities: Trace bilateral lower extremity edema  Neurologic:  Sedated intubated unable to assess  Psychiatric:  Unable to assess         Medications:      All current medications were reviewed with changes reflected in problem list.  Current Facility-Administered Medications   Medication     azithromycin (ZITHROMAX) 250 mg in sodium chloride 0.9 % 250 mL intermittent infusion     ceFEPIme (MAXIPIME) 2 g vial to attach to  ml bag for ADULTS or 50 ml bag for PEDS     dexmedetomidine (PRECEDEX) 400 mcg in sodium chloride 0.9 % 100 mL infusion     dronabinol (MARINOL) capsule 5 mg     fentaNYL (PF) (SUBLIMAZE) injection 50 mcg     fentaNYL (SUBLIMAZE) infusion     filgrastim (NEUPOGEN) injection 300 mcg     folic acid (FOLVITE) tablet 1 mg     heparin 100 UNIT/ML injection 5 mL     heparin lock flush 10 UNIT/ML injection 3 mL     heparin lock flush 10 UNIT/ML injection 5-10 mL     heparin lock flush 10 UNIT/ML injection 5-10 mL     " LORazepam (ATIVAN) tablet 1-2 mg    Or     LORazepam (ATIVAN) injection 1-2 mg     magic mouthwash suspension (diphenhydramine, lidocaine, aluminum-magnesium & simethicone)     magnesium sulfate 4 g in 100 mL sterile water (premade)     melatonin tablet 1 mg     multivitamin, therapeutic with minerals (THERA-VIT-M) tablet 1 tablet     naloxone (NARCAN) injection 0.1-0.4 mg     naloxone (NARCAN) injection 0.1-0.4 mg     norepinephrine (LEVOPHED) 16 mg in D5W 250 mL infusion     nystatin (MYCOSTATIN) suspension 500,000 Units     ondansetron (ZOFRAN-ODT) ODT tab 4 mg    Or     ondansetron (ZOFRAN) injection 4 mg     oxyCODONE IR (ROXICODONE) tablet 5 mg     pantoprazole (PROTONIX) 40 mg IV push injection     polyethylene glycol (MIRALAX/GLYCOLAX) Packet 17 g     potassium chloride (KLOR-CON) Packet 20-40 mEq     potassium chloride 10 mEq in 100 mL intermittent infusion with 10 mg lidocaine     potassium chloride 10 mEq in 100 mL sterile water intermittent infusion (premix)     potassium chloride 20 mEq in 50 mL intermittent infusion     potassium chloride SA (K-DUR/KLOR-CON M) CR tablet 20-40 mEq     prochlorperazine (COMPAZINE) injection 10 mg    Or     prochlorperazine (COMPAZINE) tablet 10 mg    Or     prochlorperazine (COMPAZINE) Suppository 25 mg     sodium chloride (PF) 0.9% PF flush 10-20 mL     sodium chloride (PF) 0.9% PF flush 10-20 mL     thiamine tablet 100 mg            Data:      All new lab and imaging data was reviewed.   Labs:    Recent Labs  Lab 09/22/18  1033 09/22/18  1021   CULT No growth after 2 days No growth after 2 days       Recent Labs  Lab 09/24/18  0520 09/23/18  1300 09/23/18  0620  09/22/18  1500   WBC 0.4*  --  0.3*  --  0.3*   HGB 7.6* 7.0* 7.0*  < > 6.7*   HCT 23.4*  --  21.1*  --  20.6*   MCV 94  --  96  --  100   PLT 86*  --  71*  --  63*   < > = values in this interval not displayed.    Recent Labs  Lab 09/24/18  0920 09/24/18  0520 09/24/18  0315  09/23/18  0620 09/22/18  213  09/22/18  1022   NA  --  135  --   --  132*  --  127*   POTASSIUM 3.9 3.2* 3.2*  < > 3.1*  --  3.8   CHLORIDE  --  101  --   --  100  --  94   CO2  --  26  --   --  23  --  24   ANIONGAP  --  8  --   --  9  --  9   GLC  --  83  --   --  81  --  103*   BUN  --  15  --   --  12  --  12   CR  --  0.34*  --   --  0.34*  --  0.33*   GFRESTIMATED  --  >90  --   --  >90  --  >90   GFRESTBLACK  --  >90  --   --  >90  --  >90   REAGAN  --  7.4*  --   --  7.3*  --  7.7*   MAG  --   --   --   --  2.0 2.6* 1.4*   PHOS  --  2.1*  --   --   --   --   --    PROTTOTAL  --  5.3*  --   --  5.3*  --  5.9*   ALBUMIN  --  2.4*  --   --  2.0*  --  2.4*   BILITOTAL  --  9.8*  --   --  8.4*  --  8.3*   ALKPHOS  --  401*  --   --  464*  --  594*   AST  --  145*  --   --  182*  --  264*   ALT  --  70*  --   --  76*  --  94*   < > = values in this interval not displayed.   Imaging:   Recent Results (from the past 24 hour(s))   CT Abdomen Pelvis w Contrast    Narrative    CT ABDOMEN AND PELVIS WITH CONTRAST  9/22/2018 6:10 PM    HISTORY: Acute liver failure based on labs.  Some upper abdominal  pain.     TECHNIQUE: Scans obtained from the diaphragm through the pelvis with  IV contrast, 64 mL Isovue-370.   Radiation dose for this scan was reduced using automated exposure  control, adjustment of the mA and/or kV according to patient size, or  iterative reconstruction technique.    COMPARISON:  CT abdomen and pelvis dated 5/8/2017.    FINDINGS: Patchy infiltrates are seen throughout the bilateral  visualized portions of the lung bases. This could represent infectious  or inflammatory infiltrates. Alveolar proteinosis is also in the  differential. No significant pleural effusion is identified. There are  coronary artery calcifications. Aortic calcifications are also noted.  There is a probable small hiatal hernia. Visualized mediastinal  contents are otherwise unremarkable. There are bilateral breast  prostheses.    Degenerative changes are seen in  the spine. This is worst at L4-5.  Extensive sclerosis of the sternum is noted but there is also a  blastic lesion in the L5. These could represent metastases. Recommend  clinical correlation for possible history of breast cancer in the  presence of possible blastic metastases.    The liver enhances heterogeneously. This could represent innumerable  areas of nodular regeneration. Metastasis are difficult to exclude.    There is minimal thickening left adrenal gland, similar to the prior  study dated 5/8/2017 likely representing adenomatous change.  Calcifications are seen in the spleen indicating chronic granulomatous  disease. Otherwise, the gallbladder, pancreas, spleen, bilateral  adrenal glands and bilateral kidneys otherwise enhance normally.  Urinary bladder is distended, but otherwise unremarkable. No  hydronephrosis, nephrolithiasis, hydroureter or ureteral calculus is  seen.    There is a small hiatal hernia. No adenopathy, free fluid or free air  is seen in the peritoneal cavity. There is nonaneurysmal  atherosclerosis. The colon is grossly of normal caliber. There are a  few scattered diverticuli in the colon, most predominantly seen in the  sigmoid colon. No pericolonic inflammatory change to suggest acute  diverticulitis. Appendix is not well seen. No evidence for pericecal  inflammatory change to suggest acute appendicitis.    There is focal thickening of the gastric antral wall (image 38 series  2 and image 33 series 3). This is of uncertain etiology and may be  better seen with direct visualization. (Upper endoscopy). Stomach and  small bowel are otherwise unremarkable.      Impression    IMPRESSION:  1. Blastic metastasis in L5 and in the sternum are again noted. These  were also seen on the prior study dated 5/8/2017. Patient has reported  history of metastatic breast cancer.  2. Patchy infiltrates bilateral lung bases concerning for pneumonia.  These are new since the prior study from 2017.  Neoplastic infiltration  is considered less likely.  3. Irregularity enhancement pattern of liver could represent multiple  focal regenerating nodules that are too small to discretely measure,  geographic fatty infiltration and sparing or diffuse metastasis in the  liver. This finding is new since the prior study.  4. No other evidence for metastasis.    KEMAL BISWAS MD   XR Chest 2 Views    Narrative    CHEST TWO VIEW   9/22/2018 7:46 PM     HISTORY: Shortness of breath, neutropenia.     COMPARISON: Chest CT 11/30/2015.      Impression    IMPRESSION: Right IJ Port-A-Cath remains in place. Patchy airspace  opacities throughout the lungs particularly in the lower lung fields.  This may represent pneumonia. There may also be a background of  pulmonary nodules present, this would be better assessed with CT. No  significant pleural effusion or pneumothorax. Cardiac silhouette  borderline enlarged.    MD Natali CHAMPION MD

## 2018-09-24 NOTE — CONSULTS
Consult Date:  09/24/2018      INFECTIOUS DISEASE CONSULTATION       REFERRING PHYSICIAN:  Dr. Suggs      IMPRESSION:   1.  A 58-year-old female with widely metastatic long-term breast cancer, ongoing chemotherapy, and now neutropenia.   2.  Acute sepsis and neutropenic fever, some signs of pneumonia, with possible esophagitis and positive blood culture, at risk for multiple infections.   3.  Thrush and some concern for possible esophagitis.   4.  Acute and chronic long-term alcohol abuse and liver dysfunction.   5.  Prior Martha-Stafford tear.   6.  Respiratory failure, now intubated.   7.  Positive blood culture, 1 bottle for diphtheroids.  No further identification.  This could be a contaminant versus line infection.      RECOMMENDATIONS:   1.  I do not think urgent to pull the port.  Blood culture of this type most likely contaminant unless multiple cultures positive or identification changes.   2.  Agree with vancomycin and Zosyn for now.   3.  From a thrush and possible fungal infection standpoint, agree fluconazole hold for now, but will do micafungin instead.  This is reasonable from a neutropenic fever standpoint anyway.   4.  Await further culture information and readjust treatment as needed.   5.  Obviously, prognosis poor for a multitude of reasons.      HISTORY:  This 58-year-old female is seen in consultation in the ICU.  She is intubated, sedated in the Intensive Care Unit.  She has a history of breast cancer that goes all the way back to 2000 and has had recurrence more recently that has required multiple treatments that are failing.  Most recently, chemo now has induced neutropenia, and she is now admitted with acute fever along with acute alcohol and liver dysfunction related issues.  She is currently intubated with respiratory failure and has a positive blood culture for a diphtheroid.  There is thought she might have esophagitis based on swallowing issues and some thrush.  Unable to obtain any  other history at present.  No obvious new or recent exposures.      PAST MEDICAL HISTORY:  Prior Martha-Stafford tear, history of prior mastectomy and the long-term breast cancer.      SOCIAL HISTORY:  Significant for chronic alcohol abuse, history of significant smoking.      FAMILY HISTORY:  Otherwise fairly unremarkable.  Cancer in other family members.      MEDICATIONS:  As listed.      ALLERGIES:  NONE.      REVIEW OF SYSTEMS:  Unobtainable.      PHYSICAL EXAMINATION:   GENERAL:  The patient looks chronically ill.  She is lying in bed, intubated.   VITAL SIGNS:  Currently include earlier temperature 101, pulse of 80, respiratory rate on vent.   HEENT:  Difficult to see exactly, but apparently thrush present.   NECK:  Supple and nontender with no lymphadenopathy.   HEART:  Mildly tachycardic in the 90s.   LUNGS:  Few crackles, but vent sounds primarily.   ABDOMEN:  Soft, nontender, no palpable abnormalities, no major distention.   EXTREMITIES:  No major rashes or skin lesions.      LABORATORY DATA:  Blood cultures:  Single culture growing a diphtheroid type organism not further identified.  Some possible infiltrates.  Neutrophil count 0.      Thank you very much for the consultation.  I will follow the patient with you.         ESA DELGADO MD             D: 2018   T: 2018   MT: SUSHILA      Name:     JEFF RODRIGUEZ   MRN:      [id number]        Account:       [id number]   :      1960           Consult Date:  2018      Document: [id number]       cc: Stan Heredia MD

## 2018-09-24 NOTE — PLAN OF CARE
Problem: Patient Care Overview  Goal: Plan of Care/Patient Progress Review  Patient alert and disoriented to time at start of shift. Requiring 9L of oxygen at start of shift, respiratory and MD paged, bumped up to 15L and then weaned down to 7L. IV lasix given at start of blood transfusion, received 1 unit of blood this shift, tolerated well. Up to commode with Ax2 and gait belt, oxygen bumped up to 15L with movement, patient recovered quickly and was able to stay on 7L while at rest. Continuing IV antibiotics, IV protonix, and albumin, and PO nystatin for thrush. Around 2100 patients oxygen saturations started to dip into the mid 80s, MD and respiratory paged and patient was placed on Bipap and orders placed to transfer to the ICU. Potassium replaced, recheck 3.2, will get replacement protocol again next shift. CIWA scores 6 and 3. Patients  took all of patients belonging with him before patient transferred to ICU.

## 2018-09-24 NOTE — CONSULTS
ID consult dictated IMP 1 59 yo female, complex, neutropenic fever, resp failure, / esophagitis, alcohol related liver dysfct, + BC    REC 1 BC prelim even with line statistically more likely contaminant than real, could delay on Port, same abx and add nilda for yeast

## 2018-09-24 NOTE — PROGRESS NOTES
Critical Care Progress Note      09/24/2018    Name: Eyal Turner MRN#: [id number]   Age: 58 year old YOB: 1960     Hsptl Day# 2  ICU DAY # 1    MV DAY # 1             Problem List:   Active Problems:    Acute liver failure           Summary/Hospital Course:     59 yo female with metastatic breast CA admitted to the ICU.  Admitted to the ICU overnight with worsening SOB, did not resolve with BiPAP and laisx.  This am still breathing at rate of 40. Discussed with patient need for intubation while treating either PNA or fluid overload.  Agreed to intubation/  Also found to have elevated liver enzymes most likely secondary to ETOH use and chemotherapy      Assessment and plan :     Eyal Turner IS a 58 year old female admitted on 9/22/2018 for hemodymanic and respiratory mangement secondary increased WOB. R/O fluid overload  Vs PNA.   I have personally reviewed the daily labs, imaging studies, cultures and discussed the case with referring physician and consulting physicians.     My assessment and plan by system for this patient is as follows:    Neurology/Psychiatry:   1. ETOH Use  2. Analgesia    Plan  -Will start precedex for sedation goal RASS of negative 1  -Monitor for signs of ETOH withdrawal  - PRN Fentanyl for pain given metastatic lesions  -On CIWA for possible withdrawal    Cardiovascular:   1.Hemodynamics -hypotensive  2.Rhythm -  tachy    Plan  -Concern hypotension secondary either to sedation vs early sepsis.  Will use norepi to support BP with goal of MAP greater than 65.  Check lactate level this am    Pulmonary/Ventilator Management:   1. Airway intubated  2. Oxygenation/ventilation/mechanics on full ventilatory support of SIMV  Plan  - titrate Fio2 to maintain sat greater than 92%, concern for fluid overload on CXR, will continue diuresis    GI and Nutrition :   1. Concern for protein calorie malnutrition    Plan  -place dubhoff and start tubefeeds in  am    Renal/Fluids/Electrolytes:   1. Cr 0.34  2. Hypokalemia, Hypomagnesium  3. Acid/base status  4. Volume status possible fluid overlad  Plan    - monitor function and electrolytes as needed with replacement per ICU protocols.  - generally avoid nephrotoxic agents such as NSAID, IV contrast unless specifically required  - adjust medications as needed for renal clearance  - follow I/O's as appropriate.    Infectious Disease:   1. Concern for PNA  2.  3.  Plan  - obtain sputum culture and continue current antibiotics while awaiting culture results    Endocrine:     1. Concern of stress induced hyperglycemia    Plan  - ICU insulin protocol, goal sugar <180      Hematology/Oncology:     1.chronic anemia secondary to CA, no signs, symptoms of active blood loss    Plan  - transfused one unit, will continue to follow          IV/Access:   1. Venous access -  port  2. Arterial access -  none  3.  Plan  - central access required and necessary      ICU Prophylaxis:   1. DVT: mechanical  2. VAP: HOB 30 degrees, chlorhexidine rinse  3. Stress Ulcer: PPI  4. Restraints: Nonviolent soft two point restraints required and necessary for patient safety and continued cares and good effect as patient continues to pull at necessary lines, tubes despite education and distraction. Will readdress daily.   5. Wound care - per unit routine   6. Feeding - NPO  7. Family Update: for need for intubation  8. Disposition - ICU        Key goals for next 24 hours:   1. Follow culture results  2. Wean FiO2  3.Follow up lactate               Interim History:   Intubated for impending respiratory failure           Key Medications:       azithromycin  250 mg Intravenous Q24H     ceFEPIme (MAXIPIME) IV  2 g Intravenous Q8H     dronabinol  5 mg Oral BID     filgrastim (NEUPOGEN/GRANIX) subcutaneous  300 mcg Subcutaneous Once     folic acid  1 mg Oral Daily     heparin  5 mL Intracatheter Q28 Days     heparin lock flush  5-10 mL Intracatheter  Q24H     multivitamin, therapeutic with minerals  1 tablet Oral Daily     nystatin  500,000 Units Swish & Swallow 4x Daily     pantoprazole (PROTONIX) IV  40 mg Intravenous BID     thiamine  100 mg Oral Daily       dexmedetomidine 0.6 mcg/kg/hr (09/24/18 1042)     fentaNYL 25 mcg/hr (09/24/18 0954)     norepinephrine 0.03 mcg/kg/min (09/24/18 1106)               Physical Examination:   Temp:  [98.8  F (37.1  C)-100.8  F (38.2  C)] 99.6  F (37.6  C)  Pulse:  [122-128] 123  Heart Rate:  [] 92  Resp:  [28-71] 44  BP: ()/(46-96) 69/46  FiO2 (%):  [40 %-60 %] 40 %  SpO2:  [86 %-100 %] 100 %    Intake/Output Summary (Last 24 hours) at 09/24/18 1115  Last data filed at 09/24/18 0800   Gross per 24 hour   Intake              250 ml   Output             2990 ml   Net            -2740 ml     Wt Readings from Last 4 Encounters:   09/23/18 59.7 kg (131 lb 9.8 oz)   10/23/17 57.6 kg (127 lb)   08/09/16 57.6 kg (127 lb)   08/01/16 57.6 kg (127 lb)     BP - Mean:  [] 51  Ventilation Mode: SIMV/PS  (Synchronized Intermittent Mandatory Ventilation with Pressure Support)  FiO2 (%): 40 %  Rate Set (breaths/minute): 12 breaths/min  Tidal Volume Set (mL): 450 mL  PEEP (cm H2O): 6 cmH2O  Pressure Support (cm H2O): 6 cmH2O  Oxygen Concentration (%): 100 %  Resp: 44    Recent Labs  Lab 09/24/18  0520 09/23/18  2330   PH  --  7.45   PCO2  --  40   PO2  --  27*   HCO3  --  27   O2PER 40% 50%       GEN: no acute distress, now.  Marked distress ptior to intubation  HEENT: head ncat, sclera anicteric, OP patent, trachea midline   PULM: unlabored synchronous with vent, clear anteriorly , coarse b/l breath sounds prior to intubation  CV/COR: RRR S1S2 no gallop,  No rub, no murmur  ABD: soft nontender, hypoactive bowel sounds, no mass  EXT:  Edema   warm  NEURO: grossly intact  SKIN: no obvious rash  LINES: clean, dry intact         Data:   All data and imaging reviewed     ROUTINE ICU LABS (Last four results)  CMP  Recent  Labs  Lab 09/24/18  0920 09/24/18  0520 09/24/18  0315 09/23/18 2050 09/23/18 0620 09/22/18  2130 09/22/18  1022   NA  --  135  --   --  132*  --  127*   POTASSIUM 3.9 3.2* 3.2* 3.2* 3.1*  --  3.8   CHLORIDE  --  101  --   --  100  --  94   CO2  --  26  --   --  23  --  24   ANIONGAP  --  8  --   --  9  --  9   GLC  --  83  --   --  81  --  103*   BUN  --  15  --   --  12  --  12   CR  --  0.34*  --   --  0.34*  --  0.33*   GFRESTIMATED  --  >90  --   --  >90  --  >90   GFRESTBLACK  --  >90  --   --  >90  --  >90   REAGAN  --  7.4*  --   --  7.3*  --  7.7*   MAG 1.8  --   --   --  2.0 2.6* 1.4*   PHOS  --  2.1*  --   --   --   --   --    PROTTOTAL  --  5.3*  --   --  5.3*  --  5.9*   ALBUMIN  --  2.4*  --   --  2.0*  --  2.4*   BILITOTAL  --  9.8*  --   --  8.4*  --  8.3*   ALKPHOS  --  401*  --   --  464*  --  594*   AST  --  145*  --   --  182*  --  264*   ALT  --  70*  --   --  76*  --  94*     CBC  Recent Labs  Lab 09/24/18  0520 09/23/18  1300 09/23/18  0620 09/22/18  1950 09/22/18  1500 09/22/18  1022   WBC 0.4*  --  0.3*  --  0.3* 0.5*   RBC 2.50*  --  2.19*  --  2.07* 2.14*   HGB 7.6* 7.0* 7.0* 7.2* 6.7* 6.9*   HCT 23.4*  --  21.1*  --  20.6* 21.7*   MCV 94  --  96  --  100 101*   MCH 30.4  --  32.0  --  32.4 32.2   MCHC 32.5  --  33.2  --  32.5 31.8   RDW 21.9*  --  18.6*  --  16.9* 16.5*   PLT 86*  --  71*  --  63* 63*     INR  Recent Labs  Lab 09/22/18  1500   INR 1.15*     Arterial Blood Gas  Recent Labs  Lab 09/24/18  0520 09/23/18  2330   PH  --  7.45   PCO2  --  40   PO2  --  27*   HCO3  --  27   O2PER 40% 50%       All cultures:    Recent Labs  Lab 09/22/18  1033 09/22/18  1021   CULT No growth after 2 days No growth after 2 days     Recent Results (from the past 24 hour(s))   XR Chest Port 1 View    Narrative    XR CHEST PORT 1 VW  9/23/2018 2:12 PM     HISTORY:  increasing oxygen needs;     COMPARISON: Film dated 9/22/2018    FINDINGS:  A right Port-A-Cath is in place. Hypoventilation.  Extensive  bilateral interstitial and alveolar infiltrate. The infiltrates appear  to have increased slightly but this could be due to the shallow  inspiration.      Impression    IMPRESSION: Extensive bilateral infiltrate. The infiltrate appears to  have increased slightly when compared to 9/22/2018.    QUINN ROSS MD   XR Chest Port 1 View    Narrative    XR CHEST PORT 1 VW 9/24/2018 9:08 AM    HISTORY: Increasing oxygen requirements.    COMPARISON: 9/23/2018, 9/22/2018    FINDINGS: Diffuse bilateral hazy airspace opacity does not appear  significantly changed. No pneumothorax. Stable heart size. Port and  catheter appear stable.      Impression    IMPRESSION: No significant interval change.    ESA STAHL MD   XR Chest Port 1 View    Narrative    XR CHEST PORT 1 VW 9/24/2018 9:37 AM    HISTORY: Endotracheal tube position.    COMPARISON: 9/24/2018 at 08:55    FINDINGS: The endotracheal tube tip is approximately 5 cm above the  jasvir. Diffuse hazy airspace opacity appears stable. Right port and  catheter appear stable.      Impression    IMPRESSION: Endotracheal tube appears appropriately positioned.    ESA STAHL MD         Billing: This patient is critically ill: Yes. Total critical care time today 34 min.    Patient with gram positive rods in blood from port.  Would recommend starting vanco/zosyn for line infection and consult IR for port removal

## 2018-09-25 NOTE — PROGRESS NOTES
Patient remained on vent settings as followed during the shift:  Ventilation Mode: CMV/AC  (Continuous Mandatory Ventilation/ Assist Control)  FiO2 (%): 30 %  Rate Set (breaths/minute): 12 breaths/min  Tidal Volume Set (mL): 450 mL  PEEP (cm H2O): 6 cmH2O  Pressure Support (cm H2O): 10 cmH2O  Oxygen Concentration (%): 50 %  Resp: 27    Patient tolerated PS trial from 0900 until 1530 on 6/6 50%.     Breath sounds are coarse, Suctioning out thick tan secretions. Patient has bloody blistered lips, applying lip moisturizer every 4 hours.  Plan to try PS trial in the morning and possibly extubate tomorrow if tolerated.    Jeannette Perera , RT  September 25, 2018 6:21 PM

## 2018-09-25 NOTE — PLAN OF CARE
Problem: Restraint for Non-Violent/Non-Self-Destructive Behavior  Goal: Prevent/Manage Potential Problems  Maintain safety of patient and others during period of restraint.  Promote psychological and physical wellbeing.  Prevent injury to skin and involved body parts.  Promote nutrition, hydration, and elimination.   Right wrist and Left wrist restraints continued 9/25/2018    Clinical Justification: Pulling lines, pulling tubes, and pulling equipment  Less Restrictive Alternative: Repositioning, Re-evaluate equipment, Disguise equipment, Pain management, De-escalation, Reorientation  Attending Physician Notified: Yes, Attending Physician's Name: Dr. Matute   New orders placed Yes  Length of Order: 1 Day      Anthony Woodward

## 2018-09-25 NOTE — PROGRESS NOTES
[facility]  Hospitalist Progress Note  Natali Fay MD 09/23/18    Reason for Stay (Diagnosis): neutropenic fever, acute liver failure         Assessment and Plan:      Summary of Stay: Eyal Turner is a 58 year old female with PMH including metastatic breast cancer with bone metastases to sternum and rib who started a new chemo regimen 5 days ago, alcohol abuse, malnutrition, pancytopenia, and HTN who presents with fatigue and shortness of breath.  Initially hypotensive that improved with IV fluids in the ED. Labs notable for acute liver failure with bilirubin of 8 and elevated transaminases.  Also found to be pancytopenic with neutropenia.  Hemoglobin was less than 7 she received 1 unit RBC transfusion in the ED. After admission she was found to have fever to 101 and chest x-ray obtained after admission showing bilateral infiltrate so started on cefepime for neutropenic fever and azithromycin added today.  A CT abdomen pelvis was obtained that did not show any ascites or obstructive liver disease.  Evidence for acute alcohol withdrawal initially with significant tremor that improved after a few doses of Ativan.  Overall withdrawal seems to be improving.  GI was consulted for possible upper GI bleed and for her liver failure.  No EGD planned.  Did start Protonix on admission, but no evidence for ongoing blood loss.  Oncology also consulted and recommended supportive cares.  PT, OT, dietitian consulted.  Overall patient is quite ill acutely and chronically.  Has received 2 units of RBCs and IV albumin replacement now for ongoing black stools and tachycardia.  She was transferred to ICU on 9/23/2018 for worsening hypoxemia.  With respiratory distress she was intubated 9/24/2018.  Blood culture grew gram-positive brayan from port.  ID was consulted.  Patient was switched to vancomycin and Zosyn.  Micafungin was also added for oral thrush.  Discussed with oncology was started on Neupogen for  neutropenia    Problem List/Assessment and Plan:     Acute respiratory failure Secondary to sepsis with possible pneumonia  --Transferred to ICU with worsening hypoxemia on 9/23/2018  --Intubated 9/24/2018  --secondary to sepsis Pnemonia ?  Port infection and vascular congestion  --Continue IV diuresis  --Antibiotics adjusted to vancomycin and Zosyn.  ID was consulted added micafungin  --Vent management per intensivist: Wean as able  --Cardiac echo showed EF of 55-60% with normal LV and RV function.    Acute liver failure: Bilirubin 9.3 today slightly lower /improving  --presenting with jaundice.  Bilirubin was up to 9.8 up from 2.4 three months ago.  Alk phos elevated 594, AST elevated 264, ALT elevated at 94.    --Does drink 2 bottles of wine daily so possible acute alcoholic hepatitis.  Also additionally started on chemotherapy with docetaxel, trastuzumab, and pertuzumab so possibility of drug toxicity although per oncology with this regimen there is not typically significant liver toxicity.    --Did not have a lot of abdominal pain although was a little tender in the upper quadrant.  Lipase was not elevated.  No obstructive process seen on CT.  Slight small nodularity and cannot rule out metastases but this seems less likely based on recent CT scan.  No history of cirrhosis on imaging as of a year ago.  Albumin is low.  Given her alcohol abuse suspect alcohol hepatitis playing a role here.  -GI consulted, at this point holding off on any steroids for possible alcoholic hepatitis due to neutropenic fever     Alcohol dependence with acute withdrawal: Has been drinking 2 bottles of wine daily for some time.  Last use 1-2 days ago.  Presenting in alcohol withdrawal with tremor.  -CIWA with Ativan as needed, only received a few doses of Ativan and is looking better.  - IV vitamins     Sepsis on admission with Neutropenic fever, ? Port infection ?suspect CAP, acute hypoxemic respiratory failure: Weakness and shortness  of breath as primary symptoms.  Developed intermittent fevers above 101  while here.  Recently completed a course of Keflex for possible chest wall sinusitis of the side of her metastasis, does not look acutely infected now.    --Denied any diarrhea so C. difficile unlikely.  Urinary symptoms.      --Chest x-ray was obtained that shows bilateral infiltrates so we will treat as CAP.  Now hypoxemic requiring 5 L oxygen.  -Was initially on cefepime for neutropenic fever and  azithromycin for possible CAP based on infiltrates on x-ray  -Blood cultures obtained  -- gram positive rods on culture ID consulted.  Appreciate input  --Was switched to vancomycin and Zosyn on 9/24/ 2018 and was started on micafungin for thrush  -12.5 g albumin IV every 8 hours ×3.  Started 9/23/2018  -With her acute liver failure and thrombocytopenia/anemia with likely GI bleed unfortunately cannot give acetaminophen or NSAIDs for fever.  Ice packs as needed  -- started on Nupogen with good response WBC count up to 1.7       Acute on chronic pancytopenia with neutropenia: On admission WBC 0.5 with ANC 0.4, hemoglobin 6.9, and platelets 63.  Hemoglobin was 11.4 three months ago and platelets were 108.  Suspect acute drop is from her chemotherapy she started this last Monday.  Also possible upper GI bleed as below.  -Received 1 unit RBCs 9/22  -Just had black stools now and hemoglobin 7.0 received 2 units of PRBCs  -CBC with differential tomorrow     Metastatic breast cancer: Initially diagnosed in 2000 periods S/B bilateral mastectomy.  Has undergone multiple rounds of chemotherapy and radiation.  Has known metastatic disease to her sternum and third left rib.  Had recent possible skin infection from the sternum met/mass and received Keflex.  Does not look actively infected at this time.  Has port in place.  Restarted on chemotherapy with docetaxel, trastuzumab, and pertuzumab on 9/17/18 per patient.  Follows with Dr. Braun of oncology.  Doing  quite poorly following chemotherapy with poor oral intake, weakness, and now acute liver failure.  -[facility] consulted, appreciate recommendations  -Now intubated and sedated on fentanyl     Acute hyponatremia: Sodium 127 on admit.    --Suspect hypovolemia due to decreased oral intake.     -- Improved with IV fluids up to 132.     Mucositis versus thrush and possible Candida esophagitis: Patient erosive changes and plaque on the tongue and her soft palate.  Question whether this is mucositis from her docetaxel or Candida infection.  Sore throat for the past week so possible esophagitis as well.  -Swish and swallow nystatin 4 times daily for thrush initially now n.p.o. and intubated  -Management would typically be systemic Diflucan in case of esophagitis however some risk in doing this due to her acutely elevated bilirubin and transaminases with azole therapy.  Received Diflucan 2 mg in the ED.  Given liver toxicity will hold on further treatment as this may just be mucositis  -GI not planning EGD to investigate for esophagitis    Possible UGIB: Reports of vomiting with red blood in it.  Denies any melena or hematochezia.  Hemoglobin is just below 7 and platelets are also low which may be from chemo or some bleeding.  No bleeding seen here and hemoglobin appears relatively stable after transfusion.  -Twice daily IV Protonix  -GI not planning an EGD      Hypokalemia and hypomagnesemia:   --Potassium and magnesium replacement protocol.     Suspect severe malnutrition:   --Evidence for fat loss on exam likely related to chronic alcohol use, metastatic breast cancer, and recent chemotherapy.  Suspect severe malnutrition.  -Dietitian consult  -Continue PTA 5 mg twice daily dronabinol as appetite stimulant  --Feeding tube as intubated    Physical deconditioning: Malaise weakness was not able to get out of bed.  Likely combination of malnutrition, metastatic cancer, recent chemotherapy, and ongoing alcohol  "abuse/dependence.  -PT/OT consult     DVT Prophylaxis: Pneumatic Compression Devices, no heparin due to thrombocytopenia and anemia  Code Status: Full Code  FEN: NPO given ongoing black stools and worsening respiratory statust, IV albumin every 8 hours ×3.   Discharge Dispo: To be decided now intubated  Estimated Disch Date / # of Days until Disch: TBD        Interval History (Subjective):      Patient is intubated and vented.  Unable to get review of system.  Care plan discussed with family.                  Physical Exam:      Last Vital Signs:  /70  Pulse 123  Temp 99.1  F (37.3  C) (Axillary)  Resp 25  Ht 1.676 m (5' 6\")  Wt 60 kg (132 lb 4.4 oz)  SpO2 97%  BMI 21.35 kg/m2      Intake/Output Summary (Last 24 hours) at 09/23/18 1326  Last data filed at 09/23/18 0831   Gross per 24 hour   Intake             1123 ml   Output              450 ml   Net              673 ml     Constitutional: Sedated intubated  Eyes: Scleral icterus  HEENT: Dry mucous membranes.  tongue ulceration/plaque  Respiratory:  few crackles bilaterally without wheeze  Cardiovascular: regular tachycardia.  No murmur   GI:  non-tender to palpation, BS present  Skin: Jaundiced, multiple ecchymoses on extremities and some scabbed lesions on the legs  Musculoskeletal/extremities: Trace bilateral lower extremity edema  Neurologic:  Sedated intubated unable to assess  Psychiatric:  Unable to assess         Medications:      All current medications were reviewed with changes reflected in problem list.  Current Facility-Administered Medications   Medication     dexmedetomidine (PRECEDEX) 400 mcg in sodium chloride 0.9 % 100 mL infusion     dextrose 10 % 1,000 mL infusion     dextrose 10 % 1,000 mL infusion     dronabinol (MARINOL) capsule 5 mg     fentaNYL (PF) (SUBLIMAZE) injection 50 mcg     fentaNYL (SUBLIMAZE) infusion     folic acid (FOLVITE) tablet 1 mg     heparin 100 UNIT/ML injection 5 mL     heparin lock flush 10 UNIT/ML " injection 3 mL     heparin lock flush 10 UNIT/ML injection 5-10 mL     heparin lock flush 10 UNIT/ML injection 5-10 mL     lidocaine (viscous) (XYLOCAINE) 2 % solution 5 mL     LORazepam (ATIVAN) tablet 1-2 mg    Or     LORazepam (ATIVAN) injection 1-2 mg     magic mouthwash suspension (diphenhydramine, lidocaine, aluminum-magnesium & simethicone)     magnesium sulfate 4 g in 100 mL sterile water (premade)     melatonin tablet 1 mg     micafungin (MYCAMINE) 100 mg in sodium chloride 0.9 % 100 mL intermittent infusion     multivitamins with minerals (CERTAVITE/CEROVITE) liquid 15 mL     naloxone (NARCAN) injection 0.1-0.4 mg     naloxone (NARCAN) injection 0.1-0.4 mg     norepinephrine (LEVOPHED) 16 mg in D5W 250 mL infusion     nystatin (MYCOSTATIN) suspension 500,000 Units     ondansetron (ZOFRAN-ODT) ODT tab 4 mg    Or     ondansetron (ZOFRAN) injection 4 mg     oxyCODONE IR (ROXICODONE) tablet 5 mg     pantoprazole (PROTONIX) 40 mg IV push injection     piperacillin-tazobactam (ZOSYN) infusion 3.375 g     polyethylene glycol (MIRALAX/GLYCOLAX) Packet 17 g     potassium chloride (KLOR-CON) Packet 20-40 mEq     potassium chloride 10 mEq in 100 mL intermittent infusion with 10 mg lidocaine     potassium chloride 10 mEq in 100 mL sterile water intermittent infusion (premix)     potassium chloride 20 mEq in 50 mL intermittent infusion     potassium chloride SA (K-DUR/KLOR-CON M) CR tablet 20-40 mEq     potassium phosphate 15 mmol in D5W 250 mL intermittent infusion     potassium phosphate 20 mmol in D5W 250 mL intermittent infusion     potassium phosphate 20 mmol in D5W 500 mL intermittent infusion     potassium phosphate 25 mmol in D5W 500 mL intermittent infusion     prochlorperazine (COMPAZINE) injection 10 mg    Or     prochlorperazine (COMPAZINE) tablet 10 mg    Or     prochlorperazine (COMPAZINE) Suppository 25 mg     protein modular (PROSource TF) 1 packet     sodium chloride (PF) 0.9% PF flush 10-20 mL      sodium chloride (PF) 0.9% PF flush 10-20 mL     thiamine tablet 100 mg     vancomycin (VANCOCIN) 1,250 mg in sodium chloride 0.9 % 250 mL intermittent infusion            Data:      All new lab and imaging data was reviewed.   Labs:    Recent Labs  Lab 09/24/18  0945 09/22/18  1033 09/22/18  1021   CULT PENDING No growth after 3 days Cultured on the 2nd day of incubation:Gram positive rods*  Critical Value/Significant Value, preliminary result only, called to and read back byKinjal Woodward RN at 1145 on 9/24/18 by JOSE ANTONIO.       Recent Labs  Lab 09/25/18  0514 09/24/18  0520 09/23/18  1300 09/23/18  0620   WBC 1.7* 0.4*  --  0.3*   HGB 8.0* 7.6* 7.0* 7.0*   HCT 24.8* 23.4*  --  21.1*   MCV 93 94  --  96   * 86*  --  71*       Recent Labs  Lab 09/25/18  0514 09/24/18  0920 09/24/18  0520  09/23/18  0620 09/22/18  2130 09/22/18  1022     --  135  --  132*  --  127*   POTASSIUM 3.0* 3.9 3.2*  < > 3.1*  --  3.8   CHLORIDE 102  --  101  --  100  --  94   CO2 28  --  26  --  23  --  24   ANIONGAP 7  --  8  --  9  --  9   *  --  83  --  81  --  103*   BUN 17  --  15  --  12  --  12   CR 0.35*  --  0.34*  --  0.34*  --  0.33*   GFRESTIMATED >90  --  >90  --  >90  --  >90   GFRESTBLACK >90  --  >90  --  >90  --  >90   REAGAN 7.5*  --  7.4*  --  7.3*  --  7.7*   MAG  --  1.8  --   --  2.0 2.6* 1.4*   PHOS 1.3*  --  2.1*  --   --   --   --    PROTTOTAL  --   --  5.3*  --  5.3*  --  5.9*   ALBUMIN  --   --  2.4*  --  2.0*  --  2.4*   BILITOTAL 9.3*  --  9.8*  --  8.4*  --  8.3*   ALKPHOS 409*  --  401*  --  464*  --  594*   *  --  145*  --  182*  --  264*   ALT  --   --  70*  --  76*  --  94*   < > = values in this interval not displayed.   Imaging:   Recent Results (from the past 24 hour(s))   CT Abdomen Pelvis w Contrast    Narrative    CT ABDOMEN AND PELVIS WITH CONTRAST  9/22/2018 6:10 PM    HISTORY: Acute liver failure based on labs.  Some upper abdominal  pain.     TECHNIQUE: Scans obtained from the  diaphragm through the pelvis with  IV contrast, 64 mL Isovue-370.   Radiation dose for this scan was reduced using automated exposure  control, adjustment of the mA and/or kV according to patient size, or  iterative reconstruction technique.    COMPARISON:  CT abdomen and pelvis dated 5/8/2017.    FINDINGS: Patchy infiltrates are seen throughout the bilateral  visualized portions of the lung bases. This could represent infectious  or inflammatory infiltrates. Alveolar proteinosis is also in the  differential. No significant pleural effusion is identified. There are  coronary artery calcifications. Aortic calcifications are also noted.  There is a probable small hiatal hernia. Visualized mediastinal  contents are otherwise unremarkable. There are bilateral breast  prostheses.    Degenerative changes are seen in the spine. This is worst at L4-5.  Extensive sclerosis of the sternum is noted but there is also a  blastic lesion in the L5. These could represent metastases. Recommend  clinical correlation for possible history of breast cancer in the  presence of possible blastic metastases.    The liver enhances heterogeneously. This could represent innumerable  areas of nodular regeneration. Metastasis are difficult to exclude.    There is minimal thickening left adrenal gland, similar to the prior  study dated 5/8/2017 likely representing adenomatous change.  Calcifications are seen in the spleen indicating chronic granulomatous  disease. Otherwise, the gallbladder, pancreas, spleen, bilateral  adrenal glands and bilateral kidneys otherwise enhance normally.  Urinary bladder is distended, but otherwise unremarkable. No  hydronephrosis, nephrolithiasis, hydroureter or ureteral calculus is  seen.    There is a small hiatal hernia. No adenopathy, free fluid or free air  is seen in the peritoneal cavity. There is nonaneurysmal  atherosclerosis. The colon is grossly of normal caliber. There are a  few scattered diverticuli in  the colon, most predominantly seen in the  sigmoid colon. No pericolonic inflammatory change to suggest acute  diverticulitis. Appendix is not well seen. No evidence for pericecal  inflammatory change to suggest acute appendicitis.    There is focal thickening of the gastric antral wall (image 38 series  2 and image 33 series 3). This is of uncertain etiology and may be  better seen with direct visualization. (Upper endoscopy). Stomach and  small bowel are otherwise unremarkable.      Impression    IMPRESSION:  1. Blastic metastasis in L5 and in the sternum are again noted. These  were also seen on the prior study dated 5/8/2017. Patient has reported  history of metastatic breast cancer.  2. Patchy infiltrates bilateral lung bases concerning for pneumonia.  These are new since the prior study from 2017. Neoplastic infiltration  is considered less likely.  3. Irregularity enhancement pattern of liver could represent multiple  focal regenerating nodules that are too small to discretely measure,  geographic fatty infiltration and sparing or diffuse metastasis in the  liver. This finding is new since the prior study.  4. No other evidence for metastasis.    KEMAL BISWAS MD   XR Chest 2 Views    Narrative    CHEST TWO VIEW   9/22/2018 7:46 PM     HISTORY: Shortness of breath, neutropenia.     COMPARISON: Chest CT 11/30/2015.      Impression    IMPRESSION: Right IJ Port-A-Cath remains in place. Patchy airspace  opacities throughout the lungs particularly in the lower lung fields.  This may represent pneumonia. There may also be a background of  pulmonary nodules present, this would be better assessed with CT. No  significant pleural effusion or pneumothorax. Cardiac silhouette  borderline enlarged.    MD Natali CHAMPION MD

## 2018-09-25 NOTE — PLAN OF CARE
Problem: Patient Care Overview  Goal: Plan of Care/Patient Progress Review  OT- Orders received, chart reviewed. Pt remains intubated/sedated; will reschedule OT evaluation when medically appropriate and able to participate.

## 2018-09-25 NOTE — PROGRESS NOTES
"HEME-ONC PROGRESS NOTE    History: unobtainable.    Physical Exam:  VS: Blood pressure (!) 88/59, pulse 123, temperature 98.5  F (36.9  C), temperature source Axillary, resp. rate 26, height 1.676 m (5' 6\"), weight 60 kg (132 lb 4.4 oz), SpO2 92 %, not currently breastfeeding.  GEN- Intubated. Non-communicative.  CV- RRR no murmurs or rubs  LUNGS-Clear to auscultation bilaterally  ABD-soft  EXTREM- no clubbing, cyanosis, or new unequal edema  SKIN- no rashes, petechiae, or purpura    LABS:   CBC Results:   Recent Labs   Lab Test  09/25/18   0514   WBC  1.7*   RBC  2.68*   HGB  8.0*   HCT  24.8*   MCV  93   MCH  29.9   MCHC  32.3   RDW  21.6*   PLT  124*       Last Basic Metabolic Panel:  Lab Results   Component Value Date     09/25/2018      Lab Results   Component Value Date    POTASSIUM 3.0 09/25/2018     Lab Results   Component Value Date    CHLORIDE 102 09/25/2018     Lab Results   Component Value Date    REAGAN 7.5 09/25/2018     Lab Results   Component Value Date    CO2 28 09/25/2018     Lab Results   Component Value Date    BUN 17 09/25/2018     Lab Results   Component Value Date    CR 0.35 09/25/2018     Lab Results   Component Value Date     09/25/2018       Liver Function Studies:   Recent Labs   Lab Test  09/25/18   0514  09/24/18   0520   PROTTOTAL   --   5.3*   ALBUMIN   --   2.4*   BILITOTAL  9.3*  9.8*   ALKPHOS  409*  401*   AST  113*  145*   ALT   --   70*       Coagulation Studies:  Lab Results   Component Value Date    INR 1.15 09/22/2018     Imaging:   ECHO 9/24/18:  Study Date: 09/24/2018 10:51 AM  Age: 58 yrs  Gender: Female    BSA: 1.7 m2  Height: 66 in  Weight: 131 lb  HR: 93  BP: 113/71 mmHg     Interpretation Summary     The visual ejection fraction is estimated at 55-60%.  Left ventricular systolic function is normal.  There is mild to moderate (1-2+) mitral regurgitation.  Mild valvular aortic stenosis.  The ascending aorta is Mildly dilated.  On direct comparison with the echo " from 2/7/18, the right ventricular systolic  function appears less vigorous now than previously. The left ventricular  systolic function was more vigorous previously but todays LV systolic function  is still within normal limits.  The study was technically difficult.  __  Assessment & Plan: Assessment and Plan: 59 yo F with Metastatic Breast Cancer now here with respiratory failure, hepatoxicity (ETOH related) in the setting of pancytopenia after recent chemobiotherapy treatment intensification.    Per her Primary Oncologist Dr. Braun:  Eyal has a long history of metastatic HER2 positive breast cancer s/p multiple lines of treatment.  With recent progression of sternal metastases despite palliative radiation therapy, she was switched from Kadcyla to docetaxel, Herceptin, and Perjeta (has not had Perjeta before).  She received chemotherapy last Monday.  Over the weekend, she became progressively more tired and weaker.  She also developed worsening shortness of breath.  Evaluation in the ER demonstrated bilateral lower lung infiltrates concerning for volume overload.  She was given Lasix for diuresis and started on antibiotics for probable pneumonia.  She was also found to have significantly elevated liver enzymes and bilirubin likely due to hepatotoxicity from alcohol abuse and chemotherapy.     1)HEPATIC-  Hepatotoxicity from alcohol abuse in the setting of chemotherapy; LFTs stable from yesterday to today.     - Monitor LFTs daily  - If the patient recovers from this, would use paclitaxel (Taxol) instead of docetaxel (Taxotere) with the next cycle     2)PULM- Respiratory failure. Intubated. Likely due to volume overload and possible pneumonia. New echo shows no new cardiomyopathy or EF decline.  -PER ICU Intensivists/Hospitalists ; Continue diuresis  - Continue to treat with antibiotics for presumed pneumonia  -See echo ABOVE; Thus, Could likely continue Her-2 directed therapy if she recovers.    3)ONCOLOGY-  Metastatic breast cancer, ER weakly positive (5%), KS negative, HER2 positive; Has sternal metastases and possible metastasis in left 5th rib and bone mets alone the spine which were no longer FDG avid on PET/CT from 7/23  - If the patient recovers from the current hospital stay, would consider switching from docetaxel to paclitaxel given lower risk of hepatotoxicity, and continue with Herceptin/Perjeta vs. Herceptin/Perjeta alone.     4)HEMATOLOGY- Pancytopenia, chemotherapy-induced; Suspect myelotoxicity due to alcohol abuse.  - follow CBC;  - As did not receive any Neulasta last week, continue Neupogen daily until ANC >1.5  - Transfuse as needed to keep Hgb >7.0.  In the setting of respiratory failure, would to reasonable to target a higher Hgb goal.      Liban Townsend MD, MS  Hematologist-Oncologist  [facility]

## 2018-09-25 NOTE — PROGRESS NOTES
[facility]  Infectious Disease Progress Note          Assessment and Plan:   IMPRESSION:   1.  A 58-year-old female with widely metastatic long-term breast cancer, ongoing chemotherapy, and now neutropenia.   2.  Acute sepsis and neutropenic fever, some signs of pneumonia, with possible esophagitis and positive blood culture, at risk for multiple infections.   3.  Thrush and some concern for possible esophagitis.   4.  Acute and chronic long-term alcohol abuse and liver dysfunction.   5.  Prior Martha-Stafford tear.   6.  Respiratory failure, now intubated.   7.  Positive blood culture, 1 bottle for diphtheroids.  No further identification.  This could be a contaminant versus line infection.       RECOMMENDATIONS:   1.   Blood culture 1 lactobacillus contaminant unless multiple cultures positive or identification changes.   2.  Agree with vancomycin and Zosyn for now.   3.  From a thrush and possible fungal infection standpoint, agree fluconazole hold for now, but will do micafungin instead.  This is reasonable from a neutropenic fever standpoint anyway.   4.  Await further culture information and readjust treatment as needed.   5.  Obviously, prognosis poor for a multitude of reasons.         Interval History:   Intubated sedated WBC rising 1700 40% PMNs  No new + cxs              Medications:       dronabinol  5 mg Oral BID     folic acid  1 mg Oral or Feeding Tube Daily     heparin  5 mL Intracatheter Q28 Days     heparin lock flush  5-10 mL Intracatheter Q24H     lidocaine (viscous)  5 mL Topical Once     micafungin  100 mg Intravenous Q24H     multivitamins with minerals  15 mL Per Feeding Tube Daily     nystatin  500,000 Units Swish & Swallow 4x Daily     pantoprazole  40 mg Per Feeding Tube BID     piperacillin-tazobactam  3.375 g Intravenous Q6H     protein modular  2 packet Per Feeding Tube BID w/meals     thiamine  100 mg Oral or Feeding Tube Daily     vancomycin (VANCOCIN) IV  1,250  "mg Intravenous Q12H                  Physical Exam:   Blood pressure 103/58, pulse 123, temperature 98.5  F (36.9  C), temperature source Axillary, resp. rate 27, height 1.676 m (5' 6\"), weight 60 kg (132 lb 4.4 oz), SpO2 97 %, not currently breastfeeding.  Wt Readings from Last 2 Encounters:   09/25/18 60 kg (132 lb 4.4 oz)   10/23/17 57.6 kg (127 lb)     Vital Signs with Ranges  Temp:  [98.2  F (36.8  C)-99.7  F (37.6  C)] 98.5  F (36.9  C)  Heart Rate:  [] 84  Resp:  [14-35] 27  BP: ()/(49-81) 103/58  FiO2 (%):  [30 %-50 %] 30 %  SpO2:  [88 %-100 %] 97 %    Constitutional: Intubated and sedated   Lungs: Clear to auscultation bilaterally, no crackles or wheezing   Cardiovascular: Regular rate and rhythm, normal S1 and S2, and no murmur noted   Abdomen: Normal bowel sounds, soft, non-distended, non-tender   Skin: No rashes, no cyanosis, no edema   Other:           Data:   All microbiology laboratory data reviewed.  Recent Labs   Lab Test  09/25/18   0514  09/24/18   0520  09/23/18   1300  09/23/18   0620   WBC  1.7*  0.4*   --   0.3*   HGB  8.0*  7.6*  7.0*  7.0*   HCT  24.8*  23.4*   --   21.1*   MCV  93  94   --   96   PLT  124*  86*   --   71*     Recent Labs   Lab Test  09/25/18   0514  09/24/18   0520  09/23/18   0620   CR  0.35*  0.34*  0.34*     No lab results found.  Recent Labs   Lab Test  09/24/18   0945  09/22/18   1033  09/22/18   1021  06/15/18   1908  10/11/15   1610  10/11/15   1600  10/11/15   1430  09/25/15   1334  06/15/15   1711   CULT  Culture in progress  No growth after 3 days  Cultured on the 2nd day of incubation:  Lactobacillus species  Identification obtained by MALDI-TOF mass spectrometry research use only database. Test   characteristics determined and verified by the [facility]   ([facility]) [place], [place].  *  Critical Value/Significant Value, preliminary result only, called to and read back by  Kinjal Woodward RN at 1145 on 9/24/18 by JOSE ANTONIO.    " Susceptibility testing in progress  >100,000 colonies/mL  mixed urogenital zahra  Susceptibility testing not routinely done    No growth  No growth  <10,000 colonies/mL mixed urogenital zahra Susceptibility testing not routinely   done    >100,000 colonies/mL Escherichia coli*  No growth

## 2018-09-25 NOTE — PROGRESS NOTES
Pt maintained throughout the night on the ventilator.     Vent settings    Ventilation Mode: SIMV/PS  (Synchronized Intermittent Mandatory Ventilation with Pressure Support)  FiO2 (%): 40 %  Rate Set (breaths/minute): 12 breaths/min  Tidal Volume Set (mL): 450 mL  PEEP (cm H2O): 6 cmH2O  Pressure Support (cm H2O): 10 cmH2O  Oxygen Concentration (%): 100 %  Resp: 27       Pt's BS were coarse throughout the night.  Suctioned moderate amounts of white/yellow thick secretions inline through the ETT.     Will continue to follow and assess.     RT Adelaide on 9/25/2018 at 5:28 AM

## 2018-09-25 NOTE — PLAN OF CARE
Problem: Patient Care Overview  Goal: Plan of Care/Patient Progress Review  PT: Received orders for evaluation and treatment.  Patient currently intubated and sedated, not medically appropriate for therapy.  Will reschedule for 9/27 to reassess for medical appropriateness.

## 2018-09-25 NOTE — PROGRESS NOTES
Critical Care Progress Note      09/25/2018    Name: Eyal Turner MRN#: [id number]   Age: 58 year old YOB: 1960     Hsptl Day# 3  ICU DAY # 2    MV DAY # 2             Problem List:   Active Problems:    Acute liver failure           Summary/Hospital Course:     57 yo female with metastatic breast CA admitted to the ICU.  Admitted to the ICU overnight with worsening SOB, did not resolve with BiPAP and laisx.  Intubated x 24 hours, still requires low dose norepi,  Overall mental status has improved      Assessment and plan :     Eyal Turner IS a 58 year old female admitted on 9/22/2018 for hemodymanic and respiratory mangement secondary increased WOB. R/O fluid overload  Vs PNA.   I have personally reviewed the daily labs, imaging studies, cultures and discussed the case with referring physician and consulting physicians.     My assessment and plan by system for this patient is as follows:    Neurology/Psychiatry:   1. ETOH Use  2. Analgesia    Plan  -Will start precedex for sedation goal RASS of negative 1  -Monitor for signs of ETOH withdrawal  - PRN Fentanyl for pain given metastatic lesions  -On CIWA for possible withdrawal    Cardiovascular:   1.Hemodynamics -hypotensive  2.Rhythm -  tachy    Plan  -Concern hypotension secondary either to sedation vs early sepsis. No escalation in amount of pressors. Will fluid bolus this am in attempt to wean the patient from norepi    Pulmonary/Ventilator Management:   1. Airway intubated  2. Oxygenation/ventilation/mechanics on full ventilatory support of SIMV  Plan  - titrate Fio2 to maintain sat greater than 92%, currently tolerating CPAP, given improved mental status, would consider extubation if meets criteria    GI and Nutrition :   1. Concern for protein calorie malnutrition    Plan  -dubhoff placed continue on tubefeeds    Renal/Fluids/Electrolytes:   1. Cr 0.345  2. Hypokalemia, Hypomagnesium  3. Acid/base status  4. Volume status possible  fluid overlad  Plan    - monitor function and electrolytes as needed with replacement per ICU protocols.  - generally avoid nephrotoxic agents such as NSAID, IV contrast unless specifically required  - adjust medications as needed for renal clearance  - follow I/O's as appropriate.    Infectious Disease:   1. Concern for PNA  2. Gram positive in blood (doubt line infection)  3.  Plan  - obtain sputum culture and continue current antibiotics while awaiting culture results  -WBC has increased to 1.7    Endocrine:     1. Concern of stress induced hyperglycemia    Plan  - ICU insulin protocol, goal sugar <180      Hematology/Oncology:     1.chronic anemia secondary to CA, no signs, symptoms of active blood loss    Plan  - transfused one unit, will continue to follow, appropriate response to transfusion          IV/Access:   1. Venous access -  port  2. Arterial access -  none  3.  Plan  - central access required and necessary      ICU Prophylaxis:   1. DVT: mechanical  2. VAP: HOB 30 degrees, chlorhexidine rinse  3. Stress Ulcer: PPI  4. Restraints: Nonviolent soft two point restraints required and necessary for patient safety and continued cares and good effect as patient continues to pull at necessary lines, tubes despite education and distraction. Will readdress daily.   5. Wound care - per unit routine   6. Feeding - NPO  7. Family Update: d/w  fluid bolus and CPAP trial  8. Disposition - ICU        Key goals for next 24 hours:   1. Follow culture results  2. CPAP  3. Continue tubefeeds               Interim History:   CPAP trial           Key Medications:       albumin human  12.5 g Intravenous Once     dronabinol  5 mg Oral BID     folic acid  1 mg Oral or Feeding Tube Daily     heparin  5 mL Intracatheter Q28 Days     heparin lock flush  5-10 mL Intracatheter Q24H     lidocaine (viscous)  5 mL Topical Once     micafungin  100 mg Intravenous Q24H     multivitamins with minerals  15 mL Per Feeding Tube Daily      nystatin  500,000 Units Swish & Swallow 4x Daily     pantoprazole (PROTONIX) IV  40 mg Intravenous BID     piperacillin-tazobactam  3.375 g Intravenous Q6H     protein modular  2 packet Per Feeding Tube BID w/meals     thiamine  100 mg Oral or Feeding Tube Daily     vancomycin (VANCOCIN) IV  1,250 mg Intravenous Q12H       dexmedetomidine 0.7 mcg/kg/hr (09/25/18 0920)     IV fluid REPLACEMENT ONLY       IV fluid REPLACEMENT ONLY       fentaNYL 50 mcg/hr (09/25/18 0920)     norepinephrine 0.03 mcg/kg/min (09/25/18 0920)               Physical Examination:   Temp:  [98.2  F (36.8  C)-99.7  F (37.6  C)] 98.2  F (36.8  C)  Heart Rate:  [] 81  Resp:  [14-35] 25  BP: ()/(46-81) 93/54  FiO2 (%):  [30 %-50 %] 50 %  SpO2:  [88 %-100 %] 94 %    Intake/Output Summary (Last 24 hours) at 09/24/18 1115  Last data filed at 09/24/18 0800   Gross per 24 hour   Intake              250 ml   Output             2990 ml   Net            -2740 ml     Wt Readings from Last 4 Encounters:   09/25/18 60 kg (132 lb 4.4 oz)   10/23/17 57.6 kg (127 lb)   08/09/16 57.6 kg (127 lb)   08/01/16 57.6 kg (127 lb)     BP - Mean:  [] 70  Ventilation Mode: SIMV/PS  (Synchronized Intermittent Mandatory Ventilation with Pressure Support)  FiO2 (%): 50 %  Rate Set (breaths/minute): 12 breaths/min  Tidal Volume Set (mL): 450 mL  PEEP (cm H2O): 6 cmH2O  Pressure Support (cm H2O): 6 cmH2O  Oxygen Concentration (%): 50 %  Resp: 25    Recent Labs  Lab 09/24/18  0520 09/23/18  2330   PH  --  7.45   PCO2  --  40   PO2  --  27*   HCO3  --  27   O2PER 40% 50%       GEN: no acute distress, now. HEENT: head ncat, sclera anicteric, OP patent, trachea midline   PULM: unlabored synchronous with vent, clear anteriorly , coarse b/l breath sounds prior to intubation  CV/COR: RRR S1S2 no gallop,  No rub, no murmur  ABD: soft nontender, hypoactive bowel sounds, no mass  EXT:  Edema   warm  NEURO: grossly intact, awake alert following commands  SKIN: no  obvious rash  LINES: clean, dry intact         Data:   All data and imaging reviewed     ROUTINE ICU LABS (Last four results)  CMP  Recent Labs  Lab 09/25/18  0514 09/24/18  0920 09/24/18  0520 09/24/18  0315  09/23/18 0620 09/22/18  2130 09/22/18  1022     --  135  --   --  132*  --  127*   POTASSIUM 3.0* 3.9 3.2* 3.2*  < > 3.1*  --  3.8   CHLORIDE 102  --  101  --   --  100  --  94   CO2 28  --  26  --   --  23  --  24   ANIONGAP 7  --  8  --   --  9  --  9   *  --  83  --   --  81  --  103*   BUN 17  --  15  --   --  12  --  12   CR 0.35*  --  0.34*  --   --  0.34*  --  0.33*   GFRESTIMATED >90  --  >90  --   --  >90  --  >90   GFRESTBLACK >90  --  >90  --   --  >90  --  >90   REAGAN 7.5*  --  7.4*  --   --  7.3*  --  7.7*   MAG 1.5* 1.8  --   --   --  2.0 2.6* 1.4*   PHOS 1.3*  --  2.1*  --   --   --   --   --    PROTTOTAL  --   --  5.3*  --   --  5.3*  --  5.9*   ALBUMIN  --   --  2.4*  --   --  2.0*  --  2.4*   BILITOTAL 9.3*  --  9.8*  --   --  8.4*  --  8.3*   ALKPHOS 409*  --  401*  --   --  464*  --  594*   *  --  145*  --   --  182*  --  264*   ALT  --   --  70*  --   --  76*  --  94*   < > = values in this interval not displayed.  CBC  Recent Labs  Lab 09/25/18  0514 09/24/18  0520 09/23/18  1300 09/23/18  0620 09/22/18  1500   WBC 1.7* 0.4*  --  0.3*  --  0.3*   RBC 2.68* 2.50*  --  2.19*  --  2.07*   HGB 8.0* 7.6* 7.0* 7.0*  < > 6.7*   HCT 24.8* 23.4*  --  21.1*  --  20.6*   MCV 93 94  --  96  --  100   MCH 29.9 30.4  --  32.0  --  32.4   MCHC 32.3 32.5  --  33.2  --  32.5   RDW 21.6* 21.9*  --  18.6*  --  16.9*   * 86*  --  71*  --  63*   < > = values in this interval not displayed.  INR    Recent Labs  Lab 09/22/18  1500   INR 1.15*     Arterial Blood Gas    Recent Labs  Lab 09/24/18  0520 09/23/18  2330   PH  --  7.45   PCO2  --  40   PO2  --  27*   HCO3  --  27   O2PER 40% 50%       All cultures:    Recent Labs  Lab 09/24/18  0945 09/22/18  1033 09/22/18  1021   CULT  PENDING No growth after 3 days Cultured on the 2nd day of incubation:Gram positive rods*  Critical Value/Significant Value, preliminary result only, called to and read back byKinjal Woodward RN at 1145 on 9/24/18 by JOSE ANTONIO.     Recent Results (from the past 24 hour(s))   XR Abdomen Port 1 View    Narrative    XR ABDOMEN PORT 1 VW 9/24/2018 2:11 PM    HISTORY: Feeding tube placement.    COMPARISON: 7/23/2007    FINDINGS: The enteric tube tip appears to be in the proximal duodenum.  Gas pattern is nonobstructive.      Impression    IMPRESSION: Enteric tube tip appears to be in the proximal duodenum.    ESA STAHL MD         Billing: This patient is critically ill: Yes. Total critical care time today 31 min.    Patient with gram positive rods in blood from port.  Would recommend starting vanco/zosyn for line infection and consult IR for port removal       High Risk

## 2018-09-25 NOTE — PROGRESS NOTES
GI Chart Check     Patient remains intubated, now requiring pressor support.     Total bili starting to trend down along with LFTs. HGB stable without any notes of additional blood transfusions. No reports of overt GI bleeding.     Overall poor prognosis. If recovers, needs chem dep eval.     Will sign off, please call with questions.     Sommer Sinha, PAC  [facility]  Office: [phone]

## 2018-09-26 NOTE — PROGRESS NOTES
[facility] ICU RESPIRATORY NOTE  Date of Admission: 09/22/18  Date of Intubation (most recent):09/24/18  Pt remained on mechanical ventilator and the following settings this shift:  Mode: SIMV/PS  RR: 12  Vt: 450 mL  PEEP: 6  PS: 10  FIO2: 50%    Pressure support trial was started at 0550 and pt tolerating.  Mode: CPAP/PS  PEEP:6  PS:6  FIO2: 50%    Breath sounds were coarse throughout. A scant amount of thick, tan secretions were suctioned. Continue PS trial as patient tolerates and possibly extubate if stable.    Nikita Syed, RT on 9/26/2018 at 6:22 AM

## 2018-09-26 NOTE — PHARMACY-VANCOMYCIN DOSING SERVICE
Pharmacy Vancomycin Note  Date of Service 2018  Patient's  1960   58 year old, female    Indication: Bacteremia  Goal Trough Level: 15-20 mg/L  Day of Therapy: 2  Current Vancomycin regimen:  1250 mg IV q12h    Current estimated CrCl = Estimated Creatinine Clearance: 166 mL/min (based on Cr of 0.35).    Creatinine for last 3 days  2018:  6:20 AM Creatinine 0.34 mg/dL  2018:  5:20 AM Creatinine 0.34 mg/dL  2018:  5:14 AM Creatinine 0.35 mg/dL    Recent Vancomycin Levels (past 3 days)  2018:  1:16 AM Vancomycin Level 5.6 mg/L    Vancomycin IV Administrations (past 72 hours)                   vancomycin (VANCOCIN) 1,250 mg in sodium chloride 0.9 % 250 mL intermittent infusion (mg) 1,250 mg New Bag 18 1453     1,250 mg New Bag  0153     1,250 mg New Bag 18 1537                Nephrotoxins and other renal medications (Future)    Start     Dose/Rate Route Frequency Ordered Stop    18 0200  vancomycin (VANCOCIN) 1,250 mg in sodium chloride 0.9 % 250 mL intermittent infusion      1,250 mg  over 90 Minutes Intravenous EVERY 8 HOURS 18 0157      18 1215  piperacillin-tazobactam (ZOSYN) infusion 3.375 g      3.375 g  100 mL/hr over 30 Minutes Intravenous EVERY 6 HOURS 18 1205      18 1115  norepinephrine (LEVOPHED) 16 mg in D5W 250 mL infusion      0.03-0.4 mcg/kg/min × 59.7 kg  1.7-22.4 mL/hr  Intravenous CONTINUOUS 18 1101               Contrast Orders - past 72 hours (72h ago through future)    Start     Dose/Rate Route Frequency Ordered Stop    18 1130  perflutren diluted 1mL to 2mL with saline (OPTISON) diluted injection 3 mL      3 mL Intravenous ONCE 18 1120 18 1130          Interpretation of levels and current regimen:  Trough level is  Subtherapeutic    Has serum creatinine changed > 50% in last 72 hours: No    Urine output:  unable to determine    Renal Function: Stable    Plan:  1.  Increase Dose to 1250mg q 8  hours  2.  Pharmacy will check trough levels as appropriate in 1-3 Days.    3. Serum creatinine levels will be ordered daily for the first week of therapy and at least twice weekly for subsequent weeks.      Rhianna Curtis        .

## 2018-09-26 NOTE — PLAN OF CARE
Problem: Patient Care Overview  Goal: Plan of Care/Patient Progress Review  ICU End of Shift Summary.  For vital signs and complete assessments, please see documentation flowsheets.     Pertinent assessments: Pt lethargic with RASS -1 throughout the day, CPAP trial this afternoon tolerated well until pt became anxious and RR increased to 45-50, returned to SIMV, BP remains low with Levo titrated to keep MAP >65, currently Levo is at 0.05. Fentanyl for chronic pain. Multiple bruises on LE and Che area,  states the pt falls often.  at bedside throughout the day, updated by physicians, questions answered, will continue to monitor.   Major Shift Events:  requested to restrict the pt's parents from visiting, stating there was a family matter with a court case and the family cabin and wished they not visit the pt 2/2 adding added stress to the pt.  Pt's parents and sister arrived to the desk wanting to see the pt and became very upset that a restriction was in place. Nursing supervisor was called along with security to address the situation with the family.  The parents were very understanding but expressed concern over their daughters condition.  The  was asked to meet with the family at the far elevators with security present.  2 of the Pt's children asked the pt if she would like to see her Mother with a visible head nod to yes.  The children asked this writer to ask the pt if she would like to see her mother.  This writer asked the pt to squeeze the my hand if she would like to see her mother and a definite squeeze was felt along with an up & down head nod for yes.  The mother entered the room and shortly after the pt's father and sister entered.  The pt and family stayed calm with no anxiety added to the pt.   Plan (Upcoming Events): Wean Levo, wean Vent and extubate when able, pain management, e  Discharge/Transfer Needs: TBD    Bedside Shift Report Completed : yes  Bedside Safety Check  Completed: yes

## 2018-09-26 NOTE — PLAN OF CARE
Problem: Patient Care Overview  Goal: Plan of Care/Patient Progress Review  ICU End of Shift Summary.  For vital signs and complete assessments, please see documentation flowsheets.     Pertinent assessments: Ventilated at 50%. Drowsy but follows commands.Gray has had adequate urine output. CIWA score is 2. RAAS -1. Coughs with stimulation and has thick white secretions. Pt slept throughout night. Chlorhexidine bath was given. Pt. Denies pain. Continuous low blood pressure throughout the night on Levophed. Scattered bruising all over body. Reddened coccyx with blanchable erythema. Left back abrasion with foam dressing in place. LS: coarse. Lips and mouth have scabs that bleed during oral care.   Major Shift Events: Started Phosphorous replacement as it was 1.8. Levophed titrated down to a 0.03 and tolerated. Desated when repositioning patient but recovered with rest and suctioning.  Plan (Upcoming Events): CPAP trial today to evaluate readiness for extubation. Continue Antibiotics. Monitor labs. Continue ICU cares.  Discharge/Transfer Needs: TBD    Bedside Shift Report Completed : yes  Bedside Safety Check Completed: yes

## 2018-09-26 NOTE — PLAN OF CARE
Problem: Patient Care Overview  Goal: Plan of Care/Patient Progress Review  Outcome: No Change  ICU End of Shift Summary.  For vital signs and complete assessments, please see documentation flowsheets.     Pertinent assessments:   Major Shift Events: Distressed (RR 50's, 's, kicking, restless) with wean at start of shift and required ativan x1.  Resting, calm and withdrawn since.  Started on oral Seroquel.  Second wean performed around 1000 and she is currently tolerating wean.  Increased watery brown/green stool today-- per MD C-diff sent and pending- incontinent care peformed.  Tolerated PRBC.  Unable to wean off levophed.  Placed on pulsate air mattress 2/2 non-blanchable coxxyc.  Plan (Upcoming Events):   Discharge/Transfer Needs:     Bedside Shift Report Completed :   Bedside Safety Check Completed:

## 2018-09-26 NOTE — PLAN OF CARE
Problem: Alcohol Withdrawal Acute, Risk/Actual (Adult)  Goal: Signs and Symptoms of Listed Potential Problems Will be Absent, Minimized or Managed (Alcohol Withdrawal Acute, Risk/Actual)  Signs and symptoms of listed potential problems will be absent, minimized or managed by discharge/transition of care (reference Alcohol Withdrawal Acute, Risk/Actual (Adult) CPG).   Outcome: No Change  .Right wrist, Left wrist, Lap belt and soft restraints restraints continued 9/26/2018    Clinical Justification: Pulling lines, pulling tubes, and pulling equipment  Less Restrictive Alternative: Repositioning, Reorientation, De-escalation, Pain management, Re-evaluate equipment  Attending Physician Notified: Yes, Attending Physician's Name: Dennys   New orders placed Yes  Length of Order: 1 Day      Nesha Hurtado

## 2018-09-26 NOTE — PROGRESS NOTES
[facility]  Hospitalist Progress Note  Natali Fay MD 09/23/18    Reason for Stay (Diagnosis): neutropenic fever, acute liver failure         Assessment and Plan:      Summary of Stay: Eyal Turner is a 58 year old female with PMH including metastatic breast cancer with bone metastases to sternum and rib who started a new chemo regimen 5 days ago, alcohol abuse, malnutrition, pancytopenia, and HTN who presents with fatigue and shortness of breath.  Initially hypotensive that improved with IV fluids in the ED. Labs notable for acute liver failure with bilirubin of 8 and elevated transaminases.  Also found to be pancytopenic with neutropenia.  Hemoglobin was less than 7 she received 1 unit RBC transfusion in the ED. After admission she was found to have fever to 101 and chest x-ray obtained after admission showing bilateral infiltrate so started on cefepime for neutropenic fever and azithromycin added today.  A CT abdomen pelvis was obtained that did not show any ascites or obstructive liver disease.  Evidence for acute alcohol withdrawal initially with significant tremor that improved after a few doses of Ativan.  Overall withdrawal seems to be improving.  GI was consulted for possible upper GI bleed and for her liver failure.  No EGD planned.  Did start Protonix on admission, but no evidence for ongoing blood loss.  Oncology also consulted and recommended supportive cares.  PT, OT, dietitian consulted.  Overall patient is quite ill acutely and chronically.  Has received 2 units of RBCs and IV albumin replacement now for ongoing black stools and tachycardia.  She was transferred to ICU on 9/23/2018 for worsening hypoxemia.  With respiratory distress she was intubated 9/24/2018.  Blood culture grew gram-positive brayan from port.  ID was consulted.  Patient was switched to vancomycin and Zosyn.  Micafungin was also added for oral thrush.  Discussed with oncology was started on Neupogen for  neutropenia with good response.  Patient continues to be on norepinephrine.  We will transfuse 1 unit of PRBCs today    Problem List/Assessment and Plan:     Acute respiratory failure Secondary to sepsis with possible pneumonia  --Transferred to ICU with worsening hypoxemia on 9/23/2018  --Intubated 9/24/2018  --secondary to sepsis Pnemonia and vascular congestion  --Positive blood culture only 1 time likely contaminant  --Continue IV diuresis  --Antibiotics adjusted to vancomycin and Zosyn.  ID was consulted added micafungin  --Vent management per intensivist: Wean as able  --Cardiac echo showed EF of 55-60% with normal LV and RV function.    Acute liver failure: Bilirubin 9.3 today slightly lower /improving  --presenting with jaundice.  Bilirubin was up to 9.8 up from 2.4 three months ago.  Alk phos elevated 594, AST elevated 264, ALT elevated at 94.    --Does drink 2 bottles of wine daily so possible acute alcoholic hepatitis.  Also additionally started on chemotherapy with docetaxel, trastuzumab, and pertuzumab so possibility of drug toxicity although per oncology with this regimen there is not typically significant liver toxicity.    --Did not have a lot of abdominal pain although was a little tender in the upper quadrant.  Lipase was not elevated.  No obstructive process seen on CT.  Slight small nodularity and cannot rule out metastases but this seems less likely based on recent CT scan.  No history of cirrhosis on imaging as of a year ago.  Albumin is low.  Given her alcohol abuse suspect alcohol hepatitis playing a role here.  -GI consulted, at this point holding off on any steroids for possible alcoholic hepatitis due to neutropenic fever     Alcohol dependence with acute withdrawal: Has been drinking 2 bottles of wine daily for some time.  Last use 1-2 days ago.  Presenting in alcohol withdrawal with tremor.  -CIWA with Ativan as needed, only received a few doses of Ativan and is looking better.  - IV  vitamins  -Now on Precedex     Sepsis on admission with Neutropenic fever, ? Port infection ?suspect CAP, acute hypoxemic respiratory failure: Weakness and shortness of breath as primary symptoms.  Developed intermittent fevers above 101  while here.  Recently completed a course of Keflex for possible chest wall sinusitis of the side of her metastasis, does not look acutely infected now.    --Denied any diarrhea so C. difficile unlikely.  Urinary symptoms.      --Chest x-ray was obtained that shows bilateral infiltrates so we will treat as CAP.  Now hypoxemic requiring 5 L oxygen.  -Was initially on cefepime for neutropenic fever and  azithromycin for possible CAP based on infiltrates on x-ray  -Blood cultures obtained  -- gram positive rods on culture ID consulted.  Appreciate input  --Was switched to vancomycin and Zosyn on 9/24/ 2018 and was started on micafungin for thrush  -12.5 g albumin IV every 8 hours ×3.  Started 9/23/2018  -With her acute liver failure and thrombocytopenia/anemia with likely GI bleed unfortunately cannot give acetaminophen or NSAIDs for fever.  Ice packs as needed  -- started on Nupogen with good response WBC count up to 7.3.  DC Neupogen     Acute on chronic pancytopenia with neutropenia: On admission WBC 0.5 with ANC 0.4, hemoglobin 6.9, and platelets 63.  Hemoglobin was 11.4 three months ago and platelets were 108.  Suspect acute drop is from her chemotherapy she started this last Monday.  Also possible upper GI bleed as below.  -Received 1 unit RBCs 9/22  -Just had black stools now and hemoglobin 7.0 received 2 units of PRBCs  --Additional PRBC unit today as patient remains on norepinephrine  -CBC with differential tomorrow     Metastatic breast cancer: Initially diagnosed in 2000 periods S/B bilateral mastectomy.  Has undergone multiple rounds of chemotherapy and radiation.  Has known metastatic disease to her sternum and third left rib.  Had recent possible skin infection from the  sternum met/mass and received Keflex.  Does not look actively infected at this time.  Has port in place.  Restarted on chemotherapy with docetaxel, trastuzumab, and pertuzumab on 9/17/18 per patient.  Follows with Dr. Braun of oncology.  Doing quite poorly following chemotherapy with poor oral intake, weakness, and now acute liver failure.  -[place] oncology consulted, appreciate recommendations  -Now intubated and sedated on fentanyl     Acute hyponatremia: Sodium 127 on admit.    --Suspect hypovolemia due to decreased oral intake.     -- Improved with IV fluids up to 132.     Mucositis versus thrush and possible Candida esophagitis: Patient erosive changes and plaque on the tongue and her soft palate.  Question whether this is mucositis from her docetaxel or Candida infection.  Sore throat for the past week so possible esophagitis as well.  -Swish and swallow nystatin 4 times daily for thrush initially now n.p.o. and intubated  -Management would typically be systemic Diflucan in case of esophagitis however some risk in doing this due to her acutely elevated bilirubin and transaminases with azole therapy.  Received Diflucan 2 mg in the ED.  Given liver toxicity will hold on further treatment as this may just be mucositis  -GI not planning EGD to investigate for esophagitis    Possible UGIB: Reports of vomiting with red blood in it.  Denies any melena or hematochezia.  Hemoglobin is just below 7 and platelets are also low which may be from chemo or some bleeding.  No bleeding seen here and hemoglobin appears relatively stable after transfusion.  -Twice daily IV Protonix  -GI not planning an EGD   --There is no evidence of bleeding however continues to require norepinephrine and hemoglobin 7.8 we will transfuse 1 unit of PRBCs today     Hypokalemia and hypomagnesemia:   --Potassium and magnesium replacement protocol.     Suspect severe malnutrition:   --Evidence for fat loss on exam likely related to chronic alcohol use,  "metastatic breast cancer, and recent chemotherapy.  Suspect severe malnutrition.  -Dietitian consult  -Continue PTA 5 mg twice daily dronabinol as appetite stimulant  --Feeding tube as intubated    Physical deconditioning: Malaise weakness was not able to get out of bed.  Likely combination of malnutrition, metastatic cancer, recent chemotherapy, and ongoing alcohol abuse/dependence.  -PT/OT consult     DVT Prophylaxis: Pneumatic Compression Devices, no heparin due to thrombocytopenia and anemia  Code Status: Full Code  FEN: NPO given ongoing black stools and worsening respiratory statust, IV albumin every 8 hours ×3.   Discharge Dispo: To be decided now intubated  Estimated Disch Date / # of Days until Disch: TBD        Interval History (Subjective):      Patient is intubated and vented.  Unable to get review of system.                   Physical Exam:      Last Vital Signs:  BP 93/62  Pulse 123  Temp 98.4  F (36.9  C) (Axillary)  Resp 28  Ht 1.676 m (5' 6\")  Wt 60.4 kg (133 lb 2.5 oz)  SpO2 94%  BMI 21.49 kg/m2      Intake/Output Summary (Last 24 hours) at 09/23/18 1326  Last data filed at 09/23/18 0831   Gross per 24 hour   Intake             1123 ml   Output              450 ml   Net              673 ml     Constitutional: Sedated intubated  Eyes: Scleral icterus  HEENT: Dry mucous membranes.  tongue ulceration/plaque  Respiratory:  few crackles bilaterally without wheeze  Cardiovascular: regular tachycardia.  No murmur   GI:  non-tender to palpation, BS present  Skin: Jaundiced, multiple ecchymoses on extremities and some scabbed lesions on the legs  Musculoskeletal/extremities: Trace bilateral lower extremity edema  Neurologic:  Sedated intubated unable to assess  Psychiatric:  Unable to assess         Medications:      All current medications were reviewed with changes reflected in problem list.  Current Facility-Administered Medications   Medication     dexmedetomidine (PRECEDEX) 400 mcg in sodium " chloride 0.9 % 100 mL infusion     dextrose 10 % 1,000 mL infusion     dextrose 10 % 1,000 mL infusion     dronabinol (MARINOL) capsule 5 mg     fentaNYL (PF) (SUBLIMAZE) injection 50 mcg     fentaNYL (SUBLIMAZE) infusion     folic acid (FOLVITE) tablet 1 mg     heparin 100 UNIT/ML injection 5 mL     heparin lock flush 10 UNIT/ML injection 3 mL     heparin lock flush 10 UNIT/ML injection 5-10 mL     heparin lock flush 10 UNIT/ML injection 5-10 mL     lidocaine (viscous) (XYLOCAINE) 2 % solution 5 mL     LORazepam (ATIVAN) tablet 1-2 mg    Or     LORazepam (ATIVAN) injection 1-2 mg     magic mouthwash suspension (diphenhydramine, lidocaine, aluminum-magnesium & simethicone)     magnesium sulfate 4 g in 100 mL sterile water (premade)     melatonin tablet 1 mg     micafungin (MYCAMINE) 100 mg in sodium chloride 0.9 % 100 mL intermittent infusion     multivitamins with minerals (CERTAVITE/CEROVITE) liquid 15 mL     naloxone (NARCAN) injection 0.1-0.4 mg     naloxone (NARCAN) injection 0.1-0.4 mg     norepinephrine (LEVOPHED) 16 mg in D5W 250 mL infusion     nystatin (MYCOSTATIN) suspension 500,000 Units     ondansetron (ZOFRAN-ODT) ODT tab 4 mg    Or     ondansetron (ZOFRAN) injection 4 mg     oxyCODONE IR (ROXICODONE) tablet 5 mg     pantoprazole (PROTONIX) 2 mg/mL suspension 40 mg     piperacillin-tazobactam (ZOSYN) infusion 3.375 g     polyethylene glycol (MIRALAX/GLYCOLAX) Packet 17 g     potassium chloride (KLOR-CON) Packet 20-40 mEq     potassium chloride 10 mEq in 100 mL intermittent infusion with 10 mg lidocaine     potassium chloride 10 mEq in 100 mL sterile water intermittent infusion (premix)     potassium chloride 20 mEq in 50 mL intermittent infusion     potassium chloride SA (K-DUR/KLOR-CON M) CR tablet 20-40 mEq     potassium phosphate 15 mmol in D5W 250 mL intermittent infusion     potassium phosphate 20 mmol in D5W 250 mL intermittent infusion     potassium phosphate 20 mmol in D5W 500 mL intermittent  infusion     potassium phosphate 25 mmol in D5W 500 mL intermittent infusion     prochlorperazine (COMPAZINE) injection 10 mg    Or     prochlorperazine (COMPAZINE) tablet 10 mg    Or     prochlorperazine (COMPAZINE) Suppository 25 mg     protein modular (PROSource TF) 2 packet     sodium chloride (PF) 0.9% PF flush 10-20 mL     sodium chloride (PF) 0.9% PF flush 10-20 mL     thiamine tablet 100 mg     vancomycin (VANCOCIN) 1,250 mg in sodium chloride 0.9 % 250 mL intermittent infusion            Data:      All new lab and imaging data was reviewed.   Labs:    Recent Labs  Lab 09/24/18  0945 09/22/18  1033 09/22/18  1021   CULT Culture in progress No growth after 4 days Cultured on the 2nd day of incubation:Lactobacillus speciesIdentification obtained by MALDI-TOF mass spectrometry research use only database. Test characteristics determined and verified by the [facility] ([facility]) [place], [place].*  Critical Value/Significant Value, preliminary result only, called to and read back byKinjal Woodward RN at 1145 on 9/24/18 by JOSE ANTONIO.  Susceptibility testing in progress       Recent Labs  Lab 09/26/18  0636 09/25/18  0514 09/24/18  0520   WBC 7.3 1.7* 0.4*   HGB 7.8* 8.0* 7.6*   HCT 24.4* 24.8* 23.4*   MCV 94 93 94   * 124* 86*       Recent Labs  Lab 09/26/18  0636 09/26/18  0116 09/25/18  1640 09/25/18  1410 09/25/18  0514 09/24/18  0920 09/24/18  0520  09/23/18  0620  09/22/18  1022     --   --   --  137  --  135  --  132*  --  127*   POTASSIUM 3.6  --   --  3.5 3.0* 3.9 3.2*  < > 3.1*  --  3.8   CHLORIDE 105  --   --   --  102  --  101  --  100  --  94   CO2 27  --   --   --  28  --  26  --  23  --  24   ANIONGAP 7  --   --   --  7  --  8  --  9  --  9   *  --   --   --  124*  --  83  --  81  --  103*   BUN 19  --   --   --  17  --  15  --  12  --  12   CR 0.34*  --   --   --  0.35*  --  0.34*  --  0.34*  --  0.33*   GFRESTIMATED >90  --   --   --  >90  --  >90  --  >90   --  >90   GFRESTBLACK >90  --   --   --  >90  --  >90  --  >90  --  >90   REAGAN 7.5*  --   --   --  7.5*  --  7.4*  --  7.3*  --  7.7*   MAG  --   --  2.6*  --  1.5* 1.8  --   --  2.0  < > 1.4*   PHOS  --  1.8* 1.7*  --  1.3*  --  2.1*  < >  --   --   --    PROTTOTAL  --   --   --   --   --   --  5.3*  --  5.3*  --  5.9*   ALBUMIN  --   --   --   --   --   --  2.4*  --  2.0*  --  2.4*   BILITOTAL  --   --   --   --  9.3*  --  9.8*  --  8.4*  --  8.3*   ALKPHOS  --   --   --   --  409*  --  401*  --  464*  --  594*   AST  --   --   --   --  113*  --  145*  --  182*  --  264*   ALT  --   --   --   --   --   --  70*  --  76*  --  94*   < > = values in this interval not displayed.   Imaging:   Recent Results (from the past 24 hour(s))   CT Abdomen Pelvis w Contrast    Narrative    CT ABDOMEN AND PELVIS WITH CONTRAST  9/22/2018 6:10 PM    HISTORY: Acute liver failure based on labs.  Some upper abdominal  pain.     TECHNIQUE: Scans obtained from the diaphragm through the pelvis with  IV contrast, 64 mL Isovue-370.   Radiation dose for this scan was reduced using automated exposure  control, adjustment of the mA and/or kV according to patient size, or  iterative reconstruction technique.    COMPARISON:  CT abdomen and pelvis dated 5/8/2017.    FINDINGS: Patchy infiltrates are seen throughout the bilateral  visualized portions of the lung bases. This could represent infectious  or inflammatory infiltrates. Alveolar proteinosis is also in the  differential. No significant pleural effusion is identified. There are  coronary artery calcifications. Aortic calcifications are also noted.  There is a probable small hiatal hernia. Visualized mediastinal  contents are otherwise unremarkable. There are bilateral breast  prostheses.    Degenerative changes are seen in the spine. This is worst at L4-5.  Extensive sclerosis of the sternum is noted but there is also a  blastic lesion in the L5. These could represent metastases.  Recommend  clinical correlation for possible history of breast cancer in the  presence of possible blastic metastases.    The liver enhances heterogeneously. This could represent innumerable  areas of nodular regeneration. Metastasis are difficult to exclude.    There is minimal thickening left adrenal gland, similar to the prior  study dated 5/8/2017 likely representing adenomatous change.  Calcifications are seen in the spleen indicating chronic granulomatous  disease. Otherwise, the gallbladder, pancreas, spleen, bilateral  adrenal glands and bilateral kidneys otherwise enhance normally.  Urinary bladder is distended, but otherwise unremarkable. No  hydronephrosis, nephrolithiasis, hydroureter or ureteral calculus is  seen.    There is a small hiatal hernia. No adenopathy, free fluid or free air  is seen in the peritoneal cavity. There is nonaneurysmal  atherosclerosis. The colon is grossly of normal caliber. There are a  few scattered diverticuli in the colon, most predominantly seen in the  sigmoid colon. No pericolonic inflammatory change to suggest acute  diverticulitis. Appendix is not well seen. No evidence for pericecal  inflammatory change to suggest acute appendicitis.    There is focal thickening of the gastric antral wall (image 38 series  2 and image 33 series 3). This is of uncertain etiology and may be  better seen with direct visualization. (Upper endoscopy). Stomach and  small bowel are otherwise unremarkable.      Impression    IMPRESSION:  1. Blastic metastasis in L5 and in the sternum are again noted. These  were also seen on the prior study dated 5/8/2017. Patient has reported  history of metastatic breast cancer.  2. Patchy infiltrates bilateral lung bases concerning for pneumonia.  These are new since the prior study from 2017. Neoplastic infiltration  is considered less likely.  3. Irregularity enhancement pattern of liver could represent multiple  focal regenerating nodules that are too  small to discretely measure,  geographic fatty infiltration and sparing or diffuse metastasis in the  liver. This finding is new since the prior study.  4. No other evidence for metastasis.    KEMAL BISWAS MD   XR Chest 2 Views    Narrative    CHEST TWO VIEW   9/22/2018 7:46 PM     HISTORY: Shortness of breath, neutropenia.     COMPARISON: Chest CT 11/30/2015.      Impression    IMPRESSION: Right IJ Port-A-Cath remains in place. Patchy airspace  opacities throughout the lungs particularly in the lower lung fields.  This may represent pneumonia. There may also be a background of  pulmonary nodules present, this would be better assessed with CT. No  significant pleural effusion or pneumothorax. Cardiac silhouette  borderline enlarged.    MD Natali CHAMPION MD

## 2018-09-26 NOTE — PROGRESS NOTES
[facility]  Infectious Disease Progress Note          Assessment and Plan:   IMPRESSION:   1.  A 58-year-old female with widely metastatic long-term breast cancer, ongoing chemotherapy, and now neutropenia.   2.  Acute sepsis and neutropenic fever, some signs of pneumonia, with possible esophagitis and positive blood culture, at risk for multiple infections.   3.  Thrush and some concern for possible esophagitis.   4.  Acute and chronic long-term alcohol abuse and liver dysfunction.   5.  Prior Martha-Stafford tear.   6.  Respiratory failure, now intubated.   7.  Positive blood culture, 1 bottle for diphtheroids.  No further identification.  This could be a contaminant versus line infection.       RECOMMENDATIONS:   1.   Blood culture 1 lactobacillus contaminant unless multiple cultures positive or identification changes.   2.   vancomycin and Zosyn for now. Likely taper soon  3.  From a thrush and possible fungal infection standpoint, agree fluconazole hold for now, but will do micafungin instead.  This is reasonable from a neutropenic fever standpoint anyway.   4.  WBC rising, fever resolved  5.  Obviously, prognosis poor for a multitude of reasons.         Interval History:   Intubated awake WBC rising to nl range incl PMNs  No new + cxs T down              Medications:       bacitracin   Topical BID     dronabinol  5 mg Oral BID     folic acid  1 mg Oral or Feeding Tube Daily     heparin  5 mL Intracatheter Q28 Days     heparin lock flush  5-10 mL Intracatheter Q24H     lidocaine (viscous)  5 mL Topical Once     micafungin  100 mg Intravenous Q24H     multivitamins with minerals  15 mL Per Feeding Tube Daily     nystatin  500,000 Units Swish & Swallow 4x Daily     pantoprazole  40 mg Per Feeding Tube BID     piperacillin-tazobactam  3.375 g Intravenous Q6H     protein modular  2 packet Per Feeding Tube BID w/meals     QUEtiapine  25 mg Oral or Feeding Tube BID     vancomycin (VANCOCIN) IV   "1,250 mg Intravenous Q8H                  Physical Exam:   Blood pressure (!) 84/52, pulse 123, temperature 97.4  F (36.3  C), temperature source Axillary, resp. rate 15, height 1.676 m (5' 6\"), weight 60.4 kg (133 lb 2.5 oz), SpO2 91 %, not currently breastfeeding.  Wt Readings from Last 2 Encounters:   09/26/18 60.4 kg (133 lb 2.5 oz)   10/23/17 57.6 kg (127 lb)     Vital Signs with Ranges  Temp:  [97.3  F (36.3  C)-98.8  F (37.1  C)] 97.4  F (36.3  C)  Heart Rate:  [75-97] 76  Resp:  [15-93] 15  BP: ()/(50-82) 84/52  FiO2 (%):  [50 %] 50 %  SpO2:  [87 %-100 %] 91 %    Constitutional: Intubated and sedated   Lungs: Clear to auscultation bilaterally, no crackles or wheezing   Cardiovascular: Regular rate and rhythm, normal S1 and S2, and no murmur noted   Abdomen: Normal bowel sounds, soft, non-distended, non-tender   Skin: No rashes, no cyanosis, no edema   Other:           Data:   All microbiology laboratory data reviewed.  Recent Labs   Lab Test  09/26/18   0636  09/25/18   0514  09/24/18   0520   WBC  7.3  1.7*  0.4*   HGB  7.8*  8.0*  7.6*   HCT  24.4*  24.8*  23.4*   MCV  94  93  94   PLT  127*  124*  86*     Recent Labs   Lab Test  09/26/18   0636  09/25/18   0514  09/24/18   0520   CR  0.34*  0.35*  0.34*     No lab results found.  Recent Labs   Lab Test  09/24/18   0945  09/22/18   1033  09/22/18   1021  06/15/18   1908  10/11/15   1610  10/11/15   1600  10/11/15   1430  09/25/15   1334  06/15/15   1711   CULT  Light growth  Candida albicans / dubliniensis  Candida albicans and Candida dubliniensis are not routinely speciated  Susceptibility testing not routinely done  *  No growth after 4 days  Cultured on the 2nd day of incubation:  Lactobacillus species  Identification obtained by MALDI-TOF mass spectrometry research use only database. Test   characteristics determined and verified by the Infectious Diseases Diagnostic Laboratory   ([facility]) [place], [place].  *  Critical Value/Significant Value, " preliminary result only, called to and read back by  Kinjal Woodward RN at 1145 on 9/24/18 by JOSE ANTONIO.    >100,000 colonies/mL  mixed urogenital zahra  Susceptibility testing not routinely done    No growth  No growth  <10,000 colonies/mL mixed urogenital zahra Susceptibility testing not routinely   done    >100,000 colonies/mL Escherichia coli*  No growth

## 2018-09-26 NOTE — PROGRESS NOTES
Critical Care Progress Note      09/26/2018    Name: Eyal Turner MRN#: [id number]   Age: 58 year old YOB: 1960     Hsptl Day# 4  ICU DAY # 3    MV DAY # 3             Problem List:   Active Problems:    Acute liver failure           Summary/Hospital Course:     59 yo female with metastatic breast CA admitted to the ICU.  Admitted to the ICU overnight with worsening SOB, did not resolve with BiPAP and laisx.  Events of last 24 hours significant for patient tolerating weaning to CPAP however when patient was placed on CPAP this a.m. by respiratory therapy patient became agitated with a respiratory rate in the 50s.  The patient was placed back on full mechanical ventilatory support.  I placed the patient on pressure support ventilation of 12 this a.m. and patient is tolerating well    Assessment and plan :     Eyal Turner IS a 58 year old female admitted on 9/22/2018 for hemodymanic and respiratory mangement secondary increased WOB. R/O fluid overload  Vs PNA.   I have personally reviewed the daily labs, imaging studies, cultures and discussed the case with referring physician and consulting physicians.     My assessment and plan by system for this patient is as follows:    Neurology/Psychiatry:   1. ETOH Use  2. Analgesia    Plan  -Will start precedex for sedation goal RASS of negative 1  -Monitor for signs of ETOH withdrawal  - PRN Fentanyl for pain given metastatic lesions  -Continue Yeni protocol    Cardiovascular:   1.Hemodynamics -hypotensive  2.Rhythm -  tachy    Plan  -Concern hypotension secondary either to sedation vs  Sepsis.   Patient continues to require low-dose norepinephrine I would transfuse the patient 1 unit of packed red blood cells this morning in hopes to wean the patient off norepinephrine    Pulmonary/Ventilator Management:   1. Airway intubated  2. Oxygenation/ventilation/mechanics on full ventilatory support of SIMV  Plan  -Wean pressure support as tolerated with  goal of reaching CPAP 6/6    GI and Nutrition :   1. Concern for protein calorie malnutrition    Plan  -dubhoff placed continue on tubefeeds    Renal/Fluids/Electrolytes:   1. Cr 0.345  2. Hypokalemia, Hypomagnesium  3. Acid/base status  4. Volume status possible fluid overlad  Plan  -Patient continues to be hypokalemic we will continue to replete  - monitor function and electrolytes as needed with replacement per ICU protocols.  - generally avoid nephrotoxic agents such as NSAID, IV contrast unless specifically required  - adjust medications as needed for renal clearance  - follow I/O's as appropriate.    Infectious Disease:   1. Concern for PNA  2. Gram positive in blood (doubt line infection)  3.  Plan  - obtain sputum culture and continue current antibiotics while awaiting culture results  -WBC has increased to 7  -Appreciate infectious disease and    Endocrine:     1. Concern of stress induced hyperglycemia    Plan  - ICU insulin protocol, goal sugar <180      Hematology/Oncology:     1.chronic anemia secondary to CA, no signs, symptoms of active blood loss    Plan  -Given continued requirements for norepinephrine will transfuse given hemoglobin of 7.8          IV/Access:   1. Venous access -  port  2. Arterial access -  none  3.  Plan  - central access required and necessary      ICU Prophylaxis:   1. DVT: mechanical  2. VAP: HOB 30 degrees, chlorhexidine rinse  3. Stress Ulcer: PPI  4. Restraints: Nonviolent soft two point restraints required and necessary for patient safety and continued cares and good effect as patient continues to pull at necessary lines, tubes despite education and distraction. Will readdress daily.   5. Wound care - per unit routine   6. Feeding -continue tube feeds  7. Family Update: Discussed with  and patient's mother CPAP trial and need for transfusion  8. Disposition - ICU        Key goals for next 24 hours:   1.   wean to CPAP  2.  Transfuse when  3.  Follow culture  results               Interim History:   CPAP trial           Key Medications:       bacitracin   Topical BID     dronabinol  5 mg Oral BID     folic acid  1 mg Oral or Feeding Tube Daily     heparin  5 mL Intracatheter Q28 Days     heparin lock flush  5-10 mL Intracatheter Q24H     lidocaine (viscous)  5 mL Topical Once     micafungin  100 mg Intravenous Q24H     multivitamins with minerals  15 mL Per Feeding Tube Daily     nystatin  500,000 Units Swish & Swallow 4x Daily     pantoprazole  40 mg Per Feeding Tube BID     piperacillin-tazobactam  3.375 g Intravenous Q6H     protein modular  2 packet Per Feeding Tube BID w/meals     vancomycin (VANCOCIN) IV  1,250 mg Intravenous Q8H       dexmedetomidine 0.7 mcg/kg/hr (09/26/18 0813)     IV fluid REPLACEMENT ONLY       IV fluid REPLACEMENT ONLY       fentaNYL 50 mcg/hr (09/26/18 0828)     norepinephrine 0.03 mcg/kg/min (09/26/18 0813)               Physical Examination:   Temp:  [98.3  F (36.8  C)-98.8  F (37.1  C)] 98.8  F (37.1  C)  Heart Rate:  [78-93] 79  Resp:  [24-55] 24  BP: ()/(49-82) 97/58  FiO2 (%):  [30 %-50 %] 50 %  SpO2:  [87 %-100 %] 97 %    Intake/Output Summary (Last 24 hours) at 09/24/18 1115  Last data filed at 09/24/18 0800   Gross per 24 hour   Intake              250 ml   Output             2990 ml   Net            -2740 ml     Wt Readings from Last 4 Encounters:   09/26/18 60.4 kg (133 lb 2.5 oz)   10/23/17 57.6 kg (127 lb)   08/09/16 57.6 kg (127 lb)   08/01/16 57.6 kg (127 lb)     BP - Mean:  [] 79  Ventilation Mode: SIMV/PS  (Synchronized Intermittent Mandatory Ventilation with Pressure Support)  FiO2 (%): 50 %  Rate Set (breaths/minute): 12 breaths/min  Tidal Volume Set (mL): 450 mL  PEEP (cm H2O): 6 cmH2O  Pressure Support (cm H2O): 10 cmH2O  Oxygen Concentration (%): 50 %  Resp: 24    Recent Labs  Lab 09/24/18  0520 09/23/18  2330   PH  --  7.45   PCO2  --  40   PO2  --  27*   HCO3  --  27   O2PER 40% 50%       GEN: no acute  distress, now   HEENT: head ncat, sclera anicteric, OP patent, trachea midline   PULM: unlabored synchronous with vent, clear anteriorly , coarse b/l breath sounds prior to intubation  CV/COR: RRR S1S2 no gallop,  No rub, no murmur  ABD: soft nontender, hypoactive bowel sounds, no mass  EXT:  Edema   warm  NEURO: grossly intact, awake alert following commands  SKIN: no obvious rash  LINES: clean, dry intact         Data:   All data and imaging reviewed     ROUTINE ICU LABS (Last four results)  CMP  Recent Labs  Lab 09/26/18  0636 09/26/18  0116 09/25/18  1640 09/25/18  1410 09/25/18  0514 09/24/18  0920 09/24/18  0520  09/23/18  0620  09/22/18  1022     --   --   --  137  --  135  --  132*  --  127*   POTASSIUM 3.6  --   --  3.5 3.0* 3.9 3.2*  < > 3.1*  --  3.8   CHLORIDE 105  --   --   --  102  --  101  --  100  --  94   CO2 27  --   --   --  28  --  26  --  23  --  24   ANIONGAP 7  --   --   --  7  --  8  --  9  --  9   *  --   --   --  124*  --  83  --  81  --  103*   BUN 19  --   --   --  17  --  15  --  12  --  12   CR 0.34*  --   --   --  0.35*  --  0.34*  --  0.34*  --  0.33*   GFRESTIMATED >90  --   --   --  >90  --  >90  --  >90  --  >90   GFRESTBLACK >90  --   --   --  >90  --  >90  --  >90  --  >90   REAGAN 7.5*  --   --   --  7.5*  --  7.4*  --  7.3*  --  7.7*   MAG  --   --  2.6*  --  1.5* 1.8  --   --  2.0  < > 1.4*   PHOS  --  1.8* 1.7*  --  1.3*  --  2.1*  --   --   --   --    PROTTOTAL  --   --   --   --   --   --  5.3*  --  5.3*  --  5.9*   ALBUMIN  --   --   --   --   --   --  2.4*  --  2.0*  --  2.4*   BILITOTAL 7.6*  --   --   --  9.3*  --  9.8*  --  8.4*  --  8.3*   ALKPHOS  --   --   --   --  409*  --  401*  --  464*  --  594*   AST 97*  --   --   --  113*  --  145*  --  182*  --  264*   ALT 57*  --   --   --   --   --  70*  --  76*  --  94*   < > = values in this interval not displayed.  CBC    Recent Labs  Lab 09/26/18  0636 09/25/18  0514 09/24/18  0520 09/23/18  1300  09/23/18  0620   WBC 7.3 1.7* 0.4*  --  0.3*   RBC 2.61* 2.68* 2.50*  --  2.19*   HGB 7.8* 8.0* 7.6* 7.0* 7.0*   HCT 24.4* 24.8* 23.4*  --  21.1*   MCV 94 93 94  --  96   MCH 29.9 29.9 30.4  --  32.0   MCHC 32.0 32.3 32.5  --  33.2   RDW 21.8* 21.6* 21.9*  --  18.6*   * 124* 86*  --  71*     INR    Recent Labs  Lab 09/22/18  1500   INR 1.15*     Arterial Blood Gas    Recent Labs  Lab 09/24/18  0520 09/23/18  2330   PH  --  7.45   PCO2  --  40   PO2  --  27*   HCO3  --  27   O2PER 40% 50%       All cultures:    Recent Labs  Lab 09/24/18  0945 09/22/18  1033 09/22/18  1021   CULT Light growthYeast* No growth after 4 days Cultured on the 2nd day of incubation:Lactobacillus speciesIdentification obtained by MALDI-TOF mass spectrometry research use only database. Test characteristics determined and verified by the Infectious Diseases Diagnostic Laboratory ([facility]) [place], [place].*  Critical Value/Significant Value, preliminary result only, called to and read back byKinjal Woodward RN at 1145 on 9/24/18 by JOSE ANTONIO.     No results found for this or any previous visit (from the past 24 hour(s)).      Billing: This patient is critically ill: Yes. Total critical care time today 32 min.

## 2018-09-26 NOTE — PROGRESS NOTES
RT- Patient placed on a vent weaning trial at 0550 this morning on 6/6 50%, RR increased to the 40's around 0745, increased to 10/6 and RR down to 26-28. Will continue to monitor. At 0830 patient was breathing in the high 40's to 50's so switched back to SIMV at that time.

## 2018-09-26 NOTE — PROGRESS NOTES
Oncology/Hematology Follow Up Note:    Assessment and Plan:    Assessment and Plan: 57 yo F with Metastatic Breast Cancer now here with respiratory failure, hepatoxicity (ETOH related) in the setting of pancytopenia after recent chemobiotherapy treatment intensification.     Per her Primary Oncologist Dr. Braun:  Eyal has a long history of metastatic HER2 positive breast cancer s/p multiple lines of treatment.  With recent progression of sternal metastases despite palliative radiation therapy, she was switched from Kadcyla to docetaxel, Herceptin, and Perjeta (has not had Perjeta before).  She received chemotherapy last Monday.  Over the weekend, she became progressively more tired and weaker.  She also developed worsening shortness of breath.  Evaluation in the ER demonstrated bilateral lower lung infiltrates concerning for volume overload.  She was given Lasix for diuresis and started on antibiotics for probable pneumonia.  She was also found to have significantly elevated liver enzymes and bilirubin likely due to hepatotoxicity from alcohol abuse and chemotherapy.      1)HEPATIC-  Hepatotoxicity from alcohol abuse in the setting of chemotherapy; LFTs stable to improved.      - Monitor LFTs daily  - If the patient recovers from this, would use paclitaxel (Taxol) instead of docetaxel (Taxotere) with the next cycle      2)PULM- Respiratory failure. Intubated. Likely due to volume overload and possible pneumonia. New echo shows no new cardiomyopathy or EF decline.  -PER ICU Intensivists/Hospitalists ; Continue diuresis  - Continue to treat with antibiotics for presumed pneumonia  -ECHO done EF 55-60%,; Thus, Could likely continue Her-2 directed therapy if she recovers.     3)ONCOLOGY- Metastatic breast cancer, ER weakly positive (5%), WI negative, HER2 positive; Has sternal metastases and possible metastasis in left 5th rib and bone mets alone the spine which were no longer FDG avid on PET/CT from 7/23  - If the  patient recovers from the current hospital stay, would consider switching from docetaxel to paclitaxel given lower risk of hepatotoxicity, and continue with Herceptin/Perjeta vs. Herceptin/Perjeta alone.      4)HEMATOLOGY- Pancytopenia, chemotherapy-induced; Suspect myelotoxicity due to alcohol abuse.  - follow CBC;  - As did not receive any Neulasta last week, continue Neupogen daily until ANC >1.5, discontinue today  - Transfuse as needed to keep Hgb >7.0.  In the setting of respiratory failure, would to reasonable to target a higher Hgb goal.    5) FULL CODE  - d/w team this am and patient has had a long history of Stage IV metastatic breast cancer, hope is she can turn the corner from current ICU issues. If her condition declines certainly appropriate to have a discussion on goals of care.    Subjective:     Intubated, sedated and failed wean trial this morning.      Labs:  All labs reviewed    CBC  Recent Labs  Lab 09/26/18  0636 09/25/18  0514 09/24/18  0520   WBC 7.3 1.7* 0.4*   HGB 7.8* 8.0* 7.6*   MCV 94 93 94   * 124* 86*       CMP  Recent Labs  Lab 09/26/18  1151 09/26/18  0636 09/26/18  0116 09/25/18  1640 09/25/18  1410 09/25/18  0514 09/24/18  0920 09/24/18  0520  09/23/18  0620  09/22/18  1022   NA  --  139  --   --   --  137  --  135  --  132*  --  127*   POTASSIUM  --  3.6  --   --  3.5 3.0* 3.9 3.2*  < > 3.1*  --  3.8   CHLORIDE  --  105  --   --   --  102  --  101  --  100  --  94   CO2  --  27  --   --   --  28  --  26  --  23  --  24   ANIONGAP  --  7  --   --   --  7  --  8  --  9  --  9   GLC  --  142*  --   --   --  124*  --  83  --  81  --  103*   BUN  --  19  --   --   --  17  --  15  --  12  --  12   CR  --  0.34*  --   --   --  0.35*  --  0.34*  --  0.34*  --  0.33*   GFRESTIMATED  --  >90  --   --   --  >90  --  >90  --  >90  --  >90   GFRESTBLACK  --  >90  --   --   --  >90  --  >90  --  >90  --  >90   REAGAN  --  7.5*  --   --   --  7.5*  --  7.4*  --  7.3*  --  7.7*   MAG  --   --    --  2.6*  --  1.5* 1.8  --   --  2.0  < > 1.4*   PHOS 1.9*  --  1.8* 1.7*  --  1.3*  --  2.1*  < >  --   --   --    PROTTOTAL  --   --   --   --   --   --   --  5.3*  --  5.3*  --  5.9*   ALBUMIN  --   --   --   --   --   --   --  2.4*  --  2.0*  --  2.4*   BILITOTAL  --  7.6*  --   --   --  9.3*  --  9.8*  --  8.4*  --  8.3*   ALKPHOS  --   --   --   --   --  409*  --  401*  --  464*  --  594*   AST  --  97*  --   --   --  113*  --  145*  --  182*  --  264*   ALT  --  57*  --   --   --   --   --  70*  --  76*  --  94*   < > = values in this interval not displayed.    INR  Recent Labs  Lab 09/22/18  1500   INR 1.15*       Blood Culture  Recent Labs  Lab 09/24/18  0945 09/22/18  1033 09/22/18  1021   CULT Light growthCandida albicans / dubliniensisCandida albicans and Treva dubliniensis are not routinely speciatedSusceptibility testing not routinely done* No growth after 4 days Cultured on the 2nd day of incubation:Lactobacillus speciesIdentification obtained by MALDI-TOF mass spectrometry research use only database. Test characteristics determined and verified by the Infectious Diseases Diagnostic Laboratory ([facility]) [place], [place].*  Critical Value/Significant Value, preliminary result only, called to and read back byKinjal Woodward RN at 1145 on 9/24/18 by KATY Romo MD  [facility]  9/26/2018 5:51 PM

## 2018-09-26 NOTE — PROGRESS NOTES
RT- Patient placed back on PS this morning and has been tolerating well since then, currently remains on 12/6 50%. BS coarse. Suctioning out some thick yellow secretions.     Ventilation Mode: CPAP/PS  (Continuous positive airway pressure with Pressure Support)  FiO2 (%): 50 %  Rate Set (breaths/minute): 12 breaths/min  Tidal Volume Set (mL): 450 mL  PEEP (cm H2O): 6 cmH2O  Pressure Support (cm H2O): 12 cmH2O  Oxygen Concentration (%): 50 %  Resp: 44    At times has an increased RR but has typically been in mid to high 20's. Will keep on PS trial as long as tolerated.

## 2018-09-26 NOTE — PLAN OF CARE
OT: Pt undergoing vent weaning trial, will re-schedule to allow for continued medical management.

## 2018-09-27 NOTE — PLAN OF CARE
Problem: Patient Care Overview  Goal: Plan of Care/Patient Progress Review  Outcome: No Change  ICU End of Shift Summary.  For vital signs and complete assessments, please see documentation flowsheets.     Pertinent assessments: Patient intubated and sedated. Opens eyes to voice and touch; occasionally spontaneously. VSS. Levophed off at 2256; MAP above 65 for remaining of night. Afebrile. Denies pain. Tele SR. FiO2 increased to keep O2 sats above 90. Family here until 0200. Incontinent loose BM x2. C-diff negative.  Major Shift Events: Tolerated CPAP wean until 2300; resp rate up to high 40s and O2 sats decreased to high 80s; switched back to full support and sats and resp rate WNL.   Plan (Upcoming Events): Attempt CPAP wean if able.  Discharge/Transfer Needs: TBD    Bedside Shift Report Completed :   Bedside Safety Check Completed:

## 2018-09-27 NOTE — PROGRESS NOTES
[facility] ICU RESPIRATORY NOTE  Date of Admission: 09/22/18  Date of Intubation (most recent): 09/24/18  Pt was on the following mechanical ventilator settings this shift:   Ventilation Mode: SIMV/PS  (Synchronized Intermittent Mandatory Ventilation with Pressure Support)  FiO2 (%): 70 %  Rate Set (breaths/minute): 12 breaths/min  Tidal Volume Set (mL): 450 mL  PEEP (cm H2O): 6 cmH2O  Pressure Support (cm H2O): 12 cmH2O  Oxygen Concentration (%): 70 %  Resp: 31    Yesterday's Pressure support trial ended at 2304 due to tachypnea and increased O2 needs. PS trial from 0930 to 2304 (13.5 hours).  At 0540 the FIO2 was weaned from 60 to 50. SpO2 remained stable, RR increased to 45-50, and HR increased from low 80s to 120s. FIO2 was increased to 70% and RR decreased from 40s to 25-30. PS trial was not started.     Moderate amount of thick, yellow secretions were suctioned. Breath sounds were coarse.     Nikita Syed, RT on 9/27/2018 at 6:21 AM

## 2018-09-27 NOTE — PROGRESS NOTES
[facility]    Hospitalist Progress Note  Name: Eyal Turner    MRN: [id number]  Provider:  Alen Garcia DO MPH  Date of Service: 09/27/2018    Summary of Stay: Eyal Turner is a 58 year old female with PMH including metastatic breast cancer with bone metastases to sternum and rib who started a new chemo regimen 5 days prior to admission, alcohol abuse versus dependence, malnutrition, pancytopenia, and HTN who presented with fatigue and shortness of breath on 9/22.  Initially she was hypotensive which improved with IV fluids in the ED. Labs notable for significantly abnormal LFTs with bilirubin of 8 and elevated transaminases.  Also found to be pancytopenic with neutropenia.  Hemoglobin was less than 7 she received 1 unit RBC transfusion in the ED.     After admission she was found to have fever to 101 and chest x-ray obtained after admission showing bilateral infiltrate so started on cefepime for neutropenic fever and azithromycin added subsequently.  A CT abdomen pelvis was obtained that did not show any ascites or obstructive liver disease.  Evidence for acute alcohol withdrawal initially with significant tremor that improved after a few doses of Ativan.  GI was consulted for possible upper GI bleed and for her liver failure.  No EGD planned.  Did start empiric Protonix on admission, but no evidence for ongoing blood loss.    Oncology also was consulted and recommended supportive cares.  Overall patient is quite ill acutely and chronically.  She was transferred to ICU on 9/23/2018 for worsening hypoxemia and was intubated 9/24/2018.  Blood culture grew lactobacillus from port.  ID was consulted and the patient was switched to vancomycin and Zosyn.  Micafungin was also added for oral thrush.  She was also started on Neupogen for neutropenia with good response.     Problem List/Assessment and Plan:      Acute hypoxic respiratory failure: Suspected due to severe sepsis and pneumonia.  Possible  component of vascular congestion as well.  Remains stable on the ventilator.  Working on pressure support trial and vacation sedation daily.  Management per the intensivist.  -Continue pressure support trial and consider extubation today.  -Continue IV Lasix.    Septic shock and neutropenic fever: Some signs of pneumonia with possible esophagitis and does have oral candidiasis.  Only one blood culture positive for lactobacillus which could be contaminant.  Infectious disease following.  Continue vancomycin and Zosyn.  Likely taper antibiotics soon.  -Infectious disease following.  -Currently on vancomycin, Zosyn, and micafungin.     Abnormal LFTs: Presented with jaundice an bilirubin was up to 9.8 up from 2.4 three months ago.  Alkaline phosphatase was elevated to 594, AST elevated 264, ALT elevated at 94.  Does drink 2 bottles of wine daily so possible acute alcoholic hepatitis.  Also additionally started on chemotherapy with docetaxel, trastuzumab, and pertuzumab so possibility of drug toxicity although per oncology with this regimen there is not typically significant liver toxicity.  Did not have a lot of abdominal pain although was a little tender in the upper quadrant.  Lipase was not elevated.  No obstructive process seen on CT.  Slight small nodularity and cannot rule out metastases but this seems less likely based on recent CT scan.  No history of cirrhosis on imaging as of a year ago.  Albumin is low.  Given her alcohol abuse suspect alcohol hepatitis playing a role here.   -Gastroenterology consulted and following.  -Recommendation against systemic steroids for alcoholic hepatitis given current infection and immunosuppression.      Alcohol dependence with acute withdrawal: Has been drinking 2 bottles of wine daily for some time.  Last use 1-2 days prior to admission.  Presented in alcohol withdrawal with tremor.  -CIWA with Ativan as needed, but not requiring any doses at this time.  -Continue thiamine and  folate.  -Now on Precedex.      Acute on chronic pancytopenia with neutropenia: On admission WBC 0.5 with ANC 0.4, hemoglobin 6.9, and platelets 63.  Hemoglobin was 11.4 three months ago and platelets were 108.  Suspect acute drop is from her recent chemotherapy.  Also possible upper GI bleed as below contributing.  -Hemoglobin stable following transfusion support.  -No transfusion today.  -Daily CBC.  -Hematology following.  -Neutropenia resolved following Neupogen which has now been discontinued.      Metastatic breast cancer: Initially diagnosed in 2000 s/p bilateral mastectomy.  Has undergone multiple rounds of chemotherapy and radiation.  Has known metastatic disease to her sternum and third left rib.  Had recent possible skin infection from the sternum met/mass and received Keflex.  Does not look actively infected at this time.  Has port in place.  Restarted on chemotherapy with docetaxel, trastuzumab, and pertuzumab on 9/17/18.  Follows with Dr. Braun of oncology.  Doing quite poorly following chemotherapy with poor oral intake, weakness, and now acute liver failure.  -[place] onc[facility] consulted, appreciate recommendations.  -Chemotherapy adjustments based on hepatotoxicity per oncology.        Mucositis versus thrush and possible Candida esophagitis: Patient erosive changes and plaque on the tongue and her soft palate.  Question whether this is mucositis from her docetaxel or Candida infection.  Sore throat for the past week so possible esophagitis as well.  -Swish and swallow nystatin 4 times daily after extubation.  -Management would typically be systemic Diflucan in case of esophagitis however some risk in doing this due to her acutely elevated bilirubin and transaminases.  Received Diflucan 2 mg in the ED.  Given liver toxicity will hold on further treatment as this may just be mucositis.  -GI not planning EGD to investigate for esophagitis.  -Continue micafungin per ID.     Possible UGIB: Reports of vomiting  with red blood in it.  Denies any melena or hematochezia.  Anemia and thrombocytopenia could in part be chemotherapy related.  Has required periodic transfusions.  -Twice daily IV Protonix.  -GI not planning an EGD .  -No transfusion today.      Hypokalemia and hypomagnesemia:   --Potassium and magnesium replacement protocol.      Severe protein calorie malnutrition: Evidence for fat loss on exam likely related to chronic alcohol use, metastatic breast cancer, and recent chemotherapy.  Clearly has severe malnutrition.  -Dietitian consult.  -Continue PTA 5 mg twice daily dronabinol as appetite stimulant.  -Feeding tube placed and continuing tube feeds.    DVT Prophylaxis: Pneumatic Compression Devices  Code Status: Full Code  Disposition: Expected discharge in 3+ days to D. Goals prior to discharge include address multiple issues noted above.   Family updated today: Will update family later today.     Interval History   Assumed care from previous hospitalist. The history was fully reviewed.  Intubated and sedated.    -Data reviewed today: I personally reviewed all new labs and imaging results over the last 24 hours.     Physical Exam   Temp: 97.5  F (36.4  C) Temp src: Axillary BP: 92/54   Heart Rate: 86 Resp: 26 SpO2: 95 % O2 Device: Mechanical Ventilator    Vitals:    09/25/18 0631 09/26/18 0645 09/27/18 0430   Weight: 60 kg (132 lb 4.4 oz) 60.4 kg (133 lb 2.5 oz) 64.5 kg (142 lb 3.2 oz)     Vital Signs with Ranges  Temp:  [97.3  F (36.3  C)-98.5  F (36.9  C)] 97.5  F (36.4  C)  Heart Rate:  [75-97] 86  Resp:  [11-93] 26  BP: ()/(52-78) 92/54  FiO2 (%):  [50 %-70 %] 60 %  SpO2:  [87 %-99 %] 95 %  I/O last 3 completed shifts:  In: 2758.9 [I.V.:1408.9; NG/GT:610]  Out: 890 [Urine:890]    GENERAL: No apparent distress.  Intubated and sedated.  Chronically ill and emaciated.  HEENT: Normocephalic, atraumatic. Extraocular movements intact.  Icteric.  CARDIOVASCULAR: Regular rate and rhythm without murmurs or  rubs. No S3.  PULMONARY: Clear bilaterally.  GASTROINTESTINAL: Soft, mildly distended. Bowel sounds normoactive.   EXTREMITIES: No cyanosis or clubbing. No edema.  NEUROLOGICAL: CN 2-12 grossly intact, no focal neurological deficits.  DERMATOLOGICAL: No rash, ulcer, but mild jaundice.  Significant diffuse bruising.    Medications     dexmedetomidine 0.7 mcg/kg/hr (09/27/18 0816)     IV fluid REPLACEMENT ONLY       IV fluid REPLACEMENT ONLY       fentaNYL 50 mcg/hr (09/27/18 0816)     norepinephrine Stopped (09/26/18 2256)       bacitracin   Topical BID     dronabinol  5 mg Oral BID     folic acid  1 mg Oral or Feeding Tube Daily     furosemide  10 mg Intravenous Once     heparin  5 mL Intracatheter Q28 Days     heparin lock flush  5-10 mL Intracatheter Q24H     micafungin  100 mg Intravenous Q24H     multivitamins with minerals  15 mL Per Feeding Tube Daily     nystatin  500,000 Units Swish & Swallow 4x Daily     pantoprazole  40 mg Per Feeding Tube BID     piperacillin-tazobactam  3.375 g Intravenous Q6H     protein modular  2 packet Per Feeding Tube BID w/meals     QUEtiapine  25 mg Oral or Feeding Tube BID     vancomycin (VANCOCIN) IV  1,250 mg Intravenous Q8H     Data     Laboratory:    Recent Labs  Lab 09/27/18  0645 09/26/18  0636 09/25/18  0514   WBC 10.3 7.3 1.7*   HGB 8.8* 7.8* 8.0*   HCT 26.8* 24.4* 24.8*   MCV 92 94 93   * 127* 124*       Recent Labs  Lab 09/27/18  0645 09/26/18  0636 09/25/18  1410 09/25/18  0514    139  --  137   POTASSIUM 3.4 3.6 3.5 3.0*   CHLORIDE 105 105  --  102   CO2 28 27  --  28   ANIONGAP 8 7  --  7   * 142*  --  124*   BUN 20 19  --  17   CR 0.37* 0.34*  --  0.35*   GFRESTIMATED >90 >90  --  >90   GFRESTBLACK >90 >90  --  >90   REAGAN 7.3* 7.5*  --  7.5*       Recent Labs  Lab 09/24/18  0945 09/22/18  1033 09/22/18  1021   CULT Light growthCandida albicans / dubliniensisCandida albicans and Treva dubliniensis are not routinely speciatedSusceptibility testing  not routinely done* No growth after 5 days Cultured on the 2nd day of incubation:Lactobacillus speciesIdentification obtained by MALDI-TOF mass spectrometry research use only database. Test characteristics determined and verified by the Infectious Diseases Diagnostic Laboratory ([facility]) [place], [place].*  Critical Value/Significant Value, preliminary result only, called to and read back byKinjal Woodward RN at 1145 on 9/24/18 by JOSE ANTONIO.       Imaging:  No results found for this or any previous visit (from the past 24 hour(s)).      Alen Garcia DO MPH  Formerly [facility] Hospitalist  [address] [place] [address]  Pager: [phone]  09/27/2018

## 2018-09-27 NOTE — PLAN OF CARE
OT: Pt undergoing vent weaning trial, remains intubated/sedated; will r/s OT evaluation when patient able to participate further.

## 2018-09-27 NOTE — PROGRESS NOTES
[facility]  Infectious Disease Progress Note          Assessment and Plan:   IMPRESSION:   1.  A 58-year-old female with widely metastatic long-term breast cancer, ongoing chemotherapy, and now neutropenia.   2.  Acute sepsis and neutropenic fever, some signs of pneumonia, with possible esophagitis and positive blood culture, at risk for multiple infections.   3.  Thrush and some concern for possible esophagitis.   4.  Acute and chronic long-term alcohol abuse and liver dysfunction.   5.  Prior Martha-Stafford tear.   6.  Respiratory failure, now intubated.   7.  Positive blood culture, 1 bottle for diphtheroids.  No further identification.  This could be a contaminant versus line infection.       RECOMMENDATIONS:   1.   Blood culture 1 lactobacillus contaminant unless multiple cultures positive or identification changes.   2.   vancomycin and Zosyn for now. Likely taper soon  3.  From a thrush and possible fungal infection standpoint, agree fluconazole hold for now, but will do micafungin instead.  This is reasonable from a neutropenic fever standpoint anyway.   4.  WBC rising, fever resolved  5.  Obviously, prognosis poor for a multitude of reasons.   6 Likely taper off abx if able to extubate        Interval History:   Intubated awake WBC rising to nl range incl PMNs  No new + cxs T down              Medications:       bacitracin   Topical BID     dronabinol  5 mg Oral BID     folic acid  1 mg Oral or Feeding Tube Daily     heparin  5 mL Intracatheter Q28 Days     heparin lock flush  5-10 mL Intracatheter Q24H     micafungin  100 mg Intravenous Q24H     multivitamins with minerals  15 mL Per Feeding Tube Daily     pantoprazole  40 mg Per Feeding Tube BID     piperacillin-tazobactam  3.375 g Intravenous Q6H     protein modular  2 packet Per Feeding Tube BID w/meals     QUEtiapine  25 mg Oral or Feeding Tube BID     vancomycin (VANCOCIN) IV  1,250 mg Intravenous Q8H                  Physical  "Exam:   Blood pressure 96/65, pulse 123, temperature 98.1  F (36.7  C), temperature source Axillary, resp. rate 25, height 1.676 m (5' 6\"), weight 64.5 kg (142 lb 3.2 oz), SpO2 93 %, not currently breastfeeding.  Wt Readings from Last 2 Encounters:   09/27/18 64.5 kg (142 lb 3.2 oz)   10/23/17 57.6 kg (127 lb)     Vital Signs with Ranges  Temp:  [97.4  F (36.3  C)-98.5  F (36.9  C)] 98.1  F (36.7  C)  Heart Rate:  [] 101  Resp:  [11-93] 25  BP: ()/(52-78) 96/65  FiO2 (%):  [50 %-70 %] 60 %  SpO2:  [89 %-98 %] 93 %    Constitutional: Intubated and sedated   Lungs: Clear to auscultation bilaterally, no crackles or wheezing   Cardiovascular: Regular rate and rhythm, normal S1 and S2, and no murmur noted   Abdomen: Normal bowel sounds, soft, non-distended, non-tender   Skin: No rashes, no cyanosis, no edema   Other:           Data:   All microbiology laboratory data reviewed.  Recent Labs   Lab Test  09/27/18   0645  09/26/18   0636  09/25/18   0514   WBC  10.3  7.3  1.7*   HGB  8.8*  7.8*  8.0*   HCT  26.8*  24.4*  24.8*   MCV  92  94  93   PLT  105*  127*  124*     Recent Labs   Lab Test  09/27/18   0645  09/26/18   0636  09/25/18   0514   CR  0.37*  0.34*  0.35*     No lab results found.  Recent Labs   Lab Test  09/24/18   0945  09/22/18   1033  09/22/18   1021  06/15/18   1908  10/11/15   1610  10/11/15   1600  10/11/15   1430  09/25/15   1334  06/15/15   1711   CULT  Light growth  Candida albicans / dubliniensis  Candida albicans and Candida dubliniensis are not routinely speciated  Susceptibility testing not routinely done  *  No growth after 5 days  Cultured on the 2nd day of incubation:  Lactobacillus species  Identification obtained by MALDI-TOF mass spectrometry research use only database. Test   characteristics determined and verified by the Infectious Diseases Diagnostic Laboratory   ([facility]) [place], [place].  *  Critical Value/Significant Value, preliminary result only, called to and read back " by  Kinjal Crissy, RN at 1145 on 9/24/18 by JOSE ANTONIO.    >100,000 colonies/mL  mixed urogenital zahra  Susceptibility testing not routinely done    No growth  No growth  <10,000 colonies/mL mixed urogenital zahra Susceptibility testing not routinely   done    >100,000 colonies/mL Escherichia coli*  No growth

## 2018-09-27 NOTE — PLAN OF CARE
Problem: Patient Care Overview  Goal: Plan of Care/Patient Progress Review  PT: Pt remains intubated and sedated, not appropriate for PT eval after multiple days of check-in. Will complete order, please re-order when pt more appropriate.

## 2018-09-27 NOTE — PROGRESS NOTES
CLINICAL NUTRITION SERVICES - REASSESSMENT NOTE    Recommendations Ordered by Registered Dietitian (RD):   OK to continue EN advancement to goal    Enteral Regimen: Isosource at 45 mL/hr    Total Enteral Provisions: 1080 mL provides 1620 kcal, 73 gm protein, 190 g CHO, 15 g fiber and 821 mL H20.    Increase EN by 10 mL q 8 hrs until goal is met   Malnutrition:   % Weight Loss:None noted  % Intake:</= 75% for >/= 1 month (severe malnutrition)  Subcutaneous Fat Loss:Orbital region mild to moderate depletion, Upper arm region moderate depletion and Thoracic region moderate depletion  Muscle Loss:Temporal region moderate to severe depletion, Clavicle bone region moderate depletion, Acromion bone region moderate depletion, Dorsal hand region moderate depletion, Patellar region moderate depletion, Anterior thigh region moderate or greater depletion and Posterior calf region moderate or greater depletion  Fluid Retention:None noted     Malnutrition Diagnosis: Severe malnutrition  In Context of:  Chronic illness or disease  Environmental or social circumstances     EVALUATION OF PROGRESS TOWARD GOALS   Diet: NPO  Nutrition Support: EN held at  20 mL/hr this morning, to be advanced to goal as follows:     Type of Feeding Tube: NG (10 fr, bridle, 9/24)    Enteral Frequency:  Continuous    Enteral Regimen: Isosource at 45 mL/hr    Total Enteral Provisions: 1080 mL provides 1620 kcal, 73 gm protein, 190 g CHO, 15 g fiber and 821 mL H20.    Meets >100% of DRI's --> continue micronutrients per CIWA protocol.    Prosource BID for an addition 22 grams protein    Free Water Flush: 30 mL q4 hours    Risk for refeeding syndrome, P04 add on and daily electrolyte check    Enteral Intake and Tolerance:   - Initiation on 9/24, not yet at goal due to risk of refeeding.   - Labs: Phos 2.0 (L, ok to advance EN at this level), LFTs trending high, BGs <150.   - Meds: Reviewed. Precedex for sedation, Lasix, Phos replacement infusing.   - I/O:  increased watery stools yesterday.   - Weight trending: up 4.8 kg since admission.   Vitals:    09/23/18 2315 09/25/18 0631 09/26/18 0645 09/27/18 0430   Weight: 59.7 kg (131 lb 9.8 oz) 60 kg (132 lb 4.4 oz) 60.4 kg (133 lb 2.5 oz) 64.5 kg (142 lb 3.2 oz)       ASSESSED NUTRITION NEEDS (PER APPROVED PRACTICE GUIDELINES, Dosing weight: 60 kg):  Estimated Energy Needs: 2808-0471+ kcals (25-30 Kcal/Kg)  Justification: maintenance and vented  Estimated Protein Needs:  grams protein (1.5-2 g pro/Kg)  Justification: hypercatabolism with critical illness  Estimated Fluid Needs: >1 mL/Kcal  Justification: maintenance    NEW FINDINGS:   - Patient remains on vent, PS trial this morning for potential extubation.   - Mucositis vs thrush, possible Candida esophagitis: expect will impact ability for PO once extubated.     Previous Goals:   TF to meet % of estimated nutrition needs in next 48-72 hours  Evaluation: Not yet met    Previous Nutrition Diagnosis:   Malnutrition related to baseline poor appetite, increased needs 2/2 metastatic disease and ETOH abuse as evidenced by fat and muscle wasting, intake likely meeting <75% of needs for >1 month and severe criteria met  Evaluation: No change, continues below     MALNUTRITION  % Weight Loss:None noted  % Intake:</= 75% for >/= 1 month (severe malnutrition)  Subcutaneous Fat Loss:Orbital region mild to moderate depletion, Upper arm region moderate depletion and Thoracic region moderate depletion  Muscle Loss:Temporal region moderate to severe depletion, Clavicle bone region moderate depletion, Acromion bone region moderate depletion, Dorsal hand region moderate depletion, Patellar region moderate depletion, Anterior thigh region moderate or greater depletion and Posterior calf region moderate or greater depletion  Fluid Retention:None noted     Malnutrition Diagnosis: Severe malnutrition  In Context of:  Chronic illness or disease  Environmental or social  circumstances    CURRENT NUTRITION DIAGNOSIS  Inadequate enteral nutrition infusion related to slow advancement to goal 2/2 risk of refeeding and abnormal electrolytes as evidenced by EN running at 44% goal volume this morning, will plan to advance to goal today.     INTERVENTIONS  Recommendations / Nutrition Prescription  Continue EN advancement to goal-    Type of Feeding Tube: NG (10 fr, bridle, 9/24)    Enteral Frequency:  Continuous    Enteral Regimen: Isosource at 45 mL/hr    Total Enteral Provisions: 1080 mL provides 1620 kcal, 73 gm protein, 190 g CHO, 15 g fiber and 821 mL H20.    Meets >100% of DRI's --> continue micronutrients per CIWA protocol.    Prosource BID for an addition 22 grams protein    Free Water Flush: 30 mL q4 hours    Risk for refeeding syndrome, P04 add on and daily electrolyte check    Advance by 10 mL q8hrs until goal is met    Implementation  EN Schedule: increase rate as outlined above  Collaboration and Referral of Nutrition care: RD attendance at interdisciplinary rounds.     Goals  EN to reach goal in the next 24-48 hours.   EN to meet % estimated nutrient needs while NPO.       MONITORING AND EVALUATION:  Progress towards goals will be monitored and evaluated per protocol and Practice Guidelines    Abril Chairez RD, LD  3rd floor/ICU: [phone]  All other floors: [phone]  Weekend/holiday: [phone]  Office: [phone]

## 2018-09-27 NOTE — PLAN OF CARE
Problem: Restraint for Non-Violent/Non-Self-Destructive Behavior  Goal: Prevent/Manage Potential Problems  Maintain safety of patient and others during period of restraint.  Promote psychological and physical wellbeing.  Prevent injury to skin and involved body parts.  Promote nutrition, hydration, and elimination.   Outcome: No Change  Right wrist and Left wrist restraints continued 9/27/2018    Clinical Justification: Pulling lines, pulling tubes, and pulling equipment  Less Restrictive Alternative: Repositioning, Disguise equipment, Reorientation  Attending Physician Notified: Yes, Attending Physician's Name: Dr. Garcia   New orders placed Yes  Length of Order: 1 Day    Pt did reach for ETT once this shift. Will continue bilateral soft wrist restraints.    Grisel Mitchell

## 2018-09-27 NOTE — PLAN OF CARE
Problem: Patient Care Overview  Goal: Plan of Care/Patient Progress Review  Outcome: No Change  ICU End of Shift Summary.  For vital signs and complete assessments, please see documentation flowsheets.     Pertinent assessments: VSS, tolerating off of levophed. Following commands.   Major Shift Events: Gave 10 mg of lasix. PST ended today with RR mid 30's. TF rate increased to 30 ml/hr.   Plan (Upcoming Events): continue CIWA protocol. Continue daily weaning from the vent.  Discharge/Transfer Needs: TBD    Bedside Shift Report Completed :   Bedside Safety Check Completed:

## 2018-09-27 NOTE — PROGRESS NOTES
[facility]  Chart check  See last two notes from our service  CBC better- Hgb up to 8.8. WBC up from recent g-csf (now d/c'd) ; Bili still elevated.  ID/GI/Hospitalists all following.  -will f/u tomorrow      Liban Townsend MD, MS  Medical Oncologist-Hematologist  [facility]

## 2018-09-27 NOTE — PROGRESS NOTES
Critical Care Progress Note      09/27/2018    Name: Eyal Turner MRN#: [id number]   Age: 58 year old YOB: 1960     Hsptl Day# 5  ICU DAY # 4    MV DAY # 4             Problem List:   Active Problems:    Acute liver failure           Summary/Hospital Course:     59 yo female with metastatic breast CA admitted to the ICU.  Events of overnight noted for episodes of agitation and patient being placed back on full ventilatory support.  When I examined the patient this morning she was awake alert following commands and was calm.  I placed the patient on pressure support ventilation at a pressure support of 12.  Patient's respiratory rate was 26 at this time.    Assessment and plan :     Eyal Turner IS a 58 year old female admitted on 9/22/2018 for hemodymanic and respiratory mangement secondary increased WOB. R/O fluid overload  Vs PNA.   I have personally reviewed the daily labs, imaging studies, cultures and discussed the case with referring physician and consulting physicians.     My assessment and plan by system for this patient is as follows:    Neurology/Psychiatry:   1. ETOH Use  2. Analgesia    Plan  -Will start precedex for sedation goal RASS of negative 1  -Monitor for signs of ETOH withdrawal  - PRN Fentanyl for pain given metastatic lesions  -Continue CIWA    Cardiovascular:   1.Hemodynamics -hypotensive  2.Rhythm -  tachy    Plan  -Hypotension has resolved status post transfusion.  Norepinephrine has been off since overnight.  We will continue to monitor the patient.  Pulmonary/Ventilator Management:   1. Airway intubated  2. Oxygenation/ventilation/mechanics on full ventilatory support of SIMV  Plan  -Wean the patient to pressure support ventilation with a goal of CPAP 6/6.      GI and Nutrition :   1. Concern for protein calorie malnutrition    Plan  -dubhoff placed continue on tubefeeds    Renal/Fluids/Electrolytes:   1. Cr 0.345  2. Hypokalemia, Hypomagnesium  3. Acid/base  status  4. Volume status possible fluid overlad  Plan  -Patient continues to be hypokalemic we will continue to replete  - monitor function and electrolytes as needed with replacement per ICU protocols.  - generally avoid nephrotoxic agents such as NSAID, IV contrast unless specifically required  - adjust medications as needed for renal clearance  - follow I/O's as appropriate.  -After reviewing the patient's I's and O's and chest x-ray which is consistent with fluid overload I decided to diurese the patient with a single dose of Lasix this a.m.  I feel the patient can tolerate diuresis and she has been off norepinephrine for greater than 10 hours.    Infectious Disease:   1. Concern for PNA  2. Gram positive in blood (doubt line infection)  3.  Plan  Appreciate ID consult and recommendations we will continue to follow culture results while on current antibiotics until final results.    Endocrine:     1. Concern of stress induced hyperglycemia    Plan  - ICU insulin protocol, goal sugar <180      Hematology/Oncology:     1.chronic anemia secondary to CA, no signs, symptoms of active blood loss    Plan  -Status post transfusion of 1 unit of packed red blood cells with appropriate response         IV/Access:   1. Venous access -  port  2. Arterial access -  none  3.  Plan  - central access required and necessary      ICU Prophylaxis:   1. DVT: mechanical  2. VAP: HOB 30 degrees, chlorhexidine rinse  3. Stress Ulcer: PPI  4. Restraints: Nonviolent soft two point restraints required and necessary for patient safety and continued cares and good effect as patient continues to pull at necessary lines, tubes despite education and distraction. Will readdress daily.   5. Wound care - per unit routine   6. Feeding -continue tube feeds  7. Family Update: Discussed with  and patient's mother CPAP trial and need for transfusion  8. Disposition - ICU        Key goals for next 24 hours:   1.   Wean to pressure support  ventilation of 8 today  2.  Decrease sedation as tolerated  3.  Follow culture results               Interim History:   Tolerated CPAP trial during the day.           Key Medications:       bacitracin   Topical BID     dronabinol  5 mg Oral BID     folic acid  1 mg Oral or Feeding Tube Daily     heparin  5 mL Intracatheter Q28 Days     heparin lock flush  5-10 mL Intracatheter Q24H     micafungin  100 mg Intravenous Q24H     multivitamins with minerals  15 mL Per Feeding Tube Daily     pantoprazole  40 mg Per Feeding Tube BID     piperacillin-tazobactam  3.375 g Intravenous Q6H     protein modular  2 packet Per Feeding Tube BID w/meals     QUEtiapine  25 mg Oral or Feeding Tube BID     vancomycin (VANCOCIN) IV  1,250 mg Intravenous Q8H       dexmedetomidine 0.7 mcg/kg/hr (09/27/18 0816)     IV fluid REPLACEMENT ONLY       IV fluid REPLACEMENT ONLY       fentaNYL 50 mcg/hr (09/27/18 0816)               Physical Examination:   Temp:  [97.4  F (36.3  C)-98.5  F (36.9  C)] 97.5  F (36.4  C)  Heart Rate:  [75-97] 86  Resp:  [11-93] 26  BP: ()/(52-78) 92/54  FiO2 (%):  [50 %-70 %] 60 %  SpO2:  [89 %-98 %] 93 %    Intake/Output Summary (Last 24 hours) at 09/24/18 1115  Last data filed at 09/24/18 0800   Gross per 24 hour   Intake              250 ml   Output             2990 ml   Net            -2740 ml     Wt Readings from Last 4 Encounters:   09/27/18 64.5 kg (142 lb 3.2 oz)   10/23/17 57.6 kg (127 lb)   08/09/16 57.6 kg (127 lb)   08/01/16 57.6 kg (127 lb)     BP - Mean:  [] 64  Ventilation Mode: SIMV/PS  (Synchronized Intermittent Mandatory Ventilation with Pressure Support)  FiO2 (%): 60 %  Rate Set (breaths/minute): 12 breaths/min  Tidal Volume Set (mL): 450 mL  PEEP (cm H2O): 6 cmH2O  Pressure Support (cm H2O): 12 cmH2O  Oxygen Concentration (%): 60 %  Resp: 26    Recent Labs  Lab 09/24/18  0520 09/23/18  2330   PH  --  7.45   PCO2  --  40   PO2  --  27*   HCO3  --  27   O2PER 40% 50%       GEN: no acute  distress, now   HEENT: head ncat, sclera anicteric, OP patent, trachea midline   PULM: unlabored synchronous with vent, clear anteriorly , coarse b/l breath sounds prior to intubation  CV/COR: RRR S1S2 no gallop,  No rub, no murmur  ABD: soft nontender, hypoactive bowel sounds, no mass  EXT:  Edema   warm  NEURO: grossly intact, awake alert following commands  SKIN: no obvious rash  LINES: clean, dry intact         Data:   All data and imaging reviewed     ROUTINE ICU LABS (Last four results)  CMP  Recent Labs  Lab 09/27/18  0645 09/26/18  1151 09/26/18  0636 09/26/18  0116 09/25/18  1640 09/25/18  1410 09/25/18  0514 09/24/18  0920 09/24/18  0520  09/23/18  0620  09/22/18  1022     --  139  --   --   --  137  --  135  --  132*  --  127*   POTASSIUM 3.4  --  3.6  --   --  3.5 3.0* 3.9 3.2*  < > 3.1*  --  3.8   CHLORIDE 105  --  105  --   --   --  102  --  101  --  100  --  94   CO2 28  --  27  --   --   --  28  --  26  --  23  --  24   ANIONGAP 8  --  7  --   --   --  7  --  8  --  9  --  9   *  --  142*  --   --   --  124*  --  83  --  81  --  103*   BUN 20  --  19  --   --   --  17  --  15  --  12  --  12   CR 0.37*  --  0.34*  --   --   --  0.35*  --  0.34*  --  0.34*  --  0.33*   GFRESTIMATED >90  --  >90  --   --   --  >90  --  >90  --  >90  --  >90   GFRESTBLACK >90  --  >90  --   --   --  >90  --  >90  --  >90  --  >90   REAGAN 7.3*  --  7.5*  --   --   --  7.5*  --  7.4*  --  7.3*  --  7.7*   MAG  --   --   --   --  2.6*  --  1.5* 1.8  --   --  2.0  < > 1.4*   PHOS 2.0* 1.9*  --  1.8* 1.7*  --  1.3*  --  2.1*  < >  --   --   --    PROTTOTAL  --   --   --   --   --   --   --   --  5.3*  --  5.3*  --  5.9*   ALBUMIN  --   --   --   --   --   --   --   --  2.4*  --  2.0*  --  2.4*   BILITOTAL  --   --  7.6*  --   --   --  9.3*  --  9.8*  --  8.4*  --  8.3*   ALKPHOS  --   --   --   --   --   --  409*  --  401*  --  464*  --  594*   AST  --   --  97*  --   --   --  113*  --  145*  --  182*  --  264*    ALT  --   --  57*  --   --   --   --   --  70*  --  76*  --  94*   < > = values in this interval not displayed.  CBC    Recent Labs  Lab 09/27/18  0645 09/26/18  0636 09/25/18  0514 09/24/18  0520   WBC 10.3 7.3 1.7* 0.4*   RBC 2.90* 2.61* 2.68* 2.50*   HGB 8.8* 7.8* 8.0* 7.6*   HCT 26.8* 24.4* 24.8* 23.4*   MCV 92 94 93 94   MCH 30.3 29.9 29.9 30.4   MCHC 32.8 32.0 32.3 32.5   RDW 21.0* 21.8* 21.6* 21.9*   * 127* 124* 86*     INR    Recent Labs  Lab 09/22/18  1500   INR 1.15*     Arterial Blood Gas    Recent Labs  Lab 09/24/18  0520 09/23/18  2330   PH  --  7.45   PCO2  --  40   PO2  --  27*   HCO3  --  27   O2PER 40% 50%       All cultures:    Recent Labs  Lab 09/24/18  0945 09/22/18  1033 09/22/18  1021   CULT Light growthCandida albicans / dubliniensisCandida albicans and Treva dubliniensis are not routinely speciatedSusceptibility testing not routinely done* No growth after 5 days Cultured on the 2nd day of incubation:Lactobacillus speciesIdentification obtained by MALDI-TOF mass spectrometry research use only database. Test characteristics determined and verified by the [facility] ([facility]) [place], [place].*  Critical Value/Significant Value, preliminary result only, called to and read back byKinjal Woodawrd RN at 1145 on 9/24/18 by JOSE ANTONIO.     Recent Results (from the past 24 hour(s))   XR Chest Port 1 View    Narrative    XR CHEST PORT 1 VW 9/27/2018 9:34 AM    HISTORY: Intubation.     COMPARISON: September 24, 2018.      Impression    IMPRESSION: Interval placement of an endotracheal tube which  terminates approximately 2 cm from the jasvir. Right port catheter in  position as before. There is an enteric tube which extends into the  stomach beyond the lower margin of the study. There are diffuse  bilateral lung opacities, stable to slightly worsened compared to  prior exam. No pneumothorax.     AMY LEO MD         Billing: This patient is critically ill: Yes. Total  critical care time today 30 min.

## 2018-09-27 NOTE — PHARMACY-VANCOMYCIN DOSING SERVICE
Pharmacy Vancomycin Note  Date of Service 2018  Patient's  1960   58 year old, female    Indication: Bacteremia  Goal Trough Level: 15-20 mg/L  Day of Therapy: 4  Current Vancomycin regimen:  1250 mg IV q8h    Current estimated CrCl = Estimated Creatinine Clearance: 168.8 mL/min (based on Cr of 0.37).    Creatinine for last 3 days  2018:  5:14 AM Creatinine 0.35 mg/dL  2018:  6:36 AM Creatinine 0.34 mg/dL  2018:  6:45 AM Creatinine 0.37 mg/dL    Recent Vancomycin Levels (past 3 days)  2018:  1:16 AM Vancomycin Level 5.6 mg/L  2018: 10:55 AM Vancomycin Level 11.8 mg/L    Vancomycin IV Administrations (past 72 hours)                   vancomycin (VANCOCIN) 1,250 mg in sodium chloride 0.9 % 250 mL intermittent infusion (mg) 1,250 mg New Bag 18 1246     1,250 mg New Bag  0330     1,250 mg New Bag 18 1710     1,250 mg New Bag  1028     1,250 mg New Bag  0227    vancomycin (VANCOCIN) 1,250 mg in sodium chloride 0.9 % 250 mL intermittent infusion (mg) 1,250 mg New Bag 18 1453     1,250 mg New Bag  0153     1,250 mg New Bag 18 1537                Nephrotoxins and other renal medications (Future)    Start     Dose/Rate Route Frequency Ordered Stop    18 0200  vancomycin (VANCOCIN) 1,250 mg in sodium chloride 0.9 % 250 mL intermittent infusion      1,250 mg  over 90 Minutes Intravenous EVERY 8 HOURS 18 0157      18 1215  piperacillin-tazobactam (ZOSYN) infusion 3.375 g      3.375 g  100 mL/hr over 30 Minutes Intravenous EVERY 6 HOURS 18 1205               Contrast Orders - past 72 hours     None          Interpretation of levels and current regimen:  Trough level is slightly Subtherapeutic, but previous intervals were extended.    Has serum creatinine changed > 50% in last 72 hours: No    Renal Function: Stable    Plan:  1.  Continue Current Dose  2.  Pharmacy will check trough levels as appropriate in 1-3 Days.    3. Serum  creatinine levels will be ordered daily for the first week of therapy and at least twice weekly for subsequent weeks.      Sidney Craig        .

## 2018-09-28 NOTE — PROGRESS NOTES
"An ABG was completed on the pt's right radial with no complications noted during the procedure.    Vital signs:  Temp: 98.9  F (37.2  C) Temp src: Axillary BP: 95/65   Heart Rate: 99 Resp: (!) 40 SpO2: 94 % O2 Device: Mechanical Ventilator Oxygen Delivery: 15 LPM Height: 167.6 cm (5' 6\") Weight: 64.5 kg (142 lb 3.2 oz)  Estimated body mass index is 22.95 kg/(m^2) as calculated from the following:    Height as of this encounter: 1.676 m (5' 6\").    Weight as of this encounter: 64.5 kg (142 lb 3.2 oz).    PAST MEDICAL HISTORY:   Past Medical History:   Diagnosis Date     Anemia      Breast cancer metastasized to bone (H) 2005     sees Dr. Zach Romo/Dr. Toño REED q3mos - herceptin, zometa      ETOH abuse      History of blood transfusion      Hypertension     lisinopril 5mg = dizziness      Invasive ductal carcinoma of breast (H) 2000    recurred 3x since.      Martha-Stafford tear 10/12/2015    with hematemesis - hospitalized     S/P mastectomy, bilateral 2000       PAST SURGICAL HISTORY:   Past Surgical History:   Procedure Laterality Date     APPENDECTOMY  1999 - age 39      ESOPHAGOSCOPY, GASTROSCOPY, DUODENOSCOPY (EGD), COMBINED N/A 10/13/2015    Procedure: COMBINED ESOPHAGOSCOPY, GASTROSCOPY, DUODENOSCOPY (EGD);  Surgeon: aRshad Rossi MD;  Location: [facility]     EXCISE MASS TRUNK Right 8/9/2016    Procedure: EXCISE MASS TRUNK;  Surgeon: Danie Forrester MD;  Location: [facility] OR     HYSTERECTOMY, NORIS  2002    with BSO      MASTECTOMY MODIFIED RADICAL BILATERAL  2000     REMOVE CATHETER VASCULAR ACCESS Right 8/9/2016    Procedure: REMOVE CATHETER VASCULAR ACCESS;  Surgeon: Danie Forrester MD;  Location: [facility] OR       FAMILY HISTORY:   Family History   Problem Relation Age of Onset     Cancer Mother      hx of lung cancer - survived      Hypertension Mother      Circulatory Mother      s/p valve replacement      Diabetes Father      prediabetes      Prostate Cancer Father      Psychotic Disorder Daughter      ? " adhd - maternal cousin with same        SOCIAL HISTORY:   Social History   Substance Use Topics     Smoking status: Former Smoker     Packs/day: 0.50     Years: 15.00     Types: Cigarettes     Quit date: 3/18/1990     Smokeless tobacco: Never Used     Alcohol use 3.5 oz/week     7 Glasses of wine per week      Comment: drinks probably  a glass of wine a night      Clint Camargo RT  9/28/2018

## 2018-09-28 NOTE — PROVIDER NOTIFICATION
Notified Dr. Matute of current BP 78/50. Active order for levophed is available discuss that pt may be dry due to lasix. MD gave telephone order for 5% Albumin 250 ml and discontinue lasix.

## 2018-09-28 NOTE — PLAN OF CARE
Problem: Patient Care Overview  Goal: Plan of Care/Patient Progress Review  Outcome: No Change  ICU End of Shift Summary.  For vital signs and complete assessments, please see documentation flowsheets.     Pertinent assessments: Patient continues to be intubated and sedated. LS coarse. BP soft at times, but levophed off entire night; MAP > 65. Tele SR to STach. Afebrile. Denies pain. Low UOP per Gray.  Major Shift Events: Incontinent large BM x2. Agitated and restless; PRN ativan given x1. Propofol gtt started per tele hub orders. Agitated when family asking patient many questions while patient awake. Albumin given x1 without increase in UOP. TF increased to goal of 45 mL/hr at 0200.   Plan (Upcoming Events): Continue FiO2 wean as able. Continue abx.   Discharge/Transfer Needs: TBD    Bedside Shift Report Completed :   Bedside Safety Check Completed:

## 2018-09-28 NOTE — PLAN OF CARE
Problem: Neutropenia (Adult)  Goal: Signs and Symptoms of Listed Potential Problems Will be Absent, Minimized or Managed (Neutropenia)  Signs and symptoms of listed potential problems will be absent, minimized or managed by discharge/transition of care (reference Neutropenia (Adult) CPG).   Outcome: No Change  ICU End of Shift Summary.  For vital signs and complete assessments, please see documentation flowsheets.     Pertinent assessments: poc, oral tissues sore, bleeding, liquid BM, uo low  Major Shift Events: cont magic mouth and nystatin for oral cares, turn freq, abd xray, abd distended, replaced potassium, phosphorus, rr high in 40's on vent hr will occasionally be tachy, check lactate, hepatic labs, give lasix  Plan (Upcoming Events): poc  Discharge/Transfer Needs: tbd    Bedside Shift Report Completed : yes  Bedside Safety Check Completed: yes

## 2018-09-28 NOTE — PROGRESS NOTES
"SPIRITUAL HEALTH SERVICES  SPIRITUAL ASSESSMENT Progress Note  Formerly [facility] ICU    PRIMARY FOCUS:     Emotional/spiritual/Congregation distress    Support for coping    ILLNESS CIRCUMSTANCES:   Saw pt Rachna and her mother Liliana per per Liliana's request for  support.  Rachna is intubated but occasionally opened her eyes and nod her head.  Introduced  services to them, and invited Liliana to the family waiting room for reflective conversation, which integrated elements of illness and family narratives.     Context of Serious Illness/Symptom(s) - Liliana shared that Rachna has lived with breast cancer for 20 years; she recently learned that Rachna has had a history of alcohol dependence.    Persons/Resources Involved - According to Liliana, Rachna is  ( Luis A) and they have three young adult children who live at home.  Charmaine is  and has two other daughters, in addition to Rachna; she had two sons who are .      DISTRESS:     Emotional/Existential/Relational Distress -   o Liliana related that there have been disagreements among her and her  with Rachna and Luis A regarding finances, especially in the sale of a family cabin.  o At first Luis A refused to allow Liliana to see Rachna in the hospital but eventually assented after Sharron called the police.  Liliana has asked Luis A to put their grievances aside for now to focus on Rachna's care.  o Liliana became tearful when she acknowledged \"I cannot loose one more child\" and narrated that one of sons drowned at the age of 17 and the other  five years later of a drug overdose when he was 25 (he was living at the time with Rachna and Luis A, but they were unaware of his drug use).    Spiritual/Congregational Distress - none directly expressed.    Social/Cultural/Economic Distress - Liliana reported that Luis A has not worked for four years and that Rachna works at a hair salon, which she has done right up to her hospitalization.  According to Liliana, she and her  " "have financially helped Rachna and Luis A in the past, such as buying them cars.    SPIRIT (Coping):     Sabianist/Gretchen - Liliana stated that Rachna was raised Church but in the last years has started going to other churches.  Rachna has a friend David, who has some type of pastoral care role and who has been a support to her.    Spiritual Practice(s) - According to Liliana, Rachna started reading and studying the Bible since her cancer diagnosis.  When we returned to Rachna's room, Rachna assented to prayer with a nod.    Emotional/Existential/Relational Connections - Liliana related that Rachna loves to go fishing and be outdoors with her family.    SENSE-MAKING:    Goals of Care - not discussed.  Informed Liliana how she and her family can request further  support.    Meaning/Sense-Making - Liliana reflected that \"staying busy and helping others\" helped her cope with the deaths of her sons.  She shared that she was very angry with God after their deaths, but over time her Orthodoxy community at [place] the [facility] has been an important source of support for her.    PLAN: I and/or shannen Jefferson will see pt and family 1-2 per week to provide further emotional/spiritual support.    Jimbo Kate M.Div., [facility]  Staff   Pager [phone]  "

## 2018-09-28 NOTE — PLAN OF CARE
Problem: Patient Care Overview  Goal: Plan of Care/Patient Progress Review  Outcome: No Change  ICU End of Shift Summary.  For vital signs and complete assessments, please see documentation flowsheets.     Pertinent assessments: Noticed that pts RR increased to mid 40's especially when pt has a bm. Pt had several watery loose bm's. SR, STach. On Precedex, propofol and fentanyl gtt. FR increased to 60 ml every 2 hrs.   Major Shift Events: Abdominal x ray and US done. LFTs elevated. Pt responded well to lasix, however became hypotensive. Replaced K+ and phosph. T max 100.1. Vent setting changed to AC.   Plan (Upcoming Events): Stop lasix. Give albumin and re start levophed.   Discharge/Transfer Needs: TBD    Bedside Shift Report Completed :   Bedside Safety Check Completed:

## 2018-09-28 NOTE — PLAN OF CARE
Problem: Neutropenia (Adult)  Goal: Signs and Symptoms of Listed Potential Problems Will be Absent, Minimized or Managed (Neutropenia)  Signs and symptoms of listed potential problems will be absent, minimized or managed by discharge/transition of care (reference Neutropenia (Adult) CPG).   Outcome: No Change  Patient sbp is 88, map 64, restarted levophed.

## 2018-09-28 NOTE — PLAN OF CARE
Problem: Restraint for Non-Violent/Non-Self-Destructive Behavior  Goal: Prevent/Manage Potential Problems  Maintain safety of patient and others during period of restraint.  Promote psychological and physical wellbeing.  Prevent injury to skin and involved body parts.  Promote nutrition, hydration, and elimination.   Outcome: No Change  Cont restraints due to patient trying to pull ett

## 2018-09-28 NOTE — PROGRESS NOTES
Critical Care Progress Note      09/28/2018    Name: Eyal Turner MRN#: [id number]   Age: 58 year old YOB: 1960     Hsptl Day# 6  ICU DAY # 5    MV DAY # 5             Problem List:   Active Problems:    Acute liver failure           Summary/Hospital Course:     57 yo female with metastatic breast CA admitted to the ICU.  Events of overnight noted for episodes of agitation and patient being placed back on full ventilatory support.  Events of last night noted included patient's increased agitation requiring propofol infusion.  Events of last night also included the patient being placed temporarily on a norepinephrine drip secondary to hypotension.  This morning when I saw the patient she was off norepinephrine and was normotensive.  Of concern the patient was on SIMV with breathing at a rate of 44.  I placed the patient on controlled mechanical ventilation with a rate of 16 and the patient's rate did slow down to about 38 at this time.  Also of concern was patient's abdominal wound was noted for increased distention.  I have personally reviewed the daily labs, imaging studies, cultures and discussed the case with referring physician and consulting physicians.     My assessment and plan by system for this patient is as follows:    Neurology/Psychiatry:   1. ETOH Use  2. Analgesia    Plan  -Will start precedex for sedation goal RASS of negative 2  -Monitor for signs of ETOH withdrawal  - PRN Fentanyl for pain given metastatic lesions  -Continue CIWA    Cardiovascular:   1.Hemodynamics -hypotensive  2.Rhythm -  tachy    Plan  -Hypotension resolved with albumin bolus no need for norepinephrine at this time.  .  Pulmonary/Ventilator Management:   1. Airway intubated  2. Oxygenation/ventilation/mechanics on full ventilatory support of SIMV  Plan  -I placed the patient on controlled mechanical ventilation with hopes to slow down the patient's respiratory.  Patient's FiO2 is 60% we will wean that down  today with a goal of getting to FiO2 of 40%.  Goal is a PaO2 greater than 60 and a saturation greater than 92%.  Of concern is the patient's continued tachypnea with increasing ventilatory support.  I am concerned that may be a central aspect of this tachypnea.  Given the patient's history of metastatic breast CA I discussed with the hospitalist service for the possibility of obtaining a head CT to rule out any intracranial pathology.    GI and Nutrition :   1. Concern for protein calorie malnutrition  2.  New abdominal stench    Plan  -dubhoff placed continue on tubefeeds  -Obtain KUB    Renal/Fluids/Electrolytes:   1. Cr 0.345  2. Hypokalemia,   3. 7.48/38/79  4. Volume status possible fluid overload  Plan  -Patient continues to be hypokalemic we will continue to replete  - monitor function and electrolytes as needed with replacement per ICU protocols.  - generally avoid nephrotoxic agents such as NSAID, IV contrast unless specifically required  - adjust medications as needed for renal clearance  - follow I/O's as appropriate.  -Even though the patient is grossly positive and chest x-ray from yesterday was consistent with fluid overload I would hold off diuresis until we obtain a lactate given the transient hypotension last night.  Also concerned about intra-abdominal pathology will obtain a KUB and hold off on Lasix until the results of the KUB are obtained.    Infectious Disease:   1. Concern for PNA  2. Gram positive in blood (doubt line infection)  3.  Plan  Appreciate ID consult and recommendations we will continue to follow culture results while on current antibiotics until final results.  Given worsening respiratory status will obtain a repeat sputum culture to make sure we are appropriately treating pneumonia    Endocrine:     1. Concern of stress induced hyperglycemia    Plan  - ICU insulin protocol, goal sugar <180      Hematology/Oncology:     1.chronic anemia secondary to CA, no signs, symptoms of  active blood loss    Plan  -No need for transfusion at this time      IV/Access:   1. Venous access -  port  2. Arterial access -  none  3.  Plan  - central access required and necessary      ICU Prophylaxis:   1. DVT: mechanical  2. VAP: HOB 30 degrees, chlorhexidine rinse  3. Stress Ulcer: PPI  4. Restraints: Nonviolent soft two point restraints required and necessary for patient safety and continued cares and good effect as patient continues to pull at necessary lines, tubes despite education and distraction. Will readdress daily.   5. Wound care - per unit routine   6. Feeding -continue tube feeds  7. Family Update: Discussed with patient's mother need for full mechanical ventilation and that we will obtain a lactic acid and KUB  8. Disposition - ICU        Key goals for next 24 hours:   1.   Full ventilatory  2.  KUB  3.  Repeat sputum               Interim History:   Tolerated CPAP trial during the day.           Key Medications:       bacitracin   Topical BID     folic acid  1 mg Oral or Feeding Tube Daily     furosemide  20 mg Intravenous Q8H     heparin  5 mL Intracatheter Q28 Days     heparin lock flush  5-10 mL Intracatheter Q24H     lidocaine visc 2% & maalox/mylanta w/simethicone & diphenhydramine  10 mL Swish & Swallow Q6H     micafungin  100 mg Intravenous Q24H     multivitamins with minerals  15 mL Per Feeding Tube Daily     nystatin  500,000 Units Swish & Spit 4x Daily     pantoprazole  40 mg Per Feeding Tube BID     piperacillin-tazobactam  3.375 g Intravenous Q6H     protein modular  2 packet Per Feeding Tube BID w/meals     QUEtiapine  25 mg Oral or Feeding Tube BID     vancomycin (VANCOCIN) IV  1,250 mg Intravenous Q8H       dexmedetomidine 0.7 mcg/kg/hr (09/28/18 0900)     IV fluid REPLACEMENT ONLY       IV fluid REPLACEMENT ONLY       fentaNYL 50 mcg/hr (09/28/18 0900)     norepinephrine       propofol (DIPRIVAN) infusion 10 mcg/kg/min (09/28/18 0900)               Physical Examination:    Temp:  [98.1  F (36.7  C)-99.1  F (37.3  C)] 98.9  F (37.2  C)  Heart Rate:  [] 99  Resp:  [17-57] 40  BP: ()/(55-84) 95/65  FiO2 (%):  [60 %-80 %] 60 %  SpO2:  [86 %-98 %] 94 %    Intake/Output Summary (Last 24 hours) at 09/24/18 1115  Last data filed at 09/24/18 0800   Gross per 24 hour   Intake              250 ml   Output             2990 ml   Net            -2740 ml     Wt Readings from Last 4 Encounters:   09/27/18 64.5 kg (142 lb 3.2 oz)   10/23/17 57.6 kg (127 lb)   08/09/16 57.6 kg (127 lb)   08/01/16 57.6 kg (127 lb)     BP - Mean:  [64-93] 79  Ventilation Mode: CMV/AC  (Continuous Mandatory Ventilation/ Assist Control)  FiO2 (%): 60 %  Rate Set (breaths/minute): 16 breaths/min  Tidal Volume Set (mL): 450 mL  PEEP (cm H2O): 6 cmH2O  Pressure Support (cm H2O): 12 cmH2O  Oxygen Concentration (%): 60 %  Resp: 40    Recent Labs  Lab 09/28/18  1042 09/24/18  0520 09/23/18  2330   PH 7.48*  --  7.45   PCO2 38  --  40   PO2 79*  --  27*   HCO3 28  --  27   O2PER 60% 40% 50%       GEN: no acute distress, now   HEENT: head ncat, sclera anicteric, OP patent, trachea midline   PULM: unlabored synchronous with vent, clear anteriorly , coarse b/l breath sounds prior to intubation  CV/COR: RRR S1S2 no gallop,  No rub, no murmur  ABD: soft nontender, hypoactive bowel sounds, no mass  EXT:  Edema   warm  NEURO: grossly intact, awake alert following commands  SKIN: no obvious rash  LINES: clean, dry intact         Data:   All data and imaging reviewed     ROUTINE ICU LABS (Last four results)  CMP  Recent Labs  Lab 09/28/18  0620 09/27/18  0645 09/26/18  1151 09/26/18  0636 09/26/18  0116 09/25/18  1640 09/25/18  1410 09/25/18  0514 09/24/18  0920 09/24/18  0520  09/23/18  0620 09/22/18  1022   * 141  --  139  --   --   --  137  --  135  --  132*  --  127*   POTASSIUM 3.1* 3.4  --  3.6  --   --  3.5 3.0* 3.9 3.2*  < > 3.1*  --  3.8   CHLORIDE 109 105  --  105  --   --   --  102  --  101  --  100  --  94    CO2 30 28  --  27  --   --   --  28  --  26  --  23  --  24   ANIONGAP 6 8  --  7  --   --   --  7  --  8  --  9  --  9   * 123*  --  142*  --   --   --  124*  --  83  --  81  --  103*   BUN 22 20  --  19  --   --   --  17  --  15  --  12  --  12   CR 0.34* 0.37*  --  0.34*  --   --   --  0.35*  --  0.34*  --  0.34*  --  0.33*   GFRESTIMATED >90 >90  --  >90  --   --   --  >90  --  >90  --  >90  --  >90   GFRESTBLACK >90 >90  --  >90  --   --   --  >90  --  >90  --  >90  --  >90   REAGAN 7.4* 7.3*  --  7.5*  --   --   --  7.5*  --  7.4*  --  7.3*  --  7.7*   MAG  --   --   --   --   --  2.6*  --  1.5* 1.8  --   --  2.0  < > 1.4*   PHOS 1.8* 2.0* 1.9*  --  1.8* 1.7*  --  1.3*  --  2.1*  < >  --   --   --    PROTTOTAL  --   --   --   --   --   --   --   --   --  5.3*  --  5.3*  --  5.9*   ALBUMIN  --   --   --   --   --   --   --   --   --  2.4*  --  2.0*  --  2.4*   BILITOTAL  --   --   --  7.6*  --   --   --  9.3*  --  9.8*  --  8.4*  --  8.3*   ALKPHOS  --   --   --   --   --   --   --  409*  --  401*  --  464*  --  594*   AST  --   --   --  97*  --   --   --  113*  --  145*  --  182*  --  264*   ALT  --   --   --  57*  --   --   --   --   --  70*  --  76*  --  94*   < > = values in this interval not displayed.  CBC    Recent Labs  Lab 09/28/18  0620 09/27/18  0645 09/26/18  0636 09/25/18  0514   WBC 10.8 10.3 7.3 1.7*   RBC 2.56* 2.90* 2.61* 2.68*   HGB 7.8* 8.8* 7.8* 8.0*   HCT 24.2* 26.8* 24.4* 24.8*   MCV 95 92 94 93   MCH 30.5 30.3 29.9 29.9   MCHC 32.2 32.8 32.0 32.3   RDW 21.0* 21.0* 21.8* 21.6*   PLT 96* 105* 127* 124*     INR    Recent Labs  Lab 09/22/18  1500   INR 1.15*     Arterial Blood Gas    Recent Labs  Lab 09/28/18  1042 09/24/18  0520 09/23/18  2330   PH 7.48*  --  7.45   PCO2 38  --  40   PO2 79*  --  27*   HCO3 28  --  27   O2PER 60% 40% 50%       All cultures:    Recent Labs  Lab 09/24/18  0945 09/22/18  1033 09/22/18  1021   CULT Light growthCandida albicans / dubliniensisCandida  albicans and Treva dubliniensis are not routinely speciatedSusceptibility testing not routinely done* No growth Cultured on the 2nd day of incubation:Lactobacillus speciesIdentification obtained by MALDI-TOF mass spectrometry research use only database. Test characteristics determined and verified by the Infectious Diseases Diagnostic Laboratory ([facility]) [place], [place].*  Critical Value/Significant Value, preliminary result only, called to and read back byKinjal Woodward RN at 1145 on 9/24/18 by JOSE ANTONIO.     No results found for this or any previous visit (from the past 24 hour(s)).      Billing: This patient is critically ill: Yes. Total critical care time today 32 min.

## 2018-09-28 NOTE — PLAN OF CARE
Problem: Restraint for Non-Violent/Non-Self-Destructive Behavior  Goal: Prevent/Manage Potential Problems  Maintain safety of patient and others during period of restraint.  Promote psychological and physical wellbeing.  Prevent injury to skin and involved body parts.  Promote nutrition, hydration, and elimination.   Outcome: No Change  Cont restraints for patient safety as patient reaches for ett when she can

## 2018-09-28 NOTE — PROGRESS NOTES
[facility]    Hospitalist Progress Note  Name: Eyal Turner    MRN: [id number]  Provider:  Alen Garcia DO MPH  Date of Service: 09/28/2018    Summary of Stay: Eyal Turner is a 58 year old female with PMH including metastatic breast cancer with bone metastases to sternum and rib who started a new chemo regimen 5 days prior to admission, alcohol abuse versus dependence, malnutrition, pancytopenia, and HTN who presented with fatigue and shortness of breath on 9/22.  Initially she was hypotensive which improved with IV fluids in the ED. Labs notable for significantly abnormal LFTs with bilirubin of 8 and elevated transaminases.  Also found to be pancytopenic with neutropenia.  Hemoglobin was less than 7 she received 1 unit RBC transfusion in the ED.     After admission she was found to have fever to 101 and chest x-ray obtained after admission showing bilateral infiltrate so started on cefepime for neutropenic fever and azithromycin added subsequently.  A CT abdomen pelvis was obtained that did not show any ascites or obstructive liver disease.  Evidence for acute alcohol withdrawal initially with significant tremor that improved after a few doses of Ativan.  GI was consulted for possible upper GI bleed and for her liver failure.  No EGD planned.  Did start empiric Protonix on admission, but no evidence for ongoing blood loss.    Oncology also was consulted and recommended supportive cares.  Overall patient is quite ill acutely and chronically.  She was transferred to ICU on 9/23/2018 for worsening hypoxemia and was intubated 9/24/2018.  Blood culture grew lactobacillus from port.  ID was consulted and the patient was switched to vancomycin and Zosyn.  Micafungin was also added for oral thrush.  She was also started on Neupogen for neutropenia with good response.     Problem List/Assessment and Plan:      Acute hypoxic respiratory failure: Suspected due to severe sepsis and pneumonia.  Possible  component of vascular congestion as well.  Remains stable on the ventilator.  Working on pressure support trial and vacation sedation daily.  Management per the intensivist.  -Continue pressure support trial and consider extubation today.  -Continue IV Lasix but increase dosage.    Septic shock and neutropenic fever: Some signs of pneumonia with possible esophagitis and does have oral candidiasis.  Only one blood culture positive for lactobacillus which could be contaminant.  Infectious disease following.  Continue vancomycin and Zosyn.  Likely taper antibiotics soon.  -Infectious disease following.  -Currently on vancomycin, Zosyn, and micafungin.     Abnormal LFTs: Presented with jaundice an bilirubin was up to 9.8 up from 2.4 three months ago.  Alkaline phosphatase was elevated to 594, AST elevated 264, ALT elevated at 94.  Does drink 2 bottles of wine daily so possible acute alcoholic hepatitis.  Also additionally started on chemotherapy with docetaxel, trastuzumab, and pertuzumab so possibility of drug toxicity although per oncology with this regimen there is not typically significant liver toxicity.  Did not have a lot of abdominal pain although was a little tender in the upper quadrant.  Lipase was not elevated.  No obstructive process seen on CT.  Slight small nodularity and cannot rule out metastases but this seems less likely based on recent CT scan.  No history of cirrhosis on imaging as of a year ago.  Albumin is low.  Given her alcohol abuse suspect alcohol hepatitis playing a role here.   -Gastroenterology consulted and following.  -Recommendation against systemic steroids for alcoholic hepatitis given current infection and immunosuppression.      Alcohol dependence with acute withdrawal: Has been drinking 2 bottles of wine daily for some time.  Last use 1-2 days prior to admission.  Presented in alcohol withdrawal with tremor.  -CIWA with Ativan as needed, but not requiring any doses at this  time.  -Continue thiamine and folate.  -Now on Precedex and propofol.      Acute on chronic pancytopenia with neutropenia: On admission WBC 0.5 with ANC 0.4, hemoglobin 6.9, and platelets 63.  Hemoglobin was 11.4 three months ago and platelets were 108.  Suspect acute drop is from her recent chemotherapy.  Also possible upper GI bleed as below contributing.  -Hemoglobin stable following transfusion support.  -No transfusion today.  -Daily CBC.  -Hematology following.  -Neutropenia resolved following Neupogen which has now been discontinued.      Metastatic breast cancer: Initially diagnosed in 2000 s/p bilateral mastectomy.  Has undergone multiple rounds of chemotherapy and radiation.  Has known metastatic disease to her sternum and third left rib.  Had recent possible skin infection from the sternum met/mass and received Keflex.  Does not look actively infected at this time.  Has port in place.  Restarted on chemotherapy with docetaxel, trastuzumab, and pertuzumab on 9/17/18.  Follows with Dr. Braun of oncology.  Doing quite poorly following chemotherapy with poor oral intake, weakness, and now acute liver injury.  -[place] oncology consulted, appreciate recommendations.  -Chemotherapy adjustments based on hepatotoxicity per oncology.    Mucositis versus thrush and possible esophagitis: Patient erosive changes and plaque on the tongue and her soft palate.  Question whether this is mucositis from her docetaxel or Candida infection.  Sore throat for the past week so possible esophagitis as well.  -Swish and swallow nystatin 4 times daily and Magic mouthwash.  -Management would typically be systemic Diflucan in case of esophagitis however some risk in doing this due to her acutely elevated bilirubin and transaminases.  Received Diflucan 2 mg in the ED.  Given liver toxicity will hold on further treatment as this may just be mucositis.  -GI not planning EGD to investigate for esophagitis.  -Continue micafungin per  ID.     Possible UGIB: Reports of vomiting with red blood in it.  Denies any melena or hematochezia.  Anemia and thrombocytopenia could in part be chemotherapy related.  Has required periodic transfusions.  -Twice daily Protonix.  -GI not planning an EGD .  -No transfusion today.      Hypokalemia and hypomagnesemia:   -Potassium and magnesium replacement protocol.      Severe protein calorie malnutrition: Evidence for fat loss on exam likely related to chronic alcohol use, metastatic breast cancer, and recent chemotherapy.  Clearly has severe malnutrition.  -Dietitian consult.  -Feeding tube placed and continuing tube feeds.    DVT Prophylaxis: Pneumatic Compression Devices  Code Status: Full Code  Disposition: Expected discharge in 3+ days to TBD. Goals prior to discharge include address multiple issues noted above.  Not showing significant improvement over the past several days.  We will continue the current course through the weekend and if no improvement by Monday we will need to readdress goals of care with family.  Family updated today: Will update family later today.     Interval History   Intubated and sedated.    -Data reviewed today: I personally reviewed all new labs and imaging results over the last 24 hours.     Physical Exam   Temp: 98.9  F (37.2  C) Temp src: Axillary BP: 95/65   Heart Rate: 99 Resp: (!) 40 SpO2: 94 % O2 Device: Mechanical Ventilator    Vitals:    09/25/18 0631 09/26/18 0645 09/27/18 0430   Weight: 60 kg (132 lb 4.4 oz) 60.4 kg (133 lb 2.5 oz) 64.5 kg (142 lb 3.2 oz)     Vital Signs with Ranges  Temp:  [98.1  F (36.7  C)-99.1  F (37.3  C)] 98.9  F (37.2  C)  Heart Rate:  [] 99  Resp:  [17-57] 40  BP: ()/(55-84) 95/65  FiO2 (%):  [60 %-80 %] 60 %  SpO2:  [86 %-98 %] 94 %  I/O last 3 completed shifts:  In: 2773.24 [I.V.:1648.24; NG/GT:300]  Out: 1035 [Urine:1035]    GENERAL: No apparent distress.  Intubated and sedated.  Chronically ill and emaciated.  HEENT: Normocephalic,  atraumatic. Extraocular movements intact.  Icteric.  CARDIOVASCULAR: Regular rate and rhythm without murmurs or rubs. No S3.  PULMONARY: Clear bilaterally.  GASTROINTESTINAL: Soft, mildly distended. Bowel sounds normoactive.   EXTREMITIES: No cyanosis or clubbing. No edema.  NEUROLOGICAL: CN 2-12 grossly intact, no focal neurological deficits.  DERMATOLOGICAL: No rash, ulcer, but mild jaundice.  Significant diffuse bruising.    Medications     dexmedetomidine 0.7 mcg/kg/hr (09/28/18 0900)     IV fluid REPLACEMENT ONLY       IV fluid REPLACEMENT ONLY       fentaNYL 50 mcg/hr (09/28/18 0900)     norepinephrine       propofol (DIPRIVAN) infusion 10 mcg/kg/min (09/28/18 0900)       bacitracin   Topical BID     folic acid  1 mg Oral or Feeding Tube Daily     furosemide  20 mg Intravenous Q8H     heparin  5 mL Intracatheter Q28 Days     heparin lock flush  5-10 mL Intracatheter Q24H     lidocaine visc 2% & maalox/mylanta w/simethicone & diphenhydramine  10 mL Swish & Swallow Q6H     micafungin  100 mg Intravenous Q24H     multivitamins with minerals  15 mL Per Feeding Tube Daily     nystatin  500,000 Units Swish & Spit 4x Daily     pantoprazole  40 mg Per Feeding Tube BID     piperacillin-tazobactam  3.375 g Intravenous Q6H     protein modular  2 packet Per Feeding Tube BID w/meals     QUEtiapine  25 mg Oral or Feeding Tube BID     vancomycin (VANCOCIN) IV  1,250 mg Intravenous Q8H     Data     Laboratory:    Recent Labs  Lab 09/28/18  0620 09/27/18  0645 09/26/18  0636   WBC 10.8 10.3 7.3   HGB 7.8* 8.8* 7.8*   HCT 24.2* 26.8* 24.4*   MCV 95 92 94   PLT 96* 105* 127*       Recent Labs  Lab 09/28/18  0620 09/27/18  0645 09/26/18  0636   * 141 139   POTASSIUM 3.1* 3.4 3.6   CHLORIDE 109 105 105   CO2 30 28 27   ANIONGAP 6 8 7   * 123* 142*   BUN 22 20 19   CR 0.34* 0.37* 0.34*   GFRESTIMATED >90 >90 >90   GFRESTBLACK >90 >90 >90   REAGAN 7.4* 7.3* 7.5*       Recent Labs  Lab 09/24/18  0945 09/22/18  1033  09/22/18  1021   CULT Light growthCandida albicans / dubliniensisCandida albicans and Treva dubliniensis are not routinely speciatedSusceptibility testing not routinely done* No growth Cultured on the 2nd day of incubation:Lactobacillus speciesIdentification obtained by MALDI-TOF mass spectrometry research use only database. Test characteristics determined and verified by the Infectious Diseases Diagnostic Laboratory ([facility]) [place], [place].*  Critical Value/Significant Value, preliminary result only, called to and read back byKinjal Woodward RN at 1145 on 9/24/18 by JOSE ANTONIO.       Imaging:  No results found for this or any previous visit (from the past 24 hour(s)).      Alen Garcia DO MPH  Critical access hospital Hospitalist  [address] [place] [address]  Pager: [phone]  09/28/2018

## 2018-09-28 NOTE — PROGRESS NOTES
Oncology/Hematology Follow Up Note:    Assessment and Plan:    Assessment and Plan: 57 yo F with Metastatic Breast Cancer now here with respiratory failure, hepatoxicity (ETOH related) in the setting of pancytopenia after recent chemobiotherapy treatment intensification.      Per her Primary Oncologist Dr. Braun:  Eyal has a long history of metastatic HER2 positive breast cancer s/p multiple lines of treatment.  With recent progression of sternal metastases despite palliative radiation therapy, she was switched from Kadcyla to docetaxel, Herceptin, and Perjeta (has not had Perjeta before).  She received chemotherapy last Monday.  Over the weekend, she became progressively more tired and weaker.  She also developed worsening shortness of breath.  Evaluation in the ER demonstrated bilateral lower lung infiltrates concerning for volume overload.  She was given Lasix for diuresis and started on antibiotics for probable pneumonia.  She was also found to have significantly elevated liver enzymes and bilirubin likely due to hepatotoxicity from alcohol abuse and chemotherapy.      1)HEPATIC-  Hepatotoxicity from alcohol abuse in the setting of chemotherapy; LFTs stable to improved.      - Monitor LFTs daily  - If the patient recovers from this, would use paclitaxel (Taxol) instead of docetaxel (Taxotere) with the next cycle      2)PULM- Respiratory failure. Intubated. Likely due to volume overload and possible pneumonia. New echo shows no new cardiomyopathy or EF decline.  -PER ICU Intensivists/Hospitalists ; Continue diuresis  - Continue to treat with antibiotics for presumed pneumonia  -ECHO done EF 55-60%,; Thus, Could likely continue Her-2 directed therapy if she recovers.      3)ONCOLOGY- Metastatic breast cancer, ER weakly positive (5%), PA negative, HER2 positive; Has sternal metastases and possible metastasis in left 5th rib and bone mets alone the spine which were no longer FDG avid on PET/CT from 7/23  - If the  patient recovers from the current hospital stay, would consider switching from docetaxel to paclitaxel given lower risk of hepatotoxicity, and continue with Herceptin/Perjeta vs. Herceptin/Perjeta alone.      4)HEMATOLOGY- Pancytopenia, chemotherapy-induced; Suspect myelotoxicity due to alcohol abuse.  - follow CBC;  - As did not receive any Neulasta last week, continue Neupogen daily until ANC >1.5, discontinued neupogen on 9/26.  - Transfuse as needed to keep Hgb >7.0.  In the setting of respiratory failure, would to reasonable to target a higher Hgb goal.     5) FULL CODE  - d/w team this am and patient has had a long history of Stage IV metastatic breast cancer, hope is she can turn the corner from current ICU issues. If her condition declines certainly appropriate to have a discussion on goals of care.   I d/w Dr Garcia and nursing, will  Keep all cares and plan going over the weekend, if condition declines after that can have a care conference planed. From a breast cancer standpoint she has done extremely well and has many treatment options available to her if she recovers.       Subjective:     Intubated, coming down on sedation, opening her eyes.      Labs:  All labs reviewed    CBC  Recent Labs  Lab 09/28/18  0620 09/27/18  0645 09/26/18  0636   WBC 10.8 10.3 7.3   HGB 7.8* 8.8* 7.8*   MCV 95 92 94   PLT 96* 105* 127*       CMP  Recent Labs  Lab 09/28/18  0620 09/27/18  0645 09/26/18  1151 09/26/18  0636 09/26/18  0116 09/25/18  1640 09/25/18  1410 09/25/18  0514 09/24/18  0920 09/24/18  0520  09/23/18  0620  09/22/18  1022   * 141  --  139  --   --   --  137  --  135  --  132*  --  127*   POTASSIUM 3.1* 3.4  --  3.6  --   --  3.5 3.0* 3.9 3.2*  < > 3.1*  --  3.8   CHLORIDE 109 105  --  105  --   --   --  102  --  101  --  100  --  94   CO2 30 28  --  27  --   --   --  28  --  26  --  23  --  24   ANIONGAP 6 8  --  7  --   --   --  7  --  8  --  9  --  9   * 123*  --  142*  --   --   --  124*  --   83  --  81  --  103*   BUN 22 20  --  19  --   --   --  17  --  15  --  12  --  12   CR 0.34* 0.37*  --  0.34*  --   --   --  0.35*  --  0.34*  --  0.34*  --  0.33*   GFRESTIMATED >90 >90  --  >90  --   --   --  >90  --  >90  --  >90  --  >90   GFRESTBLACK >90 >90  --  >90  --   --   --  >90  --  >90  --  >90  --  >90   REAGAN 7.4* 7.3*  --  7.5*  --   --   --  7.5*  --  7.4*  --  7.3*  --  7.7*   MAG  --   --   --   --   --  2.6*  --  1.5* 1.8  --   --  2.0  < > 1.4*   PHOS 1.8* 2.0* 1.9*  --  1.8* 1.7*  --  1.3*  --  2.1*  < >  --   --   --    PROTTOTAL  --   --   --   --   --   --   --   --   --  5.3*  --  5.3*  --  5.9*   ALBUMIN  --   --   --   --   --   --   --   --   --  2.4*  --  2.0*  --  2.4*   BILITOTAL  --   --   --  7.6*  --   --   --  9.3*  --  9.8*  --  8.4*  --  8.3*   ALKPHOS  --   --   --   --   --   --   --  409*  --  401*  --  464*  --  594*   AST  --   --   --  97*  --   --   --  113*  --  145*  --  182*  --  264*   ALT  --   --   --  57*  --   --   --   --   --  70*  --  76*  --  94*   < > = values in this interval not displayed.    INR  Recent Labs  Lab 09/22/18  1500   INR 1.15*       Blood Culture  Recent Labs  Lab 09/24/18  0945 09/22/18  1033 09/22/18  1021   CULT Light growthCandida albicans / dubliniensisCandida albicans and Treva dubliniensis are not routinely speciatedSusceptibility testing not routinely done* No growth Cultured on the 2nd day of incubation:Lactobacillus speciesIdentification obtained by MALDI-TOF mass spectrometry research use only database. Test characteristics determined and verified by the [facility] ([facility]) [address], [place].*  Critical Value/Significant Value, preliminary result only, called to and read back byKinjal Woodward RN at 1145 on 9/24/18 by KATY Romo MD  [facility]  9/28/2018 8:29 AM

## 2018-09-28 NOTE — PLAN OF CARE
Problem: Patient Care Overview  Goal: Plan of Care/Patient Progress Review  OT: Pt remains intubated/sedated and is not appropriate for OT eval after multiple daily check-ins. Will complete OT order at this time. Please re-consult when pt more appropriate and able to participate.

## 2018-09-28 NOTE — PLAN OF CARE
Problem: Alcohol Withdrawal Acute, Risk/Actual (Adult)  Goal: Signs and Symptoms of Listed Potential Problems Will be Absent, Minimized or Managed (Alcohol Withdrawal Acute, Risk/Actual)  Signs and symptoms of listed potential problems will be absent, minimized or managed by discharge/transition of care (reference Alcohol Withdrawal Acute, Risk/Actual (Adult) CPG).   Outcome: No Change  ciwa 1

## 2018-09-28 NOTE — PROGRESS NOTES
Respiratory Therapy Note    Patient seen resting in bed on mechanical ventilator settings:    Ventilation Mode: CMV/AC  (Continuous Mandatory Ventilation/ Assist Control)  FiO2 (%): 45 %  Rate Set (breaths/minute): 16 breaths/min  Tidal Volume Set (mL): 450 mL  PEEP (cm H2O): 6 cmH2O  Pressure Support (cm H2O): 12 cmH2O  Oxygen Concentration (%): 45 %  Resp: 33    Respiratory rate 20s-40s, breath sounds coarse, and SpO2 94%. Suctioning small/moderate, yellow, thick secretions from ETT. Sputum sample obtained. RT will continue to monitor.    Lyla Crawley  4:42 PM September 28, 2018

## 2018-09-28 NOTE — PROGRESS NOTES
Having low BP again, and requiring pressor. Has low UO despite 5kg up since admission. Was given lasix today.   - Will give Albumin 5% 500ml x1.    Michael Vargas MD.  Pulmonary and Critical Care.  09/27/2018  9:34 PM       Pt is now more awake and RR in 40's. On Dex at 0.7 and Fent at 50mcg/hr.  Plan to extubate tomorrow if able. Hence will start propofol gtts.    Michael Vargas MD.  09/28/2018  2:26 AM

## 2018-09-28 NOTE — PLAN OF CARE
Problem: Cardiac Disease Comorbidity  Goal: Cardiac Disease  Patient comorbidity will be monitored for signs and symptoms of Cardiac Disease.  Problems will be absent, minimized or managed by discharge/transition of care.   Outcome: No Change  No chg,

## 2018-09-28 NOTE — PLAN OF CARE
Problem: Alcohol Withdrawal Acute, Risk/Actual (Adult)  Goal: Signs and Symptoms of Listed Potential Problems Will be Absent, Minimized or Managed (Alcohol Withdrawal Acute, Risk/Actual)  Signs and symptoms of listed potential problems will be absent, minimized or managed by discharge/transition of care (reference Alcohol Withdrawal Acute, Risk/Actual (Adult) CPG).   Outcome: Completed Date Met: 09/28/18  ciwa 1

## 2018-09-28 NOTE — PROGRESS NOTES
RT Note:    Patient remains intubated on ventilator with settings of:    Ventilation Mode: SIMV/PS  (Synchronized Intermittent Mandatory Ventilation with Pressure Support)  FiO2 (%): 60 %  Rate Set (breaths/minute): 12 breaths/min  Tidal Volume Set (mL): 450 mL  PEEP (cm H2O): 6 cmH2O  Pressure Support (cm H2O): 12 cmH2O  Oxygen Concentration (%): 60 %  Resp: 39    Patient is tachypneic with RR in high 30s to low 40s, so no wean was done this AM. Breath sounds coarse and diminished, suctioning small amounts of cloudy thick secretions. RT will continue to monitor.    Estefanía Jordan  September 28, 2018.4:56 AM

## 2018-09-28 NOTE — PLAN OF CARE
Problem: Neutropenia (Adult)  Goal: Signs and Symptoms of Listed Potential Problems Will be Absent, Minimized or Managed (Neutropenia)  Signs and symptoms of listed potential problems will be absent, minimized or managed by discharge/transition of care (reference Neutropenia (Adult) CPG).   Outcome: Therapy, progress toward functional goals is gradual  ICU End of Shift Summary.  For vital signs and complete assessments, please see documentation flowsheets.     Pertinent assessments: vss, mouth and lips tender, bleeding with sores/lesions, eyes jaundiced, dry; skin jaundiced, dry, coccyx redden, uo minimal, hands edematous 1-2+    Major Shift Events: Bp variable, restarted levo at 0.03, gentle oral care with mouthwash magic, use eye drops for comfort, casey cream applied to coccyx, elevated hands, cont with sedation, tube feedings, poc  Plan (Upcoming Events): cont poc, follow up for thrush,  Discharge/Transfer Needs: tbd    Bedside Shift Report Completed : yes  Bedside Safety Check Completed: yes

## 2018-09-28 NOTE — PLAN OF CARE
Problem: Cardiac Disease Comorbidity  Goal: Cardiac Disease  Patient comorbidity will be monitored for signs and symptoms of Cardiac Disease.  Problems will be absent, minimized or managed by discharge/transition of care.   Outcome: No Change  Continue monitoring

## 2018-09-28 NOTE — PHARMACY-VANCOMYCIN DOSING SERVICE
Pharmacy Vancomycin Note  Date of Service 2018  Patient's  1960   58 year old, female    Indication: Bacteremia  Goal Trough Level: 15-20 mg/L  Day of Therapy: 5  Current Vancomycin regimen:  1250 mg IV q8h    Current estimated CrCl = Estimated Creatinine Clearance: 183.9 mL/min (based on Cr of 0.34).    Creatinine for last 3 days  2018:  6:36 AM Creatinine 0.34 mg/dL  2018:  6:45 AM Creatinine 0.37 mg/dL  2018:  6:20 AM Creatinine 0.34 mg/dL    Recent Vancomycin Levels (past 3 days)  2018:  1:16 AM Vancomycin Level 5.6 mg/L  2018: 10:55 AM Vancomycin Level 11.8 mg/L  2018: 11:22 AM Vancomycin Level 13.8 mg/L    Vancomycin IV Administrations (past 72 hours)                   vancomycin (VANCOCIN) 1,250 mg in sodium chloride 0.9 % 250 mL intermittent infusion (mg) 1,250 mg New Bag 18 1247     1,250 mg New Bag  0408     1,250 mg New Bag 18 1833     1,250 mg New Bag  1246     1,250 mg New Bag  0330     1,250 mg New Bag 18 1710     1,250 mg New Bag  1028     1,250 mg New Bag  0227                Nephrotoxins and other renal medications (Future)    Start     Dose/Rate Route Frequency Ordered Stop    18 0845  furosemide (LASIX) injection 20 mg      20 mg  over 1-2 Minutes Intravenous EVERY 8 HOURS 18 0832      18 2145  norepinephrine (LEVOPHED) 16 mg in D5W 250 mL infusion      0.03-0.4 mcg/kg/min × 64.5 kg  1.8-24.2 mL/hr  Intravenous CONTINUOUS 18 2132      18 0200  vancomycin (VANCOCIN) 1,250 mg in sodium chloride 0.9 % 250 mL intermittent infusion      1,250 mg  over 90 Minutes Intravenous EVERY 8 HOURS 18 0157      18 1215  piperacillin-tazobactam (ZOSYN) infusion 3.375 g      3.375 g  100 mL/hr over 30 Minutes Intravenous EVERY 6 HOURS 18 1205               Contrast Orders - past 72 hours     None          Interpretation of levels and current regimen:  Trough level is  Subtherapeutic    Has serum  creatinine changed > 50% in last 72 hours: No    Renal Function: Stable    Plan:  1.  Increase Dose to 1500mg q8h.  2.  Pharmacy will check trough levels as appropriate in 1-3 Days.    3. Serum creatinine levels will be ordered a minimum of twice weekly.      Solange Nance        .

## 2018-09-29 NOTE — PROGRESS NOTES
[facility]    Hospitalist Progress Note  Name: Eyal Turner    MRN: [id number]  Provider:  Alen Garcia DO MPH  Date of Service: 09/29/2018    Summary of Stay: Eyal Turner is a 58 year old female with PMH including metastatic breast cancer with bone metastases to sternum and rib who started a new chemo regimen 5 days prior to admission, alcohol abuse versus dependence, malnutrition, pancytopenia, and HTN who presented with fatigue and shortness of breath on 9/22.  Initially she was hypotensive which improved with IV fluids in the ED. Labs notable for significantly abnormal LFTs with bilirubin of 8 and elevated transaminases.  Also found to be pancytopenic with neutropenia.  Hemoglobin was less than 7 she received 1 unit RBC transfusion in the ED.     After admission she was found to have fever to 101 and chest x-ray obtained after admission showing bilateral infiltrate so started on cefepime for neutropenic fever and azithromycin added subsequently.  A CT abdomen pelvis was obtained that did not show any ascites or obstructive liver disease.  Evidence for acute alcohol withdrawal initially with significant tremor that improved after a few doses of Ativan.  GI was consulted for possible upper GI bleed and for her liver failure.  No EGD planned.  Did start empiric Protonix on admission, but no evidence for ongoing blood loss.    Oncology also was consulted and recommended supportive cares.  Overall patient is quite ill acutely and chronically.  She was transferred to ICU on 9/23/2018 for worsening hypoxemia and was intubated 9/24/2018.  Blood culture grew lactobacillus from port.  ID was consulted and the patient was switched to vancomycin and Zosyn.  Micafungin was also added for oral thrush.  She was also started on Neupogen for neutropenia with good response.     Problem List/Assessment and Plan:      Acute hypoxic respiratory failure: Suspected due to severe sepsis and pneumonia.  Possible  component of vascular congestion as well.  Remains stable on the ventilator.  Working on pressure support trial and vacation sedation daily but continues to fail on a daily basis.  Management per the intensivist.  -Continue pressure support trial and consider extubation in the coming days pending her improvement.  -Stop diuresis.    Septic shock and neutropenic fever: Some signs of pneumonia with possible esophagitis and does have oral candidiasis.  Only one blood culture positive for lactobacillus which could be contaminant.  Infectious disease following.  Continue vancomycin and Zosyn.  Likely taper antibiotics soon.  -Infectious disease following.  -Currently on vancomycin, Zosyn, and micafungin.     Abnormal LFTs: Presented with jaundice an bilirubin was up to 9.8 up from 2.4 three months ago.  Alkaline phosphatase was elevated to 594, AST elevated 264, ALT elevated at 94.  Does drink 2 bottles of wine daily so possible acute alcoholic hepatitis.  Also additionally started on chemotherapy with docetaxel, trastuzumab, and pertuzumab so possibility of drug toxicity although per oncology with this regimen there is not typically significant liver toxicity.  Did not have a lot of abdominal pain although was a little tender in the upper quadrant.  Lipase was not elevated.  No obstructive process seen on CT.  Slight small nodularity and cannot rule out metastases but this seems less likely based on recent CT scan.  No history of cirrhosis on imaging as of a year ago.  Albumin is low.  Given her alcohol abuse suspect alcohol hepatitis playing a role here.   -Gastroenterology consulted and following.  -Recommendation against systemic steroids for alcoholic hepatitis given current infection and immunosuppression.  -Largely unremarkable right upper quadrant ultrasound.      Alcohol dependence with acute withdrawal: Has been drinking 2 bottles of wine daily for some time.  Last use 1-2 days prior to admission.  Presented in  alcohol withdrawal with tremor.  -CIWA with Ativan as needed, but not requiring any doses at this time.  -Continue thiamine and folate.  -Now on Precedex and propofol.      Acute on chronic pancytopenia with neutropenia: On admission WBC 0.5 with ANC 0.4, hemoglobin 6.9, and platelets 63.  Hemoglobin was 11.4 three months ago and platelets were 108.  Suspect acute drop is from her recent chemotherapy.  Also possible upper GI bleed as below contributing.  -Hemoglobin stable following transfusion support.  -No transfusion today.  -Daily CBC.  -Hematology following.  -Neutropenia resolved following Neupogen which has now been discontinued.      Metastatic breast cancer: Initially diagnosed in 2000 s/p bilateral mastectomy.  Has undergone multiple rounds of chemotherapy and radiation.  Has known metastatic disease to her sternum and third left rib.  Had recent possible skin infection from the sternum met/mass and received Keflex.  Does not look actively infected at this time.  Has port in place.  Restarted on chemotherapy with docetaxel, trastuzumab, and pertuzumab on 9/17/18.  Follows with Dr. Braun of oncology.  Doing quite poorly following chemotherapy with poor oral intake, weakness, and now acute liver injury.  -[place] oncology consulted, appreciate recommendations.  -Chemotherapy adjustments based on hepatotoxicity per oncology.  -Check head CT to rule out new brain metastases.    Mucositis versus thrush and possible esophagitis: Patient erosive changes and plaque on the tongue and her soft palate.  Question whether this is mucositis from her docetaxel or Candida infection.  Sore throat for the past week so possible esophagitis as well.  -Swish and swallow nystatin 4 times daily and Magic mouthwash.  -Management would typically be systemic Diflucan in case of esophagitis however some risk in doing this due to her acutely elevated bilirubin and transaminases.  Received Diflucan 2 mg in the ED.  Given liver toxicity  will hold on further treatment as this may just be mucositis.  -GI not planning EGD to investigate for esophagitis.  -Continue micafungin per ID.     Possible UGIB: Reports of vomiting with red blood in it.  Denies any melena or hematochezia.  Anemia and thrombocytopenia could in part be chemotherapy related.  Has required periodic transfusions.  -Twice daily Protonix.  -GI not planning an EGD .  -No transfusion today.      Hypokalemia and hypomagnesemia:   -Potassium and magnesium replacement protocol.      Severe protein calorie malnutrition: Evidence for fat loss on exam likely related to chronic alcohol use, metastatic breast cancer, and recent chemotherapy.  Clearly has severe malnutrition.  -Dietitian consult.  -Feeding tube placed and continuing tube feeds.    Diarrhea: C. difficile testing negative.  Rectal tube now in place as neutropenia resolved.    Hypernatremia: Sodium climbing.  Stop diuresis.  Increase free water.    DVT Prophylaxis: Pneumatic Compression Devices  Code Status: Full Code  Disposition: Expected discharge in 3+ days to D. Goals prior to discharge include address multiple issues noted above.  Not showing significant improvement over the past several days.  We will continue the current course through the weekend and if no improvement by Monday we will need to readdress goals of care with family.  Family updated today: Will update family later today.     Interval History   Intubated and sedated.    -Data reviewed today: I personally reviewed all new labs and imaging results over the last 24 hours.     Physical Exam   Temp: 98.4  F (36.9  C) Temp src: Axillary BP: (!) 85/57   Heart Rate: 78 Resp: (!) 38 SpO2: 97 % O2 Device: Mechanical Ventilator    Vitals:    09/27/18 0430 09/28/18 0800 09/29/18 0030   Weight: 64.5 kg (142 lb 3.2 oz) 64.6 kg (142 lb 6.7 oz) 67 kg (147 lb 11.3 oz)     Vital Signs with Ranges  Temp:  [98.3  F (36.8  C)-101.4  F (38.6  C)] 98.4  F (36.9  C)  Heart Rate:   [] 78  Resp:  [12-53] 38  BP: ()/(50-70) 85/57  FiO2 (%):  [45 %-60 %] 60 %  SpO2:  [90 %-100 %] 97 %  I/O last 3 completed shifts:  In: 3624.7 [I.V.:1949.7; NG/GT:730]  Out: 2000 [Urine:2000]    GENERAL: No apparent distress.  Intubated and sedated.  Chronically ill and emaciated.  HEENT: Normocephalic, atraumatic. Extraocular movements intact.  Icteric.  CARDIOVASCULAR: Regular rate and rhythm without murmurs or rubs. No S3.  PULMONARY: Clear bilaterally.  GASTROINTESTINAL: Soft, mildly distended. Bowel sounds normoactive.   EXTREMITIES: No cyanosis or clubbing. No edema.  NEUROLOGICAL: CN 2-12 grossly intact, no focal neurological deficits.  DERMATOLOGICAL: No rash, ulcer, but mild jaundice.  Significant diffuse bruising.    Medications     dexmedetomidine 0.7 mcg/kg/hr (09/29/18 0600)     IV fluid REPLACEMENT ONLY       IV fluid REPLACEMENT ONLY       fentaNYL 50 mcg/hr (09/29/18 0600)     norepinephrine 0.03 mcg/kg/min (09/29/18 0653)     propofol (DIPRIVAN) infusion 45 mcg/kg/min (09/29/18 0600)       bacitracin   Topical BID     folic acid  1 mg Oral or Feeding Tube Daily     heparin  5 mL Intracatheter Q28 Days     heparin lock flush  5-10 mL Intracatheter Q24H     lidocaine visc 2% & maalox/mylanta w/simethicone & diphenhydramine  10 mL Swish & Swallow Q6H     micafungin  100 mg Intravenous Q24H     multivitamins with minerals  15 mL Per Feeding Tube Daily     nystatin  500,000 Units Swish & Spit 4x Daily     pantoprazole  40 mg Per Feeding Tube BID     piperacillin-tazobactam  3.375 g Intravenous Q6H     protein modular  2 packet Per Feeding Tube BID w/meals     QUEtiapine  25 mg Oral or Feeding Tube BID     vancomycin (VANCOCIN) IV  1,500 mg Intravenous Q8H     Data     Laboratory:    Recent Labs  Lab 09/29/18  0525 09/28/18  0620 09/27/18  0645   WBC 13.2* 10.8 10.3   HGB 7.8* 7.8* 8.8*   HCT 24.4* 24.2* 26.8*   MCV 95 95 92   * 96* 105*       Recent Labs  Lab 09/29/18  0521  09/28/18  1520 09/28/18  0620 09/27/18  0645   *  --  145* 141   POTASSIUM 3.2* 3.5 3.1* 3.4   CHLORIDE 112*  --  109 105   CO2 30  --  30 28   ANIONGAP 6  --  6 8   *  --  139* 123*   BUN 26  --  22 20   CR 0.42*  --  0.34* 0.37*   GFRESTIMATED >90  --  >90 >90   GFRESTBLACK >90  --  >90 >90   REAGAN 7.3*  --  7.4* 7.3*       Recent Labs  Lab 09/28/18  1211 09/24/18  0945 09/22/18  1033 09/22/18  1021   CULT PENDING Light growthCandida albicans / dubliniensisCandida albicans and Treva dubliniensis are not routinely speciatedSusceptibility testing not routinely done* No growth Cultured on the 2nd day of incubation:Lactobacillus speciesIdentification obtained by MALDI-TOF mass spectrometry research use only database. Test characteristics determined and verified by the [facility] ([facility]) [place], [place].*  Critical Value/Significant Value, preliminary result only, called to and read back byKinjal Woodward RN at 1145 on 9/24/18 by JOSE ANTONIO.       Imaging:  Recent Results (from the past 24 hour(s))   XR Abdomen Port 1 View    Narrative    XR ABDOMEN PORT 1 VW 9/28/2018 10:22 AM    HISTORY: Abdominal distention.    COMPARISON: 9/24/2018    FINDINGS: The course of the enteric tube suggests that the tip is in  the duodenum. The bowel gas pattern is nonobstructive.      Impression    IMPRESSION: Nonobstructive gas pattern.    ESA STAHL MD   US Abdomen Complete Portable    Narrative    ULTRASOUND ABDOMEN COMPLETE 9/28/2018 5:10 PM     HISTORY: Elevated liver function tests.     COMPARISON: 7/16/2016 ultrasound, CT from 9/22/2018    FINDINGS: Minimal free fluid. Liver is coarse in echogenicity without  focal solid lesions. Gallbladder demonstrates no cholelithiasis.  Gallbladder wall is contracted, however, given its incompletely  distended state, this is of uncertain clinical significance.  Hypoechoic area inferior to the liver which is nonspecific, but no  mass was seen on the CT from  six days prior, this is likely some fluid  and debris-filled bowel. Extrahepatic bile duct is normal in diameter.  Pancreas is normal where visualized. Spleen is normal. Kidneys are  normal in size. There is no hydronephrosis. Visualized abdominal aorta  and IVC are nonaneurysmal.      Impression    IMPRESSION:    1. Coarse echogenicity of the liver which is nonspecific, minimal free  fluid.  2. Some thickening of the gallbladder wall is noted, however, this is  nonspecific given its incompletely distended state.  3. No biliary obstruction demonstrated.    MD Alen ANDRES DO MPH  [facility] Hospitalist  [address] [place] [address]  Pager: [phone]  09/29/2018

## 2018-09-29 NOTE — PROGRESS NOTES
Oncology/Hematology Follow Up Note:    Assessment and Plan:    Assessment and Plan: 59 yo F with Metastatic Breast Cancer now here with respiratory failure, hepatoxicity (ETOH related) in the setting of pancytopenia after recent chemobiotherapy treatment intensification.      Per her Primary Oncologist Dr. Braun:  Eyal has a long history of metastatic HER2 positive breast cancer s/p multiple lines of treatment.  With recent progression of sternal metastases despite palliative radiation therapy, she was switched from Kadcyla to docetaxel, Herceptin, and Perjeta (has not had Perjeta before).  She received chemotherapy last Monday.  Over the weekend, she became progressively more tired and weaker.  She also developed worsening shortness of breath.  Evaluation in the ER demonstrated bilateral lower lung infiltrates concerning for volume overload.  She was given Lasix for diuresis and started on antibiotics for probable pneumonia.  She was also found to have significantly elevated liver enzymes and bilirubin likely due to hepatotoxicity from alcohol abuse and chemotherapy.      1)HEPATIC-  Hepatotoxicity from alcohol abuse in the setting of chemotherapy; LFTs stable to improved.      - Monitor LFTs daily  - If the patient recovers from this, would use paclitaxel (Taxol) instead of docetaxel (Taxotere) with the next cycle      2)PULM- Respiratory failure. Intubated. Likely due to volume overload and possible pneumonia. New echo shows no new cardiomyopathy or EF decline.  -PER ICU Intensivists/Hospitalists ; Continue diuresis  - Continue to treat with antibiotics for presumed pneumonia  -ECHO done EF 55-60%,; Thus, Could likely continue Her-2 directed therapy if she recovers.      3)ONCOLOGY- Metastatic breast cancer, ER weakly positive (5%), AZ negative, HER2 positive; Has sternal metastases and possible metastasis in left 5th rib and bone mets alone the spine which were no longer FDG avid on PET/CT from 7/23  - If the  patient recovers from the current hospital stay, would consider switching from docetaxel to paclitaxel given lower risk of hepatotoxicity, and continue with Herceptin/Perjeta vs. Herceptin or Perjeta alone.      4)HEMATOLOGY- Pancytopenia, chemotherapy-induced; Suspect myelotoxicity due to alcohol abuse.  - follow CBC;  -  neupogen discontinued on 9/26.  - Transfuse as needed to keep Hgb >7.0.  In the setting of respiratory failure, would to reasonable to target a higher Hgb goal.     5) FULL CODE  - Dr Romo d/w team 9/28 and patient has had a long history of Stage IV metastatic breast cancer, hope is she can turn the corner from current ICU issues. If her condition declines certainly appropriate to have a discussion on goals of care.   She d/w Dr Garcia and nursing, will  Keep all cares and plan going over the weekend, if condition declines after that can have a care conference planed. From a breast cancer standpoint she has done extremely well and has many treatment options available to her if she recovers.       Subjective:     Intubated, sedated.      Labs:  All labs reviewed    CBC    Recent Labs  Lab 09/29/18  0525 09/28/18  0620 09/27/18  0645   WBC 13.2* 10.8 10.3   HGB 7.8* 7.8* 8.8*   MCV 95 95 92   * 96* 105*       CMP  Recent Labs  Lab 09/29/18  0525 09/28/18  1520 09/28/18  1122 09/28/18  0620 09/27/18  0645  09/26/18  0636  09/25/18  1640  09/25/18  0514 09/24/18  0920 09/24/18  0520  09/23/18  0620   *  --   --  145* 141  --  139  --   --   --  137  --  135  --  132*   POTASSIUM 3.2* 3.5  --  3.1* 3.4  --  3.6  --   --   < > 3.0* 3.9 3.2*  < > 3.1*   CHLORIDE 112*  --   --  109 105  --  105  --   --   --  102  --  101  --  100   CO2 30  --   --  30 28  --  27  --   --   --  28  --  26  --  23   ANIONGAP 6  --   --  6 8  --  7  --   --   --  7  --  8  --  9   *  --   --  139* 123*  --  142*  --   --   --  124*  --  83  --  81   BUN 26  --   --  22 20  --  19  --   --   --  17  --  15   --  12   CR 0.42*  --   --  0.34* 0.37*  --  0.34*  --   --   --  0.35*  --  0.34*  --  0.34*   GFRESTIMATED >90  --   --  >90 >90  --  >90  --   --   --  >90  --  >90  --  >90   GFRESTBLACK >90  --   --  >90 >90  --  >90  --   --   --  >90  --  >90  --  >90   REAGAN 7.3*  --   --  7.4* 7.3*  --  7.5*  --   --   --  7.5*  --  7.4*  --  7.3*   MAG  --   --   --   --   --   --   --   --  2.6*  --  1.5* 1.8  --   --  2.0   PHOS 2.6 2.2*  --  1.8* 2.0*  < >  --   < > 1.7*  --  1.3*  --  2.1*  < >  --    PROTTOTAL  --   --  4.8*  --   --   --   --   --   --   --   --   --  5.3*  --  5.3*   ALBUMIN  --   --  1.9*  --   --   --   --   --   --   --   --   --  2.4*  --  2.0*   BILITOTAL  --   --  7.9*  --   --   --  7.6*  --   --   --  9.3*  --  9.8*  --  8.4*   ALKPHOS  --   --  667*  --   --   --   --   --   --   --  409*  --  401*  --  464*   AST  --   --  158*  --   --   --  97*  --   --   --  113*  --  145*  --  182*   ALT  --   --  69*  --   --   --  57*  --   --   --   --   --  70*  --  76*   < > = values in this interval not displayed.    INR    Recent Labs  Lab 09/22/18  1500   INR 1.15*       Blood Culture    Recent Labs  Lab 09/28/18  1211 09/24/18  0945   CULT Culture negative monitoring continues Light growthCandida albicans / dubliniensisCandida albicans and Treva dubliniensis are not routinely speciatedSusceptibility testing not routinely done*

## 2018-09-29 NOTE — PLAN OF CARE
Problem: Patient Care Overview  Goal: Plan of Care/Patient Progress Review  Outcome: Declining  ICU End of Shift Summary.  For vital signs and complete assessments, please see documentation flowsheets.     Pertinent assessments: Pt is not following commands. Opens eyes to oral cares. Mouth is bleeding from oral sores. NSR to Sinus Tachycardia. Sedated on fentanyl, precedex and propofol gtt and levophed.   Major Shift Events: BC and urine drawn. Head CT done with no new changes and eco done. Pt RR 30's, fentanyl gtt increased and RASS goal changed to -4. Rectal tube placed for water stool. K+ replaced.   Plan (Upcoming Events): Consider Palliative care consult by Monday.   Discharge/Transfer Needs: Pt remains critically ill. TBD.     Bedside Shift Report Completed :   Bedside Safety Check Completed:

## 2018-09-29 NOTE — PROGRESS NOTES
Respiratory Therapy Note    Patient seen resting in bed on mechanical ventilator support, current settings:    Ventilation Mode: CMV/AC  (Continuous Mandatory Ventilation/ Assist Control)  FiO2 (%): 50 %  Rate Set (breaths/minute): 16 breaths/min  Tidal Volume Set (mL): 450 mL  PEEP (cm H2O): 8 cmH2O  Pressure Support (cm H2O): 12 cmH2O  Oxygen Concentration (%): 50 %  Resp: 30    Respiratory rate 20s-30s, breath sounds clear/occasionally coarse, and SpO2 90-94%. Suctioning small, yellow/pale, thick secretions from ETT. Patient transported to CT today via transport ventilator. RT to follow.    Lyla Crawley  5:47 PM September 29, 2018

## 2018-09-29 NOTE — PLAN OF CARE
Problem: Patient Care Overview  Goal: Plan of Care/Patient Progress Review  Outcome: No Change  .ICU End of Shift Summary.  For vital signs and complete assessments, please see documentation flowsheets.     Pertinent assessments: Patient continues to be intubated and sedated.  Precedex, Fentanyl, and Propofol.  Propofol restarted and titrated up due to increased respiration; RR 30-40.  Vent settings changed; Peep 8 and currently FiO2 60%.  O2 sats mainly in the mid 90s.  LS coarse at times; suctioned small amount of secretion.  Tele SR/ST.  TF going at goal 45 ml/hr with water flushes of 60 ml every 2 hours.  Abdomen distended.  Gray patent; urine output adequate.  Multiple loose/watery stools.    Major Shift Events: BP soft with SBP in 80's.  MAP > 65.   Plan (Upcoming Events): monitor BP; restart Levo if needed.  Monitor respiratory status.    Discharge/Transfer Needs: TBD    Bedside Shift Report Completed :   Bedside Safety Check Completed:

## 2018-09-29 NOTE — PLAN OF CARE
Problem: Restraint for Non-Violent/Non-Self-Destructive Behavior  Goal: Prevent/Manage Potential Problems  Maintain safety of patient and others during period of restraint.  Promote psychological and physical wellbeing.  Prevent injury to skin and involved body parts.  Promote nutrition, hydration, and elimination.   Outcome: No Change  .Right wrist and Left wrist restraints continued 9/29/2018    Clinical Justification: Pulling lines, pulling tubes, and pulling equipment  Less Restrictive Alternative: Re-evaluate equipment, Reorientation, Repositioning  Attending Physician Notified: Yes, Attending Physician's Name: Dr. Garcia   New orders placed No  Length of Order: 1 Day      Sherry Modi

## 2018-09-29 NOTE — PLAN OF CARE
Problem: Restraint for Non-Violent/Non-Self-Destructive Behavior  Goal: Prevent/Manage Potential Problems  Maintain safety of patient and others during period of restraint.  Promote psychological and physical wellbeing.  Prevent injury to skin and involved body parts.  Promote nutrition, hydration, and elimination.   Outcome: No Change  Right wrist and Left wrist restraints continued 9/28/2018    Clinical Justification: Pulling lines, pulling tubes, and pulling equipment  Less Restrictive Alternative: Re-evaluate equipment, Reorientation, Repositioning  Attending Physician Notified: Yes, Attending Physician's Name: Dr. Garcia   New orders placed Yes  Length of Order: 1 Day      Grisel Mitchell

## 2018-09-29 NOTE — PROGRESS NOTES
Contacted Dr. Matute to discuss that pt RR 30's, and pt is using accessory muscles. Propofol gtt is infusing at 45 ml/hr, fentanyl gtt is at 50 mcg/hr and precedex gtt at max rate. Cannot increase sedation without following RASS score of -2 to -3. MD agreed to change RASS to -3 to -4 and gave telephone order to increase fentanyl gtt to 100 mcg/hr.

## 2018-09-29 NOTE — PROGRESS NOTES
[facility]  Infectious Disease Progress Note          Assessment and Plan:   IMPRESSION:   1.  A 58-year-old female with widely metastatic long-term breast cancer, ongoing chemotherapy, and now neutropenia.   2.  Acute sepsis and neutropenic fever, some signs of pneumonia, with possible esophagitis and positive blood culture, at risk for multiple infections.   3.  Thrush and some concern for possible esophagitis.   4.  Acute and chronic long-term alcohol abuse and liver dysfunction.   5.  Prior Martha-Stafford tear.   6.  Respiratory failure, now intubated.   7.  Positive blood culture, 1 bottle for diphtheroids.  No further identification.  This could be a contaminant versus line infection.       RECOMMENDATIONS:   1.   Blood culture 1 lactobacillus contaminant unless multiple cultures positive or identification changes.   2.   vancomycin and Zosyn   3.  From a thrush and possible fungal infection on micafungin.  This is reasonable from a neutropenic fever standpoint anyway.   4.   Obviously, prognosis poor for a multitude of reasons.   6 Likely taper off abx if able to extubate        Interval History:   Intubated awake WBC rising to nl range incl PMNs  No new + cxs T down              Medications:       albumin human  12.5 g Intravenous Once     bacitracin   Topical BID     folic acid  1 mg Oral or Feeding Tube Daily     heparin  5 mL Intracatheter Q28 Days     heparin lock flush  5-10 mL Intracatheter Q24H     lidocaine visc 2% & maalox/mylanta w/simethicone & diphenhydramine  10 mL Swish & Swallow Q6H     micafungin  100 mg Intravenous Q24H     multivitamins with minerals  15 mL Per Feeding Tube Daily     nystatin  500,000 Units Swish & Spit 4x Daily     pantoprazole  40 mg Per Feeding Tube BID     piperacillin-tazobactam  3.375 g Intravenous Q6H     protein modular  2 packet Per Feeding Tube BID w/meals     QUEtiapine  25 mg Oral or Feeding Tube BID     vancomycin (VANCOCIN) IV  1,500 mg  "Intravenous Q8H                  Physical Exam:   Blood pressure 90/57, pulse 123, temperature 98.9  F (37.2  C), temperature source Oral, resp. rate 29, height 1.676 m (5' 6\"), weight 67 kg (147 lb 11.3 oz), SpO2 94 %, not currently breastfeeding.  Wt Readings from Last 2 Encounters:   09/29/18 67 kg (147 lb 11.3 oz)   10/23/17 57.6 kg (127 lb)     Vital Signs with Ranges  Temp:  [98.4  F (36.9  C)-100.1  F (37.8  C)] 98.9  F (37.2  C)  Heart Rate:  [] 89  Resp:  [12-53] 29  BP: ()/(50-86) 90/57  FiO2 (%):  [45 %-60 %] 50 %  SpO2:  [90 %-100 %] 94 %    Constitutional: Intubated and sedated   Lungs: Clear to auscultation bilaterally, no crackles or wheezing   Cardiovascular: Regular rate and rhythm, normal S1 and S2, and no murmur noted   Abdomen: Normal bowel sounds, soft, non-distended, non-tender   Skin: No rashes, no cyanosis, no edema   Other:           Data:   All microbiology laboratory data reviewed.  Recent Labs   Lab Test  09/29/18   0525  09/28/18   0620  09/27/18   0645   WBC  13.2*  10.8  10.3   HGB  7.8*  7.8*  8.8*   HCT  24.4*  24.2*  26.8*   MCV  95  95  92   PLT  103*  96*  105*     Recent Labs   Lab Test  09/29/18   0525  09/28/18   0620  09/27/18   0645   CR  0.42*  0.34*  0.37*     No lab results found.  Recent Labs   Lab Test  09/28/18   1211  09/24/18   0945  09/22/18   1033  09/22/18   1021  06/15/18   1908  10/11/15   1610  10/11/15   1600  10/11/15   1430  09/25/15   1334   CULT  Culture negative monitoring continues  Light growth  Candida albicans / dubliniensis  Candida albicans and Candida dubliniensis are not routinely speciated  Susceptibility testing not routinely done  *  No growth  Cultured on the 2nd day of incubation:  Lactobacillus species  Identification obtained by MALDI-TOF mass spectrometry research use only database. Test   characteristics determined and verified by the Infectious Diseases Diagnostic Laboratory   ([facility]) [place], [place].  *  Critical " Value/Significant Value, preliminary result only, called to and read back by  Kinjal Woodward RN at 1145 on 9/24/18 by JOSE ANTONIO.    >100,000 colonies/mL  mixed urogenital zahra  Susceptibility testing not routinely done    No growth  No growth  <10,000 colonies/mL mixed urogenital zahra Susceptibility testing not routinely   done    >100,000 colonies/mL Escherichia coli*

## 2018-09-29 NOTE — PROGRESS NOTES
Nurse called regarding increased RR after stopping propofol.  Camera-ed into room patient is clearly working to breathe. Based on CXR from today --Pnuemonia, possible beginning ARDS  Recommended resuming propofol, levophed for pressure. Increase vent support- PE to 8, FiO2 to 60%. Will check gas in 1 hr.    Michelle Almonte

## 2018-09-29 NOTE — PROGRESS NOTES
Ventilation Mode: CMV/AC  (Continuous Mandatory Ventilation/ Assist Control)  FiO2 (%): 60 %  Rate Set (breaths/minute): 16 breaths/min  Tidal Volume Set (mL): 450 mL  PEEP (cm H2O): 8 cmH2O  Pressure Support (cm H2O): 12 cmH2O  Oxygen Concentration (%): 60 %  Resp: 23      Patient had stable shift- requiring more peep and oxygen this shift to maintain sats.   RR start of the shift 30-40's. Will continue to monitor respiratory status for changes.  No wean this early am.

## 2018-09-29 NOTE — PROGRESS NOTES
Critical Care Progress Note      09/29/2018    Name: Eyal Turner MRN#: [id number]   Age: 58 year old YOB: 1960     Hsptl Day# 7  ICU DAY # 6    MV DAY # 6             Problem List:   Active Problems:    Acute liver failure           Summary/Hospital Course:     59 yo female with metastatic breast CA admitted to the ICU.  Events of overnight noted for episodes of agitation and patient being placed back on full ventilatory support.  Events of last night noted included patient's increased agitation requiring propofol infusion.  Events of last 24 hours noted including patient being restarted on norepinephrine for hypotension.  Patient did not tolerate wean from mechanical ventilation and became markedly tachypneic.  Workup for distended abdomen was unremarkable yesterday with KUB showing nonobstructive pattern and lactate being within normal limits.  I have personally reviewed the daily labs, imaging studies, cultures and discussed the case with referring physician and consulting physicians.     My assessment and plan by system for this patient is as follows:    Neurology/Psychiatry:   1. ETOH Use  2. Analgesia    Plan  -Will start precedex for sedation goal RASS of negative 2  -Monitor for signs of ETOH withdrawal  - PRN Fentanyl for pain given metastatic lesions  -Continue CIWA  -Of concern the patient's persistent tachypnea given with increased levels of mechanical ventilatory support.  Given this finding I would recommend a head CT to rule out any intracranial pathology at this time.    Cardiovascular:   1.Hemodynamics -hypotensive  2.Rhythm -  tachy    Plan  -Concern for persistent hypotension requiring norepinephrine this a.m.  Likely this etiology is sepsis versus SIRS reaction.  We will gently fluid bolus the patient in order to try to wean the norepinephrine.  We will also hold diuresis given the fact that the patient is requiring norepinephrine at this time.  Given the patient's persistent  hypotension and difficulty with weaning of mechanical ventilation we will also obtain a CAMMY this weekend.    Pulmonary/Ventilator Management:   1. Airway intubated  2. Oxygenation/ventilation/mechanics on full ventilatory support of SIMV  Plan  -I placed the patient on controlled mechanical ventilation with hopes to slow down the patient's respiratory.  Patient's FiO2 was increased to 60% overnight.  At the time of my examination the patient's O2 saturation was 98%.  I decreased the patient's FiO2 to 50% and her saturation remained unchanged.  We will continue the patient on full mechanical ventilatory support today.    GI and Nutrition :   1. Concern for protein calorie malnutrition  2.  New abdominal distention resolved  3.  Ultrasound of gallbladder reviewed no evidence of biliary obstruction was demonstrated on this study    Plan  -dubhoff placed continue on tubefeeds  -KUB shows nonobstructive pattern and lactate is within normal level.  Highly doubt intra-abdominal process at this time    Renal/Fluids/Electrolytes:   1. Cr 0.42  2. Hypokalemia,   3. 7.48/38/79  4. Volume status possible fluid overload  5.  Hyponatremia  Plan  -Patient continues to be hypokalemic we will continue to replete  -Please increase free water replacement in order to correct hypernatremia.  - monitor function and electrolytes as needed with replacement per ICU protocols.  - generally avoid nephrotoxic agents such as NSAID, IV contrast unless specifically required  - adjust medications as needed for renal clearance  - follow I/O's as appropriate.  -Hold diuresis given the patient's increased pressor record    Infectious Disease:   1. Concern for PNA  2. Gram positive in blood (doubt line infection)  3.  Plan  Appreciate ID consult and recommendations we will continue to follow culture results while on current antibiotics until final results.  Respiratory culture shows no sputum growth so far.  Of concern is patient presents hypertension  we will obtain blood cultures and urine culture this a.m.    Endocrine:     1. Concern of stress induced hyperglycemia    Plan  - ICU insulin protocol, goal sugar <180      Hematology/Oncology:     1.chronic anemia secondary to CA, no signs, symptoms of active blood loss    Plan  -No need for transfusion at this time      IV/Access:   1. Venous access -  port  2. Arterial access -  none  3.  Plan  - central access required and necessary      ICU Prophylaxis:   1. DVT: mechanical  2. VAP: HOB 30 degrees, chlorhexidine rinse  3. Stress Ulcer: PPI  4. Restraints: Nonviolent soft two point restraints required and necessary for patient safety and continued cares and good effect as patient continues to pull at necessary lines, tubes despite education and distraction. Will readdress daily.   5. Wound care - per unit routine   6. Feeding -continue tube feeds  7. Family Update: Discussed with patient  need to obtain head CT and continue the patient on full mechanical ventilatory support   8. Disposition - ICU        Key goals for next 24 hours:   1.   Full ventilatory support  2.  CT  3.  All cultures               Interim History:   Worsening clinical condition.  Concern for early sepsis.           Key Medications:       bacitracin   Topical BID     folic acid  1 mg Oral or Feeding Tube Daily     heparin  5 mL Intracatheter Q28 Days     heparin lock flush  5-10 mL Intracatheter Q24H     lidocaine visc 2% & maalox/mylanta w/simethicone & diphenhydramine  10 mL Swish & Swallow Q6H     micafungin  100 mg Intravenous Q24H     multivitamins with minerals  15 mL Per Feeding Tube Daily     nystatin  500,000 Units Swish & Spit 4x Daily     pantoprazole  40 mg Per Feeding Tube BID     piperacillin-tazobactam  3.375 g Intravenous Q6H     protein modular  2 packet Per Feeding Tube BID w/meals     QUEtiapine  25 mg Oral or Feeding Tube BID     vancomycin (VANCOCIN) IV  1,500 mg Intravenous Q8H       dexmedetomidine 0.7 mcg/kg/hr  (09/29/18 0600)     IV fluid REPLACEMENT ONLY       IV fluid REPLACEMENT ONLY       fentaNYL 50 mcg/hr (09/29/18 0837)     norepinephrine 0.05 mcg/kg/min (09/29/18 0952)     propofol (DIPRIVAN) infusion 45 mcg/kg/min (09/29/18 0952)               Physical Examination:   Temp:  [98.3  F (36.8  C)-100.1  F (37.8  C)] 98.7  F (37.1  C)  Heart Rate:  [] 85  Resp:  [12-53] 23  BP: ()/(50-69) 85/57  FiO2 (%):  [45 %-60 %] 60 %  SpO2:  [90 %-100 %] 97 %    Intake/Output Summary (Last 24 hours) at 09/24/18 1115  Last data filed at 09/24/18 0800   Gross per 24 hour   Intake              250 ml   Output             2990 ml   Net            -2740 ml     Wt Readings from Last 4 Encounters:   09/29/18 67 kg (147 lb 11.3 oz)   10/23/17 57.6 kg (127 lb)   08/09/16 57.6 kg (127 lb)   08/01/16 57.6 kg (127 lb)     BP - Mean:  [57-82] 67  Ventilation Mode: CMV/AC  (Continuous Mandatory Ventilation/ Assist Control)  FiO2 (%): 60 %  Rate Set (breaths/minute): 16 breaths/min  Tidal Volume Set (mL): 450 mL  PEEP (cm H2O): 8 cmH2O  Pressure Support (cm H2O): 12 cmH2O  Oxygen Concentration (%): 60 %  Resp: 23    Recent Labs  Lab 09/29/18  0525 09/28/18  2258 09/28/18  1042 09/24/18  0520 09/23/18  2330   PH  --   --  7.48*  --  7.45   PCO2  --   --  38  --  40   PO2  --   --  79*  --  27*   HCO3  --   --  28  --  27   O2PER 60% 60% 60% 40% 50%       GEN: no acute distress, now   HEENT: head ncat, sclera anicteric, OP patent, trachea midline   PULM: unlabored synchronous with vent, clear anteriorly , coarse b/l breath sounds prior to intubation  CV/COR: RRR S1S2 no gallop,  No rub, no murmur  ABD: soft nontender, hypoactive bowel sounds, no mass  EXT:  Edema   warm  NEURO: grossly intact, chemically sedated does not follow command  SKIN: no obvious rash  LINES: clean, dry intact         Data:   All data and imaging reviewed     ROUTINE ICU LABS (Last four results)  CMP  Recent Labs  Lab 09/29/18  0525 09/28/18  1520  09/28/18  1122 09/28/18  0620 09/27/18  0645  09/26/18  0636  09/25/18  1640  09/25/18  0514 09/24/18  0920 09/24/18  0520 09/23/18  0620 09/22/18  1022   *  --   --  145* 141  --  139  --   --   --  137  --  135  --  132*  --  127*   POTASSIUM 3.2* 3.5  --  3.1* 3.4  --  3.6  --   --   < > 3.0* 3.9 3.2*  < > 3.1*  --  3.8   CHLORIDE 112*  --   --  109 105  --  105  --   --   --  102  --  101  --  100  --  94   CO2 30  --   --  30 28  --  27  --   --   --  28  --  26  --  23  --  24   ANIONGAP 6  --   --  6 8  --  7  --   --   --  7  --  8  --  9  --  9   *  --   --  139* 123*  --  142*  --   --   --  124*  --  83  --  81  --  103*   BUN 26  --   --  22 20  --  19  --   --   --  17  --  15  --  12  --  12   CR 0.42*  --   --  0.34* 0.37*  --  0.34*  --   --   --  0.35*  --  0.34*  --  0.34*  --  0.33*   GFRESTIMATED >90  --   --  >90 >90  --  >90  --   --   --  >90  --  >90  --  >90  --  >90   GFRESTBLACK >90  --   --  >90 >90  --  >90  --   --   --  >90  --  >90  --  >90  --  >90   REAGAN 7.3*  --   --  7.4* 7.3*  --  7.5*  --   --   --  7.5*  --  7.4*  --  7.3*  --  7.7*   MAG  --   --   --   --   --   --   --   --  2.6*  --  1.5* 1.8  --   --  2.0  < > 1.4*   PHOS 2.6 2.2*  --  1.8* 2.0*  < >  --   < > 1.7*  --  1.3*  --  2.1*  < >  --   --   --    PROTTOTAL  --   --  4.8*  --   --   --   --   --   --   --   --   --  5.3*  --  5.3*  --  5.9*   ALBUMIN  --   --  1.9*  --   --   --   --   --   --   --   --   --  2.4*  --  2.0*  --  2.4*   BILITOTAL  --   --  7.9*  --   --   --  7.6*  --   --   --  9.3*  --  9.8*  --  8.4*  --  8.3*   ALKPHOS  --   --  667*  --   --   --   --   --   --   --  409*  --  401*  --  464*  --  594*   AST  --   --  158*  --   --   --  97*  --   --   --  113*  --  145*  --  182*  --  264*   ALT  --   --  69*  --   --   --  57*  --   --   --   --   --  70*  --  76*  --  94*   < > = values in this interval not displayed.  CBC    Recent Labs  Lab 09/29/18  0525 09/28/18  0620  09/27/18  0645 09/26/18  0636   WBC 13.2* 10.8 10.3 7.3   RBC 2.57* 2.56* 2.90* 2.61*   HGB 7.8* 7.8* 8.8* 7.8*   HCT 24.4* 24.2* 26.8* 24.4*   MCV 95 95 92 94   MCH 30.4 30.5 30.3 29.9   MCHC 32.0 32.2 32.8 32.0   RDW 21.7* 21.0* 21.0* 21.8*   * 96* 105* 127*     INR    Recent Labs  Lab 09/22/18  1500   INR 1.15*     Arterial Blood Gas    Recent Labs  Lab 09/29/18  0525 09/28/18  2258 09/28/18  1042 09/24/18  0520 09/23/18  2330   PH  --   --  7.48*  --  7.45   PCO2  --   --  38  --  40   PO2  --   --  79*  --  27*   HCO3  --   --  28  --  27   O2PER 60% 60% 60% 40% 50%       All cultures:    Recent Labs  Lab 09/28/18  1211 09/24/18  0945 09/22/18  1033 09/22/18  1021   CULT PENDING Light growthCandida albicans / dubliniensisCandida albicans and Treva dubliniensis are not routinely speciatedSusceptibility testing not routinely done* No growth Cultured on the 2nd day of incubation:Lactobacillus speciesIdentification obtained by MALDI-TOF mass spectrometry research use only database. Test characteristics determined and verified by the Infectious Diseases Diagnostic Laboratory ([facility]) [place], [place].*  Critical Value/Significant Value, preliminary result only, called to and read back byKinjal Woodward RN at 1145 on 9/24/18 by JOSE ANTONIO.     Recent Results (from the past 24 hour(s))   XR Abdomen Port 1 View    Narrative    XR ABDOMEN PORT 1 VW 9/28/2018 10:22 AM    HISTORY: Abdominal distention.    COMPARISON: 9/24/2018    FINDINGS: The course of the enteric tube suggests that the tip is in  the duodenum. The bowel gas pattern is nonobstructive.      Impression    IMPRESSION: Nonobstructive gas pattern.    ESA STAHL MD   US Abdomen Complete Portable    Narrative    ULTRASOUND ABDOMEN COMPLETE 9/28/2018 5:10 PM     HISTORY: Elevated liver function tests.     COMPARISON: 7/16/2016 ultrasound, CT from 9/22/2018    FINDINGS: Minimal free fluid. Liver is coarse in echogenicity without  focal solid lesions. Gallbladder  demonstrates no cholelithiasis.  Gallbladder wall is contracted, however, given its incompletely  distended state, this is of uncertain clinical significance.  Hypoechoic area inferior to the liver which is nonspecific, but no  mass was seen on the CT from six days prior, this is likely some fluid  and debris-filled bowel. Extrahepatic bile duct is normal in diameter.  Pancreas is normal where visualized. Spleen is normal. Kidneys are  normal in size. There is no hydronephrosis. Visualized abdominal aorta  and IVC are nonaneurysmal.      Impression    IMPRESSION:    1. Coarse echogenicity of the liver which is nonspecific, minimal free  fluid.  2. Some thickening of the gallbladder wall is noted, however, this is  nonspecific given its incompletely distended state.  3. No biliary obstruction demonstrated.    CYNDI SIDHU MD         Billing: This patient is critically ill: Yes. Total critical care time today 34 min.        Head CT shows no acute pathology at this time.

## 2018-09-30 NOTE — PROGRESS NOTES
Critical Care Progress Note      09/30/2018    Name: Eyal Turner MRN#: [id number]   Age: 58 year old YOB: 1960     Hsptl Day# 8  ICU DAY # 6    MV DAY # 6             Problem List:   Active Problems:    Acute liver failure           Summary/Hospital Course:     57 yo female with metastatic breast CA admitted to the ICU.  Events of overnight noted for episodes of agitation and patient being placed back on full ventilatory support.  Events of last night noted included patient's increased agitation requiring propofol infusion.  Events of last 24-hour noted for increasing ventilatory requirements.  The patient has had increased oxygen requirement is not requiring 60% FiO2.  TH patient's PEEP was also increased to 10.  Head CT showed no acute pathological process at this time.  Chest CT on my review shows significant groundglass infiltrates either consistent with infection or acute lung injury.  Patient still requires norepinephrine.  I have personally reviewed the daily labs, imaging studies, cultures and discussed the case with referring physician and consulting physicians.     My assessment and plan by system for this patient is as follows:    Neurology/Psychiatry:   1. ETOH Use  2. Analgesia    Plan  -Will start precedex for sedation goal RASS of negative 2  -Monitor for signs of ETOH withdrawal  - PRN Fentanyl for pain given metastatic lesions  -Continue CIWA  -Head CT ruled out any acute pathology.    Cardiovascular:   1.Hemodynamics -hypotensive  2.Rhythm -  tachy    Plan  -Remains hypotensive requiring norepinephrine.  Of concern this is either sepsis or the serous process.  We will transfuse the patient 1 unit of packed red blood cells given her anemia in hopes to wean the norepinephrine.  An echo obtained yesterday showed normal ejection fraction.    Pulmonary/Ventilator Management:   1. Airway intubated  2. Oxygenation/ventilation/mechanics on full ventilatory support of  SIMV  Plan  -Patient remained tachypneic throughout the day yesterday.  Patient's FiO2 and PEEP were increased with appropriate response and oxygenation.  Respiratory rate was increased to 20 and the patient's tachypnea resolved.  Of concern this is either secondary to evolving acute lung injury versus an infectious process.  It was noted the patient did not have green sputum which would lead me to think that this is a recurrence of her pneumonia.  We will continue the patient on full ventilatory support on the current settings for now.    GI and Nutrition :   1. Concern for protein calorie malnutrition  2.  New abdominal distention resolved  3.  Ultrasound of gallbladder reviewed no evidence of biliary obstruction was demonstrated on this study    Plan  -dubhoff placed continue on tubefeeds  -    Renal/Fluids/Electrolytes:   1. Cr 0.42  2. Hypokalemia,   3.  Chronic respiratory acidosis  4.  Patient may be third spacing fluids but remains intravascularly deplete    Plan  -Patient continues to be hypokalemic we will continue to replete  -Please increase free water replacement in order to correct hypernatremia.  - monitor function and electrolytes as needed with replacement per ICU protocols.  - generally avoid nephrotoxic agents such as NSAID, IV contrast unless specifically required  - adjust medications as needed for renal clearance  - follow I/O's as appropriate.  -Hold diuresis given the patient's increased pressor record    Infectious Disease:   1. Concern for PNA again  2. Gram positive in blood (doubt line infection)  3.  Plan  Appreciate ID consult at this time.  We will continue the patient on current antibiotics.  The patient was recultured this a.m. I would continue to follow this culture results.    Endocrine:     1. Concern of stress induced hyperglycemia    Plan  - ICU insulin protocol, goal sugar <180      Hematology/Oncology:     1.chronic anemia secondary to CA, no signs, symptoms of active blood  loss    Plan  -Given increasing norepinephrine requirements we will transfuse the patient 1 unit of packed red blood cells per      IV/Access:   1. Venous access -  port  2. Arterial access -  none  3.  Plan  - central access required and necessary      ICU Prophylaxis:   1. DVT: mechanical  2. VAP: HOB 30 degrees, chlorhexidine rinse  3. Stress Ulcer: PPI  4. Restraints: Nonviolent soft two point restraints required and necessary for patient safety and continued cares and good effect as patient continues to pull at necessary lines, tubes despite education and distraction. Will readdress daily.   5. Wound care - per unit routine   6. Feeding -continue tube feeds  7. Family Update: No one at bedside this morning 8. Disposition - ICU        Key goals for next 24 hours:   1.   Given poor prognosis as documented by oncology note would consider family meeting tomorrow in order to discuss goals of care  2.  CT scan chest abdomen pelvis today to rule out infectious source  3.  Follow culture result               Interim History:   Continued worsening clinical condition.  Concern for sepsis.           Key Medications:       bacitracin   Topical BID     chlorhexidine  15 mL Mouth/Throat BID     fiber modular (NUTRISOURCE FIBER)  1 packet Per Feeding Tube BID     folic acid  1 mg Oral or Feeding Tube Daily     heparin  5 mL Intracatheter Q28 Days     heparin lock flush  5-10 mL Intracatheter Q24H     lidocaine visc 2% & maalox/mylanta w/simethicone & diphenhydramine  10 mL Swish & Swallow Q6H     micafungin  100 mg Intravenous Q24H     multivitamins with minerals  15 mL Per Feeding Tube Daily     nystatin  500,000 Units Swish & Spit 4x Daily     pantoprazole  40 mg Per Feeding Tube BID     piperacillin-tazobactam  3.375 g Intravenous Q6H     protein modular  2 packet Per Feeding Tube BID w/meals     QUEtiapine  25 mg Oral or Feeding Tube BID     vancomycin (VANCOCIN) IV  1,500 mg Intravenous Q8H       dexmedetomidine 0.7  mcg/kg/hr (09/30/18 0924)     IV fluid REPLACEMENT ONLY       IV fluid REPLACEMENT ONLY       fentaNYL 100 mcg/hr (09/30/18 0256)     norepinephrine 0.06 mcg/kg/min (09/30/18 0816)     propofol (DIPRIVAN) infusion 50 mcg/kg/min (09/30/18 0739)               Physical Examination:   Temp:  [98.6  F (37  C)-102.1  F (38.9  C)] 98.6  F (37  C)  Heart Rate:  [] 93  Resp:  [9-84] 84  BP: ()/(46-74) 79/46  FiO2 (%):  [50 %-70 %] 60 %  SpO2:  [88 %-100 %] 93 %    Intake/Output Summary (Last 24 hours) at 09/24/18 1115  Last data filed at 09/24/18 0800   Gross per 24 hour   Intake              250 ml   Output             2990 ml   Net            -2740 ml     Wt Readings from Last 4 Encounters:   09/30/18 69.3 kg (152 lb 12.5 oz)   10/23/17 57.6 kg (127 lb)   08/09/16 57.6 kg (127 lb)   08/01/16 57.6 kg (127 lb)     BP - Mean:  [55-83] 55  Ventilation Mode: CMV/AC  (Continuous Mandatory Ventilation/ Assist Control)  FiO2 (%): 60 %  Rate Set (breaths/minute): 20 breaths/min  Tidal Volume Set (mL): 450 mL  PEEP (cm H2O): 10 cmH2O  Pressure Support (cm H2O): 12 cmH2O  Oxygen Concentration (%): 60 %  Resp: 84    Recent Labs  Lab 09/30/18  0220 09/29/18  0525 09/28/18  2258 09/28/18  1042  09/23/18  2330   PH  --   --   --  7.48*  --  7.45   PCO2  --   --   --  38  --  40   PO2  --   --   --  79*  --  27*   HCO3  --   --   --  28  --  27   O2PER 70% 60% 60% 60%  < > 50%   < > = values in this interval not displayed.    GEN: no acute distress, now   HEENT: head ncat, sclera anicteric, OP patent, trachea midline   PULM: unlabored synchronous with vent, clear anteriorly , coarse b/l breath sounds prior to intubation  CV/COR: RRR S1S2 no gallop,  No rub, no murmur  ABD: soft nontender, hypoactive bowel sounds, no mass  EXT:  Edema   warm  NEURO: grossly intact, chemically sedated does not follow command  SKIN: no obvious rash  LINES: clean, dry intact         Data:   All data and imaging reviewed     ROUTINE ICU LABS (Last  four results)  [facility]  Recent Labs  Lab 09/30/18  0450 09/29/18  1356 09/29/18  0525 09/28/18  1520 09/28/18  1122 09/28/18  0620 09/27/18  0645  09/26/18  0636  09/25/18  1640  09/25/18  0514 09/24/18  0920 09/24/18  0520   *  --  148*  --   --  145* 141  --  139  --   --   --  137  --  135   POTASSIUM 3.7 3.7 3.2* 3.5  --  3.1* 3.4  --  3.6  --   --   < > 3.0* 3.9 3.2*   CHLORIDE 113*  --  112*  --   --  109 105  --  105  --   --   --  102  --  101   CO2 29  --  30  --   --  30 28  --  27  --   --   --  28  --  26   ANIONGAP 6  --  6  --   --  6 8  --  7  --   --   --  7  --  8   *  --  125*  --   --  139* 123*  --  142*  --   --   --  124*  --  83   BUN 26  --  26  --   --  22 20  --  19  --   --   --  17  --  15   CR 0.41*  --  0.42*  --   --  0.34* 0.37*  --  0.34*  --   --   --  0.35*  --  0.34*   GFRESTIMATED >90  --  >90  --   --  >90 >90  --  >90  --   --   --  >90  --  >90   GFRESTBLACK >90  --  >90  --   --  >90 >90  --  >90  --   --   --  >90  --  >90   REAGAN 7.9*  --  7.3*  --   --  7.4* 7.3*  --  7.5*  --   --   --  7.5*  --  7.4*   MAG  --   --   --   --   --   --   --   --   --   --  2.6*  --  1.5* 1.8  --    PHOS  --   --  2.6 2.2*  --  1.8* 2.0*  < >  --   < > 1.7*  --  1.3*  --  2.1*   PROTTOTAL  --   --   --   --  4.8*  --   --   --   --   --   --   --   --   --  5.3*   ALBUMIN  --   --   --   --  1.9*  --   --   --   --   --   --   --   --   --  2.4*   BILITOTAL  --   --   --   --  7.9*  --   --   --  7.6*  --   --   --  9.3*  --  9.8*   ALKPHOS  --   --   --   --  667*  --   --   --   --   --   --   --  409*  --  401*   AST  --   --   --   --  158*  --   --   --  97*  --   --   --  113*  --  145*   ALT  --   --   --   --  69*  --   --   --  57*  --   --   --   --   --  70*   < > = values in this interval not displayed.  CBC    Recent Labs  Lab 09/30/18  0450 09/29/18  0525 09/28/18  0620 09/27/18  0645   WBC 20.9* 13.2* 10.8 10.3   RBC 2.46* 2.57* 2.56* 2.90*   HGB 7.3* 7.8* 7.8* 8.8*    HCT 24.4* 24.4* 24.2* 26.8*   MCV 99 95 95 92   MCH 29.7 30.4 30.5 30.3   MCHC 29.9* 32.0 32.2 32.8   RDW 22.0* 21.7* 21.0* 21.0*   * 103* 96* 105*     INR  No lab results found in last 7 days.  Arterial Blood Gas    Recent Labs  Lab 09/30/18  0220 09/29/18  0525 09/28/18  2258 09/28/18  1042  09/23/18  2330   PH  --   --   --  7.48*  --  7.45   PCO2  --   --   --  38  --  40   PO2  --   --   --  79*  --  27*   HCO3  --   --   --  28  --  27   O2PER 70% 60% 60% 60%  < > 50%   < > = values in this interval not displayed.    All cultures:    Recent Labs  Lab 09/30/18  0130 09/30/18  0111 09/29/18  0943 09/29/18  0911 09/28/18  1211 09/24/18  0945   CULT No growth after 3 hours No growth after 5 hours No growth after 15 hours No growth after 15 hours Culture negative monitoring continues Light growthCandida albicans / dubliniensisCandida albicans and Treva dubliniensis are not routinely speciatedSusceptibility testing not routinely done*     Recent Results (from the past 24 hour(s))   CT Head w/o Contrast    Narrative    CT OF THE HEAD WITHOUT CONTRAST 9/29/2018 10:25 AM     COMPARISON: Head CT 6/15/2018    HISTORY:  Altered mental status.     TECHNIQUE: 5 mm thick axial CT images of the head were acquired  without IV contrast material.    FINDINGS: Ovoid calcification in the subcortical white matter of the  anterior aspect of the right frontal lobe again noted, possibly  representing calcification within a vascular lesion such as a  cavernous hemangioma. There is moderate diffuse cerebral volume loss.  There are subtle patchy areas of decreased density in the cerebral  white matter bilaterally that are consistent with sequela of chronic  small vessel ischemic disease.     The ventricles and basal cisterns are within normal limits in  configuration given the degree of cerebral volume loss.  There is no  midline shift. There are no extra-axial fluid collections.     No intracranial hemorrhage, mass or recent  infarct.    The visualized paranasal sinuses are well aerated. There is no  mastoiditis. There are no fractures of the visualized bones.       Impression    IMPRESSION: Probable partially-calcified cavernous hemangioma in the  subcortical white matter of the right frontal lobe again noted without  change. Diffuse cerebral volume loss and cerebral white matter changes  consistent with chronic small vessel ischemic disease. No evidence for  acute intracranial pathology.     Radiation dose for this scan was reduced using automated exposure  control, adjustment of the mA and/or kV according to patient size, or  iterative reconstruction technique.    KYREE MCGOWAN MD         Billing: This patient is critically ill: Yes. Total critical care time today 33 min excluding procedures.        Patient with increasing norepinephrine requirements this a.m. given the fact that the patient needs increasing norepinephrine concern for ongoing septic shock.  Will order vasopressin as a second line agent.  Will place arterial line this morning in order to close amounts of blood pressure.

## 2018-09-30 NOTE — PROGRESS NOTES
[facility]    Hospitalist Progress Note  Name: Eyal Turner    MRN: [id number]  Provider:  Alen Garcia DO MPH  Date of Service: 09/30/2018    Summary of Stay: Eyal Turner is a 58 year old female with PMH including metastatic breast cancer with bone metastases to sternum and rib who started a new chemo regimen 5 days prior to admission, alcohol abuse versus dependence, malnutrition, pancytopenia, and HTN who presented with fatigue and shortness of breath on 9/22.  Initially she was hypotensive which improved with IV fluids in the ED. Labs notable for significantly abnormal LFTs with bilirubin of 8 and elevated transaminases.  Also found to be pancytopenic with neutropenia.  Hemoglobin was less than 7 she received 1 unit RBC transfusion in the ED.     After admission she was found to have fever to 101 and chest x-ray obtained after admission showing bilateral infiltrate so started on cefepime for neutropenic fever and azithromycin added subsequently.  A CT abdomen pelvis was obtained that did not show any ascites or obstructive liver disease.  Evidence for acute alcohol withdrawal initially with significant tremor that improved after a few doses of Ativan.  GI was consulted for possible upper GI bleed and for her liver failure.  No EGD planned.  Did start empiric Protonix on admission, but no evidence for ongoing blood loss.    Oncology also was consulted and recommended supportive cares.  Overall patient is quite ill acutely and chronically.  She was transferred to ICU on 9/23/2018 for worsening hypoxemia and was intubated 9/24/2018.  Blood culture grew lactobacillus from port.  ID was consulted and the patient was switched to vancomycin and Zosyn.  Micafungin was also added for oral thrush.  She was also started on Neupogen for neutropenia with good response.    She has shown no improvement over the past several days and even showing gradual deterioration in mental and respiratory status.   Overnight developed fever to 102 and now has leukocytosis of 20.  Recheck in cultures and CT of the chest abdomen pelvis for alternative explanation of her fevers.     Problem List/Assessment and Plan:      Acute hypoxic respiratory failure: Suspected due to severe sepsis and pneumonia.  Possible component of vascular congestion as well.  Remains stable on the ventilator.  Working on pressure support trial and vacation sedation daily but continues to fail on a daily basis and no longer appropriate for this given mental status, worsening infectious parameters, and work of breathing.  Management per the intensivist.  -Ventilator management per the intensivist.    Septic shock and neutropenic fever: Some signs of pneumonia with possible esophagitis and does have oral candidiasis.  Only one blood culture positive for lactobacillus which could be contaminant.  Infectious disease following.  Continue vancomycin and Zosyn.  Unfortunately overnight has developed a fever of 102 and worsening leukocytosis now to 20.  -Infectious disease following.  -Currently on vancomycin, Zosyn, and micafungin.  -Given new fever and leukocytosis consider further broadening antibiotics.  -CT of chest, abdomen, and pelvis today.  -Repeated blood, urine, and sputum cultures.     Abnormal LFTs: Presented with jaundice an bilirubin was up to 9.8 up from 2.4 three months ago.  Alkaline phosphatase was elevated to 594, AST elevated 264, ALT elevated at 94.  Does drink 2 bottles of wine daily so possible acute alcoholic hepatitis.  Also additionally started on chemotherapy with docetaxel, trastuzumab, and pertuzumab so possibility of drug toxicity although per oncology with this regimen there is not typically significant liver toxicity.  Did not have a lot of abdominal pain although was a little tender in the upper quadrant.  Lipase was not elevated.  No obstructive process seen on CT.  Slight small nodularity and cannot rule out metastases but  this seems less likely based on recent CT scan.  No history of cirrhosis on imaging as of a year ago.  Albumin is low.  Given her alcohol abuse suspect alcohol hepatitis playing a role here.   -Gastroenterology consulted and following peripherally.  -Largely unremarkable right upper quadrant ultrasound.      Alcohol dependence with acute withdrawal: Has been drinking 2 bottles of wine daily for some time.  Last use 1-2 days prior to admission.  Presented in alcohol withdrawal with tremor.  -CIWA with Ativan as needed, but not requiring any doses at this time.  -Continue thiamine and folate.  -Now on Precedex and propofol.      Acute on chronic pancytopenia with neutropenia: On admission WBC 0.5 with ANC 0.4, hemoglobin 6.9, and platelets 63.  Hemoglobin was 11.4 three months ago and platelets were 108.  Suspect acute drop is from her recent chemotherapy.  Also possible upper GI bleed as below contributing.  -Hemoglobin stable following transfusion support but gradually drifting down and give 1 more unit of PRBCs today.  -Daily CBC.  -Hematology following.  -Neutropenia resolved following Neupogen which has now been discontinued.      Metastatic breast cancer: Initially diagnosed in 2000 s/p bilateral mastectomy.  Has undergone multiple rounds of chemotherapy and radiation.  Has known metastatic disease to her sternum and third left rib.  Had recent possible skin infection from the sternum met/mass and received Keflex.  Does not look actively infected at this time.  Has port in place.  Restarted on chemotherapy with docetaxel, trastuzumab, and pertuzumab on 9/17/18.  Follows with Dr. Braun of oncology.  Doing quite poorly following chemotherapy with poor oral intake, weakness, and now acute liver injury.  -[place] oncology consulted, appreciate recommendations.  -Head CT shows no obvious intracranial metastases.  -CT of the chest, abdomen, and pelvis today to workup infection but also evaluate if further metastases are  noted.    Mucositis versus thrush and possible esophagitis: Patient erosive changes and plaque on the tongue and her soft palate.  Question whether this is mucositis from her docetaxel or Candida infection.  Sore throat for the past week so possible esophagitis as well.  -Swish and swallow nystatin 4 times daily and Magic mouthwash.  -Management would typically be systemic Diflucan in case of esophagitis however some risk in doing this due to her acutely elevated bilirubin and transaminases.  Received Diflucan 2 mg in the ED.  Given liver toxicity will hold on further treatment as this may just be mucositis.  -GI not planning EGD to investigate for esophagitis.  -Continue micafungin per ID.     Possible UGIB: Reports of vomiting with red blood in it.  Denies any melena or hematochezia.  Anemia and thrombocytopenia could in part be chemotherapy related.  Has required periodic transfusions.  -Twice daily Protonix.  -GI not planning an EGD .      Hypokalemia and hypomagnesemia:   -Potassium and magnesium replacement protocol.      Severe protein calorie malnutrition: Evidence for fat loss on exam likely related to chronic alcohol use, metastatic breast cancer, and recent chemotherapy.  Clearly has severe malnutrition.  -Dietitian consult.  -Feeding tube placed and continuing tube feeds.    Diarrhea: C. difficile testing negative.  Rectal tube now in place as neutropenia resolved.    Hypernatremia: Sodium climbing.  Now off diuresis.  Increase free water.    DVT Prophylaxis: Pneumatic Compression Devices  Code Status: Full Code  Disposition: Expected discharge in 3+ days to TBD. Goals prior to discharge include address multiple issues noted above.  Not showing significant improvement over the past several days and actually gradually deteriorating.  Discussed with Dr. Romo on Friday and plan for continued medical management over the weekend and if no improvement will need palliative care involvement on Monday to further  discuss goals of care and overall poor prognosis.  Family updated today: Per Dr Matute.     Interval History   Intubated and sedated.    -Data reviewed today: I personally reviewed all new labs and imaging results over the last 24 hours.     Physical Exam   Temp: 98.6  F (37  C) Temp src: Axillary BP: (!) 79/46 (increase levophed)   Heart Rate: 93 Resp: (!) 84 SpO2: 93 % O2 Device: Mechanical Ventilator    Vitals:    09/28/18 0800 09/29/18 0030 09/30/18 0545   Weight: 64.6 kg (142 lb 6.7 oz) 67 kg (147 lb 11.3 oz) 69.3 kg (152 lb 12.5 oz)     Vital Signs with Ranges  Temp:  [98.6  F (37  C)-102.1  F (38.9  C)] 98.6  F (37  C)  Heart Rate:  [] 93  Resp:  [9-84] 84  BP: ()/(46-74) 79/46  FiO2 (%):  [50 %-70 %] 60 %  SpO2:  [88 %-100 %] 93 %  I/O last 3 completed shifts:  In: 3495.79 [I.V.:1075.79; NG/GT:1180]  Out: 1755 [Urine:1055; Stool:700]    GENERAL: No apparent distress.  Intubated and sedated.  Chronically ill and emaciated.  HEENT: Normocephalic, atraumatic. Extraocular movements intact.  Icteric.  CARDIOVASCULAR: Regular rate and rhythm without murmurs or rubs. No S3.  PULMONARY: Clear bilaterally.  GASTROINTESTINAL: Soft, mildly distended. Bowel sounds normoactive.   EXTREMITIES: No cyanosis or clubbing. No edema.  NEUROLOGICAL: CN 2-12 grossly intact, no focal neurological deficits.  DERMATOLOGICAL: No rash, ulcer, but mild jaundice.  Significant diffuse bruising.    Medications     dexmedetomidine 0.7 mcg/kg/hr (09/30/18 0924)     IV fluid REPLACEMENT ONLY       IV fluid REPLACEMENT ONLY       fentaNYL 100 mcg/hr (09/30/18 0256)     norepinephrine 0.06 mcg/kg/min (09/30/18 0816)     propofol (DIPRIVAN) infusion 50 mcg/kg/min (09/30/18 0739)       bacitracin   Topical BID     chlorhexidine  15 mL Mouth/Throat BID     fiber modular (NUTRISOURCE FIBER)  1 packet Per Feeding Tube BID     folic acid  1 mg Oral or Feeding Tube Daily     heparin  5 mL Intracatheter Q28 Days     heparin lock flush  5-10  mL Intracatheter Q24H     lidocaine visc 2% & maalox/mylanta w/simethicone & diphenhydramine  10 mL Swish & Swallow Q6H     micafungin  100 mg Intravenous Q24H     multivitamins with minerals  15 mL Per Feeding Tube Daily     nystatin  500,000 Units Swish & Spit 4x Daily     pantoprazole  40 mg Per Feeding Tube BID     piperacillin-tazobactam  3.375 g Intravenous Q6H     protein modular  2 packet Per Feeding Tube BID w/meals     QUEtiapine  25 mg Oral or Feeding Tube BID     vancomycin (VANCOCIN) IV  1,500 mg Intravenous Q8H     Data     Laboratory:    Recent Labs  Lab 09/30/18  0450 09/29/18  0525 09/28/18  0620   WBC 20.9* 13.2* 10.8   HGB 7.3* 7.8* 7.8*   HCT 24.4* 24.4* 24.2*   MCV 99 95 95   * 103* 96*       Recent Labs  Lab 09/30/18  0450 09/29/18  1356 09/29/18  0525  09/28/18  0620   *  --  148*  --  145*   POTASSIUM 3.7 3.7 3.2*  < > 3.1*   CHLORIDE 113*  --  112*  --  109   CO2 29  --  30  --  30   ANIONGAP 6  --  6  --  6   *  --  125*  --  139*   BUN 26  --  26  --  22   CR 0.41*  --  0.42*  --  0.34*   GFRESTIMATED >90  --  >90  --  >90   GFRESTBLACK >90  --  >90  --  >90   REAGAN 7.9*  --  7.3*  --  7.4*   < > = values in this interval not displayed.    Recent Labs  Lab 09/30/18  0130 09/30/18  0111 09/29/18  0943 09/29/18  0911 09/28/18  1211 09/24/18  0945   CULT No growth after 3 hours No growth after 5 hours No growth after 15 hours No growth after 15 hours No growth Light growthCandida albicans / dubliniensisCandida albicans and Treva dubliniensis are not routinely speciatedSusceptibility testing not routinely done*       Imaging:  Recent Results (from the past 24 hour(s))   CT Chest/Abdomen/Pelvis w Contrast    Narrative    CT CHEST/ABDOMEN/PELVIS WITH CONTRAST September 30, 2018 9:48 AM     HISTORY: Fever and elevated white blood cell count.    TECHNIQUE: 76mL Isovue-370 IV were administered. After contrast  administration, volumetric helical sections were acquired from  the  thoracic inlet to the ischial tuberosities. Coronal images were also  reconstructed. Radiation dose for this scan was reduced using  automated exposure control, adjustment of the mA and/or kV according  to patient size, or iterative reconstruction technique.    COMPARISON: CT of the abdomen and pelvis performed 9/22/2018.    FINDINGS:    Chest: Multiple images through the chest are degraded due to patient  motion artifact. There are extensive ground-glass and airspace  infiltrates throughout both lungs. Small right pleural effusion. No  pericardial or left pleural effusion. Atherosclerotic calcification of  the thoracic aorta and coronary arteries. Right Port-A-Cath, with tip  in the low SVC. Bilateral breast implants are noted. Endotracheal tube  is in place, with tip 3.5 cm above the jasvir. Enteric tube, tip  within the second portion of the duodenum. Destructive appearing  sclerotic change throughout the sternum is consistent with metastatic  disease. Sclerosis within the T6 vertebral body and T7 posterior  elements also likely represent metastatic disease.    Abdomen and Pelvis: Small amount of ascites. The gallbladder wall is  diffusely thickened, possibly related to ascites. The gallbladder is  not distended. Few scattered calcified granulomas in the spleen. The  liver, spleen, adrenal glands, pancreas, and kidneys are otherwise  unremarkable. Mild atherosclerotic aortoiliac calcification. No  hydronephrosis. No bowel obstruction. Small fat-containing  paraumbilical hernia. Gray catheter in the bladder. Rectal tube is in  place. Ill-defined sclerosis in the L5 vertebral body is consistent  with metastatic disease.      Impression    IMPRESSION:   1. Extensive ground-glass and airspace infiltrates throughout both  lungs are nonspecific, but are suspicious for an infectious etiology.  2. Small right pleural effusion.  3. Mild ascites.  4. Sclerotic bony metastases are noted within the sternum, as well  as  the thoracic and lumbar spine.              Alen Garcia DO MPH  [facility] Hospitalist  [address] [place] [address]  Pager: [phone]  09/30/2018

## 2018-09-30 NOTE — PROCEDURES
54-year-old female with concern for sepsis secondary to pneumonia.  Patient's had increased norepinephrine requirements this a.m.  I attempted to contact  for consent to proceed with arterial line.  I was unable to contact the  so I deemed the procedure emergency given the patient's increased pressor requirements.  The patient's right forearm was prepped and draped in usual sterile fashions.  The area over the radial artery was infiltrated with 1% lidocaine.  Using ultrasound guidance a hollow bore needle was  introduced into the radial artery under direct visualization.  There was good blood return through the hollow bore needle.  The Seldinger technique was used to secure a long 20-gauge intravenous catheter into the radial artery.  The catheter was visualized after insertion under ultrasound guidance.  There was adequate blood return with blood pressures of 90s over 60s.  The patient tolerated procedure well and the line was sutured into place and secured with a sterile dressing    .  Mendez Matute MD    Chief anesthesiology critical care  Form of anesthesiology  [facility]  Date of service 9/30/2018

## 2018-09-30 NOTE — PHARMACY-VANCOMYCIN DOSING SERVICE
Pharmacy Vancomycin Note  Date of Service 2018  Patient's  1960   58 year old, female    Indication: Sepsis  Goal Trough Level: 15-20 mg/L  Day of Therapy: 7  Current Vancomycin regimen:  1500 mg IV q8h    Current estimated CrCl = Estimated Creatinine Clearance: 163.6 mL/min (based on Cr of 0.41).    Creatinine for last 3 days  2018:  6:20 AM Creatinine 0.34 mg/dL  2018:  5:25 AM Creatinine 0.42 mg/dL  2018:  4:50 AM Creatinine 0.41 mg/dL    Recent Vancomycin Levels (past 3 days)  2018: 11:22 AM Vancomycin Level 13.8 mg/L  2018: 11:00 AM Vancomycin Level 21.5 mg/L    Vancomycin IV Administrations (past 72 hours)                   vancomycin (VANCOCIN) 1,500 mg in sodium chloride 0.9 % 250 mL intermittent infusion (mg) 1,500 mg New Bag 18 1215     1,500 mg New Bag  0432     1,500 mg New Bag 18 1958     1,500 mg New Bag  1102     1,500 mg New Bag  0345     1,500 mg New Bag 18    vancomycin (VANCOCIN) 1,250 mg in sodium chloride 0.9 % 250 mL intermittent infusion (mg) 1,250 mg New Bag 18 1247     1,250 mg New Bag  0408     1,250 mg New Bag 18 1833                Nephrotoxins and other renal medications (Future)    Start     Dose/Rate Route Frequency Ordered Stop    18 220  vancomycin (VANCOCIN) 1,250 mg in sodium chloride 0.9 % 250 mL intermittent infusion      1,250 mg  over 90 Minutes Intravenous EVERY 8 HOURS 18 1450      18 1215  vasopressin (VASOSTRICT) 40 Units in D5W 40 mL infusion      2.4 Units/hr  2.4 mL/hr  Intravenous CONTINUOUS 18 1140      18 2145  norepinephrine (LEVOPHED) 16 mg in D5W 250 mL infusion      0.03-0.4 mcg/kg/min × 64.5 kg  1.8-24.2 mL/hr  Intravenous CONTINUOUS 18 2132      18 1215  piperacillin-tazobactam (ZOSYN) infusion 3.375 g      3.375 g  100 mL/hr over 30 Minutes Intravenous EVERY 6 HOURS 18 1205               Contrast Orders - past 72 hours (72h ago  through future)    Start     Dose/Rate Route Frequency Ordered Stop    09/30/18 0945  iopamidol (ISOVUE-370) solution 76 mL      76 mL Intravenous ONCE 09/30/18 0935 09/30/18 0936    09/29/18 1300  perflutren diluted 1mL to 2mL with saline (OPTISON) diluted injection 2 mL      2 mL Intravenous ONCE 09/29/18 1246 09/29/18 1247          Interpretation of levels and current regimen:  Trough level is  Subtherapeutic    Has serum creatinine changed > 50% in last 72 hours: No    Urine output:  good urine output    Renal Function: Stable    Plan:  1.  Decrease Dose to 1250mg iv q8h  2.  Pharmacy will check trough levels as appropriate in 1-3 Days.            .

## 2018-09-30 NOTE — PROCEDURES
After containing informed consent over the telephone from the  the patient was positioned supine for bronchoscopy.  The indication for the bronchoscopy was concern for pneumonia and unable to get good samples via traditional sputum collection.  The patient was appropriately placed on 100% FiO2 and sedation was co and sedation was continued with existing Precedex and fentanyl sedation.  A portable bronchoscope was introduced into the patient's endotracheal tube and the jasvir was identified.  There appeared to be no gross contamination with any purulent material.  I examined the right mainstem of the right upper lobe and found some small white purulent material in the right middle lobe.  I irrigated the right middle lobe with 10 cc of normal saline and aspirated out about 5 cc with copious secretions.  I continue to inspect the right side of the patient's pulmonary tree and suctioned out small amounts of white secretions however the amount of secretions overall was unimpressive.  I then turned my attention to the left side the left mainstem bronchus is free of any secretions the left lower bronchus seem to have some of the similar white purulent material the left lower lobe was irrigated with 10 cc of normal saline 5 cc was aspirated out along with copious secretions.  Throughout the procedure the patient remained comfortable and sedated O2 sats never fell below 100%.  At this time I  I felt that there was no further indication to surgically any other retained secretions and I thought I had obtained good samples for culture.  The bronchoscope was withdrawn and a chest x-ray will be ordered to rule out any potential complications and samples will be sent for culture.    Mendez Matute   Chief anesthesiology critical care medicine  Department of anesthesiology  [facility]  Date of service 9/30/2018

## 2018-09-30 NOTE — PROGRESS NOTES
TELE:   Notified of desat to high 80s on 50% and peep8.  Increase peep to 10 and performing recruitment maneuver.    ADDENDUM:  Pt no longer breathing over vent.  vbg with acute on chronic resp acidosis 7.30/63/55.  Increased vent rate from 16 to 20.

## 2018-09-30 NOTE — PROGRESS NOTES
Ventilation Mode: CMV/AC  (Continuous Mandatory Ventilation/ Assist Control)  FiO2 (%): 70 %  Rate Set (breaths/minute): 20 breaths/min  Tidal Volume Set (mL): 450 mL  PEEP (cm H2O): 10 cmH2O  Pressure Support (cm H2O): 12 cmH2O  Oxygen Concentration (%): 60 %  Resp: 17      Increased rate this shift.  BS coarse. Sputum sample sent. No wean this am. Will continue to monitor respiratory status for changes.

## 2018-09-30 NOTE — PLAN OF CARE
Problem: Restraint for Non-Violent/Non-Self-Destructive Behavior  Goal: Prevent/Manage Potential Problems  Maintain safety of patient and others during period of restraint.  Promote psychological and physical wellbeing.  Prevent injury to skin and involved body parts.  Promote nutrition, hydration, and elimination.   Outcome: Declining  Right wrist and Left wrist restraints continued 9/29/2018    Clinical Justification: Pulling lines, pulling tubes, and pulling equipment  Less Restrictive Alternative: Re-evaluate equipment, Reorientation, Repositioning  Attending Physician Notified: Yes, Attending Physician's Name: Dr. Garcia   New orders placed Yes  Length of Order: 1 Day      Grisel Mitchell

## 2018-09-30 NOTE — PROGRESS NOTES
Respiratory Therapy Note    Patient seen resting in bed on mechanical ventilator settings:    Ventilation Mode: CMV/AC  (Continuous Mandatory Ventilation/ Assist Control)  FiO2 (%): 65 %  Rate Set (breaths/minute): 20 breaths/min  Tidal Volume Set (mL): 450 mL  PEEP (cm H2O): 10 cmH2O  Pressure Support (cm H2O): 12 cmH2O  Oxygen Concentration (%): 65 %  Resp: 20    Respiratory rate 20s, breath sounds coarse, and SpO2 93%. Suctioning small, yellow-pale, thick secretions from ETT. Assisted with bronchoscopy today as well as transport to CT. RT will continue to monitor.    Lyla Crawley  4:50 PM September 30, 2018

## 2018-09-30 NOTE — PLAN OF CARE
Problem: Restraint for Non-Violent/Non-Self-Destructive Behavior  Goal: Prevent/Manage Potential Problems  Maintain safety of patient and others during period of restraint.  Promote psychological and physical wellbeing.  Prevent injury to skin and involved body parts.  Promote nutrition, hydration, and elimination.   Outcome: Declining  Right wrist and Left wrist restraints discontinued at 10:47 AM on 9/30/2018.    Restraint discontinue criteria met, patient is calm, cooperative and safe. Restraints removed.     Patient's Response: No evidence of learning  Family Notification: Spouse/significant other  Attending Physician Notified: MD ordered restraint, Attending Physician's Name: Dr. Garcia    Discontinued bilateral soft wrist restraints since pt only responds to pain and is not moving extremities. RASS score goal is -3 to -4.     Grisel Mitchell

## 2018-09-30 NOTE — PLAN OF CARE
Problem: Patient Care Overview  Goal: Plan of Care/Patient Progress Review  Outcome: No Change  ICU End of Shift Summary.  For vital signs and complete assessments, please see documentation flowsheets.     Pertinent assessments: Tmax 102.1 axillary, RR tachy on vent. FiO2 60-70% this shift, see flow sheets. Rectal tube in place, minimal output with leaking around tube with cough. Port in place with PIV. Levo 0.03-0.06mcg/kg/hr. Fent continues at 100 mcg/kg/hr, propofol at 50 mcg/kg/hr, doses unchanged. TF continues at 45 ml/hr with 90 mg H2O q2H, tolerating well. , family at bedside.  Major Shift Events: Elevated temp, HR and RR intermittently tachy. Temp improved following ice pack application, bath. Increase RR to 20 per tele hub. Blood Cx, UA/UC, sputum and VBG collected this shift.  updated via daughter through phone conversation.  Plan (Upcoming Events):  Awaiting cultures, ability to wean vent.   Discharge/Transfer Needs: TBD; address goals of care on Monday.    Bedside Shift Report Completed : Y  Bedside Safety Check Completed: Y

## 2018-10-01 NOTE — CONSULTS
"[facility]    Palliative Care Consultation   Text Page    Date of Admission:  9/22/2018    Assessment & Plan   Eyal Turner is a 58 year old female who was admitted on 9/22/2018. I was asked by Teresa Henry MD to see the patient for goals of care decision making.    Recommendations:  1. Decisional Capacity -  Unreliable as she is currently intubated with minimal response. Patient does not have an advance directive. Per  informed consent policy next of kin should be involved in all consent and decision making. Her  Ashish Turner is next-of-kin.    2.  Pain - Agree with IV Dilaudid 0.2-0.4 mg/hr infusion for comfort. Chronic use of Oxycodone 5 mg per [facility]  (35 tablets obtained 9/21/2018). Earlier in ICU was on IV Fentanyl before converting to IV Dilaudid.    3.  Dyspnea - Patient currently intubated.  is next-of-kin indicates that the patient would want tracheostomy if indicated.     4.  Agitation in setting of alcohol withdrawal - Continue Seroquel 25 mg BID and IV Ativan as needed.     5.  Oral thrush - Continue Nystatin and Micafungin per ID recommendations.      6.  Severe Malnutrition - Appreciate input of Registered Dietitian Tiffanie Cronin, RD, LD. Patient intubated with NG Tube Feeding.       7. Diarrhea - C. Diff negative 9/26/2018 and 9/29/2018 was able to have rectal tube as neutropenia resolved. Admission platelet count at 63 and today is normal at 171.    8. Spiritual Care - Appreciate input of  Jimbo Kate. The patient's  indicates a friend and , David has visited twice during Rachna's hospitalization. Luis A would call David if a need arose.      9. Care Planning - The patient's  is next-of-kin he is aware that Rachna has not demonstrated significant improvement over several days. He indicates that Rachna would want tracheostomy if indicated. He also acknowledged that \"Rachna doesn't like it in the hospital. She always wants to go home " "to be with family\".  In discussing prolonged hospitalization such as LTACH, Luis A acknowledged that he would need to consider this with their three children.       Goal of Care: FULL CODE - Restorative care    Disease Process/es & Symptoms:  Eyal Turner is a 58 year old patient admitted 9/22/2018 with symptoms of generalized weakness and shortness of breath. She has an extensive history of HER2 positive breast cancer diagnosed in 12/2000 and underwent bilateral mastectomy. She has had extensive treatment with mets to sternum and rib and is followed by Oncologist Richard Braun. She was recently started on palliative systemic therapy consisting of pertuzumab, trastuzumab and docetaxel with first dose on 9/17/2018. Her medical history is also significant for chronic anemia, alcoholism (consuming 2 bottles of wine or vodka daily) and hypertension. She was found to have bilateral lower lung infiltrates concerning for volume overload. She was started on Lasix and antibiotics for probable pneumonia. She was seen in consultation by Gastroenterology regarding abnormal liver function due to hepatotoxicity from alcohol abuse in the setting of chemotherapy. Oncology and Infectious Disease are following. Despite diuresis with lasix and BiPAP the patient's respiratory status deteriorated and she was intubated 9/24/2018.  Patient underwent bronchoscopy yesterday per Anesthesiologist Mendez Matute MD     The patient had a previous emergency room visit Raiza 15, 2018 for episode of confusion which Head CT demonstrated probable calcified hemangioma in the high anterior aspect of the right frontal lobe again noted. Diffuse cerebral volume loss and cerebral white matter changes consistent with chronic small vessel ischemic disease. No evidence for acute intracranial pathology. Patient left hospital with support of her family.     Findings & plan of care discussed with: Hospitalist Lazaro Rodriguez MD; Intensivist Teresa Henry MD and " "bedside nurse Bernadette Phelps RN.   Follow-up plan from palliative team: Will follow closely during this hospitalization  Thank you for involving us in the patient's care.     Chata Huff MS, RN, CNS, APRN, ACHPN, FAACVPR  Pain and Palliative Care  Pager [phone]  Office [phone]     Time Spent on this Encounter   I spent 125 minutes (3:45 PM- 5:50 PM ) in assessment of the patient, counseling and discussion with her  as documented in sections below. Another 35 minutes in review of chart, documentation, coordination of care and discussion with the health care team.    Primary Care Physician   Angie Heredia    Chief Complaint   Generalized Weakness and Shortness of Breath    History is obtained from the electronic health record and patient's spouse      Decision-Making & Goals of Care:  Discussed on October 1, 2018 with Chata MANDEL, CNS:     Appreciate assistance of bedside nurse Bernadette Phelps RN in letting me know that the patient's , Luis A had arrived. 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 When I arrived and introduced myself, the friend and her daughter left. Luis A requested that we remain at bedside for our discussion. Luis A explained \"Rachna has had cancer for twenty years\". He was appreciative of the care Rachna's Oncologist Richard Braun MD has provided. \"He's been a Hell of a good sailaja to Rachna.\" \"He said most women with cancer die after only a couple years and here Rachna's had 20 years!\" I acknowledged that Rachna has a history of pain. Luis A explained \"She used up the Oxycodone and when she couldn't get that she used the vodka\". He inquired about her liver function which we discussed at length in reviewing her lab trends during this hospitalization. Nahid explained \"I know she doesn't want to die. They said about putting the tube in her mouth into her neck. She's ready for that!\" I asked Luis A about what would Rachna tell us " "about this hospitalization. \"Rachna doesn't like it in the hospital. She always wants to go home to be with family.\" We explored the options regarding tracheostomy and prolonged hospitalization with LTACH. Luis A acknowledged \"She wouldn't like that. I know she would do everything to stay alive. She's done everything the past twenty years. The four of us would need to talk about it\". Offered option to have a family conference \"The kids will need to think about it\".        Past Medical History   I have reviewed this patient's medical history and updated it with pertinent information if needed.   Past Medical History:   Diagnosis Date     Anemia      Breast cancer metastasized to bone (H) 2005     sees Dr. Zach Romo/Dr. Toño REED q3mos - herceptin, zometa      ETOH abuse      History of blood transfusion      Hypertension     lisinopril 5mg = dizziness      Invasive ductal carcinoma of breast (H) 2000    recurred 3x since.      Martha-Stafford tear 10/12/2015    with hematemesis - hospitalized     S/P mastectomy, bilateral 2000       Past Surgical History   I have reviewed this patient's surgical history and updated it with pertinent information if needed.  Past Surgical History:   Procedure Laterality Date     APPENDECTOMY  1999 - age 39      ESOPHAGOSCOPY, GASTROSCOPY, DUODENOSCOPY (EGD), COMBINED N/A 10/13/2015    Procedure: COMBINED ESOPHAGOSCOPY, GASTROSCOPY, DUODENOSCOPY (EGD);  Surgeon: Rashad Rossi MD;  Location: [facility]     EXCISE MASS TRUNK Right 8/9/2016    Procedure: EXCISE MASS TRUNK;  Surgeon: Danie Forrester MD;  Location: [facility]     HYSTERECTOMY, NORIS  2002    with BSO      MASTECTOMY MODIFIED RADICAL BILATERAL  2000     REMOVE CATHETER VASCULAR ACCESS Right 8/9/2016    Procedure: REMOVE CATHETER VASCULAR ACCESS;  Surgeon: Danie Forrester MD;  Location: [facility]       Prior to Admission Medications   Prior to Admission Medications   Prescriptions Last Dose Informant Patient Reported? Taking? "   Potassium Chloride ER 20 MEQ TBCR 2018 at am  Yes Yes   Sig: Take 1 tablet by mouth daily   Prenatal Vit-Fe Fumarate-FA (PRENATAL MULTIVITAMIN  PLUS IRON) 27-0.8 MG TABS Past Week at Unknown time  Yes Yes   Sig: Take 1 tablet by mouth daily   metoclopramide (REGLAN) 10 MG tablet 2018 at am  No Yes   Sig: Take 1 tablet (10 mg) by mouth 3 times daily as needed (Nausea or Vomiting)   multivitamin, therapeutic (THERA-VIT) TABS tablet Past Week at Unknown time  Yes Yes   Sig: Take 1 tablet by mouth daily   ondansetron (ZOFRAN-ODT) 8 MG ODT tab Past Week at Unknown time  Yes Yes   Sig: Take 8 mg by mouth every 8 hours as needed for nausea   oxyCODONE (ROXICODONE) 5 MG immediate release tablet 2018 at 0600  No Yes   Sig: Take 1-2 tablets (5-10 mg) by mouth every 3 hours as needed for pain or other (Moderate to Severe)      Facility-Administered Medications: None     Allergies   No Known Allergies    Social History   Living situation: Lived with  and three young adult children.   Family system: , Luis A and their 3 adult children. Her mother Liliana. A brother  from drowning at age 17 and another brother  from overdose at age 25.     Functional status: Working and independent prior to admission   Employment/education: Worked at a hair salon  Activities/interests:  Fishing and outdoors  History of substance use/abuse:  reports that she quit smoking about 28 years ago. Her smoking use included Cigarettes. She has a 7.50 pack-year smoking history. She has never used smokeless tobacco.  reports that she drinks about 3.5 oz of alcohol per week   indicates she uses alcohol daily 2 bottles of wine or several vodka drinks  [employer] affiliation: [employer]    Family History   I have reviewed this patient's family history and updated it with pertinent information if needed.   Family History   Problem Relation Age of Onset     Cancer Mother      hx of lung cancer - survived      Hypertension  Mother      Circulatory Mother      s/p valve replacement      Diabetes Father      prediabetes      Prostate Cancer Father      Psychotic Disorder Daughter      ? adhd - maternal cousin with same        Review of Systems   Review of systems not obtained due to patient factors - critical condition and intubation    Physical Exam   Temp:  [97.6  F (36.4  C)-99.6  F (37.6  C)] 97.6  F (36.4  C)  Heart Rate:  [] 105  Resp:  [] 20  BP: ()/(53-70) 115/70  MAP:  [56 mmHg-84 mmHg] 69 mmHg  Arterial Line BP: ()/(42-60) 110/52  FiO2 (%):  [60 %-65 %] 65 %  SpO2:  [86 %-100 %] 98 %  154 lbs 15.73 oz  GEN:  Chronically ill appearing jaundiced  female is orally intubated and sedated.  HEENT:  Normocephalic/atraumatic, scleral icterus, no nasal discharge, mouth moist.  CV:  RRR, S1, S2; no murmurs or other irregularities noted.  +3 DP/PT pulses bilaterally; pitting edema of all four extremities.  RESP:  Clear to auscultation bilaterally without rales/rhonchi/wheezing/retractions.  Symmetric chest rise on inhalation noted.  Normal respiratory effort.  ABD:  Rounded, soft, non-tender/distended.  +BS  EXT:  Spontaneously moves all four extremities  SKIN:  Jaundiced, warm and dry to touch, no mottling.    NEURO: Deferred    Data   Results for orders placed or performed during the hospital encounter of 09/22/18   XR Chest 2 Views    Narrative    CHEST TWO VIEW   9/22/2018 7:46 PM     HISTORY: Shortness of breath, neutropenia.     COMPARISON: Chest CT 11/30/2015.      Impression    IMPRESSION: Right IJ Port-A-Cath remains in place. Patchy airspace  opacities throughout the lungs particularly in the lower lung fields.  This may represent pneumonia. There may also be a background of  pulmonary nodules present, this would be better assessed with CT. No  significant pleural effusion or pneumothorax. Cardiac silhouette  borderline enlarged.    FARA LENNON MD   CT Abdomen Pelvis w Contrast    Narrative     CT ABDOMEN AND PELVIS WITH CONTRAST  9/22/2018 6:10 PM    HISTORY: Acute liver failure based on labs.  Some upper abdominal  pain.     TECHNIQUE: Scans obtained from the diaphragm through the pelvis with  IV contrast, 64 mL Isovue-370.   Radiation dose for this scan was reduced using automated exposure  control, adjustment of the mA and/or kV according to patient size, or  iterative reconstruction technique.    COMPARISON:  CT abdomen and pelvis dated 5/8/2017.    FINDINGS: Patchy infiltrates are seen throughout the bilateral  visualized portions of the lung bases. This could represent infectious  or inflammatory infiltrates. Alveolar proteinosis is also in the  differential. No significant pleural effusion is identified. There are  coronary artery calcifications. Aortic calcifications are also noted.  There is a probable small hiatal hernia. Visualized mediastinal  contents are otherwise unremarkable. There are bilateral breast  prostheses.    Degenerative changes are seen in the spine. This is worst at L4-5.  Extensive sclerosis of the sternum is noted but there is also a  blastic lesion in the L5. These could represent metastases. Recommend  clinical correlation for possible history of breast cancer in the  presence of possible blastic metastases.    The liver enhances heterogeneously. This could represent innumerable  areas of nodular regeneration. Metastasis are difficult to exclude.    There is minimal thickening left adrenal gland, similar to the prior  study dated 5/8/2017 likely representing adenomatous change.  Calcifications are seen in the spleen indicating chronic granulomatous  disease. Otherwise, the gallbladder, pancreas, spleen, bilateral  adrenal glands and bilateral kidneys otherwise enhance normally.  Urinary bladder is distended, but otherwise unremarkable. No  hydronephrosis, nephrolithiasis, hydroureter or ureteral calculus is  seen.    There is a small hiatal hernia. No adenopathy, free fluid  or free air  is seen in the peritoneal cavity. There is nonaneurysmal  atherosclerosis. The colon is grossly of normal caliber. There are a  few scattered diverticuli in the colon, most predominantly seen in the  sigmoid colon. No pericolonic inflammatory change to suggest acute  diverticulitis. Appendix is not well seen. No evidence for pericecal  inflammatory change to suggest acute appendicitis.    There is focal thickening of the gastric antral wall (image 38 series  2 and image 33 series 3). This is of uncertain etiology and may be  better seen with direct visualization. (Upper endoscopy). Stomach and  small bowel are otherwise unremarkable.      Impression    IMPRESSION:  1. Blastic metastasis in L5 and in the sternum are again noted. These  were also seen on the prior study dated 5/8/2017. Patient has reported  history of metastatic breast cancer.  2. Patchy infiltrates bilateral lung bases concerning for pneumonia.  These are new since the prior study from 2017. Neoplastic infiltration  is considered less likely.  3. Irregularity enhancement pattern of liver could represent multiple  focal regenerating nodules that are too small to discretely measure,  geographic fatty infiltration and sparing or diffuse metastasis in the  liver. This finding is new since the prior study.  4. No other evidence for metastasis.    KEMAL BISWAS MD   XR Chest Port 1 View    Narrative    XR CHEST PORT 1 VW  9/23/2018 2:12 PM     HISTORY:  increasing oxygen needs;     COMPARISON: Film dated 9/22/2018    FINDINGS:  A right Port-A-Cath is in place. Hypoventilation. Extensive  bilateral interstitial and alveolar infiltrate. The infiltrates appear  to have increased slightly but this could be due to the shallow  inspiration.      Impression    IMPRESSION: Extensive bilateral infiltrate. The infiltrate appears to  have increased slightly when compared to 9/22/2018.    QUINN ROSS MD   XR Chest Port 1 View    Narrative    XR CHEST PORT  1 VW 9/24/2018 9:08 AM    HISTORY: Increasing oxygen requirements.    COMPARISON: 9/23/2018, 9/22/2018    FINDINGS: Diffuse bilateral hazy airspace opacity does not appear  significantly changed. No pneumothorax. Stable heart size. Port and  catheter appear stable.      Impression    IMPRESSION: No significant interval change.    ESA STAHL MD   XR Chest Port 1 View    Narrative    XR CHEST PORT 1 VW 9/24/2018 9:37 AM    HISTORY: Endotracheal tube position.    COMPARISON: 9/24/2018 at 08:55    FINDINGS: The endotracheal tube tip is approximately 5 cm above the  jasvir. Diffuse hazy airspace opacity appears stable. Right port and  catheter appear stable.      Impression    IMPRESSION: Endotracheal tube appears appropriately positioned.    ESA STAHL MD   XR Abdomen Port 1 View    Narrative    XR ABDOMEN PORT 1 VW 9/24/2018 2:11 PM    HISTORY: Feeding tube placement.    COMPARISON: 7/23/2007    FINDINGS: The enteric tube tip appears to be in the proximal duodenum.  Gas pattern is nonobstructive.      Impression    IMPRESSION: Enteric tube tip appears to be in the proximal duodenum.    ESA STAHL MD   XR Chest Port 1 View    Narrative    XR CHEST PORT 1 VW 9/27/2018 9:34 AM    HISTORY: Intubation.     COMPARISON: September 24, 2018.      Impression    IMPRESSION: Interval placement of an endotracheal tube which  terminates approximately 2 cm from the jasvir. Right port catheter in  position as before. There is an enteric tube which extends into the  stomach beyond the lower margin of the study. There are diffuse  bilateral lung opacities, stable to slightly worsened compared to  prior exam. No pneumothorax.     AMY LEO MD   XR Abdomen Port 1 View    Narrative    XR ABDOMEN PORT 1 VW 9/28/2018 10:22 AM    HISTORY: Abdominal distention.    COMPARISON: 9/24/2018    FINDINGS: The course of the enteric tube suggests that the tip is in  the duodenum. The bowel gas pattern is nonobstructive.      Impression     IMPRESSION: Nonobstructive gas pattern.    ESA STAHL MD   US Abdomen Complete Portable    Narrative    ULTRASOUND ABDOMEN COMPLETE 9/28/2018 5:10 PM     HISTORY: Elevated liver function tests.     COMPARISON: 7/16/2016 ultrasound, CT from 9/22/2018    FINDINGS: Minimal free fluid. Liver is coarse in echogenicity without  focal solid lesions. Gallbladder demonstrates no cholelithiasis.  Gallbladder wall is contracted, however, given its incompletely  distended state, this is of uncertain clinical significance.  Hypoechoic area inferior to the liver which is nonspecific, but no  mass was seen on the CT from six days prior, this is likely some fluid  and debris-filled bowel. Extrahepatic bile duct is normal in diameter.  Pancreas is normal where visualized. Spleen is normal. Kidneys are  normal in size. There is no hydronephrosis. Visualized abdominal aorta  and IVC are nonaneurysmal.      Impression    IMPRESSION:    1. Coarse echogenicity of the liver which is nonspecific, minimal free  fluid.  2. Some thickening of the gallbladder wall is noted, however, this is  nonspecific given its incompletely distended state.  3. No biliary obstruction demonstrated.    CYNDI SIDHU MD   CT Head w/o Contrast    Narrative    CT OF THE HEAD WITHOUT CONTRAST 9/29/2018 10:25 AM     COMPARISON: Head CT 6/15/2018    HISTORY:  Altered mental status.     TECHNIQUE: 5 mm thick axial CT images of the head were acquired  without IV contrast material.    FINDINGS: Ovoid calcification in the subcortical white matter of the  anterior aspect of the right frontal lobe again noted, possibly  representing calcification within a vascular lesion such as a  cavernous hemangioma. There is moderate diffuse cerebral volume loss.  There are subtle patchy areas of decreased density in the cerebral  white matter bilaterally that are consistent with sequela of chronic  small vessel ischemic disease.     The ventricles and basal cisterns are within  normal limits in  configuration given the degree of cerebral volume loss.  There is no  midline shift. There are no extra-axial fluid collections.     No intracranial hemorrhage, mass or recent infarct.    The visualized paranasal sinuses are well aerated. There is no  mastoiditis. There are no fractures of the visualized bones.       Impression    IMPRESSION: Probable partially-calcified cavernous hemangioma in the  subcortical white matter of the right frontal lobe again noted without  change. Diffuse cerebral volume loss and cerebral white matter changes  consistent with chronic small vessel ischemic disease. No evidence for  acute intracranial pathology.     Radiation dose for this scan was reduced using automated exposure  control, adjustment of the mA and/or kV according to patient size, or  iterative reconstruction technique.    KYREE MCGOWAN MD   CT Chest/Abdomen/Pelvis w Contrast    Narrative    CT CHEST/ABDOMEN/PELVIS WITH CONTRAST September 30, 2018 9:48 AM     HISTORY: Fever and elevated white blood cell count.    TECHNIQUE: 76mL Isovue-370 IV were administered. After contrast  administration, volumetric helical sections were acquired from the  thoracic inlet to the ischial tuberosities. Coronal images were also  reconstructed. Radiation dose for this scan was reduced using  automated exposure control, adjustment of the mA and/or kV according  to patient size, or iterative reconstruction technique.    COMPARISON: CT of the abdomen and pelvis performed 9/22/2018.    FINDINGS:    Chest: Multiple images through the chest are degraded due to patient  motion artifact. There are extensive ground-glass and airspace  infiltrates throughout both lungs. Small right pleural effusion. No  pericardial or left pleural effusion. Atherosclerotic calcification of  the thoracic aorta and coronary arteries. Right Port-A-Cath, with tip  in the low SVC. Bilateral breast implants are noted. Endotracheal tube  is in place,  with tip 3.5 cm above the jasvir. Enteric tube, tip  within the second portion of the duodenum. Destructive appearing  sclerotic change throughout the sternum is consistent with metastatic  disease. Sclerosis within the T6 vertebral body and T7 posterior  elements also likely represent metastatic disease.    Abdomen and Pelvis: Small amount of ascites. The gallbladder wall is  diffusely thickened, possibly related to ascites. The gallbladder is  not distended. Few scattered calcified granulomas in the spleen. The  liver, spleen, adrenal glands, pancreas, and kidneys are otherwise  unremarkable. Mild atherosclerotic aortoiliac calcification. No  hydronephrosis. No bowel obstruction. Small fat-containing  paraumbilical hernia. Grya catheter in the bladder. Rectal tube is in  place. Ill-defined sclerosis in the L5 vertebral body is consistent  with metastatic disease.      Impression    IMPRESSION:   1. Extensive ground-glass and airspace infiltrates throughout both  lungs are nonspecific, but are suspicious for an infectious etiology.  2. Small right pleural effusion.  3. Mild ascites.  4. Sclerotic bony metastases are noted within the sternum, as well as  within the thoracic and lumbar spine.       VEENA DAVE MD   XR Chest Port 1 View    Narrative    CHEST PORTABLE ONE VIEW 9/30/2018 1:40 PM     COMPARISON: Frontal chest x-ray 9/27/2018    HISTORY: Status post bronch.       Impression    IMPRESSION: There is more extensive airspace opacity in the left lung  and persistent airspace opacity in the right lung. While some of this  may represent fluid related to the bronchoscopy, worsening pneumonia  must be considered. There is no pneumothorax. There is no definite  pleural effusion.    Right internal jugular central venous Port-A-Cath again noted. Tip of  the endotracheal tube remains at the level of the clavicular heads.  Feeding tube again noted.    KYREE MCGOWAN MD   Alcohol ethyl   Result Value Ref Range     Ethanol g/dL <0.01 <0.01 g/dL   CBC with platelets differential   Result Value Ref Range    WBC 0.5 (LL) 4.0 - 11.0 10e9/L    RBC Count 2.14 (L) 3.8 - 5.2 10e12/L    Hemoglobin 6.9 (LL) 11.7 - 15.7 g/dL    Hematocrit 21.7 (L) 35.0 - 47.0 %     (H) 78 - 100 fl    MCH 32.2 26.5 - 33.0 pg    MCHC 31.8 31.5 - 36.5 g/dL    RDW 16.5 (H) 10.0 - 15.0 %    Platelet Count 63 (L) 150 - 450 10e9/L    Diff Method Manual Differential     % Neutrophils 86.0 %    % Lymphocytes 10.0 %    % Monocytes 3.0 %    % Eosinophils 1.0 %    % Basophils 0.0 %    Nucleated RBCs 1 (H) 0 /100    Absolute Neutrophil 0.4 (LL) 1.6 - 8.3 10e9/L    Absolute Lymphocytes 0.1 (L) 0.8 - 5.3 10e9/L    Absolute Monocytes 0.0 0.0 - 1.3 10e9/L    Absolute Eosinophils 0.0 0.0 - 0.7 10e9/L    Absolute Basophils 0.0 0.0 - 0.2 10e9/L    Absolute Nucleated RBC 0.0     Anisocytosis Slight     Platelet Estimate       Automated count confirmed.  Platelet morphology is normal.   Comprehensive metabolic panel   Result Value Ref Range    Sodium 127 (L) 133 - 144 mmol/L    Potassium 3.8 3.4 - 5.3 mmol/L    Chloride 94 94 - 109 mmol/L    Carbon Dioxide 24 20 - 32 mmol/L    Anion Gap 9 3 - 14 mmol/L    Glucose 103 (H) 70 - 99 mg/dL    Urea Nitrogen 12 7 - 30 mg/dL    Creatinine 0.33 (L) 0.52 - 1.04 mg/dL    GFR Estimate >90 >60 mL/min/1.7m2    GFR Estimate If Black >90 >60 mL/min/1.7m2    Calcium 7.7 (L) 8.5 - 10.1 mg/dL    Bilirubin Total 8.3 (H) 0.2 - 1.3 mg/dL    Albumin 2.4 (L) 3.4 - 5.0 g/dL    Protein Total 5.9 (L) 6.8 - 8.8 g/dL    Alkaline Phosphatase 594 (H) 40 - 150 U/L    ALT 94 (H) 0 - 50 U/L     (H) 0 - 45 U/L   Lipase   Result Value Ref Range    Lipase 144 73 - 393 U/L   Magnesium   Result Value Ref Range    Magnesium 1.4 (L) 1.6 - 2.3 mg/dL   Lactic acid level STAT for sepsis protocol   Result Value Ref Range    Lactate for Sepsis Protocol 1.0 0.7 - 2.0 mmol/L   Ammonia   Result Value Ref Range    Ammonia 36 10 - 50 umol/L   Bilirubin direct    Result Value Ref Range    Bilirubin Direct 6.4 (H) 0.0 - 0.2 mg/dL   Lactate Dehydrogenase   Result Value Ref Range    Lactate Dehydrogenase 398 (H) 81 - 234 U/L   INR   Result Value Ref Range    INR 1.15 (H) 0.86 - 1.14   Blood Morphology Pathologist Review   Result Value Ref Range    Copath Report       Patient Name: JEFF RODRIGUEZ  MR#: [id number]  Specimen #: [id number]  Collected: 9/22/2018  Received: 9/24/2018  Reported: 9/24/2018 10:39  Ordering Phy(s): KAYLEE DE JESUS    For improved result formatting, select 'View Enhanced Report Format' under   Linked Documents section.    TEST(S):  Peripheral Smear Morphology    FINAL DIAGNOSIS:  Peripheral blood morphology:  - Pancytopenia, marked.    COMMENT:  The differential diagnosis of pancytopenia includes recent chemotherapy   and/or radiation therapy,  hypersplenism, autoimmune disorders, infections, medications, toxins,   vitamin B12/folate deficiency and bone  marrow suppression/infiltration.  Correlation with clinical findings and   further evaluation, if clinically  indicated, is recommended.    Electronically signed out by:    Raghav Calix M.D.    CLINICAL HISTORY:  58 years old female.  Indication for peripheral blood morphology:   Pancytopenia.  Clinical notes were reviewed  in [facility].    PERIPHERAL BLOOD DATA:     Patient Value (Reference Range >18 year old female)  0.3      WBC    (4.0-11.0 x 10*9/L)  2.07     RBC    (3.8-5.2 x 10*12/L)  6.7      HGB    (11.7-15.7 g/dL)  20.6     HCT    (35.0-47.0 %)  100      MCV    (78-100fL)  32.4     MCH    (26.5-33.0 pg)  32.5     MCHC    (31.5-36.5 g/dL)  16.9     RDW    (10.0-15.0 %)  63       PLT    (150-450 x 10*9/L)  0.4       Retic    (0.5-2.0%)    PERIPHERAL BLOOD DIFFERENTIAL  (Reference ranges >18 year old)    Percent  86.0      Neutrophils, segmented and bands  10.0      Lymphocytes  3.0       Monocytes  1.0       Eosinophils  0.0       Basophils    Absolute  0.2      Neutrophils, segmented  and bands    (1.6 - 8.3 x 10*9/L)  0.1      Lymphocytes    (0.8 - 5.3 x 10*9/L)  0.0      Monocytes    (0 -1.3 x 10*9/L)  0.0      Eosinophils    (0 - 0.7 x 10*9/L)  0.0      Basophils    (0 - 0.2 x 10*9/L)    PERIPHERAL MORPHOLOGY:    ERYTHROCYTES: The hemoglobin is recorded as 6.7 g/dL.  The erythrocytes   are normocytic normochromic.  There is  a slight a nisopoikilocytosis.    LEUKOCYTES: The white blood cell count is recorded as 300.  There is   absolute neutropenia and absolute  lymphocytopenia.  A few neutrophils, monocytes and lymphocytes examined   show mature morphology.  There is no  evidence of blasts.    PLATELETS: The platelet count is recorded as 63,000.  There is platelet   clumping.  There is no evidence of  satellitosis.  The platelets show normal size and granulation.  (Dictated by Raghav Calix MD 9- @ 10:38AM).    CPT Codes:  A: 02949-XJNS    TESTING LAB LOCATION:  [facility] [address] [place]  [id number] [phone]    COLLECTION SITE:  Client[place]  [facility]  [place]  [facility] [place]     Reticulocyte Count   Result Value Ref Range    % Retic 0.4 (L) 0.5 - 2.0 %    Absolute Retic 8.7 (L) 25 - 95 10e9/L   CBC with platelets differential   Result Value Ref Range    WBC 0.3 (LL) 4.0 - 11.0 10e9/L    RBC Count 2.07 (L) 3.8 - 5.2 10e12/L    Hemoglobin 6.7 (LL) 11.7 - 15.7 g/dL    Hematocrit 20.6 (L) 35.0 - 47.0 %     78 - 100 fl    MCH 32.4 26.5 - 33.0 pg    MCHC 32.5 31.5 - 36.5 g/dL    RDW 16.9 (H) 10.0 - 15.0 %    Platelet Count 63 (L) 150 - 450 10e9/L    Diff Method WBC <0.5, Diff not done    Hemoglobin   Result Value Ref Range    Hemoglobin 7.2 (L) 11.7 - 15.7 g/dL   Magnesium   Result Value Ref Range    Magnesium 2.6 (H) 1.6 - 2.3 mg/dL   UA with Microscopic   Result Value Ref Range    Color Urine Hina     Appearance Urine Slightly Cloudy     Glucose Urine Negative NEG^Negative mg/dL    Bilirubin Urine Moderate (A) NEG^Negative     Ketones Urine 5 (A) NEG^Negative mg/dL    Specific Gravity Urine 1.019 1.003 - 1.035    Blood Urine Negative NEG^Negative    pH Urine 7.0 5.0 - 7.0 pH    Protein Albumin Urine Negative NEG^Negative mg/dL    Urobilinogen mg/dL 4.0 (H) 0.0 - 2.0 mg/dL    Nitrite Urine Negative NEG^Negative    Leukocyte Esterase Urine Negative NEG^Negative    Source Unspecified Urine     WBC Urine 8 (H) 0 - 5 /HPF    RBC Urine 1 0 - 2 /HPF    Bacteria Urine Few (A) NEG^Negative /HPF    Squamous Epithelial /HPF Urine 1 0 - 1 /HPF    Transitional Epi 5 (H) 0 - 1 /HPF    Mucous Urine Present (A) NEG^Negative /LPF   CBC with platelets differential   Result Value Ref Range    WBC 0.3 (LL) 4.0 - 11.0 10e9/L    RBC Count 2.19 (L) 3.8 - 5.2 10e12/L    Hemoglobin 7.0 (L) 11.7 - 15.7 g/dL    Hematocrit 21.1 (L) 35.0 - 47.0 %    MCV 96 78 - 100 fl    MCH 32.0 26.5 - 33.0 pg    MCHC 33.2 31.5 - 36.5 g/dL    RDW 18.6 (H) 10.0 - 15.0 %    Platelet Count 71 (L) 150 - 450 10e9/L    Diff Method WBC <0.5, Diff not done    Comprehensive metabolic panel   Result Value Ref Range    Sodium 132 (L) 133 - 144 mmol/L    Potassium 3.1 (L) 3.4 - 5.3 mmol/L    Chloride 100 94 - 109 mmol/L    Carbon Dioxide 23 20 - 32 mmol/L    Anion Gap 9 3 - 14 mmol/L    Glucose 81 70 - 99 mg/dL    Urea Nitrogen 12 7 - 30 mg/dL    Creatinine 0.34 (L) 0.52 - 1.04 mg/dL    GFR Estimate >90 >60 mL/min/1.7m2    GFR Estimate If Black >90 >60 mL/min/1.7m2    Calcium 7.3 (L) 8.5 - 10.1 mg/dL    Bilirubin Total 8.4 (H) 0.2 - 1.3 mg/dL    Albumin 2.0 (L) 3.4 - 5.0 g/dL    Protein Total 5.3 (L) 6.8 - 8.8 g/dL    Alkaline Phosphatase 464 (H) 40 - 150 U/L    ALT 76 (H) 0 - 50 U/L     (H) 0 - 45 U/L   Magnesium   Result Value Ref Range    Magnesium 2.0 1.6 - 2.3 mg/dL   Hemoglobin   Result Value Ref Range    Hemoglobin 7.0 (L) 11.7 - 15.7 g/dL   Lactic acid whole blood   Result Value Ref Range    Lactic Acid 1.1 0.7 - 2.0 mmol/L   Potassium   Result Value Ref Range    Potassium 3.2  (L) 3.4 - 5.3 mmol/L   Blood gas arterial and oxyhgb   Result Value Ref Range    pH Arterial 7.45 7.35 - 7.45 pH    pCO2 Arterial 40 35 - 45 mm Hg    pO2 Arterial 27 (LL) 80 - 105 mm Hg    Bicarbonate Arterial 27 21 - 28 mmol/L    FIO2 50%     Oxyhemoglobin Arterial 49 (L) 92 - 100 %    Base Excess Art 3.2 mmol/L   Glucose by meter   Result Value Ref Range    Glucose 77 70 - 99 mg/dL   Potassium   Result Value Ref Range    Potassium 3.2 (L) 3.4 - 5.3 mmol/L   Glucose by meter   Result Value Ref Range    Glucose 82 70 - 99 mg/dL   Blood gas venous with oxyhemoglobin   Result Value Ref Range    Ph Venous 7.45 (H) 7.32 - 7.43 pH    PCO2 Venous 40 40 - 50 mm Hg    PO2 Venous 41 25 - 47 mm Hg    Bicarbonate Venous 28 21 - 28 mmol/L    FIO2 40%     Oxyhemoglobin Venous 75 %    Base Excess Venous 3.4 mmol/L   CBC with platelets differential   Result Value Ref Range    WBC 0.4 (LL) 4.0 - 11.0 10e9/L    RBC Count 2.50 (L) 3.8 - 5.2 10e12/L    Hemoglobin 7.6 (L) 11.7 - 15.7 g/dL    Hematocrit 23.4 (L) 35.0 - 47.0 %    MCV 94 78 - 100 fl    MCH 30.4 26.5 - 33.0 pg    MCHC 32.5 31.5 - 36.5 g/dL    RDW 21.9 (H) 10.0 - 15.0 %    Platelet Count 86 (L) 150 - 450 10e9/L    Diff Method WBC <0.5, Diff not done    Comprehensive metabolic panel   Result Value Ref Range    Sodium 135 133 - 144 mmol/L    Potassium 3.2 (L) 3.4 - 5.3 mmol/L    Chloride 101 94 - 109 mmol/L    Carbon Dioxide 26 20 - 32 mmol/L    Anion Gap 8 3 - 14 mmol/L    Glucose 83 70 - 99 mg/dL    Urea Nitrogen 15 7 - 30 mg/dL    Creatinine 0.34 (L) 0.52 - 1.04 mg/dL    GFR Estimate >90 >60 mL/min/1.7m2    GFR Estimate If Black >90 >60 mL/min/1.7m2    Calcium 7.4 (L) 8.5 - 10.1 mg/dL    Bilirubin Total 9.8 (H) 0.2 - 1.3 mg/dL    Albumin 2.4 (L) 3.4 - 5.0 g/dL    Protein Total 5.3 (L) 6.8 - 8.8 g/dL    Alkaline Phosphatase 401 (H) 40 - 150 U/L    ALT 70 (H) 0 - 50 U/L     (H) 0 - 45 U/L   Potassium   Result Value Ref Range    Potassium 3.9 3.4 - 5.3 mmol/L    Phosphorus   Result Value Ref Range    Phosphorus 2.1 (L) 2.5 - 4.5 mg/dL     *Note: Due to a large number of results and/or encounters for the requested time period, some results have not been displayed. A complete set of results can be found in Results Review.

## 2018-10-01 NOTE — PLAN OF CARE
Problem: Patient Care Overview  Goal: Plan of Care/Patient Progress Review  Outcome: No Change  ICU End of Shift Summary.  For vital signs and complete assessments, please see documentation flowsheets.     Pertinent assessments: Sedated, Rass -4. Responds to painful stimuli. Lungs coarse/wheezes. Belly distended, diarrhea rectal tube. UOP alex adequate. Edema, jaundice. Skin wounds. TF keofeed post-pyloric. Multiple drips  Major Shift Events: no vent or sedation wean. Increased FiO2 needs, up to 75%. Pressor support unchanged. C-diff checked again and continues to be negative. TF decreased to trophic feed d/t abd distention. Blood sugars Q 4 hours, stable with no coverage ordered. Dilaudid drip decreased.  Plan (Upcoming Events): abx, steroids, bronch and cultures pending. Palliative care consulted today. Family so far would like trach and everything done. More conversations to come.  Discharge/Transfer Needs: unknown    Bedside Shift Report Completed   Bedside Safety Check Completed

## 2018-10-01 NOTE — PROGRESS NOTES
CLINICAL NUTRITION SERVICES - REASSESSMENT NOTE      Recommendations Ordered by Registered Dietitian (RD):   Isosource 1.5 at 10 ml/hr --> if any further increase in abdominal distension, hold TF and alert MD  Discontinue fiber modulars   Future/Additional Recommendations:     Pending pressor requirements, kcal from propofol    ?Micronutrient needs per pressure injury protocol    Malnutrition:   % Intake:</= 75% for >/= 1 month (severe malnutrition) --> improving with EN reliance  Subcutaneous Fat Loss:Orbital region mild to moderate depletion, Upper arm region moderate depletion and Thoracic region moderate depletion  Muscle Loss:Temporal region moderate to severe depletion, Clavicle bone region moderate depletion, Acromion bone region moderate depletion, Dorsal hand region moderate depletion, Patellar region moderate depletion, Anterior thigh region moderate or greater depletion and Posterior calf region moderate or greater depletion      Malnutrition Diagnosis: Severe malnutrition  In Context of:  Chronic illness or disease and Environmental or social circumstances     EVALUATION OF PROGRESS TOWARD GOALS/NEW FINDINGS   Progress towards goals will be monitored and evaluated per protocol and Practice Guidelines    Patient remains on TF as follows, goal rate reached 9/27:     Type of Feeding Tube: ND (9/24)    Enteral Frequency:  Continuous    Enteral Regimen: Isosource at 45 mL/hr    Total Enteral Provisions: 1080 mL provides 1620 kcal, 73 gm protein, 190 g CHO, 15 g fiber and 821 mL H20.    Meets >100% of DRI's --> continue micronutrients per CIWA protocol.    2 Prosource BID for an addition 44 grams protein    Free Water Flush: 30 mL q4 hours  9/30: 990 mL  9/29: 1035 mL  9/28: 890 mL  9/27: 660 mL (goal rate reached)  3 day average: 972 mL, 90% of prescribed volume received since 9/28  RN reported increased abdominal distension      I/O last 3 completed shifts:    In: 3318.38 [I.V.:898.38; NG/GT:1080]    Out:  1870 [Urine:1170; Stool:700] (rectal tube 9/29)    Wounds/WOCN: consult placed 9/29, breakdown on L buttocks, L back abrasion, coccyx red and blanchable    Fluid: +2 L/R hand, L/R foot edema    Weight: 70 kg     Palliative: consult 10/1    Ori nutrition score: 3; total score: 11    Meds: Folic acid 1 mg, Certavite; dilaudid; Levophed + vasopressin (since 9/30); Propofol gtt at 11.6 mL/hr = 306 kcal    Labs:  (H, inflammation), LFT's elevated; NH3 50 (WNL)  Recent Labs   Lab Test  10/01/18   0620 09/30/18   0450  09/29/18   1356  09/29/18   0525  09/28/18   1520   POTASSIUM  4.5  3.7  3.7  3.2*  3.5     Recent Labs   Lab Test  10/01/18   0620  09/30/18   0450  09/29/18   0525  09/28/18   1520  09/28/18   0620   PHOS  3.2  1.5*  2.6  2.2*  1.8*     Recent Labs   Lab Test  10/01/18   0620  09/30/18   0450  09/25/18   1640  09/25/18   0514  09/24/18   0920   MAG  2.0  1.9  2.6*  1.5*  1.8     Recent Labs   Lab Test  10/01/18   0620  09/30/18   0450  09/29/18   0525  09/28/18   0620  09/27/18   0645   NA  145*  148*  148*  145*  141     Recent Labs   Lab Test  10/01/18   0620  09/30/18   0450  09/29/18   0525  09/28/18   0620  09/27/18   0645   CR  0.36*  0.41*  0.42*  0.34*  0.37*       Recent Labs  Lab 10/01/18  0620 09/30/18  0450 09/29/18  0525 09/28/18  0620 09/27/18  0645 09/26/18  0636 09/25/18  0514   * 128* 125* 139* 123* 142* 124*     Lab Results   Component Value Date    A1C 5.1 10/14/2015    A1C 5.2 10/13/2015       ASSESSED NUTRITION NEEDS (PER APPROVED PRACTICE GUIDELINES, Dosing weight: 60 kg):  Estimated Energy Needs: 2184-6337+ kcals (25-30 Kcal/Kg)  Justification: maintenance and vented  Estimated Protein Needs:  grams protein (1.5-2 g pro/Kg)  Justification: hypercatabolism with critical illness  Estimated Fluid Needs: >1 mL/Kcal  Justification: maintenance      Previous Goals:   EN to reach goal in the next 24-48 hours.   Evaluation: Met  EN to meet % estimated nutrient  needs while NPO.   Evaluation: Met, but not requiring pressors     Previous Nutrition Diagnosis:   Inadequate enteral nutrition infusion related to slow advancement to goal 2/2 risk of refeeding and abnormal electrolytes as evidenced by EN running at 44% goal volume this morning, will plan to advance to goal today.   Evaluation: Ongoing, updated below    MALNUTRITION (Assessed 9/27)  % Weight Loss:None noted  % Intake:</= 75% for >/= 1 month (severe malnutrition) --> improving with EN reliance  Subcutaneous Fat Loss:Orbital region mild to moderate depletion, Upper arm region moderate depletion and Thoracic region moderate depletion  Muscle Loss:Temporal region moderate to severe depletion, Clavicle bone region moderate depletion, Acromion bone region moderate depletion, Dorsal hand region moderate depletion, Patellar region moderate depletion, Anterior thigh region moderate or greater depletion and Posterior calf region moderate or greater depletion  Fluid Retention:None noted      Malnutrition Diagnosis: Severe malnutrition  In Context of:  Chronic illness or disease and Environmental or social circumstances    CURRENT NUTRITION DIAGNOSIS  Impaired nutrient utilization related to hemodynamic instability as evidenced by Levophed and vasopressin requirements, increase in abdominal distension with EN running at goal rate of 45 mL/hr.    INTERVENTIONS  Recommendations / Nutrition Prescription  Decrease and hold TF with pressor support, increase in abdominal distension: Isosource 1.5 at 10 mL/hr. Discontinue fiber modular. If any increase in abdominal distension, alert MD and hold TF.    ?Micronutrient needs per pressure injury protocol     Implementation  EN Composition, EN Schedule and Feeding Tube Flush: Entered orders to reflect regimen outlined above  Collaboration and Referral of care: Discussed patient during interdisciplinary care rounds this morning    Goals  TF to resume at goal rate within next 48  hours      MONITORING AND EVALUATION:  Progress towards goals will be monitored and evaluated per protocol and Practice Guidelines      Tiffanie Cronin RDN, FRANCINE, CNSC  Pager - 3rd floor/ICU: [phone]  Pager - All other floors: [phone]  Pager - Weekend/holiday: [phone]  Office: [phone]

## 2018-10-01 NOTE — PROGRESS NOTES
[facility]  Infectious Disease Progress Note          Assessment and Plan:   IMPRESSION:   1.  A 58-year-old female with widely metastatic long-term breast cancer, ongoing chemotherapy, and now neutropenia.   2.  Acute sepsis and neutropenic fever, some signs of pneumonia, with possible esophagitis and positive blood culture, at risk for multiple infections.   3.  Thrush and some concern for possible esophagitis.   4.  Acute and chronic long-term alcohol abuse and liver dysfunction.   5.  Prior Martha-Stafford tear.   6.  Respiratory failure, now intubated.   7.  Positive blood culture, 1 bottle for diphtheroids.  No further identification.  This could be a contaminant versus line infection.       RECOMMENDATIONS:   1.   Blood culture 1 lactobacillus contaminant unless multiple cultures positive or identification changes.   2.   vancomycin and Zosyn for now.? taper soon but not really improving  3.  From a thrush and possible fungal infection standpoint, agree fluconazole hold for now, but will do micafungin instead. .   4.  WBC rising(with growth factors), fever resolved  5.  Obviously, prognosis poor for a multitude of reasons.   6 Discussed with         Interval History:   Intubated sedated WBC rising above nl range incl PMNs  No new + cxs intermittent LGF              Medications:       bacitracin   Topical BID     chlorhexidine  15 mL Mouth/Throat BID     folic acid  1 mg Oral or Feeding Tube Daily     heparin  5 mL Intracatheter Q28 Days     heparin lock flush  5-10 mL Intracatheter Q24H     lidocaine visc 2% & maalox/mylanta w/simethicone & diphenhydramine  10 mL Swish & Swallow Q6H     methylPREDNISolone  62.5 mg Intravenous Q12H     micafungin  100 mg Intravenous Q24H     multivitamins with minerals  15 mL Per Feeding Tube Daily     nystatin  500,000 Units Swish & Spit 4x Daily     pantoprazole  40 mg Per Feeding Tube BID     piperacillin-tazobactam  3.375 g Intravenous Q6H      "protein modular  2 packet Per Feeding Tube BID w/meals     QUEtiapine  25 mg Oral or Feeding Tube BID     vancomycin (VANCOCIN) IV  1,250 mg Intravenous Q8H                  Physical Exam:   Blood pressure 125/62, pulse 123, temperature 100  F (37.8  C), temperature source Oral, resp. rate 19, height 1.676 m (5' 6\"), weight 70.3 kg (154 lb 15.7 oz), SpO2 91 %, not currently breastfeeding.  Wt Readings from Last 2 Encounters:   10/01/18 70.3 kg (154 lb 15.7 oz)   10/23/17 57.6 kg (127 lb)     Vital Signs with Ranges  Temp:  [97.6  F (36.4  C)-101.3  F (38.5  C)] 100  F (37.8  C)  Heart Rate:  [] 98  Resp:  [13-60] 19  BP: (115-125)/(62-70) 125/62  MAP:  [60 mmHg-88 mmHg] 78 mmHg  Arterial Line BP: ()/(42-60) 120/57  FiO2 (%):  [65 %-75 %] 75 %  SpO2:  [89 %-98 %] 91 %    Constitutional: Intubated and sedated   Lungs: Clear to auscultation bilaterally, no crackles or wheezing   Cardiovascular: Regular rate and rhythm, normal S1 and S2, and no murmur noted   Abdomen: Normal bowel sounds, soft, non-distended, non-tender   Skin: No rashes, no cyanosis, no edema   Other:           Data:   All microbiology laboratory data reviewed.  Recent Labs   Lab Test  10/01/18   0620 09/30/18   0450  09/29/18   0525   WBC  27.0*  20.9*  13.2*   HGB  8.4*  7.3*  7.8*   HCT  27.5*  24.4*  24.4*   MCV  98  99  95   PLT  171  142*  103*     Recent Labs   Lab Test  10/01/18   0620 09/30/18   0450  09/29/18   0525   CR  0.36*  0.41*  0.42*     No lab results found.  Recent Labs   Lab Test  09/30/18   1210  09/30/18   0335  09/30/18   0130  09/30/18   0111  09/29/18   0943  09/29/18   0911  09/28/18   1211  09/24/18   0945  09/22/18   1033   CULT  Culture negative monitoring continues  Culture negative monitoring continues  No growth after 17 hours  Culture negative after 17 hours  Canceled, Test credited  Test reordered as correct code  Culture in progress  No growth  No growth after 19 hours  No growth after 19 hours  " No growth after 2 days  No growth after 2 days  No growth  Light growth  Candida albicans / dubliniensis  Candida albicans and Candida dubliniensis are not routinely speciated  Susceptibility testing not routinely done  *  No growth

## 2018-10-01 NOTE — PROGRESS NOTES
Critical Care Progress Note      10/01/2018    Name: Eyal Turner MRN#: [id number]   Age: 58 year old YOB: 1960     Hsptl Day# 9  ICU DAY # 7    MV DAY # 7             Problem List:   Metastatic breast cancer  Acute hypoxic respiratory failure  Alcohol abuse/dependence  Hepatitis, chronic cause unclear         Summary/Hospital Course:     59 yo female with metastatic breast CA admitted to the ICU. I have personally reviewed the daily labs, imaging studies, cultures and discussed the case with referring physician and consulting physicians.     My assessment and plan by system for this patient is as follows:    Neurology/Psychiatry:   1. ETOH Use  2. Analgesia    Plan  -Will start precedex for sedation goal RASS of negative 2  -Monitor for signs of ETOH withdrawal  - PRN Fentanyl for pain given metastatic lesions  -Continue CIWA  -Head CT ruled out any acute pathology.    Cardiovascular:   1.Hemodynamics -hypotensive; echo pretty normal 9/29  2.Rhythm -  Tachy, sinus    Plan  -Remains hypotensive requiring norepinephrine, vasopressin.  Of concern this is either sepsis or possibly drug-induced lung injury.  We will transfuse the patient 1 unit of packed red blood cells given her anemia in hopes to wean the norepinephrine.  An echo obtained yesterday showed normal ejection fraction.    Pulmonary/Ventilator Management:   1. Airway intubated  2. Diffuse ground glass opacity on CT, bronchoscopy with washings on 9/30   > 25 pms, mixed organisms on Gram  Plan  Initiate steroids for drug-induced lung injury, approx 1.5 mg/kg methylprednisolone    GI and Nutrition :   1. Concern for protein calorie malnutrition  2.  New abdominal distention resolved  3.  Ultrasound of gallbladder reviewed no evidence of biliary obstruction was demonstrated on this study    Plan  -small bore feeding tube placed continue on tubefeeds  -    Renal/Fluids/Electrolytes:   1. Cr 0.42  2. Hypokalemia,   3.  Chronic respiratory  acidosis  4.  Patient may be third spacing fluids but remains intravascularly deplete    Plan    - adjust medications as needed for renal clearance  - follow I/O's   -Hold diuresis given the patient's increased pressor     Infectious Disease:   1. Concern for PNA again  2. Gram positive in blood (doubt line infection)  3.  Plan  Appreciate ID consult.  We will continue the patient on current antibiotics.  The patient was recultured 9/30 no growth.    Endocrine:     1. Concern of stress induced hyperglycemia    Plan  - ICU insulin protocol, goal sugar <180      Hematology/Oncology:     1.chronic anemia secondary to CA, no signs, symptoms of active blood loss    Plan  -Given increasing norepinephrine requirements we will transfuse the patient 1 unit of packed red blood cells per      IV/Access:   1. Venous access -  port  2. Arterial access -  none  3.  Plan  - central access required and necessary      ICU Prophylaxis:   1. DVT: mechanical  2. VAP: HOB 30 degrees, chlorhexidine rinse  3. Stress Ulcer: PPI  4. Restraints: Nonviolent soft two point restraints required and necessary for patient safety and continued cares and good effect as patient continues to pull at necessary lines, tubes despite education and distraction. Will readdress daily.   5. Wound care - per unit routine   6. Feeding -continue tube feeds  7. Family Update:       Key goals for next 24 hours:   1.   Continue supportive care  2. Palliative care consult  3. Initiate steroids for possible drug-induced lung injury           Interim History:   Continued worsening clinical condition.  Concern for sepsis.  I spoke with Dr Braun and patient's family today.          Key Medications:       bacitracin   Topical BID     chlorhexidine  15 mL Mouth/Throat BID     folic acid  1 mg Oral or Feeding Tube Daily     heparin  5 mL Intracatheter Q28 Days     heparin lock flush  5-10 mL Intracatheter Q24H     lidocaine visc 2% & maalox/mylanta w/simethicone &  diphenhydramine  10 mL Swish & Swallow Q6H     micafungin  100 mg Intravenous Q24H     multivitamins with minerals  15 mL Per Feeding Tube Daily     nystatin  500,000 Units Swish & Spit 4x Daily     pantoprazole  40 mg Per Feeding Tube BID     piperacillin-tazobactam  3.375 g Intravenous Q6H     protein modular  2 packet Per Feeding Tube BID w/meals     QUEtiapine  25 mg Oral or Feeding Tube BID     vancomycin (VANCOCIN) IV  1,250 mg Intravenous Q8H       dexmedetomidine 0.7 mcg/kg/hr (10/01/18 0800)     IV fluid REPLACEMENT ONLY       IV fluid REPLACEMENT ONLY       HYDROmorphone 0.8 mg/hr (10/01/18 0800)     norepinephrine 0.05 mcg/kg/min (10/01/18 0800)     propofol (DIPRIVAN) infusion 30 mcg/kg/min (10/01/18 0800)     vasopressin (PITRESSIN) infusion ADULT (40 mL) 2.4 Units/hr (10/01/18 0800)               Physical Examination:   Temp:  [97.6  F (36.4  C)-99.6  F (37.6  C)] 97.6  F (36.4  C)  Heart Rate:  [] 97  Resp:  [] 19  BP: ()/(53-70) 115/70  MAP:  [56 mmHg-84 mmHg] 65 mmHg  Arterial Line BP: ()/(42-60) 110/45  FiO2 (%):  [60 %-65 %] 65 %  SpO2:  [86 %-100 %] 93 %    Intake/Output Summary (Last 24 hours) at 09/24/18 1115  Last data filed at 09/24/18 0800   Gross per 24 hour   Intake              250 ml   Output             2990 ml   Net            -2740 ml     Wt Readings from Last 4 Encounters:   10/01/18 70.3 kg (154 lb 15.7 oz)   10/23/17 57.6 kg (127 lb)   08/09/16 57.6 kg (127 lb)   08/01/16 57.6 kg (127 lb)     Arterial Line BP: ()/(42-60) 110/45  MAP:  [56 mmHg-84 mmHg] 65 mmHg  BP - Mean:  [63-85] 85  Ventilation Mode: CMV/AC  (Continuous Mandatory Ventilation/ Assist Control)  FiO2 (%): 65 %  Rate Set (breaths/minute): 20 breaths/min  Tidal Volume Set (mL): 450 mL  PEEP (cm H2O): 10 cmH2O  Pressure Support (cm H2O): 12 cmH2O  Oxygen Concentration (%): 65 %  Resp: 19    Recent Labs  Lab 09/30/18  1300 09/30/18  0220 09/29/18  0525 09/28/18  2258 09/28/18  1042   PH  7.36  --   --   --  7.48*   PCO2 51*  --   --   --  38   PO2 77*  --   --   --  79*   HCO3 29*  --   --   --  28   O2PER 65% 70% 60% 60% 60%       GEN: no acute distress, now   HEENT: head ncat, sclera anicteric, OP patent, trachea midline   PULM: unlabored synchronous with vent, clear anteriorly ,   CV/COR: RRR S1S2 no gallop,  No rub, no murmur  ABD: soft nontender, hypoactive bowel sounds, no mass  EXT:  Edema   warm  NEURO: grossly intact, chemically sedated does not follow command  SKIN: nodular, fungating skin lesions on sternum  LINES: clean, dry intact         Data:   All data and imaging reviewed     ROUTINE ICU LABS (Last four results)  CMP  Recent Labs  Lab 10/01/18  0620 09/30/18  0450 09/29/18  1356 09/29/18  0525 09/28/18  1520 09/28/18  1122 09/28/18  0620  09/26/18  0636  09/25/18  1640  09/25/18  0514   * 148*  --  148*  --   --  145*  < > 139  --   --   --  137   POTASSIUM 4.5 3.7 3.7 3.2* 3.5  --  3.1*  < > 3.6  --   --   < > 3.0*   CHLORIDE 112* 113*  --  112*  --   --  109  < > 105  --   --   --  102   CO2 31 29  --  30  --   --  30  < > 27  --   --   --  28   ANIONGAP 2* 6  --  6  --   --  6  < > 7  --   --   --  7   * 128*  --  125*  --   --  139*  < > 142*  --   --   --  124*   BUN 28 26  --  26  --   --  22  < > 19  --   --   --  17   CR 0.36* 0.41*  --  0.42*  --   --  0.34*  < > 0.34*  --   --   --  0.35*   GFRESTIMATED >90 >90  --  >90  --   --  >90  < > >90  --   --   --  >90   GFRESTBLACK >90 >90  --  >90  --   --  >90  < > >90  --   --   --  >90   REAGAN 7.7* 7.9*  --  7.3*  --   --  7.4*  < > 7.5*  --   --   --  7.5*   MAG 2.0 1.9  --   --   --   --   --   --   --   --  2.6*  --  1.5*   PHOS 3.2 1.5*  --  2.6 2.2*  --  1.8*  < >  --   < > 1.7*  --  1.3*   PROTTOTAL 4.9*  --   --   --   --  4.8*  --   --   --   --   --   --   --    ALBUMIN 1.7*  --   --   --   --  1.9*  --   --   --   --   --   --   --    BILITOTAL 8.4*  --   --   --   --  7.9*  --   --  7.6*  --   --   --   9.3*   ALKPHOS 688*  --   --   --   --  667*  --   --   --   --   --   --  409*   *  --   --   --   --  158*  --   --  97*  --   --   --  113*   ALT 62*  --   --   --   --  69*  --   --  57*  --   --   --   --    < > = values in this interval not displayed.  CBC    Recent Labs  Lab 10/01/18  0620 09/30/18  0450 09/29/18  0525 09/28/18  0620   WBC 27.0* 20.9* 13.2* 10.8   RBC 2.82* 2.46* 2.57* 2.56*   HGB 8.4* 7.3* 7.8* 7.8*   HCT 27.5* 24.4* 24.4* 24.2*   MCV 98 99 95 95   MCH 29.8 29.7 30.4 30.5   MCHC 30.5* 29.9* 32.0 32.2   RDW 22.3* 22.0* 21.7* 21.0*    142* 103* 96*     INR    Recent Labs  Lab 10/01/18  0620   INR 1.23*     Arterial Blood Gas    Recent Labs  Lab 09/30/18  1300 09/30/18  0220 09/29/18  0525 09/28/18  2258 09/28/18  1042   PH 7.36  --   --   --  7.48*   PCO2 51*  --   --   --  38   PO2 77*  --   --   --  79*   HCO3 29*  --   --   --  28   O2PER 65% 70% 60% 60% 60%       All cultures:    Recent Labs  Lab 09/30/18  1210 09/30/18  0130 09/30/18  0111 09/29/18  0943 09/29/18  0911 09/28/18  1211 09/24/18  0945   CULT Canceled, Test credited  Test reordered as correct code No growth  No growth after 19 hours No growth after 19 hours No growth after 2 days No growth after 2 days No growth Light growthCandida albicans / dubliniensisCandida albicans and Treva dubliniensis are not routinely speciatedSusceptibility testing not routinely done*     Recent Results (from the past 24 hour(s))   CT Chest/Abdomen/Pelvis w Contrast    Narrative    CT CHEST/ABDOMEN/PELVIS WITH CONTRAST September 30, 2018 9:48 AM     HISTORY: Fever and elevated white blood cell count.    TECHNIQUE: 76mL Isovue-370 IV were administered. After contrast  administration, volumetric helical sections were acquired from the  thoracic inlet to the ischial tuberosities. Coronal images were also  reconstructed. Radiation dose for this scan was reduced using  automated exposure control, adjustment of the mA and/or kV  according  to patient size, or iterative reconstruction technique.    COMPARISON: CT of the abdomen and pelvis performed 9/22/2018.    FINDINGS:    Chest: Multiple images through the chest are degraded due to patient  motion artifact. There are extensive ground-glass and airspace  infiltrates throughout both lungs. Small right pleural effusion. No  pericardial or left pleural effusion. Atherosclerotic calcification of  the thoracic aorta and coronary arteries. Right Port-A-Cath, with tip  in the low SVC. Bilateral breast implants are noted. Endotracheal tube  is in place, with tip 3.5 cm above the jasvir. Enteric tube, tip  within the second portion of the duodenum. Destructive appearing  sclerotic change throughout the sternum is consistent with metastatic  disease. Sclerosis within the T6 vertebral body and T7 posterior  elements also likely represent metastatic disease.    Abdomen and Pelvis: Small amount of ascites. The gallbladder wall is  diffusely thickened, possibly related to ascites. The gallbladder is  not distended. Few scattered calcified granulomas in the spleen. The  liver, spleen, adrenal glands, pancreas, and kidneys are otherwise  unremarkable. Mild atherosclerotic aortoiliac calcification. No  hydronephrosis. No bowel obstruction. Small fat-containing  paraumbilical hernia. Gray catheter in the bladder. Rectal tube is in  place. Ill-defined sclerosis in the L5 vertebral body is consistent  with metastatic disease.      Impression    IMPRESSION:   1. Extensive ground-glass and airspace infiltrates throughout both  lungs are nonspecific, but are suspicious for an infectious etiology.  2. Small right pleural effusion.  3. Mild ascites.  4. Sclerotic bony metastases are noted within the sternum, as well as  within the thoracic and lumbar spine.       VEENA DAVE MD   XR Chest Port 1 View    Narrative    CHEST PORTABLE ONE VIEW 9/30/2018 1:40 PM     COMPARISON: Frontal chest x-ray  9/27/2018    HISTORY: Status post bronch.       Impression    IMPRESSION: There is more extensive airspace opacity in the left lung  and persistent airspace opacity in the right lung. While some of this  may represent fluid related to the bronchoscopy, worsening pneumonia  must be considered. There is no pneumothorax. There is no definite  pleural effusion.    Right internal jugular central venous Port-A-Cath again noted. Tip of  the endotracheal tube remains at the level of the clavicular heads.  Feeding tube again noted.    KYREE MCGOWAN MD         Billing: This patient is critically ill: Yes. Total critical care time today 30 min excluding procedures.

## 2018-10-01 NOTE — PLAN OF CARE
Problem: Patient Care Overview  Goal: Plan of Care/Patient Progress Review  Outcome: Declining  ICU End of Shift Summary.  For vital signs and complete assessments, please see documentation flowsheets.     Pertinent assessments: Pt only responds to oral cares. Not following commands. RR improved on the ventilator. Pt is still breathing over the vent at rate of low to mid 20's. Pt desat to 89-90% after bronch. Dr. Matute aware and said desaturation was expected after the bronch. fi02 increased to 65%.  Major Shift Events: sent pt down for CT of chest abdomen and pelvis, Bronchoscopy done, samples sent to lab. Start vasopressin. Art line placed. MD aware of recent blood gases at 1300  and is okay with results. No new changed made to the ventilator. Changed fentanyl to dilaudid due to no available fentanyl in hospital. Bilateral soft wrist restraints removed because pt is not making attempts to reach for tubes and lines. Administered 1 unit of PRBC.   Plan (Upcoming Events): Pt status is declining. Pt is critically ill. Consider palliative involvement tomorrow.   Discharge/Transfer Needs: TBD    Bedside Shift Report Completed :   Bedside Safety Check Completed:

## 2018-10-01 NOTE — PROGRESS NOTES
RT: Received patient on CMV 20/ 450/ +10/ 65%. Fio2 increased to 75%.    No additional vent changes or weans attempted this shift.    BBS coarse/ diminished. Suctioned red, thin secretions orally and yellow/green thick secretions from ETT.    Will continue to follow and assess.

## 2018-10-01 NOTE — PROGRESS NOTES
Ventilation Mode: CMV/AC  (Continuous Mandatory Ventilation/ Assist Control)  FiO2 (%): 65 %  Rate Set (breaths/minute): 20 breaths/min  Tidal Volume Set (mL): 450 mL  PEEP (cm H2O): 10 cmH2O  Pressure Support (cm H2O): 12 cmH2O  Oxygen Concentration (%): 65 %  Resp: 17    No changes made to vent this shift. No wean planned with high peep and FIO2 levels.  Will continue to monitor respiratory status.

## 2018-10-01 NOTE — PHARMACY-VANCOMYCIN DOSING SERVICE
Pharmacy Vancomycin Note  Date of Service 2018  Patient's  1960   58 year old, female    Indication: Sepsis  Goal Trough Level: 15-20 mg/L  Day of Therapy: 8  Current Vancomycin regimen:  1250 mg IV q8h    Current estimated CrCl = Estimated Creatinine Clearance: 189 mL/min (based on Cr of 0.36).    Creatinine for last 3 days  2018:  5:25 AM Creatinine 0.42 mg/dL  2018:  4:50 AM Creatinine 0.41 mg/dL  10/1/2018:  6:20 AM Creatinine 0.36 mg/dL    Recent Vancomycin Levels (past 3 days)  2018: 11:00 AM Vancomycin Level 21.5 mg/L  10/1/2018:  1:00 PM Vancomycin Level 19.9 mg/L    Vancomycin IV Administrations (past 72 hours)                   vancomycin (VANCOCIN) 1,250 mg in sodium chloride 0.9 % 250 mL intermittent infusion (mg) 1,250 mg New Bag 10/01/18 1410     1,250 mg New Bag  0644     1,250 mg New Bag 18    vancomycin (VANCOCIN) 1,500 mg in sodium chloride 0.9 % 250 mL intermittent infusion (mg) 1,500 mg New Bag 18 1215     1,500 mg New Bag  0432     1,500 mg New Bag 18 1958     1,500 mg New Bag  1102     1,500 mg New Bag  0345     1,500 mg New Bag 18                Nephrotoxins and other renal medications (Future)    Start     Dose/Rate Route Frequency Ordered Stop    18 2200  vancomycin (VANCOCIN) 1,250 mg in sodium chloride 0.9 % 250 mL intermittent infusion      1,250 mg  over 90 Minutes Intravenous EVERY 8 HOURS 18 1450      18 1215  vasopressin (VASOSTRICT) 40 Units in D5W 40 mL infusion      2.4 Units/hr  2.4 mL/hr  Intravenous CONTINUOUS 18 1140      18 2145  norepinephrine (LEVOPHED) 16 mg in D5W 250 mL infusion      0.03-0.4 mcg/kg/min × 64.5 kg  1.8-24.2 mL/hr  Intravenous CONTINUOUS 18 2132      18 1215  piperacillin-tazobactam (ZOSYN) infusion 3.375 g      3.375 g  100 mL/hr over 30 Minutes Intravenous EVERY 6 HOURS 18 1205               Contrast Orders - past 72 hours (72h ago through  future)    Start     Dose/Rate Route Frequency Ordered Stop    09/30/18 0945  iopamidol (ISOVUE-370) solution 76 mL      76 mL Intravenous ONCE 09/30/18 0935 09/30/18 0936    09/29/18 1300  perflutren diluted 1mL to 2mL with saline (OPTISON) diluted injection 2 mL      2 mL Intravenous ONCE 09/29/18 1246 09/29/18 1247          Interpretation of levels and current regimen:  Trough level is  Therapeutic    Has serum creatinine changed > 50% in last 72 hours: No    Urine output:  good urine output    Renal Function: Stable    Plan:  1.  Continue Current Dose  2.  Pharmacy will check trough levels as appropriate in 3-4 days.    3. Serum creatinine levels will be ordered a minimum of twice weekly.      Shira Pittman, PharmD IV Student          .

## 2018-10-01 NOTE — PROGRESS NOTES
[facility]    Hospitalist Progress Note  Name: Eyal Turner    MRN: [id number]  Provider:  aLzaro Rodriguez MD  Date of Service: 10/01/2018    Summary of Stay: Eyal Turner is a 58 year old female with metastatic breast cancer with bone metastases to sternum and rib who started a new chemo regimen (that included switch from Kadcyla to Docetaxel, Herceptin and Perjeta) 5 days prior to admission (about 9/17/18).  In addition, prior medical history is notable for chronic alcohol abuse complicated by malnutrition and HTN who presented on 9/22/2018 with fatigue and shortness of breath.      Initially, Ms. Turner was hypotensive, though that was responsive to IV fluids in the ED. Her labs were notable for significantly elevated LFTs (above her baseline abnormals) with bilirubin of 8, alk phos about 600 and elevated transaminases.  Also was found to be pancytopenic with neutropenia.  Hemoglobin was less than 7 she received 1 unit RBC transfusion in the ED.     After admission she was found to have fever to 101 for which infectrious wrk up was started and chest x-ray showed bilateral infiltrate.  Cefepime was presumptively started for neutropenic fever and azithromycin added subsequently.  A CT abdomen pelvis was obtained that did not show any ascites or obstructive liver disease.    She also developed evidence for acute alcohol withdrawal initially with significant tremor that improved after a few doses of Ativan.  GI was consulted for possible upper GI bleed and for her liver failure.  No EGD planned.  Did start empiric Protonix on admission, but no evidence for ongoing blood loss.    Oncology also was consulted and recommended supportive cares.      Ms. Turner was transferred to ICU on 9/23/2018 for worsening hypoxemia and was intubated 9/24/2018.  Blood culture grew lactobacillus from port.  ID was consulted and the patient was switched to vancomycin and Zosyn.  Micafungin was also added for oral  thrush.  She was also started on Neupogen for neutropenia with good response.    She has shown no improvement over the past several days and even showing gradual deterioration in mental and respiratory status.  On 9/29, she developed fever to 102 and now has leukocytosis of 27K (though the rise in white cell count may be partly due to exposure to Neupogen).      On speaking with Dr. Braun this morning, I note that he feels that none of the current decompensation relates to the patient's malignancy.  Unfortunately, in the setting of the patient's now well-established liver disease, it seems likely that she got a relatively high dose of chemotherapeutic agents.  Instead, it seems most likely that the patient has acute lung injury related probably to the chemotherapeutic agents.       Problem List/Assessment and Plan:   Acute hypoxic respiratory failure: Suspected due to severe sepsis and pneumonia.  Possible component of vascular congestion as well.  Remains stable on the ventilator.    -Ventilator management per the intensivist.    Septic shock and neutropenic fever: Some signs of pneumonia with possible esophagitis and does have oral candidiasis.  Only one blood culture positive for lactobacillus which could be contaminant.  Infectious disease following.  Continue vancomycin and Zosyn.    -Currently on vancomycin, Zosyn, and micafungin.  -Given new fever and leukocytosis consider further broadening antibiotics.  -CT of chest, abdomen, and pelvis on 9/30 shows extensive airspace infiltrates throughout both lungs.  -Repeated blood, urine, and sputum cultures.     Abnormal LFTs, thought to be multifactorial: Presented with jaundice an bilirubin was up to 9.8 up from 2.4 three months ago.  Alkaline phosphatase was elevated to 594, AST elevated 264, ALT elevated at 94.  Does drink 2 bottles of wine daily so possible acute alcoholic hepatitis.  Also additionally started on chemotherapy with docetaxel, trastuzumab, and  pertuzumab so possibility of drug toxicity although per oncology with this regimen there is not typically significant liver toxicity.  Did not have a lot of abdominal pain although was a little tender in the upper quadrant.  Lipase was not elevated.  No obstructive process seen on CT.  Slight small nodularity and cannot rule out metastases but this seems less likely based on recent CT scan.  No history of cirrhosis on imaging as of a year ago.  Albumin is low.  Given her alcohol abuse suspect alcohol hepatitis playing a role here.   -Gastroenterology consulted and following peripherally.  -Largely unremarkable right upper quadrant ultrasound.      Alcohol dependence with acute withdrawal: Has been drinking 2 bottles of wine daily for some time.  Last use 1-2 days prior to admission.  Presented in alcohol withdrawal with tremor.  -CIWA with Ativan as needed, but not requiring any doses at this time.  -Continue thiamine and folate.  -Now on Precedex and propofol.      Acute on chronic pancytopenia with neutropenia: On admission WBC 0.5 with ANC 0.4, hemoglobin 6.9, and platelets 63.  Hemoglobin was 11.4 three months ago and platelets were 108.  Suspect acute drop is from her recent chemotherapy.  Also possible upper GI bleed as below contributing.  -Hemoglobin stable following transfusion support but gradually drifting down and give 1 more unit of PRBCs today.  -Daily CBC.  -Hematology following.  -Neutropenia resolved following Neupogen which has now been discontinued.      Metastatic breast cancer: Initially diagnosed in 2000 s/p bilateral mastectomy.  Has undergone multiple rounds of chemotherapy and radiation.  Has known metastatic disease to her sternum and third left rib.  Had recent possible skin infection from the sternum met/mass and received Keflex.  Does not look actively infected at this time.  Has port in place.  Restarted on chemotherapy with docetaxel, trastuzumab, and pertuzumab on 9/17/18.  Follows  with Dr. Braun of oncology.  Doing quite poorly following chemotherapy with poor oral intake, weakness, and now acute liver injury.  -[facility] consulted, appreciate recommendations.  -No known metastases except to bone which probably accounts for the chronically elevated alkaline phosphatase    Mucositis versus thrush and possible esophagitis: Patient erosive changes and plaque on the tongue and her soft palate.  Question whether this is mucositis from her docetaxel or Candida infection.  Sore throat for the past week so possible esophagitis as well.  -Swish and swallow nystatin 4 times daily and Magic mouthwash.  -Management would typically be systemic Diflucan in case of esophagitis however some risk in doing this due to her LFT abnormalities.  Received Diflucan 2 mg in the ED.  Given liver toxicity will hold on further treatment as this may just be mucositis.  -GI not planning EGD to investigate for esophagitis.  -Continue micafungin per ID.     Possible UGIB: Reports of vomiting with red blood in it.  Denies any melena or hematochezia.  Anemia and thrombocytopenia could in part be chemotherapy related.  Has required periodic transfusions.  -Twice daily Protonix.  -GI not planning an EGD .      Hypokalemia and hypomagnesemia:   -Potassium and magnesium replacement protocol.      Severe protein calorie malnutrition: Evidence for fat loss on exam likely related to chronic alcohol use, metastatic breast cancer, and recent chemotherapy.  Clearly has severe malnutrition, though per Dr. Braun, this appears chronic.  -Dietitian consult.  -Feeding tube placed and continuing tube feeds.    Diarrhea: repeated C. difficile testing negative.  Rectal tube now in place as neutropenia resolved.      DVT Prophylaxis: Pneumatic Compression Devices  Code Status: Full Code  Disposition: Expected discharge in 3+ days to TBD. Goals prior to discharge include address multiple issues noted above.  Not showing significant improvement  over the past several days and actually gradually deteriorating.  Discussed with Dr. Romo on Friday and plan for continued medical management over the weekend and if no improvement will need palliative care involvement on Monday to further discuss goals of care and overall poor prognosis.  Family updated today: Per Dr Matute.     Interval History   Intubated and sedated.    -Data reviewed today: I personally reviewed all new labs and imaging results over the last 24 hours.     Physical Exam   Temp: 97.6  F (36.4  C) Temp src: Axillary BP: 115/70   Heart Rate: 97 Resp: 19 SpO2: 93 % O2 Device: Mechanical Ventilator    Vitals:    09/29/18 0030 09/30/18 0545 10/01/18 0508   Weight: 67 kg (147 lb 11.3 oz) 69.3 kg (152 lb 12.5 oz) 70.3 kg (154 lb 15.7 oz)     Vital Signs with Ranges  Temp:  [97.6  F (36.4  C)-99.6  F (37.6  C)] 97.6  F (36.4  C)  Heart Rate:  [] 97  Resp:  [] 19  BP: ()/(46-70) 115/70  MAP:  [56 mmHg-84 mmHg] 65 mmHg  Arterial Line BP: ()/(42-60) 110/45  FiO2 (%):  [60 %-65 %] 65 %  SpO2:  [86 %-100 %] 93 %  I/O last 3 completed shifts:  In: 3318.38 [I.V.:898.38; NG/GT:1080]  Out: 1870 [Urine:1170; Stool:700]    GENERAL: Intubated and sedated.  Chronically ill with bi-temporal wasting.  HEENT: Normocephalic, atraumatic. Extraocular movements intact.  Icteric.  CARDIOVASCULAR: Regular rate and rhythm without murmurs or rubs. No S3.  PULMONARY: Clear bilaterally.  GASTROINTESTINAL: Soft, moderately distended. Bowel sounds normoactive. No appreciable tenderenss.  EXTREMITIES: No cyanosis or clubbing. No edema.  NEUROLOGICAL: CN 2-12 grossly intact, no focal neurological deficits.  DERMATOLOGICAL: No rash, ulcer, but mild jaundice.  Significant diffuse bruising.    Medications     dexmedetomidine 0.7 mcg/kg/hr (10/01/18 0624)     IV fluid REPLACEMENT ONLY       IV fluid REPLACEMENT ONLY       HYDROmorphone 0.8 mg/hr (09/30/18 2780)     norepinephrine 0.05 mcg/kg/min (10/01/18 3798)      propofol (DIPRIVAN) infusion 40 mcg/kg/min (10/01/18 0731)     vasopressin (PITRESSIN) infusion ADULT (40 mL) 2.4 Units/hr (10/01/18 0040)       bacitracin   Topical BID     chlorhexidine  15 mL Mouth/Throat BID     fiber modular (NUTRISOURCE FIBER)  1 packet Per Feeding Tube BID     folic acid  1 mg Oral or Feeding Tube Daily     heparin  5 mL Intracatheter Q28 Days     heparin lock flush  5-10 mL Intracatheter Q24H     lidocaine visc 2% & maalox/mylanta w/simethicone & diphenhydramine  10 mL Swish & Swallow Q6H     micafungin  100 mg Intravenous Q24H     multivitamins with minerals  15 mL Per Feeding Tube Daily     nystatin  500,000 Units Swish & Spit 4x Daily     pantoprazole  40 mg Per Feeding Tube BID     piperacillin-tazobactam  3.375 g Intravenous Q6H     protein modular  2 packet Per Feeding Tube BID w/meals     QUEtiapine  25 mg Oral or Feeding Tube BID     vancomycin (VANCOCIN) IV  1,250 mg Intravenous Q8H     Data     Laboratory:    Recent Labs  Lab 10/01/18  0620 09/30/18  0450 09/29/18  0525   WBC 27.0* 20.9* 13.2*   HGB 8.4* 7.3* 7.8*   HCT 27.5* 24.4* 24.4*   MCV 98 99 95    142* 103*       Recent Labs  Lab 10/01/18  0620 09/30/18  0450 09/29/18  1356 09/29/18  0525   * 148*  --  148*   POTASSIUM 4.5 3.7 3.7 3.2*   CHLORIDE 112* 113*  --  112*   CO2 31 29  --  30   ANIONGAP 2* 6  --  6   * 128*  --  125*   BUN 28 26  --  26   CR 0.36* 0.41*  --  0.42*   GFRESTIMATED >90 >90  --  >90   GFRESTBLACK >90 >90  --  >90   REAGAN 7.7* 7.9*  --  7.3*       Recent Labs  Lab 09/30/18  1210 09/30/18  0130 09/30/18  0111 09/29/18  0943 09/29/18  0911 09/28/18  1211 09/24/18  0945   CULT Canceled, Test credited  Test reordered as correct code No growth after 19 hours  PENDING No growth after 19 hours No growth after 2 days No growth after 2 days No growth Light growthCandida albicans / dubliniensisCandida albicans and Treva dubliniensis are not routinely speciatedSusceptibility testing not  routinely done*       Imaging:  Recent Results (from the past 24 hour(s))   CT Chest/Abdomen/Pelvis w Contrast    Narrative    CT CHEST/ABDOMEN/PELVIS WITH CONTRAST September 30, 2018 9:48 AM     HISTORY: Fever and elevated white blood cell count.    TECHNIQUE: 76mL Isovue-370 IV were administered. After contrast  administration, volumetric helical sections were acquired from the  thoracic inlet to the ischial tuberosities. Coronal images were also  reconstructed. Radiation dose for this scan was reduced using  automated exposure control, adjustment of the mA and/or kV according  to patient size, or iterative reconstruction technique.    COMPARISON: CT of the abdomen and pelvis performed 9/22/2018.    FINDINGS:    Chest: Multiple images through the chest are degraded due to patient  motion artifact. There are extensive ground-glass and airspace  infiltrates throughout both lungs. Small right pleural effusion. No  pericardial or left pleural effusion. Atherosclerotic calcification of  the thoracic aorta and coronary arteries. Right Port-A-Cath, with tip  in the low SVC. Bilateral breast implants are noted. Endotracheal tube  is in place, with tip 3.5 cm above the jasvir. Enteric tube, tip  within the second portion of the duodenum. Destructive appearing  sclerotic change throughout the sternum is consistent with metastatic  disease. Sclerosis within the T6 vertebral body and T7 posterior  elements also likely represent metastatic disease.    Abdomen and Pelvis: Small amount of ascites. The gallbladder wall is  diffusely thickened, possibly related to ascites. The gallbladder is  not distended. Few scattered calcified granulomas in the spleen. The  liver, spleen, adrenal glands, pancreas, and kidneys are otherwise  unremarkable. Mild atherosclerotic aortoiliac calcification. No  hydronephrosis. No bowel obstruction. Small fat-containing  paraumbilical hernia. Gray catheter in the bladder. Rectal tube is in  place.  Ill-defined sclerosis in the L5 vertebral body is consistent  with metastatic disease.      Impression    IMPRESSION:   1. Extensive ground-glass and airspace infiltrates throughout both  lungs are nonspecific, but are suspicious for an infectious etiology.  2. Small right pleural effusion.  3. Mild ascites.  4. Sclerotic bony metastases are noted within the sternum, as well as  within the thoracic and lumbar spine.       VEENA DAVE MD   XR Chest Port 1 View    Narrative    CHEST PORTABLE ONE VIEW 9/30/2018 1:40 PM     COMPARISON: Frontal chest x-ray 9/27/2018    HISTORY: Status post bronch.       Impression    IMPRESSION: There is more extensive airspace opacity in the left lung  and persistent airspace opacity in the right lung. While some of this  may represent fluid related to the bronchoscopy, worsening pneumonia  must be considered. There is no pneumothorax. There is no definite  pleural effusion.    Right internal jugular central venous Port-A-Cath again noted. Tip of  the endotracheal tube remains at the level of the clavicular heads.  Feeding tube again noted.    KYREE MCGOWAN MD

## 2018-10-01 NOTE — PLAN OF CARE
Problem: Neutropenia (Adult)  Goal: Signs and Symptoms of Listed Potential Problems Will be Absent, Minimized or Managed (Neutropenia)  Signs and symptoms of listed potential problems will be absent, minimized or managed by discharge/transition of care (reference Neutropenia (Adult) CPG).   Outcome: No Change  ICU End of Shift Summary.  For vital signs and complete assessments, please see documentation flowsheets.     Pertinent assessments: HR tachy, other VSS on Vent, FiO2 65% unchanged, PEEP 10. RASS goal -3 to -4. Pt remained -4 per RASS. Neuro status unchanged, withdraws from pain, coughs during turns. Gray in place with good UO, remains icteric. Rectal tube in place, good output. Vasopressin infusing at 2.4 u/hr, precedex at 0.7 mcg/kg/hr. Dilaudid PCA at 0.8 mg/hr. TF infusing at 45 ml/hr with H2O flushes at 90 ml/2hr. Tele ST. Spouse at bedside, supportive and involved; updated on plan of care.   Major Shift Events: Reduced propfol infusion to 40 mcg/kg/hr, levophed to 0.05 mcg/kg/hr. Phosphorus replaced this shift. ETT suctioned for yellow-green sputum.  Plan (Upcoming Events): WOC, palliative team consult  Discharge/Transfer Needs: TBD    Bedside Shift Report Completed : Y  Bedside Safety Check Completed: Y

## 2018-10-01 NOTE — PROGRESS NOTES
Discussed with MD regarding ventilator weaning and sedation vacation this a.m. Per Dr. Henry, no weaning of sedation or ventilator today d/t increased FiO2 needs, peep and 10 and hypoxia. RASS goal of -3 to -4 still appropriate for this pt given instability.  See flowsheet for additional assessment information.

## 2018-10-01 NOTE — PROGRESS NOTES
Oncology/Hematology Follow Up Note:    Assessment and Plan:    #1 Respiratory failure, due to probable pneumonia and possible volume overload.     - Intubated and mechanically ventilated  - Echo shows no new cardiomyopathy or EF decline.    PLAN:  - PER ICU Intensivists/Hospitalists  - Continue to treat with broad spectrum antibiotics for presumed pneumonia  - Awaiting BAL results  - Continue diuresis per intensivist/hospitalist     #2 Hepatotoxicity from alcohol abuse and sepsis, in the setting of chemotherapy      - Baseline LFTs before starting chemotherapy (bilirubin normal- 1.4, normal ALT- 51, elevated AST likely due to chronic alcohol abuse, and chronically elevated Alk Phos due to long standing bone mets)  - ALT, AST, and alk phos relatively stable since admission, bilirubin down slightly  - hepatotoxicity from docetaxel is usually self limited.     PLAN:  - Continue to provide support care  - Monitor LFTs daily  - If the patient recovers from this, would use paclitaxel instead of docetaxel with the next cycle, or omit taxanes altogether.    #3 Metastatic breast cancer, ER weakly positive (5%), KS negative, HER2 positive;     - Has sternal metastases and possible metastasis in left 5th rib  - Prior bone mets alone the spine were no longer FDG avid on PET/CT from 7/23  - Has had a good clinical response so far with significant shrinkage of the sternal metastases over the past 2 weeks.  - If the patient recovers from the current hospital stay, would consider switching from docetaxel to paclitaxel given lower risk of hepatotoxicity, and continue with Herceptin/Perjeta vs. Herceptin or Perjeta alone.      #4  Pancytopenia, chemotherapy-induced; Also suspect myelotoxicity due to alcohol abuse.  -  follow CBC; Improving  -  neupogen discontinued on 9/26.  - Transfuse as needed to keep Hgb >7.0.  In the setting of respiratory failure, would to reasonable to target a higher Hgb goal.      #5 Code status  #6 Goals of  care  - The patient has had a long history of Stage IV HER2 positive metastatic breast cancer (close to 20 years), when most people with this type of cancer only live 3-4 years.  She currently has very limited disease, which has actually responded to the new treatment she received.  We hope she can turn the corner from current ICU issues. If her condition declines certainly appropriate to have a group discussion on goals of care.     Discussed with the Dr. Rodriguez, Dr. Henry, the patient's bedside RN, and the patient's /daughter.    Richard Braun M.D.  [facility]  [phone]      Subjective:    Remains intubated and mechanically ventilated with FiO2 of 65% and PEEP of 20.  On two pressors.  Still febrile with Tmax of 101.3.       Scheduled Medications:  Reviewed active medications    Labs:  CBC RESULTS:   Recent Labs   Lab Test  10/01/18   0620 09/30/18   0450  09/29/18   0525   WBC  27.0*  20.9*  13.2*   HGB  8.4*  7.3*  7.8*   HCT  27.5*  24.4*  24.4*   MCV  98  99  95   PLT  171  142*  103*       CMP  Recent Labs  Lab 10/01/18  0620 09/30/18  0450 09/29/18  1356 09/29/18  0525 09/28/18  1520 09/28/18  1122 09/28/18  0620  09/26/18  0636  09/25/18  1640  09/25/18  0514   * 148*  --  148*  --   --  145*  < > 139  --   --   --  137   POTASSIUM 4.5 3.7 3.7 3.2* 3.5  --  3.1*  < > 3.6  --   --   < > 3.0*   CHLORIDE 112* 113*  --  112*  --   --  109  < > 105  --   --   --  102   CO2 31 29  --  30  --   --  30  < > 27  --   --   --  28   ANIONGAP 2* 6  --  6  --   --  6  < > 7  --   --   --  7   * 128*  --  125*  --   --  139*  < > 142*  --   --   --  124*   BUN 28 26  --  26  --   --  22  < > 19  --   --   --  17   CR 0.36* 0.41*  --  0.42*  --   --  0.34*  < > 0.34*  --   --   --  0.35*   GFRESTIMATED >90 >90  --  >90  --   --  >90  < > >90  --   --   --  >90   GFRESTBLACK >90 >90  --  >90  --   --  >90  < > >90  --   --   --  >90   REAGAN 7.7* 7.9*  --  7.3*  --   --  7.4*  < > 7.5*  --   --  "  --  7.5*   MAG 2.0 1.9  --   --   --   --   --   --   --   --  2.6*  --  1.5*   PHOS 3.2 1.5*  --  2.6 2.2*  --  1.8*  < >  --   < > 1.7*  --  1.3*   PROTTOTAL 4.9*  --   --   --   --  4.8*  --   --   --   --   --   --   --    ALBUMIN 1.7*  --   --   --   --  1.9*  --   --   --   --   --   --   --    BILITOTAL 8.4*  --   --   --   --  7.9*  --   --  7.6*  --   --   --  9.3*   ALKPHOS 688*  --   --   --   --  667*  --   --   --   --   --   --  409*   *  --   --   --   --  158*  --   --  97*  --   --   --  113*   ALT 62*  --   --   --   --  69*  --   --  57*  --   --   --   --    < > = values in this interval not displayed.    INR  Recent Labs  Lab 10/01/18  0620   INR 1.23*       Objective/Physical Exam:  Blood pressure 125/62, pulse 123, temperature 99.8  F (37.7  C), temperature source Oral, resp. rate 20, height 1.676 m (5' 6\"), weight 70.3 kg (154 lb 15.7 oz), SpO2 93 %, not currently breastfeeding.  General intubated and sedated  Skin:  Jaundiced  Eyes:  Scleral icterus noted.  Chest:  Previously noted sternal mets have decreased significantly in size over the past 2 weeks.  Abdomen:  Distended.  Ext: 1+ pitting edema noted in bilateral LEs.    Richard Braun MD  [facility]  10/1/2018 12:57 PM        "

## 2018-10-01 NOTE — PROGRESS NOTES
[facility] Nurse Inpatient Wound Assessment     Assessment of wound(s) on pt's:   Sacrum/gluteal cleft        Data:   Patient History:      per MD note(s): Metastatic breast cancer  Acute hypoxic respiratory failure  Alcohol abuse/dependence  Hepatitis, chronic cause unclear     Moisture Management:  Flexi-Seal and Urinary Catheter    Catheter secured? Yes    Current Diet / Nutrition:           Active Diet Order      NPO for Medical/Clinical Reasons Except for: Meds, Ice Chips             Ori Assessment and sub scores:   Ori Score  Av.6  Min: 10  Max: 19     Mattress:  Standard , Low air loss mattress with pulsation     Labs:         Recent Labs   Lab Test  10/01/18   0620   10/14/15   0940   ALBUMIN  1.7*   < >   --    HGB  8.4*   < >  8.7*   RBC  2.82*   < >  2.96*   WBC  27.0*   < >  4.8   PLT  171   < >  70*   INR  1.23*   < >   --    A1C   --    --   5.1   CRP  128.0*   --    --     < > = values in this interval not displayed.          Wound Assessment (location #1):   Sacrum/gluteal cleft  Wound History:  Nursing reports that patient had significant loose stools with impaired liver function and clotting    Specific Dimensions (length x width x depth, in cm):   1.5x1 cm no depth    Tunneling:  N/A      Undermining: N/A    Wound Base: moist epidermis    Palpation of the wound bed:  normal    Slough appearance:  none         Eschar appearance:  none    Periwound Skin: irritant dermatitis,      Color: red    Temperature  normal     Drainage:  Small bloody      Odor: none    Pain:  unable to assess          Intervention:     Patient's chart evaluated.      Wound(s) was assessed    Wound Care: was not done:      Orders  Reviewed    Supplies  reviewed    Discussed plan of care with Nurse          Assessment:       Moisture associated dermatitis from history of  loose, frequent         Plan:     Nursing to notify the Provider(s) and re-consult the [facility] Nurse if wound(s) deteriorate(s) or  if the wound care plan needs reevaluation.    Plan of care for wound located on 2 times per day and prn to sacrum/gluteal cleft:     Cleanse with skin cleanser    Triad paste in thick layer to affected areas    WOC Nurse will return: 2 x per week and prn

## 2018-10-02 NOTE — PROGRESS NOTES
[facility]    Hospitalist Progress Note  Name: Eyal Turner    MRN: [id number]  Provider:  Lazaro Rodriguez MD  Date of Service: 10/02/2018    Summary of Stay: Eyal Turner is a 58 year old female with metastatic breast cancer with bone metastases to sternum and rib who started a new chemo regimen (that included switch from Kadcyla to Docetaxel, Herceptin and Perjeta) 5 days prior to admission (about 9/17/18).  In addition, prior medical history is notable for chronic alcohol abuse complicated by malnutrition and HTN who presented on 9/22/2018 with fatigue and shortness of breath.      Initially, Ms. Turner was hypotensive, though that was responsive to IV fluids in the ED. Her labs were notable for significantly elevated LFTs (above her baseline abnormals) with bilirubin of 8, alk phos about 600 and elevated transaminases.  Also was found to be pancytopenic with neutropenia.  Hemoglobin was less than 7 she received 1 unit RBC transfusion in the ED.     After admission she was found to have fever to 101 for which infectrious wrk up was started and chest x-ray showed bilateral infiltrate.  Cefepime was presumptively started for neutropenic fever and azithromycin added subsequently.  A CT abdomen pelvis was obtained that did not show any ascites or obstructive liver disease.    She also developed evidence for acute alcohol withdrawal initially with significant tremor that improved after a few doses of Ativan.  GI was consulted for possible upper GI bleed and for her liver failure.  No EGD planned.  Did start empiric Protonix on admission, but no evidence for ongoing blood loss.    Oncology also was consulted and recommended supportive cares.      Ms. Turner was transferred to ICU on 9/23/2018 for worsening hypoxemia and was intubated 9/24/2018.  Blood culture grew lactobacillus from port.  ID was consulted and the patient was switched to vancomycin and Zosyn.  Micafungin was also added for oral  thrush.  She was also started on Neupogen for neutropenia with good response.    She has shown no improvement over the past several days and even showing gradual deterioration in mental and respiratory status.  On 9/29, she developed fever to 102 and now has leukocytosis of 27K (though the rise in white cell count may be partly due to exposure to Neupogen).      Again consulting with Dr. Braun this morning, will give a trial of methylprednisolone 250 mg IV twice daily.  It is considered most likely that the patient does not have a bacterial infection and will also start tapering antibiotics.     Problem List/Assessment and Plan:   Acute hypoxic respiratory failure, recalcitrant: Initially thought due to severe sepsis and pneumonia.  Possible component of vascular congestion also considered.  Remains stable on the ventilator.    -Pulmonary toxicity related to the patient's recent chemotherapy is considered as it seems likely that we have de facto eliminated other causes of her respiratory failure.  After discussion with Dr. Braun will give a trial of moderately high dose steroids and attempt to diuresis as well as possible.  -repeat trial of lasix with albumin flushes q 8 hours  -solumedrol 250 mg IV q12 hours.     Septic shock and neutropenic fever: Some evidence of pneumonia with possible esophagitis and does have oral candidiasis.  One blood culture positive for lactobacillus which is considered contaminant.  Infectious disease following.     -will stop Vanco; cont Zosyn and Micafungin.  -CT of chest, abdomen, and pelvis on 9/30 shows extensive airspace infiltrates throughout both lungs.  -Repeated blood, urine, and sputum cultures.     Advanced liver disease with suspected liver failure.  Abnormal LFTs, thought to be multifactorial: Presented with jaundice an bilirubin was up to 9.8 up from 2.4 three months ago.  Alkaline phosphatase was elevated to 594, AST elevated 264, ALT elevated at 94.  Does drink 2 bottles  of wine daily so possible acute alcoholic hepatitis.  Also additionally started on chemotherapy with docetaxel, trastuzumab, and pertuzumab so possibility of drug toxicity although per oncology with this regimen there is not typically significant liver toxicity.  Albumin is low.  Given her alcohol abuse suspect alcohol hepatitis playing a role here.   -Gastroenterology consulted and following peripherally.  -Largely unremarkable right upper quadrant ultrasound.    Empiric trial of steroids/hyperglycemia:  -initiated 10/1, now with increase in dose to 250 mg q 12 hours with plan to continue for 3 days's trial.   -need to monitor and correct hyperglycemia induced by steroids. Check sugars q 4 hours and cover with ISS. Likely to need BID dosing NPH.      Alcohol dependence with acute withdrawal: Has been drinking 2 bottles of wine daily for years, per family report.  Last use 1-2 days prior to admission.  Presented in alcohol withdrawal with tremor.  -CIWA with Ativan as needed, but not requiring any doses at this time.  -Continue thiamine and folate.  -Now on Precedex and propofol.      Acute on chronic pancytopenia with neutropenia: On admission WBC 0.5 with ANC 0.4, hemoglobin 6.9, and platelets 63.  Hemoglobin was 11.4 three months ago and platelets were 108.  Suspect acute drop is from her recent chemotherapy.  Also possible upper GI bleed as below contributing.  -Hemoglobin stable following transfusion support but gradually drifting down and give 1 more unit of PRBCs today.  -Daily CBC.  -Hematology following.  -Neutropenia resolved following Neupogen which has now been discontinued.      Metastatic breast cancer: Initially diagnosed in 2000 s/p bilateral mastectomy.  Has undergone multiple rounds of chemotherapy and radiation.  Has known metastatic disease to her sternum and third left rib.  Had recent possible skin infection from the sternum met/mass and received Keflex.  Does not look actively infected at this  time.  Has port in place.  Restarted on chemotherapy with docetaxel, trastuzumab, and pertuzumab on 9/17/18.  Follows with Dr. Braun of oncology.  Doing quite poorly following chemotherapy with poor oral intake, weakness, and now acute liver injury.  -[facility] consulted, appreciate recommendations.  -No known metastases except to bone which probably accounts for the chronically elevated alkaline phosphatase    Mucositis versus thrush and possible esophagitis: Patient erosive changes and plaque on the tongue and her soft palate.  Question whether this is mucositis from her docetaxel or Candida infection.  Sore throat for the week PTA possible esophagitis as well.  -GI not planning EGD to investigate for esophagitis.  -Continue micafungin per ID.     Possible UGIB: Reports of vomiting with red blood in it.  Denies any melena or hematochezia.  Anemia and thrombocytopenia could in part be chemotherapy related.  Has required periodic transfusions.  -Twice daily Protonix.  -GI not planning an EGD .      Hypokalemia and hypomagnesemia:   -Potassium and magnesium replacement protocol.      Severe protein calorie malnutrition: Evidence for fat loss on exam likely related to chronic alcohol use, metastatic breast cancer, and recent chemotherapy.  Clearly has severe malnutrition, though per Dr. Braun, this appears chronic.  -Dietitian consult.  -Feeding tube placed and continuing tube feeds.    Diarrhea: repeated C. difficile testing negative.  Rectal tube now in place as neutropenia resolved.      DVT Prophylaxis: Pneumatic Compression Devices  Code Status: Full Code  Disposition: To be determined    Family updated today: I met with the patient's mother at the bedside today who indicated that there is a lot of friction with the patient's  and the rest of the family.  I indicated to her that our palliative care group would be reaching out to family members in order to see if we could come to a consensus about her  care going forward.  There is great concern that the patient is at a crossroads at this time, and may not have a favorable outcome. It is essential to start to talk about goals of care.     Interval History   Intubated and sedated.  Stable night.    -Data reviewed today: I personally reviewed all new labs and imaging results over the last 24 hours.     Physical Exam   Temp: 97  F (36.1  C) Temp src: Axillary BP: (!) 84/41   Heart Rate: 74 Resp: 19 SpO2: 98 % O2 Device: Mechanical Ventilator    Vitals:    09/30/18 0545 10/01/18 0508 10/02/18 0500   Weight: 69.3 kg (152 lb 12.5 oz) 70.3 kg (154 lb 15.7 oz) 74.9 kg (165 lb 2 oz)     Vital Signs with Ranges  Temp:  [97  F (36.1  C)-99.7  F (37.6  C)] 97  F (36.1  C)  Heart Rate:  [65-98] 74  Resp:  [18-34] 19  BP: ()/(41-81) 84/41  MAP:  [56 mmHg-95 mmHg] 74 mmHg  Arterial Line BP: ()/(40-70) 102/55  FiO2 (%):  [75 %-80 %] 80 %  SpO2:  [86 %-98 %] 98 %  I/O last 3 completed shifts:  In: 3556.51 [I.V.:2056.51; NG/GT:1220]  Out: 2205 [Urine:1405; Stool:800]    GENERAL: Intubated and sedated.  Chronically ill with bi-temporal wasting.  HEENT: Normocephalic, atraumatic. Extraocular movements intact.  Icteric.  CARDIOVASCULAR: Regular rate and rhythm without murmurs or rubs. No S3.  PULMONARY: Clear bilaterally.  GASTROINTESTINAL: Soft, moderately distended. Bowel sounds normoactive. No appreciable tenderness, rebound, guarding.  EXTREMITIES: No cyanosis or clubbing. No edema.  NEUROLOGICAL: CN 2-12 grossly intact, no focal neurological deficits.  DERMATOLOGICAL: No rash, ulcer, but mild jaundice.  Significant diffuse bruising.    Medications     dexmedetomidine 0.7 mcg/kg/hr (10/02/18 1609)     IV fluid REPLACEMENT ONLY       HYDROmorphone 0.4 mg/hr (10/02/18 1600)     norepinephrine 0.03 mcg/kg/min (10/02/18 1600)     propofol (DIPRIVAN) infusion 30 mcg/kg/min (10/02/18 1600)     sodium chloride 20 mL/hr at 10/01/18 1900     vasopressin (PITRESSIN) infusion  ADULT (40 mL) 2.4 Units/hr (10/02/18 1600)       albumin human  12.5 g Intravenous Q8H     bacitracin   Topical BID     folic acid  1 mg Oral or Feeding Tube Daily     furosemide  40 mg Intravenous Q8H     heparin  5 mL Intracatheter Q28 Days     heparin lock flush  5-10 mL Intracatheter Q24H     insulin aspart  1-6 Units Subcutaneous Q4H     lidocaine visc 2% & maalox/mylanta w/simethicone & diphenhydramine  10 mL Swish & Swallow Q6H     methylPREDNISolone  250 mg Intravenous Q12H     micafungin  100 mg Intravenous Q24H     multivitamins with minerals  15 mL Per Feeding Tube Daily     pantoprazole  40 mg Per Feeding Tube BID     piperacillin-tazobactam  3.375 g Intravenous Q6H     protein modular  2 packet Per Feeding Tube BID w/meals     QUEtiapine  25 mg Oral or Feeding Tube BID     Data     Laboratory:    Recent Labs  Lab 10/02/18  0538 10/01/18  0620 09/30/18  0450   WBC 27.4* 27.0* 20.9*   HGB 8.9* 8.4* 7.3*   HCT 28.9* 27.5* 24.4*   MCV 98 98 99    171 142*       Recent Labs  Lab 10/02/18  0538 10/01/18  0620 09/30/18  0450    145* 148*   POTASSIUM 4.6 4.5 3.7   CHLORIDE 109 112* 113*   CO2 33* 31 29   ANIONGAP 2* 2* 6   * 156* 128*   BUN 40* 28 26   CR 0.40* 0.36* 0.41*   GFRESTIMATED >90 >90 >90   GFRESTBLACK >90 >90 >90   REAGAN 8.0* 7.7* 7.9*       Recent Labs  Lab 09/30/18  1210 09/30/18  0335 09/30/18  0130 09/30/18  0111 09/29/18  0943 09/29/18  0911 09/28/18  1211   CULT No growth after 2 days  Presumptive identification:Candida albicans / dubliniensisisolatedCandida albicans and Treva dubliniensis are not routinely speciated*  No growth  Culture negative monitoring continues  Canceled, Test credited  Test reordered as correct code Light growthCandida albicans / dubliniensisCandida albicans and Treva dubliniensis are not routinely speciatedSusceptibility testing not routinely done* No growth after 2 days  No growth No growth after 2 days No growth after 3 days No growth  after 3 days No growth       Imaging:  No results found for this or any previous visit (from the past 24 hour(s)).

## 2018-10-02 NOTE — PLAN OF CARE
"Problem: Neutropenia (Adult)  Goal: Signs and Symptoms of Listed Potential Problems Will be Absent, Minimized or Managed (Neutropenia)  Signs and symptoms of listed potential problems will be absent, minimized or managed by discharge/transition of care (reference Neutropenia (Adult) CPG).   Outcome: No Change  ICU End of Shift Summary.  For vital signs and complete assessments, please see documentation flow sheets.     Pertinent assessments: pt is sedated, levo has been weaned to 0.04 mcg/kg/min, lungs are dim, uo is good, urine is dk alex/brown in color, con't in SR, con't to put out liquid stool in rectal tube, bilirubin total is up to 8.8, wbc con't at 27, had a long talk with pt , boni has a strong fiordaliza and believes that God is going to pull his wife out of this situation, different areas of the pt situation were discussed and how sick she is, we discussed the possibility of a trach and if she would like that, he said,\" she wouldn't like being in a long term care center,\", \" I guess she is really sick, maybe she won't make it. \"  Major Shift Events: none  Plan (Upcoming Events): con't to monitor, con't with education for .  Discharge/Transfer Needs: TBD    Bedside Shift Report Completed : y  Bedside Safety Check Completed: y            "

## 2018-10-02 NOTE — PLAN OF CARE
Problem: Pneumonia (Adult)  Goal: Signs and Symptoms of Listed Potential Problems Will be Absent, Minimized or Managed (Pneumonia)  Signs and symptoms of listed potential problems will be absent, minimized or managed by discharge/transition of care (reference Pneumonia (Adult) CPG).  Outcome: No Change  No ventilator weaning or sedation vacation today per Dr. Henry d/t pt's increasing FiO2 requirements and high peep. Pt is a bit more responsive to painful stimuli today and opens her eyes to pain as well as withdraws extremities but continues to overbreath ventilator at approximately 26-28 bpm and use accessory muscles. Continue full vent support with added steroids, and IV lasix today to optimize respiratory stability. See flowsheet and MAR for additional assessment information.

## 2018-10-02 NOTE — PROGRESS NOTES
"[facility] ICU RESPIRATORY NOTE  Date of Admission: 09/22/2018  Date of Intubation (most recent): 09/24/2018  Reason for Mechanical Ventilation: Fatigue and SOB  Number of Days on Mechanical Ventilation: 9  Met Criteria for Pressure Support Trial: No  Length of Pressure Support Trial:  Reason for Stopping Pressure Support Trial:   Reason for No Pressure Support Trial: Current respiratory needs and increase PEEP and FIO2  Significant Events Today: None  ABG Results: 7.34/57/83/31 10/1/2018  ETT appearance on chest x-ray: level of the clavicular heads  Plan:  Continue to monitor and assess the pt's respiratory needs, in hopes of liberating her from the ventilator.    Ventilation Mode: CMV/AC  (Continuous Mandatory Ventilation/ Assist Control)  FiO2 (%): 80 %  Rate Set (breaths/minute): 20 breaths/min  Tidal Volume Set (mL): 450 mL  PEEP (cm H2O): 10 cmH2O  Pressure Support (cm H2O): 12 cmH2O  Oxygen Concentration (%): 80 %  Resp: 19    Vital signs:  Temp: 97  F (36.1  C) Temp src: Axillary BP: (!) 84/41   Heart Rate: 74 Resp: 19 SpO2: 98 % O2 Device: Mechanical Ventilator Oxygen Delivery: 15 LPM Height: 167.6 cm (5' 6\") Weight: 74.9 kg (165 lb 2 oz)  Estimated body mass index is 26.65 kg/(m^2) as calculated from the following:    Height as of this encounter: 1.676 m (5' 6\").    Weight as of this encounter: 74.9 kg (165 lb 2 oz).      Recent Labs  Lab 10/01/18  1051 09/30/18  1300 09/30/18  0220 09/29/18  0525  09/28/18  1042   PH 7.34* 7.36  --   --   --  7.48*   PCO2 57* 51*  --   --   --  38   PO2 83 77*  --   --   --  79*   HCO3 31* 29*  --   --   --  28   O2PER 75% 65% 70% 60%  < > 60%   < > = values in this interval not displayed.    PAST MEDICAL HISTORY:   Past Medical History:   Diagnosis Date     Anemia      Breast cancer metastasized to bone (H) 2005     sees Dr. Zach Romo/Dr. Toño REED q3mos - herceptin, zometa      ETOH abuse      History of blood transfusion      Hypertension     lisinopril 5mg = dizziness  "     Invasive ductal carcinoma of breast (H) 2000    recurred 3x since.      Martha-Stafford tear 10/12/2015    with hematemesis - hospitalized     S/P mastectomy, bilateral 2000       PAST SURGICAL HISTORY:   Past Surgical History:   Procedure Laterality Date     APPENDECTOMY  1999 - age 39      ESOPHAGOSCOPY, GASTROSCOPY, DUODENOSCOPY (EGD), COMBINED N/A 10/13/2015    Procedure: COMBINED ESOPHAGOSCOPY, GASTROSCOPY, DUODENOSCOPY (EGD);  Surgeon: Rashad Rossi MD;  Location: [facility]     EXCISE MASS TRUNK Right 8/9/2016    Procedure: EXCISE MASS TRUNK;  Surgeon: Danie Forrester MD;  Location: [facility] OR     HYSTERECTOMY, NORIS  2002    with BSO      MASTECTOMY MODIFIED RADICAL BILATERAL  2000     REMOVE CATHETER VASCULAR ACCESS Right 8/9/2016    Procedure: REMOVE CATHETER VASCULAR ACCESS;  Surgeon: Danie Forrester MD;  Location: [facility] OR       FAMILY HISTORY:   Family History   Problem Relation Age of Onset     Cancer Mother      hx of lung cancer - survived      Hypertension Mother      Circulatory Mother      s/p valve replacement      Diabetes Father      prediabetes      Prostate Cancer Father      Psychotic Disorder Daughter      ? adhd - maternal cousin with same        SOCIAL HISTORY:   Social History   Substance Use Topics     Smoking status: Former Smoker     Packs/day: 0.50     Years: 15.00     Types: Cigarettes     Quit date: 3/18/1990     Smokeless tobacco: Never Used     Alcohol use 3.5 oz/week     7 Glasses of wine per week      Comment: drinks probably  a glass of wine a night      Clint Camargo RT  10/2/2018

## 2018-10-02 NOTE — PLAN OF CARE
Problem: Patient Care Overview  Goal: Plan of Care/Patient Progress Review  Outcome: No Change  ICU End of Shift Summary.  For vital signs and complete assessments, please see documentation flowsheets.     Pertinent assessments: Sedated, minimally responsive only to pain, opens eyes to stimuli. Lungs coarse with occasional wheezing. Vent full support. ET secretions thick and tan, oral secretions minimal. Belly still distended. TF increasing to goal. Rectal tube and stools continue, thickening up a bit. UOP adequate after lasix. Moderate edema throughout. Multiple skin issues.  Major Shift Events: IV lasix and higher dose steroids today. Albumin added as well. Pressors continue, no vent or sedation weaning. FIO2 remain at 80% peep 10.   Plan (Upcoming Events): pressor and sedation wean when able. IV lasix, steroids, Abx. Palliative following. Give few more days with current treatments and discuss trach this week if pt not improved.  Discharge/Transfer Needs: LTACH    Bedside Shift Report Completed   Bedside Safety Check Completed

## 2018-10-02 NOTE — PROGRESS NOTES
RT end of shift note:      Patient remains on mechanical ventilator support.  Settings: CMV/AC rate of 20, tidal volume 450, peep of 10 and 80% FIO2. Saturations has been in the low 90's on these settings. Tolerating mechanical ventilator well. Breath sounds are coarse slightly diminished pre and post treatment. Suctioned scant amount of cloudy thin secretions.     Will continue to follow and monitor.      Lori Santos, RRT

## 2018-10-02 NOTE — PROGRESS NOTES
Oncology/Hematology Follow Up Note:    Assessment and Plan:  #1 Respiratory failure, due to probable pneumonia and volume overload, cannot rule out lung toxicity from chemotherapy (rare)     - Intubated and mechanically ventilated  - Echo shows no new cardiomyopathy or EF decline.  - BAL studies negative thus far.     PLAN:  - Continue to treat with broad spectrum antibiotics for presumed pneumonia.  ID on board  - Would aggressively diurese today, if BP is high enough to support it  - Would also give 500-1000 mg of Solumedrol today and tomorrow, for possible drug-induced pneumonitis.      #2 Hepatotoxicity from alcohol abuse and sepsis, in the setting of chemotherapy      - Baseline LFTs before starting chemotherapy (bilirubin normal- 1.4, normal ALT- 51, elevated AST likely due to chronic alcohol abuse, and chronically elevated Alk Phos due to long standing bone mets)  - ALT, AST, alk phos, and bilirubin down slightly since admission.  - hepatotoxicity from docetaxel is rare and usually self limited.      PLAN:  - Continue to provide support care  - Monitor LFTs daily  - If the patient recovers from this, would use paclitaxel instead of docetaxel with the next cycle, or omit taxanes altogether.     #3 Metastatic breast cancer, ER weakly positive (5%), KY negative, HER2 positive;      - Has sternal metastases and possible metastasis in left 5th rib  - Prior bone mets alone the spine were no longer FDG avid on PET/CT from 7/23  - Has had a good clinical response so far with significant shrinkage of the sternal metastases over the past 2 weeks.  - If the patient recovers from the current hospital stay, would consider switching from docetaxel to paclitaxel given lower risk of hepatotoxicity, and continue with Herceptin/Perjeta vs. Herceptin or Perjeta alone.      #4  Pancytopenia, chemotherapy-induced; Also suspect myelotoxicity due to alcohol abuse.  -  follow CBC; Improving  -  neupogen discontinued on 9/26.  -  Transfuse as needed to keep Hgb >7.0.  In the setting of respiratory failure, would be reasonable to target a higher Hgb goal.      #5 Code status  #6 Goals of care  - The patient has had a long history of Stage IV HER2 positive metastatic breast cancer (close to 20 years), when most people with this type of cancer only live 3-4 years.  She currently has very limited disease, which has actually responded to the new treatment she received.  We hope she can turn the corner from current ICU issues.     We will try to aggressively diurese her and give her high dose steroids for the next 2-3 days.  Broad spectrum antibiotics should also be continued.  If her respiratory status fails to improve, then would have a discussion on goals of care with the patient's family.  I am available for a family meeting after 4 pm Wed-Fri this week.     Discussed with the Dr. Rodriguez and  Dr. Elaine Braun M.D.  [facility]  [phone]      Subjective:    Remains intubated, but pressor support has decreased.  No longer febrile.  No new issues overnight.    Scheduled Medications:  Reviewed active medications    Labs:  CBC RESULTS:   Recent Labs   Lab Test  10/02/18   0538  10/01/18   0620  09/30/18   0450   WBC  27.4*  27.0*  20.9*   HGB  8.9*  8.4*  7.3*   HCT  28.9*  27.5*  24.4*   MCV  98  98  99   PLT  209  171  142*       CMP  Recent Labs  Lab 10/02/18  0538 10/01/18  0620 09/30/18  0450 09/29/18  1356 09/29/18  0525 09/28/18  1520 09/28/18  1122  09/26/18  0636  09/25/18  1640    145* 148*  --  148*  --   --   < > 139  --   --    POTASSIUM 4.6 4.5 3.7 3.7 3.2* 3.5  --   < > 3.6  --   --    CHLORIDE 109 112* 113*  --  112*  --   --   < > 105  --   --    CO2 33* 31 29  --  30  --   --   < > 27  --   --    ANIONGAP 2* 2* 6  --  6  --   --   < > 7  --   --    * 156* 128*  --  125*  --   --   < > 142*  --   --    BUN 40* 28 26  --  26  --   --   < > 19  --   --    CR 0.40* 0.36* 0.41*  --  0.42*  --   --   <  "> 0.34*  --   --    GFRESTIMATED >90 >90 >90  --  >90  --   --   < > >90  --   --    GFRESTBLACK >90 >90 >90  --  >90  --   --   < > >90  --   --    REAGAN 8.0* 7.7* 7.9*  --  7.3*  --   --   < > 7.5*  --   --    MAG  --  2.0 1.9  --   --   --   --   --   --   --  2.6*   PHOS  --  3.2 1.5*  --  2.6 2.2*  --   < >  --   < > 1.7*   PROTTOTAL 5.1* 4.9*  --   --   --   --  4.8*  --   --   --   --    ALBUMIN 1.6* 1.7*  --   --   --   --  1.9*  --   --   --   --    BILITOTAL 8.8* 8.4*  --   --   --   --  7.9*  --  7.6*  --   --    ALKPHOS 624* 688*  --   --   --   --  667*  --   --   --   --    AST 89* 106*  --   --   --   --  158*  --  97*  --   --    ALT 59* 62*  --   --   --   --  69*  --  57*  --   --    < > = values in this interval not displayed.    INR  Recent Labs  Lab 10/01/18  0620   INR 1.23*       Objective/Physical Exam:  Blood pressure (!) 84/41, pulse 123, temperature 98.2  F (36.8  C), temperature source Oral, resp. rate 24, height 1.676 m (5' 6\"), weight 74.9 kg (165 lb 2 oz), SpO2 98 %, not currently breastfeeding.  General intubated and sedated  Skin:  Jaundiced  Eyes:  Scleral icterus noted.  Chest:  Previously noted sternal mets have decreased significantly in size over the past 2 weeks.  Lungs:  Coarse breath sounds bilaterally  Abdomen:  Distended.  Ext: 1+ pitting edema noted in bilateral LEs.    Richard Braun MD  [facility]  10/2/2018 1:03 PM        "

## 2018-10-02 NOTE — PROGRESS NOTES
NUTRITION BRIEF NOTE  See RD note 10/1 for full assessment details    New findings in last 24 hours:    Remains intubated. Due to concern for abdominal distension, pressor demands, EN dropped to trophic rate 10/2: Isosource 1.5 at 10 mL/hr, fluid flush 90 mL q2 hours. 2 packets Prosource BID for an additional 44 grams protein    Weight: 74.9 kg, up ~15 kg since admit    BM: 800 mL stool output in last 24 hours    Meds: Folic acid 1 mg, Certavite; IV lasix 40 mg TID q8 hours and albumin added 10/2; propofol gtt at 11.6 mL/hr + precedex + dilaudid; norepi now off, vasopressin   Recent Labs   Lab Test  10/02/18   0538  10/01/18   0620  09/30/18   0450  09/29/18   1356  09/29/18   0525   POTASSIUM  4.6  4.5  3.7  3.7  3.2*     Recent Labs   Lab Test  10/01/18   0620  09/30/18   0450  09/29/18   0525  09/28/18   1520  09/28/18   0620   PHOS  3.2  1.5*  2.6  2.2*  1.8*     Recent Labs   Lab Test  10/01/18   0620  09/30/18   0450  09/25/18   1640  09/25/18   0514  09/24/18   0920   MAG  2.0  1.9  2.6*  1.5*  1.8     Recent Labs   Lab Test  10/02/18   0538  10/01/18   0620  09/30/18   0450  09/29/18   0525  09/28/18   0620   NA  144  145*  148*  148*  145*     Recent Labs   Lab Test  10/02/18   0538  10/01/18   0620  09/30/18   0450  09/29/18   0525  09/28/18   0620   CR  0.40*  0.36*  0.41*  0.42*  0.34*       Recent Labs  Lab 10/02/18  0538 10/01/18  0620 09/30/18  0450 09/29/18  0525 09/28/18  0620 09/27/18  0645 09/26/18  0636   * 156* 128* 125* 139* 123* 142*     Lab Results   Component Value Date    A1C 5.1 10/14/2015    A1C 5.2 10/13/2015         Assessed Nutrition Needs (PER APPROVED PRACTICE GUIDELINES, Dosing weight: 60 kg):  Estimated Energy Needs: 5991-4095+ kcals (25-30 Kcal/Kg)  Justification: maintenance and vented  Estimated Protein Needs:  grams protein (1.5-2 g pro/Kg)  Justification: hypercatabolism with critical illness  Estimated Fluid Needs: >1 mL/Kcal  Justification:  maintenance      Interventions:  Collaboration and Referral of care: Discussed patient during interdisciplinary care rounds this morning --> ok to increase EN rate:    Type of Feeding Tube: ND (10 fr, federico, 9/24)    Enteral Frequency:  Continuous    Enteral Regimen: Isosource at 45 mL/hr    Total Enteral Provisions: 1080 mL provides 1620 kcal, 73 gm protein, 190 g CHO, 15 g fiber and 821 mL H20.    Increase by 10 mL q6 hours if tolerating    Meets >100% of DRI's --> continue micronutrients per CIWA protocol.    Continue protein modular as ordered.    Free Water Flush: 60 mL q4 hours    Please page/consult as needed.      Tiffanie Cronin, BENJAMIN, LD, CNSC  Pager - 3rd floor/ICU: [phone]  Pager - All other floors: [phone]  Pager - Weekend/holiday: [phone]  Office: [phone]

## 2018-10-02 NOTE — PROGRESS NOTES
Critical Care Progress Note      10/02/2018    Name: Eyal Turner MRN#: [id number]   Age: 58 year old YOB: 1960     Hsptl Day# 10  ICU DAY # 8    MV DAY # 8             Problem List:   Metastatic breast cancer  Acute hypoxic respiratory failure, possible drug-induced pneumonitis  Alcohol abuse/dependence  Hepatitis, chronic cause unclear         Summary/Hospital Course:     59 yo female with metastatic breast CA admitted to the ICU. I have personally reviewed the daily labs, imaging studies, cultures and discussed the case with referring physician and consulting physicians.     My assessment and plan by system for this patient is as follows:    Neurology/Psychiatry:   1. ETOH Use  2. Analgesia    Plan  -Will start precedex for sedation goal RASS of negative 2  -Monitor for signs of ETOH withdrawal  - PRN Fentanyl for pain given metastatic lesions  -Continue CIWA  -Head CT ruled out any acute pathology.    Cardiovascular:   1.Hemodynamics -hypotensive; echo pretty normal 9/29  2.Rhythm -  Tachy, sinus    Plan  -Remains hypotensive requiring norepinephrine, vasopressin.  Of concern this is either sepsis or possibly drug-induced lung injury.  An echo obtained this hospitalization showed normal ejection fraction.    Pulmonary/Ventilator Management:   1. Airway intubated  2. Diffuse ground glass opacity on CT, bronchoscopy with washings on 9/30   > 25 pms, mixed organisms on Gram  Plan  Increase steroids for drug-induced lung injury, 250 mg twice daily methylprednisolone    GI and Nutrition :   1. Concern for protein calorie malnutrition  2.  New abdominal distention resolved  3.  Ultrasound of gallbladder reviewed no evidence of biliary obstruction was demonstrated on this study    Plan  -small bore feeding tube placed continue on tubefeeds  -    Renal/Fluids/Electrolytes:   1. Cr 0.42  2. Hypokalemia,   3.  Chronic respiratory acidosis  4.  Patient may be third spacing fluids but remains  intravascularly deplete    Plan  -Administer albumin prior to Lasix for improved diuresis  - adjust medications as needed for renal clearance  - follow I/O's   -     Infectious Disease:   1. Concern for PNA again  2. Gram positive in blood (doubt line infection)  3.  Plan  Appreciate ID consult.  We will continue the patient on current antibiotics.  The patient was recultured 9/30 no growth.    Endocrine:     1. Concern of stress induced hyperglycemia    Plan  - ICU insulin protocol, goal sugar <180      Hematology/Oncology:     1.chronic anemia secondary to CA, no signs, symptoms of active blood loss    Plan  -Given increasing norepinephrine requirements we will transfuse the patient 1 unit of packed red blood cells per      IV/Access:   1. Venous access -  port  2. Arterial access -  none  3.  Plan  - central access required and necessary      ICU Prophylaxis:   1. DVT: mechanical  2. VAP: HOB 30 degrees, chlorhexidine rinse  3. Stress Ulcer: PPI  4. Restraints: Nonviolent soft two point restraints required and necessary for patient safety and continued cares and good effect as patient continues to pull at necessary lines, tubes despite education and distraction. Will readdress daily.   5. Wound care - per unit routine   6. Feeding -continue tube feeds  7. Family Update:       Key goals for next 24 hours:   1.   Continue supportive care  2.  Initiate steroids for possible drug-induced lung injury           Interim History:   No change in clinical condition today.  She did have a palliative care consult yesterday.  I spoke with Dr. Braun this morning, he will be participating in any family meeting to discuss goals of care.         Key Medications:       albumin human  12.5 g Intravenous Q8H     bacitracin   Topical BID     folic acid  1 mg Oral or Feeding Tube Daily     furosemide  40 mg Intravenous Q8H     heparin  5 mL Intracatheter Q28 Days     heparin lock flush  5-10 mL Intracatheter Q24H     lidocaine visc 2%  & maalox/mylanta w/simethicone & diphenhydramine  10 mL Swish & Swallow Q6H     methylPREDNISolone  250 mg Intravenous Q12H     micafungin  100 mg Intravenous Q24H     multivitamins with minerals  15 mL Per Feeding Tube Daily     pantoprazole  40 mg Per Feeding Tube BID     piperacillin-tazobactam  3.375 g Intravenous Q6H     protein modular  2 packet Per Feeding Tube BID w/meals     QUEtiapine  25 mg Oral or Feeding Tube BID     vancomycin (VANCOCIN) IV  1,250 mg Intravenous Q8H       dexmedetomidine 0.7 mcg/kg/hr (10/02/18 1400)     IV fluid REPLACEMENT ONLY       HYDROmorphone 0.4 mg/hr (10/02/18 1400)     norepinephrine 0.03 mcg/kg/min (10/02/18 1445)     propofol (DIPRIVAN) infusion 30 mcg/kg/min (10/02/18 1525)     sodium chloride 20 mL/hr at 10/01/18 1900     vasopressin (PITRESSIN) infusion ADULT (40 mL) 2.4 Units/hr (10/02/18 1400)               Physical Examination:   Temp:  [98  F (36.7  C)-99.7  F (37.6  C)] 98.2  F (36.8  C)  Heart Rate:  [] 78  Resp:  [18-34] 22  BP: ()/(41-81) 84/41  MAP:  [56 mmHg-95 mmHg] 79 mmHg  Arterial Line BP: ()/(40-70) 107/59  FiO2 (%):  [75 %-80 %] 80 %  SpO2:  [86 %-98 %] 96 %    Intake/Output Summary (Last 24 hours) at 10/02/18 1536  Last data filed at 10/02/18 1400   Gross per 24 hour   Intake          3356.51 ml   Output             2205 ml   Net          1151.51 ml         Wt Readings from Last 4 Encounters:   10/02/18 74.9 kg (165 lb 2 oz)   10/23/17 57.6 kg (127 lb)   08/09/16 57.6 kg (127 lb)   08/01/16 57.6 kg (127 lb)     Arterial Line BP: ()/(40-70) 107/59  MAP:  [56 mmHg-95 mmHg] 79 mmHg  BP - Mean:  [] 57  Ventilation Mode: CMV/AC  (Continuous Mandatory Ventilation/ Assist Control)  FiO2 (%): 80 %  Rate Set (breaths/minute): 20 breaths/min  Tidal Volume Set (mL): 450 mL  PEEP (cm H2O): 10 cmH2O  Pressure Support (cm H2O): 12 cmH2O  Oxygen Concentration (%): 80 %  Resp: 22    Recent Labs  Lab 10/01/18  1051 09/30/18  1300  09/30/18  0220 09/29/18  0525  09/28/18  1042   PH 7.34* 7.36  --   --   --  7.48*   PCO2 57* 51*  --   --   --  38   PO2 83 77*  --   --   --  79*   HCO3 31* 29*  --   --   --  28   O2PER 75% 65% 70% 60%  < > 60%   < > = values in this interval not displayed.    GEN: no acute distress, now   HEENT: head ncat, sclera anicteric, OP patent, trachea midline   PULM: unlabored synchronous with vent, clear anteriorly ,   CV/COR: RRR S1S2 no gallop,  No rub, no murmur  ABD: soft nontender, hypoactive bowel sounds, no mass  EXT:  Edema   warm  NEURO: grossly intact, chemically sedated does not follow command  SKIN: nodular, fungating skin lesions on sternum  LINES: clean, dry intact         Data:   All data and imaging reviewed     ROUTINE ICU LABS (Last four results)  CMP  Recent Labs  Lab 10/02/18  0538 10/01/18  0620 09/30/18  0450 09/29/18  1356 09/29/18  0525 09/28/18  1520 09/28/18  1122  09/26/18  0636  09/25/18  1640    145* 148*  --  148*  --   --   < > 139  --   --    POTASSIUM 4.6 4.5 3.7 3.7 3.2* 3.5  --   < > 3.6  --   --    CHLORIDE 109 112* 113*  --  112*  --   --   < > 105  --   --    CO2 33* 31 29  --  30  --   --   < > 27  --   --    ANIONGAP 2* 2* 6  --  6  --   --   < > 7  --   --    * 156* 128*  --  125*  --   --   < > 142*  --   --    BUN 40* 28 26  --  26  --   --   < > 19  --   --    CR 0.40* 0.36* 0.41*  --  0.42*  --   --   < > 0.34*  --   --    GFRESTIMATED >90 >90 >90  --  >90  --   --   < > >90  --   --    GFRESTBLACK >90 >90 >90  --  >90  --   --   < > >90  --   --    REAGAN 8.0* 7.7* 7.9*  --  7.3*  --   --   < > 7.5*  --   --    MAG  --  2.0 1.9  --   --   --   --   --   --   --  2.6*   PHOS  --  3.2 1.5*  --  2.6 2.2*  --   < >  --   < > 1.7*   PROTTOTAL 5.1* 4.9*  --   --   --   --  4.8*  --   --   --   --    ALBUMIN 1.6* 1.7*  --   --   --   --  1.9*  --   --   --   --    BILITOTAL 8.8* 8.4*  --   --   --   --  7.9*  --  7.6*  --   --    ALKPHOS 624* 688*  --   --   --   --  667*   --   --   --   --    AST 89* 106*  --   --   --   --  158*  --  97*  --   --    ALT 59* 62*  --   --   --   --  69*  --  57*  --   --    < > = values in this interval not displayed.  CBC    Recent Labs  Lab 10/02/18  0538 10/01/18  0620 09/30/18  0450 09/29/18  0525   WBC 27.4* 27.0* 20.9* 13.2*   RBC 2.96* 2.82* 2.46* 2.57*   HGB 8.9* 8.4* 7.3* 7.8*   HCT 28.9* 27.5* 24.4* 24.4*   MCV 98 98 99 95   MCH 30.1 29.8 29.7 30.4   MCHC 30.8* 30.5* 29.9* 32.0   RDW 22.0* 22.3* 22.0* 21.7*    171 142* 103*     INR    Recent Labs  Lab 10/01/18  0620   INR 1.23*     Arterial Blood Gas    Recent Labs  Lab 10/01/18  1051 09/30/18  1300 09/30/18  0220 09/29/18  0525  09/28/18  1042   PH 7.34* 7.36  --   --   --  7.48*   PCO2 57* 51*  --   --   --  38   PO2 83 77*  --   --   --  79*   HCO3 31* 29*  --   --   --  28   O2PER 75% 65% 70% 60%  < > 60%   < > = values in this interval not displayed.    All cultures:    Recent Labs  Lab 09/30/18  1210 09/30/18  0335 09/30/18  0130 09/30/18  0111 09/29/18  0943 09/29/18  0911 09/28/18  1211   CULT No growth after 2 days  Presumptive identification:Candida albicans / dubliniensisisolatedCandida albicans and Treva dubliniensis are not routinely speciated*  No growth  Culture negative monitoring continues  Canceled, Test credited  Test reordered as correct code Light growthCandida albicans / dubliniensisCandida albicans and Treva dubliniensis are not routinely speciatedSusceptibility testing not routinely done* No growth after 2 days  No growth No growth after 2 days No growth after 3 days No growth after 3 days No growth     No results found for this or any previous visit (from the past 24 hour(s)).      Billing: This patient is critically ill: Yes. Total critical care time today 30 min excluding procedures.

## 2018-10-02 NOTE — PLAN OF CARE
Problem: Patient Care Overview  Goal: Plan of Care/Patient Progress Review  Outcome: No Change  Pt blood sugars slowly more elevated with addition of steroids and increased TF. Are ranging 150-160's now this afternoon.   Dr. Rodriguez notified of above assessment data, will place order for sliding scale coverage. Pt blood sugars continued to be checked Q 4 hours as ordered. See flowsheet and MAR for additional assessment information

## 2018-10-02 NOTE — PROGRESS NOTES
[facility]  Infectious Disease Progress Note          Assessment and Plan:   IMPRESSION:   1.  A 58-year-old female with widely metastatic long-term breast cancer, ongoing chemotherapy, and now neutropenia.   2.  Acute sepsis and neutropenic fever, some signs of pneumonia, with possible esophagitis and positive blood culture, at risk for multiple infections.   3.  Thrush and some concern for possible esophagitis.   4.  Acute and chronic long-term alcohol abuse and liver dysfunction.   5.  Prior Martha-Stafford tear.   6.  Respiratory failure, now intubated.   7.  Positive blood culture, 1 bottle for diphtheroids.  No further identification.  This could be a contaminant versus line infection.       RECOMMENDATIONS:   1.   Blood culture 1 lactobacillus contaminant unless multiple cultures positive or identification changes.   2.   vancomycin and Zosyn ? taper soon but not really improving despite full marrow recovery, intermittent fever  3.  From a thrush and possible fungal infection standpoint, agree fluconazole hold for now, onbut will  micafungin instead. .   4.  WBC rising(with growth factors), fever resolved mostly 101+ 2 of 3 last days  5.  Obviously, prognosis poor overall for a multitude of reasons, primarily malignancy.   6 steroids for ? Med related lung disease        Interval History:   Intubated sedated WBC rising above nl range incl PMNs  No new + cxs intermittent LGF on steroids              Medications:       albumin human  12.5 g Intravenous Q8H     bacitracin   Topical BID     folic acid  1 mg Oral or Feeding Tube Daily     furosemide  40 mg Intravenous Q8H     heparin  5 mL Intracatheter Q28 Days     heparin lock flush  5-10 mL Intracatheter Q24H     insulin aspart  1-6 Units Subcutaneous Q4H     lidocaine visc 2% & maalox/mylanta w/simethicone & diphenhydramine  10 mL Swish & Swallow Q6H     methylPREDNISolone  250 mg Intravenous Q12H     micafungin  100 mg Intravenous Q24H  "    multivitamins with minerals  15 mL Per Feeding Tube Daily     pantoprazole  40 mg Per Feeding Tube BID     piperacillin-tazobactam  3.375 g Intravenous Q6H     protein modular  2 packet Per Feeding Tube BID w/meals     QUEtiapine  25 mg Oral or Feeding Tube BID                  Physical Exam:   Blood pressure (!) 84/41, pulse 123, temperature 97  F (36.1  C), temperature source Axillary, resp. rate 22, height 1.676 m (5' 6\"), weight 74.9 kg (165 lb 2 oz), SpO2 95 %, not currently breastfeeding.  Wt Readings from Last 2 Encounters:   10/02/18 74.9 kg (165 lb 2 oz)   10/23/17 57.6 kg (127 lb)     Vital Signs with Ranges  Temp:  [97  F (36.1  C)-99.7  F (37.6  C)] 97  F (36.1  C)  Heart Rate:  [65-98] 78  Resp:  [18-34] 22  BP: ()/(41-81) 84/41  MAP:  [56 mmHg-95 mmHg] 79 mmHg  Arterial Line BP: ()/(40-70) 107/59  FiO2 (%):  [75 %-80 %] 80 %  SpO2:  [86 %-98 %] 95 %    Constitutional: Intubated and sedated   Lungs: Clear to auscultation bilaterally, no crackles or wheezing   Cardiovascular: Regular rate and rhythm, normal S1 and S2, and no murmur noted   Abdomen: Normal bowel sounds, soft, non-distended, non-tender   Skin: No rashes, no cyanosis, no edema   Other:           Data:   All microbiology laboratory data reviewed.  Recent Labs   Lab Test  10/02/18   0538  10/01/18   0620  09/30/18   0450   WBC  27.4*  27.0*  20.9*   HGB  8.9*  8.4*  7.3*   HCT  28.9*  27.5*  24.4*   MCV  98  98  99   PLT  209  171  142*     Recent Labs   Lab Test  10/02/18   0538  10/01/18   0620  09/30/18   0450   CR  0.40*  0.36*  0.41*     No lab results found.  Recent Labs   Lab Test  09/30/18   1210  09/30/18   0335  09/30/18   0130  09/30/18   0111  09/29/18   0943  09/29/18   0911  09/28/18   1211  09/24/18   0945  09/22/18   1033   CULT  No growth after 2 days  Presumptive identification:  Candida albicans / dubliniensis  isolated  Candida albicans and Candida dubliniensis are not routinely speciated  *  No growth  " Culture negative monitoring continues  Canceled, Test credited  Test reordered as correct code  Light growth  Candida albicans / dubliniensis  Candida albicans and Candida dubliniensis are not routinely speciated  Susceptibility testing not routinely done  *  No growth after 2 days  No growth  No growth after 2 days  No growth after 3 days  No growth after 3 days  No growth  Light growth  Candida albicans / dubliniensis  Candida albicans and Candida dubliniensis are not routinely speciated  Susceptibility testing not routinely done  *  No growth

## 2018-10-03 NOTE — PROGRESS NOTES
Ventilation Mode: CMV/AC  (Continuous Mandatory Ventilation/ Assist Control)  FiO2 (%): 80 %  Rate Set (breaths/minute): 20 breaths/min  Tidal Volume Set (mL): 450 mL  PEEP (cm H2O): 10 cmH2O  Pressure Support (cm H2O): 12 cmH2O  Oxygen Concentration (%): 80 %  Resp: 19    No changes made this shift. Patient remains on vent and sedated.

## 2018-10-03 NOTE — PROGRESS NOTES
Patient with heart rate 113, respiratory rate 33. Accessory muscle use for breathing. Propofol restarted.  Aline Castro

## 2018-10-03 NOTE — PROGRESS NOTES
Critical Care Progress Note      10/03/2018    Name: Eyal Turner MRN#: [id number]   Age: 58 year old YOB: 1960     Hsptl Day# 11  ICU DAY # 10    MV DAY # 10             Problem List:   Metastatic breast cancer  Acute hypoxic respiratory failure, possible drug-induced pneumonitis  Alcohol abuse/dependence  Hepatitis, chronic cause unclear         Summary/Hospital Course:     59 yo female with metastatic breast CA admitted to the ICU. I have personally reviewed the daily labs, imaging studies, cultures and discussed the case with referring physician and consulting physicians.     My assessment and plan by system for this patient is as follows:    Neurology/Psychiatry:   1. ETOH Use  2. Analgesia    Plan  - precedex/propofol for sedation goal RASS of negative 2  -Monitor for signs of ETOH withdrawal  - PRN Fentanyl for pain given metastatic lesions  -Continue CIWA  -Head CT negative for acute bleed.    Cardiovascular:   1.Hemodynamics -improved echo pretty normal 9/29  2.Rhythm -  Tachy, sinus    Plan  -off pressors overnight    Pulmonary/Ventilator Management:   1. Airway intubated  2. Diffuse ground glass opacity on CT, bronchoscopy with washings on 9/30   > 25 pms, mixed organisms on Gram  Plan  Continue steroids for drug-induced lung injury, 250 mg twice daily methylprednisolone    GI and Nutrition :   1. Concern for protein calorie malnutrition  2.  Waxing and waning abdominal distention   3.  Ultrasound of gallbladder reviewed no evidence of biliary obstruction     Plan  -small bore feeding tube placed continue on tubefeeds  -    Renal/Fluids/Electrolytes:   1. Cr 0.42  2. Hypokalemia,   3.  Chronic respiratory acidosis  4.  Patient may be third spacing fluids but remains intravascularly deplete    Plan  -Administer albumin prior to Lasix for improved diuresis  - adjust medications as needed for renal clearance  - follow I/O's   -     Infectious Disease:   1. Gram positive in blood -  lactobacillus in 1 bottle  2. Cultures negative otherwise  Plan  Appreciate ID consult.   - de-escalating antibiotics     Endocrine:     1. Concern of stress induced hyperglycemia    Plan  - ICU insulin protocol, goal sugar <180      Hematology/Oncology:     1.chronic anemia secondary to CA, no signs, symptoms of active blood loss  2. Metastatic breast cancer for 20+ years, only currently active disease in clavicle/sternum and responded briskly to recent new chemo regimen (docetaxel/Herceptin + Perjeta)  3. hbg 8.4 today    Plan  Minimize blood draws      IV/Access:   1. Venous access -  port  2. Arterial access -  Art line  3.  Plan  - central access required and necessary      ICU Prophylaxis:   1. DVT: mechanical  2. VAP: HOB 30 degrees, chlorhexidine rinse  3. Stress Ulcer: PPI  4. Restraints: Nonviolent soft two point restraints required and necessary for patient safety and continued cares and good effect as patient continues to pull at necessary lines, tubes despite education and distraction. Will readdress daily.   5. Wound care - per unit routine   6. Feeding -continue tube feeds  7. Family Update: yes, anticipate family mtg end of week with palliative and oncology      Key goals for next 24 hours:   1.   Continue supportive care  2.  Continue steroids for possible drug-induced lung injury           Interim History:   Pressors down, more awake today         Key Medications:       albumin human  12.5 g Intravenous Q8H     bacitracin   Topical BID     folic acid  1 mg Oral or Feeding Tube Daily     furosemide  40 mg Intravenous Q8H     heparin  5 mL Intracatheter Q28 Days     heparin lock flush  5-10 mL Intracatheter Q24H     insulin aspart  1-6 Units Subcutaneous Q4H     lidocaine visc 2% & maalox/mylanta w/simethicone & diphenhydramine  10 mL Swish & Swallow Q6H     methylPREDNISolone  250 mg Intravenous Q12H     micafungin  100 mg Intravenous Q24H     multivitamins with minerals  15 mL Per Feeding Tube  Daily     pantoprazole  40 mg Per Feeding Tube BID     piperacillin-tazobactam  3.375 g Intravenous Q6H     protein modular  2 packet Per Feeding Tube BID w/meals     QUEtiapine  25 mg Oral or Feeding Tube BID       dexmedetomidine 0.7 mcg/kg/hr (10/03/18 0120)     IV fluid REPLACEMENT ONLY       HYDROmorphone 0.4 mg/hr (10/02/18 2000)     norepinephrine Stopped (10/02/18 2300)     propofol (DIPRIVAN) infusion 30 mcg/kg/min (10/02/18 2311)     sodium chloride 20 mL/hr at 10/02/18 2000     vasopressin (PITRESSIN) infusion ADULT (40 mL) 2.4 Units/hr (10/02/18 2000)               Physical Examination:   Temp:  [97  F (36.1  C)-98.7  F (37.1  C)] 98.7  F (37.1  C)  Heart Rate:  [60-96] 60  Resp:  [18-38] 19  BP: ()/(41-81) 111/67  MAP:  [56 mmHg-95 mmHg] 70 mmHg  Arterial Line BP: ()/(40-70) 103/48  FiO2 (%):  [80 %] 80 %  SpO2:  [86 %-99 %] 98 %    Intake/Output Summary (Last 24 hours) at 10/02/18 1536  Last data filed at 10/02/18 1400   Gross per 24 hour   Intake          3356.51 ml   Output             2205 ml   Net          1151.51 ml         Wt Readings from Last 4 Encounters:   10/03/18 71.4 kg (157 lb 6.5 oz)   10/23/17 57.6 kg (127 lb)   08/09/16 57.6 kg (127 lb)   08/01/16 57.6 kg (127 lb)     Arterial Line BP: ()/(40-70) 103/48  MAP:  [56 mmHg-95 mmHg] 70 mmHg  BP - Mean:  [] 83  Ventilation Mode: CMV/AC  (Continuous Mandatory Ventilation/ Assist Control)  FiO2 (%): 80 %  Rate Set (breaths/minute): 20 breaths/min  Tidal Volume Set (mL): 450 mL  PEEP (cm H2O): 10 cmH2O  Pressure Support (cm H2O): 12 cmH2O  Oxygen Concentration (%): 80 %  Resp: 19    Recent Labs  Lab 10/01/18  1051 09/30/18  1300 09/30/18  0220 09/29/18  0525  09/28/18  1042   PH 7.34* 7.36  --   --   --  7.48*   PCO2 57* 51*  --   --   --  38   PO2 83 77*  --   --   --  79*   HCO3 31* 29*  --   --   --  28   O2PER 75% 65% 70% 60%  < > 60%   < > = values in this interval not displayed.    GEN: no acute distress, now    HEENT: head ncat, sclera anicteric, OP patent, trachea midline   PULM: unlabored synchronous with vent, clear anteriorly ,   CV/COR: RRR S1S2 no gallop,  No rub, no murmur  ABD: soft nontender, hypoactive bowel sounds, no mass  EXT:  Edema   warm  NEURO: grossly intact, chemically sedated does not follow command  SKIN: nodular, fungating skin lesions on sternum  LINES: clean, dry intact         Data:   All data and imaging reviewed     ROUTINE ICU LABS (Last four results)  CMP  Recent Labs  Lab 10/03/18  0505 10/02/18  0538 10/01/18  0620 09/30/18  0450  09/29/18  0525 09/28/18  1520 09/28/18  1122    144 145* 148*  --  148*  --   --    POTASSIUM 3.3* 4.6 4.5 3.7  < > 3.2* 3.5  --    CHLORIDE 107 109 112* 113*  --  112*  --   --    CO2 34* 33* 31 29  --  30  --   --    ANIONGAP 3 2* 2* 6  --  6  --   --    * 141* 156* 128*  --  125*  --   --    BUN 48* 40* 28 26  --  26  --   --    CR 0.42* 0.40* 0.36* 0.41*  --  0.42*  --   --    GFRESTIMATED >90 >90 >90 >90  --  >90  --   --    GFRESTBLACK >90 >90 >90 >90  --  >90  --   --    REAGAN 8.0* 8.0* 7.7* 7.9*  --  7.3*  --   --    MAG  --   --  2.0 1.9  --   --   --   --    PHOS  --   --  3.2 1.5*  --  2.6 2.2*  --    PROTTOTAL 5.0* 5.1* 4.9*  --   --   --   --  4.8*   ALBUMIN 1.8* 1.6* 1.7*  --   --   --   --  1.9*   BILITOTAL 8.8* 8.8* 8.4*  --   --   --   --  7.9*   ALKPHOS 581* 624* 688*  --   --   --   --  667*   AST 89* 89* 106*  --   --   --   --  158*   ALT 66* 59* 62*  --   --   --   --  69*   < > = values in this interval not displayed.  CBC    Recent Labs  Lab 10/03/18  0505 10/02/18  0538 10/01/18  0620 09/30/18  0450   WBC 20.2* 27.4* 27.0* 20.9*   RBC 2.84* 2.96* 2.82* 2.46*   HGB 8.4* 8.9* 8.4* 7.3*   HCT 27.4* 28.9* 27.5* 24.4*   MCV 97 98 98 99   MCH 29.6 30.1 29.8 29.7   MCHC 30.7* 30.8* 30.5* 29.9*   RDW 21.6* 22.0* 22.3* 22.0*    209 171 142*     INR    Recent Labs  Lab 10/01/18  0620   INR 1.23*     Arterial Blood Gas    Recent  Labs  Lab 10/01/18  1051 09/30/18  1300 09/30/18  0220 09/29/18  0525  09/28/18  1042   PH 7.34* 7.36  --   --   --  7.48*   PCO2 57* 51*  --   --   --  38   PO2 83 77*  --   --   --  79*   HCO3 31* 29*  --   --   --  28   O2PER 75% 65% 70% 60%  < > 60%   < > = values in this interval not displayed.    All cultures:    Recent Labs  Lab 09/30/18  1210 09/30/18  0335 09/30/18  0130 09/30/18  0111 09/29/18  0943 09/29/18  0911 09/28/18  1211   CULT No growth after 2 days  Presumptive identification:Candida albicans / dubliniensisisolatedCandida albicans and Treva dubliniensis are not routinely speciated*  No growth  Culture negative monitoring continues  Canceled, Test credited  Test reordered as correct code Light growthCandida albicans / dubliniensisCandida albicans and Treva dubliniensis are not routinely speciatedSusceptibility testing not routinely done* No growth after 2 days  No growth No growth after 2 days No growth after 3 days No growth after 3 days No growth     No results found for this or any previous visit (from the past 24 hour(s)).      Billing: This patient is critically ill: Yes. Total critical care time today 30 min excluding procedures.

## 2018-10-03 NOTE — PROGRESS NOTES
[facility]  Infectious Disease Progress Note          Assessment and Plan:   IMPRESSION:   1.  A 58-year-old female with widely metastatic long-term breast cancer, ongoing chemotherapy, and now neutropenia.   2.  Acute sepsis and neutropenic fever, some signs of pneumonia, with possible esophagitis and positive blood culture, at risk for multiple infections.   3.  Thrush and some concern for possible esophagitis.   4.  Acute and chronic long-term alcohol abuse and liver dysfunction.   5.  Prior Martha-Stafford tear.   6.  Respiratory failure, now intubated.   7.  Positive blood culture, 1 bottle for diphtheroids.  No further identification.  This could be a contaminant versus line infection.       RECOMMENDATIONS:   1.   Blood culture 1 lactobacillus contaminant wo multiple cultures positive    2.   vancomycin and Zosyn , 10 days broad coverage, taper starting with vanco, not really improving despite full marrow recovery, intermittent fever, sl better today  3.  From a thrush and possible fungal infection standpoint, agree fluconazole hold for now, on but will  micafungin instead. 10 days tx  already.   4.  WBC rising(with growth factors), fever resolved mostly   5.  Obviously, prognosis poor overall for a multitude of reasons, primarily malignancy.   6 steroids for ? Med related lung disease        Interval History:   Intubated sedated WBC rising above nl range incl PMNs  No new + cxs intermittent LGF on steroids              Medications:       albumin human  12.5 g Intravenous Q6H     bacitracin   Topical BID     folic acid  1 mg Oral or Feeding Tube Daily     furosemide  40 mg Intravenous Q6H     heparin  5 mL Intracatheter Q28 Days     heparin lock flush  5-10 mL Intracatheter Q24H     insulin aspart  1-12 Units Subcutaneous Q4H     lidocaine visc 2% & maalox/mylanta w/simethicone & diphenhydramine  10 mL Swish & Swallow Q6H     methylPREDNISolone  250 mg Intravenous Q12H     micafungin   "100 mg Intravenous Q24H     multivitamins with minerals  15 mL Per Feeding Tube Daily     pantoprazole  40 mg Per Feeding Tube BID     piperacillin-tazobactam  3.375 g Intravenous Q6H     protein modular  2 packet Per Feeding Tube BID w/meals     QUEtiapine  25 mg Oral or Feeding Tube BID     sodium chloride (PF)  3 mL Intracatheter Q8H                  Physical Exam:   Blood pressure 111/67, pulse 123, temperature 98.2  F (36.8  C), temperature source Axillary, resp. rate 19, height 1.676 m (5' 6\"), weight 71.4 kg (157 lb 6.5 oz), SpO2 96 %, not currently breastfeeding.  Wt Readings from Last 2 Encounters:   10/03/18 71.4 kg (157 lb 6.5 oz)   10/23/17 57.6 kg (127 lb)     Vital Signs with Ranges  Temp:  [97.9  F (36.6  C)-98.7  F (37.1  C)] 98.2  F (36.8  C)  Heart Rate:  [] 78  Resp:  [18-41] 19  BP: (111)/(67) 111/67  MAP:  [63 mmHg-94 mmHg] 67 mmHg  Arterial Line BP: ()/(45-65) 114/48  FiO2 (%):  [60 %-80 %] 60 %  SpO2:  [94 %-99 %] 96 %    Constitutional: Intubated and sedated   Lungs: Clear to auscultation bilaterally, no crackles or wheezing   Cardiovascular: Regular rate and rhythm, normal S1 and S2, and no murmur noted   Abdomen: Normal bowel sounds, soft, non-distended, non-tender   Skin: No rashes, no cyanosis, no edema   Other:           Data:   All microbiology laboratory data reviewed.  Recent Labs   Lab Test  10/03/18   0505  10/02/18   0538  10/01/18   0620   WBC  20.2*  27.4*  27.0*   HGB  8.4*  8.9*  8.4*   HCT  27.4*  28.9*  27.5*   MCV  97  98  98   PLT  195  209  171     Recent Labs   Lab Test  10/03/18   0505  10/02/18   0538  10/01/18   0620   CR  0.42*  0.40*  0.36*     No lab results found.  Recent Labs   Lab Test  09/30/18   1210  09/30/18   0335  09/30/18   0130  09/30/18   0111  09/29/18   0943  09/29/18   0911  09/28/18   1211  09/24/18   0945  09/22/18   1033   CULT  No growth after 3 days  Culture received and in progress.  Positive AFB results are called as soon as " detected.    Final report to follow in 7 to 8 weeks.    Assayed at [facility] [facility], [employer] [address] [place] [id number] [phone]  Presumptive identification:  Candida albicans / dubliniensis  isolated  Candida albicans and Candida dubliniensis are not routinely speciated  *  No growth  Culture negative monitoring continues  Canceled, Test credited  Test reordered as correct code  Light growth  Candida albicans / dubliniensis  Candida albicans and Candida dubliniensis are not routinely speciated  Susceptibility testing not routinely done  *  No growth after 3 days  No growth  No growth after 3 days  No growth after 4 days  No growth after 4 days  No growth  Light growth  Candida albicans / dubliniensis  Candida albicans and Candida dubliniensis are not routinely speciated  Susceptibility testing not routinely done  *  No growth

## 2018-10-03 NOTE — PLAN OF CARE
Problem: Patient Care Overview  Goal: Plan of Care/Patient Progress Review  Outcome: No Change  ICU End of Shift Summary.  For vital signs and complete assessments, please see documentation flowsheets.     Pertinent assessments: Patient remains intubated and sedated. Vitals stable. Good urine output. Tolerating tube feeds. Remains on vassopressin.   Major Shift Events: Patient became tachycardic and tachypnic on sedation vacation.  ABle to wean down fio2 on vent  Plan (Upcoming Events): continue to monitor  Discharge/Transfer Needs: to be determined    Bedside Shift Report Completed : yes  Bedside Safety Check Completed:yes

## 2018-10-03 NOTE — PROGRESS NOTES
[facility]    Hospitalist Progress Note  Name: Eyal Turner    MRN: [id number]  Provider:  Lazaro Rodriguez MD  Date of Service: 10/03/2018    Summary of Stay: Eyal Turner is a 58 year old female with metastatic breast cancer with bone metastases to sternum and rib who started a new chemo regimen (that included switch from Kadcyla to Docetaxel, Herceptin and Perjeta) 5 days prior to admission (about 9/17/18).  In addition, prior medical history is notable for chronic alcohol abuse complicated by malnutrition and HTN who presented on 9/22/2018 with fatigue and shortness of breath.      Initially, Ms. Turner was hypotensive, though that was responsive to IV fluids in the ED. Her labs were notable for significantly elevated LFTs (above her baseline abnormals) with bilirubin of 8, alk phos about 600 and elevated transaminases.  Also was found to be pancytopenic with neutropenia.  Hemoglobin was less than 7 she received 1 unit RBC transfusion in the ED.     After admission she was found to have fever to 101 for which infectrious wrk up was started and chest x-ray showed bilateral infiltrate.  Cefepime was presumptively started for neutropenic fever and azithromycin added subsequently.  A CT abdomen pelvis was obtained that did not show any ascites or obstructive liver disease.    She also developed evidence for acute alcohol withdrawal initially with significant tremor that improved after a few doses of Ativan.  GI was consulted for possible upper GI bleed and for her liver failure.  No EGD planned.  Did start empiric Protonix on admission, but no evidence for ongoing blood loss.    Oncology also was consulted and recommended supportive cares.      Ms. Turner was transferred to ICU on 9/23/2018 for worsening hypoxemia and was intubated 9/24/2018.  Blood culture grew lactobacillus from port.  ID was consulted and the patient was switched to vancomycin and Zosyn.  Micafungin was also added for oral  thrush.  She was also started on Neupogen for neutropenia with good response.    She has shown no improvement over the past several days and even showing gradual deterioration in mental and respiratory status.  On 9/29, she developed fever to 102 and now has leukocytosis of 27K (though the rise in white cell count may be partly due to exposure to Neupogen).      Cont trial of methylprednisolone 250 mg IV twice daily.  It is considered most likely that the patient does not have a bacterial infection and have therefore abx tapered off.     Problem List/Assessment and Plan:   Acute hypoxic respiratory failure, recalcitrant: Initially thought due to severe sepsis and pneumonia.  Possible component of vascular congestion also considered.  Remains stable on the ventilator.    -Pulmonary toxicity related to the patient's recent chemotherapy is considered as it seems likely that we have de facto eliminated other causes of her respiratory failure.  After discussion with Dr. Braun will give a trial of moderately high dose steroids and attempt to diuresis as well as possible.  -repeat trial of lasix with albumin flushes q 8 hours  -solumedrol 250 mg IV q12 hours.   -vent day # 10. CMV/AC 20/PEEP10/FiO2 70    Volume overload, iatrogenic: this is primarily a concern as much as it impinges on vent wean  -started 10/2 with higher dose (40 mg q 8 hours IV Lasix) along with albumin infusions.  Improved output with this  -tolerating gentle wean of vent support.     Septic shock and neutropenic fever: Some evidence of pneumonia with possible esophagitis and does have oral candidiasis.  One blood culture positive for lactobacillus which is considered contaminant.  Infectious disease following.     -stopped abx over the past several days  -CT of chest, abdomen, and pelvis on 9/30 shows extensive airspace infiltrates throughout both lungs.  -Repeated blood, urine, and sputum cultures last on 9/30 -- all NTD; diarrhea c dif negative x 2,  last on 10/1  -off Norepi, remains on Vasopressin at 2.4 U/hr     Advanced liver disease with suspected liver failure.  Abnormal LFTs, thought to be multifactorial: Presented with jaundice an bilirubin was up to 9.8 up from 2.4 three months ago.  Alkaline phosphatase was elevated to 594, AST elevated 264, ALT elevated at 94.  Does drink 2 bottles of wine daily so possible acute alcoholic hepatitis.  Also additionally started on chemotherapy with docetaxel, trastuzumab, and pertuzumab so possibility of drug toxicity although per oncology with this regimen there is not typically significant liver toxicity.  Albumin is low.  Given her alcohol abuse suspect alcohol hepatitis playing a role here.   -Gastroenterology consulted and following peripherally.  -Largely unremarkable right upper quadrant ultrasound.    Empiric trial of steroids/hyperglycemia:  -initiated 10/1, now dose 250 mg q 12 hours with plan to continue for 3 days' trial.   -need to monitor and correct hyperglycemia induced by steroids. Check sugars q 4 hours and cover with high-intensity ISS. Lantus at 7 units to start in am.  -continues on tube feeds, expected short burst of high-dose steroids      Alcohol dependence with acute withdrawal: Has been drinking 2 bottles of wine daily for years, per family report.  Last use 1-2 days prior to admission.  Presented in alcohol withdrawal with tremor.  -CIWA with Ativan as needed, but not requiring any doses at this time.  -Continue thiamine and folate.  -Now on Precedex and propofol.      Acute on chronic pancytopenia with neutropenia: On admission WBC 0.5 with ANC 0.4, hemoglobin 6.9, and platelets 63.  Hemoglobin was 11.4 three months ago and platelets were 108.  Suspect acute drop is from her recent chemotherapy.  Also possible upper GI bleed as below contributing.  -Hemoglobin stable following transfusion support but gradually drifting down and give 1 more unit of PRBCs today.  -Daily CBC.  -Hematology  following.  -Neutropenia resolved following Neupogen which has now been discontinued.      Metastatic breast cancer: Initially diagnosed in 2000 s/p bilateral mastectomy.  Has undergone multiple rounds of chemotherapy and radiation.  Has known metastatic disease to her sternum and third left rib.  Had recent possible skin infection from the sternum met/mass and received Keflex.  Does not look actively infected at this time.  Has port in place.  Restarted on chemotherapy with docetaxel, trastuzumab, and pertuzumab on 9/17/18.  Follows with Dr. Braun of oncology.  Doing quite poorly following chemotherapy with poor oral intake, weakness, and now acute liver injury.  -[facility] oncology consulted, appreciate recommendations.  -No known metastases except to bone which probably accounts for the chronically elevated alkaline phosphatase    Mucositis versus thrush and possible esophagitis: Patient erosive changes and plaque on the tongue and her soft palate.  Question whether this is mucositis from her docetaxel or Candida infection.  Sore throat for the week PTA possible esophagitis as well.  -GI not planning EGD to investigate for esophagitis.  -Continue micafungin per ID.     Possible UGIB: Reports of vomiting with red blood in it.  Denies any melena or hematochezia.  Anemia and thrombocytopenia could in part be chemotherapy related.  Has required periodic transfusions.  -Twice daily Protonix.  -GI not planning an EGD .      Hypokalemia and hypomagnesemia:   -Potassium and magnesium replacement protocol.      Severe protein calorie malnutrition: Evidence for fat loss on exam likely related to chronic alcohol use, metastatic breast cancer, and recent chemotherapy.  Clearly has severe malnutrition, though per Dr. Braun, this appears chronic.  -Dietitian consult.  -Feeding tube placed and continuing tube feeds.    Diarrhea: repeated C. difficile testing negative.  Rectal tube now in place as neutropenia resolved.      DVT  Prophylaxis: Pneumatic Compression Devices  Code Status: Full Code  Disposition: To be determined    Family updated today: I met with the patient's  and indicated my sense that the patient is slightly improved today. In any case, the overall plan is to cont with current cares and reassess on Friday, after 3 days of high-dose steroids in order to assess response. At that time, will need to discuss extubation v trach.     Interval History   Intubated and sedated.  Stable night.    -Data reviewed today: I personally reviewed all new labs and imaging results over the last 24 hours.     Physical Exam   Temp: 99.3  F (37.4  C) Temp src: Axillary BP: 124/73   Heart Rate: 124 Resp: 27 SpO2: 94 % O2 Device: Mechanical Ventilator    Vitals:    10/01/18 0508 10/02/18 0500 10/03/18 0400   Weight: 70.3 kg (154 lb 15.7 oz) 74.9 kg (165 lb 2 oz) 71.4 kg (157 lb 6.5 oz)     Vital Signs with Ranges  Temp:  [97.9  F (36.6  C)-99.3  F (37.4  C)] 99.3  F (37.4  C)  Heart Rate:  [] 124  Resp:  [18-41] 27  BP: (111-124)/(67-73) 124/73  MAP:  [63 mmHg-94 mmHg] 80 mmHg  Arterial Line BP: ()/(45-65) 130/56  FiO2 (%):  [60 %-80 %] 60 %  SpO2:  [93 %-99 %] 94 %  I/O last 3 completed shifts:  In: 2554.43 [I.V.:1034.43; NG/GT:460]  Out: 4205 [Urine:3205; Stool:1000]    GENERAL: Intubated and sedated. More wakeful andinteractive than previous, still on sedative meds.  Chronically ill with bi-temporal wasting.  HEENT: Normocephalic, atraumatic. Extraocular movements intact.  Icteric.  CARDIOVASCULAR: Regular rate and rhythm without murmurs or rubs. No S3.  PULMONARY: Clear bilaterally.  GASTROINTESTINAL: Soft, moderately distended. Bowel sounds normoactive to mildly hyperactive. No appreciable tenderness, rebound, guarding.  EXTREMITIES: No cyanosis or clubbing. No edema.  NEUROLOGICAL: unable  DERMATOLOGICAL: No rash, ulcer, but mild jaundice.  Significant diffuse bruising.    Medications     dexmedetomidine 0.7 mcg/kg/hr  (10/03/18 1152)     IV fluid REPLACEMENT ONLY       HYDROmorphone 0.4 mg/hr (10/02/18 2000)     norepinephrine Stopped (10/02/18 2300)     propofol (DIPRIVAN) infusion 20 mcg/kg/min (10/03/18 2000)     sodium chloride 10 mL/hr at 10/03/18 0926     sodium chloride 10 mL/hr at 10/03/18 1805     vasopressin (PITRESSIN) infusion ADULT (40 mL) 2.4 Units/hr (10/03/18 0802)       albumin human  12.5 g Intravenous Q6H     bacitracin   Topical BID     folic acid  1 mg Oral or Feeding Tube Daily     furosemide  40 mg Intravenous Q6H     heparin  5 mL Intracatheter Q28 Days     heparin lock flush  5-10 mL Intracatheter Q24H     insulin aspart  1-12 Units Subcutaneous Q4H     [START ON 10/4/2018] insulin glargine  7 Units Subcutaneous QAM AC     lidocaine visc 2% & maalox/mylanta w/simethicone & diphenhydramine  10 mL Swish & Swallow Q6H     methylPREDNISolone  250 mg Intravenous Q12H     micafungin  100 mg Intravenous Q24H     multivitamins with minerals  15 mL Per Feeding Tube Daily     pantoprazole  40 mg Per Feeding Tube BID     piperacillin-tazobactam  3.375 g Intravenous Q6H     protein modular  2 packet Per Feeding Tube BID w/meals     QUEtiapine  25 mg Oral or Feeding Tube BID     sodium chloride (PF)  3 mL Intracatheter Q8H     Data     Laboratory:    Recent Labs  Lab 10/03/18  0505 10/02/18  0538 10/01/18  0620   WBC 20.2* 27.4* 27.0*   HGB 8.4* 8.9* 8.4*   HCT 27.4* 28.9* 27.5*   MCV 97 98 98    209 171       Recent Labs  Lab 10/03/18  1401 10/03/18  0505 10/02/18  0538 10/01/18  0620   NA  --  144 144 145*   POTASSIUM 3.5 3.3* 4.6 4.5   CHLORIDE  --  107 109 112*   CO2  --  34* 33* 31   ANIONGAP  --  3 2* 2*   GLC  --  205* 141* 156*   BUN  --  48* 40* 28   CR  --  0.42* 0.40* 0.36*   GFRESTIMATED  --  >90 >90 >90   GFRESTBLACK  --  >90 >90 >90   REAGAN  --  8.0* 8.0* 7.7*       Recent Labs  Lab 09/30/18  1210 09/30/18  0335 09/30/18  0130 09/30/18  0111 09/29/18  0943 09/29/18  0911 09/28/18  1211   CULT No  growth after 3 days  Culture received and in progress.  Positive AFB results are called as soon as detected.  Final report to follow in 7 to 8 weeks.  Assayed at [facility] [address] [place] [id number] [phone]  Presumptive identification:Candida albicans / dubliniensisisolatedCandida albicans and Treva dubliniensis are not routinely speciated*  No growth  Culture negative monitoring continues  Canceled, Test credited  Test reordered as correct code Light growthCandida albicans / dubliniensisCandida albicans and Treva dubliniensis are not routinely speciatedSusceptibility testing not routinely done* No growth after 3 days  No growth No growth after 3 days No growth after 4 days No growth after 4 days No growth       Imaging:  No results found for this or any previous visit (from the past 24 hour(s)).

## 2018-10-03 NOTE — PLAN OF CARE
Problem: Pneumonia (Adult)  Goal: Signs and Symptoms of Listed Potential Problems Will be Absent, Minimized or Managed (Pneumonia)  Signs and symptoms of listed potential problems will be absent, minimized or managed by discharge/transition of care (reference Pneumonia (Adult) CPG).   Outcome: No Change  ICU End of Shift Summary.  For vital signs and complete assessments, please see documentation flowsheets.     Pertinent assessments: pt is sedated, tries to open eyes when name is called, levo was weaned off, VSS, afeb, lungs are coarse, uo is good, rectal output has slowed down, bs are very hypoactive.  Major Shift Events: none  Plan (Upcoming Events):con't to assess   Discharge/Transfer Needs: TBD    Bedside Shift Report Completed : y  Bedside Safety Check Completed: y

## 2018-10-03 NOTE — PROGRESS NOTES
RT: Received patient on full vent support, settings: CMV 20/ 450/ +10/ 70%. FIO2 decreased to 60%- SPO2 96%.    No additional vent changes made. No weans attempted. BBS coarse/ dim. Suctioned for moderate, yellow, thick secretions from ETT.    Will continue to follow and assess.

## 2018-10-03 NOTE — PROGRESS NOTES
"[facility]  Palliative Care Progress Note  Text Page    Case discussed in ICU rounds. I met with the patient's , Luis A in the family waiting room. Offered the option to have a family care conference on Friday as the medical team would have insight into Rachna's pulmonary status and Oncologist Richard Braun MD would be able to participate. Luis A indicates he will talk with their kids to have them attend. Answered basic questions regarding tracheostomy and briefly explained that Rachna would go to an [facility] for continued rehabilitation. Luis A explained \"It looks like she's need to go to rehab for months even if she gets the tube out\". He explained that it has been difficult without Rachna at home because she usually took care of the bills at home. Offered assistance  which he responded \"I've got the bills paid. It was just knowing the passwords to everything\". Luis A explained that as Rachna was only able to work a few days a week, she usually kept track of the bills for them. He reminisced about Rachna's love of country music and [employer] where Luis A enjoyed more classic country.      Assessment & Plan      1. Decisional Capacity -  Unreliable as she is currently intubated. Patient does not have an advance directive. Per  informed consent policy next of kin should be involved in all consent and decision making. Her  Ashish Turner is next-of-kin.     2. Chronic cancer pain - Continue IV Dilaudid 0.2-0.4 mg/hr infusion for comfort. Chronic use of Oxycodone 5 mg per [facility]  (35 tablets obtained 9/21/2018).      3.  Dyspnea - Patient intubated. Plan to continue steroids and observe. Family conference planned for Friday at 4 PM at which time consideration of tracheostomy may indicated.      4.  Agitation - Given timeline of hospitalization and intubation, she would be past issues with alcohol withdrawal - Continue Seroquel 25 mg BID and IV Ativan as needed.      5.  Severe " Malnutrition - Appreciate input of Registered Dietitian Tiffanie Cronin, RD, LD. Patient intubated with NG Tube Feeding.        6. Diarrhea - C. Diff negative 9/26/2018 and 9/29/2018. Rectal tube stated 9/29 once neutropenia resolved. Admission platelet count at 63 and today is normal at 195.     7. Spiritual Care - Appreciate input of  Jimbo Kate. The patient's  indicates a friend and , David is involved as needed.      8. Care Planning - Family care conference planned for Friday October 5 at 4 PM with Oncologist Richard Braun MD The patient's , Luis A acknowledged he would ask their 3 adult children to participate in the discussion.        Goal of Care: FULL CODE - Restorative care    Chata Huff MS, RN, CNS, APRN, ACHPN, FAACVPR  Pain and Palliative Care  Pager [phone]  Office [phone]       Time Spent on this Encounter   I spent 20 minutes (11:55 AM - 12:10 PM ) in assessment of the patient, counseling and discussion with the patient's  as documented in sections above. Another 20 minutes in review of chart, documentation, coordination of care and discussion with the health care team.    Interval History   Chart reviewed    Review of Systems       Unable as patient orally intubated and sedated    Physical Exam   Temp:  [97  F (36.1  C)-98.7  F (37.1  C)] 98.7  F (37.1  C)  Heart Rate:  [] 103  Resp:  [18-41] 37  BP: (111)/(67) 111/67  MAP:  [63 mmHg-94 mmHg] 79 mmHg  Arterial Line BP: ()/(45-65) 125/56  FiO2 (%):  [70 %-80 %] 70 %  SpO2:  [94 %-99 %] 94 %  157 lbs 6.54 oz  GEN:  Orally intubated and sedated. .      Medications     dexmedetomidine 0.7 mcg/kg/hr (10/03/18 1152)     IV fluid REPLACEMENT ONLY       HYDROmorphone 0.4 mg/hr (10/02/18 2000)     norepinephrine Stopped (10/02/18 2300)     propofol (DIPRIVAN) infusion 25 mcg/kg/min (10/03/18 1254)     sodium chloride 10 mL/hr at 10/03/18 0926     sodium chloride 10 mL/hr at 10/03/18 1221      vasopressin (PITRESSIN) infusion ADULT (40 mL) 2.4 Units/hr (10/03/18 0802)       albumin human  12.5 g Intravenous Q6H     bacitracin   Topical BID     folic acid  1 mg Oral or Feeding Tube Daily     furosemide  40 mg Intravenous Q6H     heparin  5 mL Intracatheter Q28 Days     heparin lock flush  5-10 mL Intracatheter Q24H     insulin aspart  1-12 Units Subcutaneous Q4H     lidocaine visc 2% & maalox/mylanta w/simethicone & diphenhydramine  10 mL Swish & Swallow Q6H     methylPREDNISolone  250 mg Intravenous Q12H     micafungin  100 mg Intravenous Q24H     multivitamins with minerals  15 mL Per Feeding Tube Daily     pantoprazole  40 mg Per Feeding Tube BID     piperacillin-tazobactam  3.375 g Intravenous Q6H     protein modular  2 packet Per Feeding Tube BID w/meals     QUEtiapine  25 mg Oral or Feeding Tube BID     sodium chloride (PF)  3 mL Intracatheter Q8H       Data   Results for orders placed or performed during the hospital encounter of 09/22/18 (from the past 24 hour(s))   Glucose by meter   Result Value Ref Range    Glucose 187 (H) 70 - 99 mg/dL   Glucose by meter   Result Value Ref Range    Glucose 178 (H) 70 - 99 mg/dL   Hemoglobin A1c   Result Value Ref Range    Hemoglobin A1C 5.0 0 - 5.6 %   Glucose by meter   Result Value Ref Range    Glucose 181 (H) 70 - 99 mg/dL   Glucose by meter   Result Value Ref Range    Glucose 202 (H) 70 - 99 mg/dL   Comprehensive metabolic panel   Result Value Ref Range    Sodium 144 133 - 144 mmol/L    Potassium 3.3 (L) 3.4 - 5.3 mmol/L    Chloride 107 94 - 109 mmol/L    Carbon Dioxide 34 (H) 20 - 32 mmol/L    Anion Gap 3 3 - 14 mmol/L    Glucose 205 (H) 70 - 99 mg/dL    Urea Nitrogen 48 (H) 7 - 30 mg/dL    Creatinine 0.42 (L) 0.52 - 1.04 mg/dL    GFR Estimate >90 >60 mL/min/1.7m2    GFR Estimate If Black >90 >60 mL/min/1.7m2    Calcium 8.0 (L) 8.5 - 10.1 mg/dL    Bilirubin Total 8.8 (H) 0.2 - 1.3 mg/dL    Albumin 1.8 (L) 3.4 - 5.0 g/dL    Protein Total 5.0 (L) 6.8 - 8.8  g/dL    Alkaline Phosphatase 581 (H) 40 - 150 U/L    ALT 66 (H) 0 - 50 U/L    AST 89 (H) 0 - 45 U/L   CBC with platelets   Result Value Ref Range    WBC 20.2 (H) 4.0 - 11.0 10e9/L    RBC Count 2.84 (L) 3.8 - 5.2 10e12/L    Hemoglobin 8.4 (L) 11.7 - 15.7 g/dL    Hematocrit 27.4 (L) 35.0 - 47.0 %    MCV 97 78 - 100 fl    MCH 29.6 26.5 - 33.0 pg    MCHC 30.7 (L) 31.5 - 36.5 g/dL    RDW 21.6 (H) 10.0 - 15.0 %    Platelet Count 195 150 - 450 10e9/L   Blood gas arterial with oxyhemoglobin   Result Value Ref Range    pH Arterial 7.46 (H) 7.35 - 7.45 pH    pCO2 Arterial 48 (H) 35 - 45 mm Hg    pO2 Arterial 117 (H) 80 - 105 mm Hg    Bicarbonate Arterial 34 (H) 21 - 28 mmol/L    FIO2 80%     Oxyhemoglobin Arterial 98 92 - 100 %    Base Excess Art 8.9 mmol/L   Glucose by meter   Result Value Ref Range    Glucose 206 (H) 70 - 99 mg/dL   Glucose by meter   Result Value Ref Range    Glucose 190 (H) 70 - 99 mg/dL

## 2018-10-03 NOTE — PROGRESS NOTES
Oncology/Hematology Follow Up Note:    Assessment and Plan:  #1 Respiratory failure, due to probable pneumonia and volume overload, cannot rule out lung toxicity from chemotherapy (rare)      - Intubated and mechanically ventilated  - Echo shows no new cardiomyopathy  - BAL studies negative thus far.      PLAN:  - Continue to treat with broad spectrum antibiotics for presumed pneumonia.  ID on board  - Agree with albumin followed by Lasix.  May consider Lasix drip, but would defer to ICU team.  Would aim for 1-2 L net negative today if possible  - Continue Solumedrol 500-1000 mg daily, for possible drug-induced pneumonitis.      #2 Hepatotoxicity from alcohol abuse and sepsis, in the setting of chemotherapy      - Baseline LFTs before starting chemotherapy (bilirubin normal- 1.4, normal ALT- 51, elevated AST likely due to chronic alcohol abuse, and chronically elevated Alk Phos due to long standing bone mets)  - ALT, AST, alk phos, and bilirubin down slightly since admission.  - hepatotoxicity from docetaxel is rare and usually self limited.      PLAN:  - Continue to provide support care  - Monitor LFTs daily  - If the patient recovers from this, would use paclitaxel instead of docetaxel with the next cycle, or omit taxanes altogether.      #3 Metastatic breast cancer, ER weakly positive (5%), ND negative, HER2 positive;       - Has sternal metastases and possible metastasis in left 5th rib  - Prior bone mets alone the spine were no longer FDG avid on PET/CT from 7/23  - Has had a good clinical response so far with significant shrinkage of the sternal metastases over the past 2 weeks.  - If the patient recovers from the current hospital stay, would consider switching from docetaxel to paclitaxel given lower risk of hepatotoxicity, and continue with Herceptin/Perjeta vs. Herceptin and Perjeta alone.      #4  Pancytopenia, chemotherapy-induced; Also suspect myelotoxicity due to alcohol abuse.  -  follow CBC;  Improving  -  neupogen discontinued on 9/26.  - Transfuse as needed to keep Hgb >7.0.  In the setting of respiratory failure, would be reasonable to target a higher Hgb goal.      #5 Code status  #6 Goals of care  - The patient has had a long history of Stage IV HER2 positive metastatic breast cancer (close to 20 years), when most people with this type of cancer only live 3-4 years.  She currently has very limited disease, which has actually responded to the new treatment she received.  We hope she can turn the corner from current ICU issues.      We will continue to aggressively diurese her and give her high dose steroids for the next 2-3 days.  Broad spectrum antibiotics should also be continued.  If her respiratory status fails to improve, then would have a discussion on goals of care with the patient's family towards the end of this week.  I am available for a family meeting after 4 pm Wed-Fri this week if needed.      Discussed with the Dr. Rodriguez this morning, and will discuss with Dr. Henry later today.      Richard Braun M.D.  [facility]  [phone]      Subjective:    Remains intubated, mechanically ventilated, and sedated. Afebrile over the past 24 hours.  Of Norepi, but still on Vaso.  Received 40 mg of IV Lasix at 5 pm yesterday evening, and again at 1 AM, preceded by Albumin.  However, she was only -252 ml over the past 24 hours.    Scheduled Medications:  Reviewed active medications    Labs:  CBC RESULTS:   Recent Labs   Lab Test  10/03/18   0505  10/02/18   0538  10/01/18   0620   WBC  20.2*  27.4*  27.0*   HGB  8.4*  8.9*  8.4*   HCT  27.4*  28.9*  27.5*   MCV  97  98  98   PLT  195  209  171       CMP  Recent Labs  Lab 10/03/18  0505 10/02/18  0538 10/01/18  0620 09/30/18  0450  09/29/18  0525 09/28/18  1520 09/28/18  1122    144 145* 148*  --  148*  --   --    POTASSIUM 3.3* 4.6 4.5 3.7  < > 3.2* 3.5  --    CHLORIDE 107 109 112* 113*  --  112*  --   --    CO2 34* 33* 31 29  --  30   "--   --    ANIONGAP 3 2* 2* 6  --  6  --   --    * 141* 156* 128*  --  125*  --   --    BUN 48* 40* 28 26  --  26  --   --    CR 0.42* 0.40* 0.36* 0.41*  --  0.42*  --   --    GFRESTIMATED >90 >90 >90 >90  --  >90  --   --    GFRESTBLACK >90 >90 >90 >90  --  >90  --   --    REAGAN 8.0* 8.0* 7.7* 7.9*  --  7.3*  --   --    MAG  --   --  2.0 1.9  --   --   --   --    PHOS  --   --  3.2 1.5*  --  2.6 2.2*  --    PROTTOTAL 5.0* 5.1* 4.9*  --   --   --   --  4.8*   ALBUMIN 1.8* 1.6* 1.7*  --   --   --   --  1.9*   BILITOTAL 8.8* 8.8* 8.4*  --   --   --   --  7.9*   ALKPHOS 581* 624* 688*  --   --   --   --  667*   AST 89* 89* 106*  --   --   --   --  158*   ALT 66* 59* 62*  --   --   --   --  69*   < > = values in this interval not displayed.    INR  Recent Labs  Lab 10/01/18  0620   INR 1.23*       Objective/Physical Exam:  Blood pressure 111/67, pulse 123, temperature 98.7  F (37.1  C), temperature source Axillary, resp. rate 19, height 1.676 m (5' 6\"), weight 71.4 kg (157 lb 6.5 oz), SpO2 98 %, not currently breastfeeding.  General appearance:alert, oriented, no acute distress  HEENT:sclera anicteric, no pallor, no thrush or mucositis.  Lungs: chest is clear to auscultation, no rales or rhonchi appreciated.  Abdomen: soft, NT, ND , BS normal  Ext: no edema or rashes    Richard Braun MD  [facility]  10/3/2018 7:46 AM        "

## 2018-10-04 NOTE — PROGRESS NOTES
CLINICAL NUTRITION SERVICES - REASSESSMENT NOTE       MALNUTRITION (Reassessed 10/04/2018)   % Weight Loss: Weight loss does not meet criteria for malnutrition --> r/t diuresis at this time  % Intake: Decreased intake does not meet criteria --> now enteral reliant  Subcutaneous Fat Loss:Orbital region mild to moderate depletion, Upper arm region moderate depletion and Thoracic region moderate depletion  Muscle Loss:Temporal region moderate to severe depletion, Clavicle bone region moderate depletion, Acromion bone region moderate depletion, Dorsal hand region moderate depletion, Patellar region moderate depletion, Anterior thigh region moderate or greater depletion and Posterior calf region moderate or greater depletion  Fluid Retention:None noted      Malnutrition Diagnosis: Severe malnutrition  In Context of:  Chronic illness or disease and Environmental or social circumstances       EVALUATION OF PROGRESS TOWARD GOALS   Diet:  Remains NPO with intubation.    Nutrition Support:  Goal enteral rate originally reached 9/27/2018.  Per last reassessment (10/01/2018), meeting 90% prescribed daily volume.  On this date also required decrease in enteral rate (dropped to trophic) d/t increased pressor needs/overall hemodynamic instability, and increase in abdominal distention.  Ok for increase in rate per MD 10/02/2018.  Mild distention per RN today (had patient yesterday as well) - thinks more so related to underlying liver dx/ascites.  Goal outlined below.  Enteral intake:  Received >95% prescribed daily volume yesterday.  <75% needs for a period of 48 hrs prior to this.      Type of Feeding Tube: ND (10 fr, bridle, 9/24)    Enteral Frequency:  Continuous    Enteral Regimen: Isosource at 45 mL/hr    Total Enteral Provisions: 1080 mL provides 1620 kcal (27 kcal/kg), 73 gm protein, 190 g CHO, 15 g fiber and 821 mL H20.    Increase by 10 mL q6 hours if tolerating    Meets >100% of DRI's --> continue given etoh hx    2 pkts  Prosource BID to provide additional 22 g protein daily (to total 95 g - 1.6 g/kg)    Free Water Flush: 60 mL q4 hours      Biochemical review:  - BG increasing slightly, see below, noted Lantus added  - Na increased at 149 (previously WNL x 48 hrs) --> discussed with MD  - K dropped to 2.6 (WNL at 3.5 yesterday, replacement already initiated)  - No phos since 10/01 (added-on)    Stooling:  - With rectal tube, output decreasing:  10/01: 1100 ml  10/02: 1000 ml  10/03: 600 ml    Wt trending:  - Highest wt of 165# on 10/02 and trending down with diuresis, still fluid up, cannot determine true wt trending at this time:  Vitals:    09/30/18 0545 10/01/18 0508 10/02/18 0500 10/03/18 0400   Weight: 69.3 kg (152 lb 12.5 oz) 70.3 kg (154 lb 15.7 oz) 74.9 kg (165 lb 2 oz) 71.4 kg (157 lb 6.5 oz)    10/04/18 0200   Weight: 71.5 kg (157 lb 10.1 oz)       ASSESSED NUTRITION NEEDS (PER APPROVED PRACTICE GUIDELINES, Dosing weight: 60 kg):  Estimated Energy Needs: 7345-8461+ kcals (25-30 Kcal/Kg)  Justification: maintenance and vented  Estimated Protein Needs:  grams protein (1.5-2 g pro/Kg)  Justification: hypercatabolism with critical illness  Estimated Fluid Needs: >1 mL/Kcal  Justification: maintenance      NEW FINDINGS:   - Per WOCN reassessment 10/01: sacrum/gluteal cleft - moisture associated dermatitis from loose/frequent stooling.  - Noted concerns for thrush/possible esophagitis: slight improvement per RN report.  - Palliative following - full code/restorative care.  Care conference planned for tomorrow.  - Medications reviewed:   40 mg Lasix q 6 hrs   sliding scale insulin, 7 units Lantus in AM    Magic mouthwash   Solu-medrol   Daily MVI/M   2 pkts Prosource BID    Precedex   Off Levo, decreasing vaso   Low-rate Prop over past 24 hrs (highest rate of 10.7 ml/hr - <250 kcal/day)    Fluids TKO  - NGT output decreasing:  10/01: 1340 ml  10/02: 1020 ml  10/03: 360 ml      Previous Goals:   TF to resume at goal rate  within next 48 hours  Evaluation: Met    Previous Nutrition Diagnosis:   Impaired nutrient utilization related to hemodynamic instability as evidenced by Levophed and vasopressin requirements, increase in abdominal distension with EN running at goal rate of 45 mL/hr.  Evaluation: Completed, changed below      CURRENT NUTRITION DIAGNOSIS  Predicted suboptimal nutrient intake (energy/protein) related to enteral reliant since 9/27/2018, only brief interruption and met >95% prescribed daily volume yesterday, potential for interruptions.    INTERVENTIONS  Recommendations / Nutrition Prescription  Continue regimen as above.     Water flushes per MD (use of Lasix).  Discussed during rounds.        Add-on phos (low and already replacing, not thought to be nutritionally related).    Insulin per MD/PharmD.    Implementation  Collaboration and Referral of Nutrition care: Discussed POC with team during rounds.    Goals  EN to meet % needs while NPO.      MONITORING AND EVALUATION:  Progress towards goals will be monitored and evaluated per protocol and Practice Guidelines      Ava Tiwari RD, LD  Clinical Dietitian  3rd floor/ICU: [phone]  All other floors: [phone]  Weekend/holiday: [phone]

## 2018-10-04 NOTE — PROGRESS NOTES
Patient remains on full vent support with the following settings:  Ventilation Mode: CMV/AC  (Continuous Mandatory Ventilation/ Assist Control)  FiO2 (%): 50 %  Rate Set (breaths/minute): 20 breaths/min  Tidal Volume Set (mL): 450 mL  PEEP (cm H2O): 10 cmH2O  Oxygen Concentration (%): 55 %  Resp: 23    No change made to vent setting this shift. SpO2 mid to high 90's, BS coarse, Suction for small to moderate thick pale yellow secretion. Will continue to monitor pt's respiratory status closely.    Radha Díaz, RT  10/4/2018 5:54 PM

## 2018-10-04 NOTE — PLAN OF CARE
Problem: Pneumonia (Adult)  Goal: Signs and Symptoms of Listed Potential Problems Will be Absent, Minimized or Managed (Pneumonia)  Signs and symptoms of listed potential problems will be absent, minimized or managed by discharge/transition of care (reference Pneumonia (Adult) CPG).   Outcome: Improving  ICU End of Shift Summary.  For vital signs and complete assessments, please see documentation flowsheets.     Pertinent assessments: Pt was very sleepy last evening, propofol was slowly weaned off, pt follows simple commands, opens eyes spontaneously, is more alert, moves legs a little on own,  lungs are coarse, sats 96%, VSS,afeb,sr, b/p was in the 140's systolic, vaso was turned off,  uo is very good, slept.  Major Shift Events: none  Plan (Upcoming Events): con't to assess.  Discharge/Transfer Needs: TBD    Bedside Shift Report Completed : y  Bedside Safety Check Completed: y

## 2018-10-04 NOTE — PROGRESS NOTES
Ventilation Mode: CMV/AC  (Continuous Mandatory Ventilation/ Assist Control)  FiO2 (%): 60 %  Rate Set (breaths/minute): 20 breaths/min  Tidal Volume Set (mL): 450 mL  PEEP (cm H2O): 10 cmH2O  Oxygen Concentration (%): 60 %  Resp: 23    Patient had stable shift. No changes made to vent. BS coarse. Will continue to monitor respiratory status.

## 2018-10-04 NOTE — PROGRESS NOTES
[facility]  Hospitalist Progress Note  Name: Eyal Turner    MRN: [id number]  Provider:  Liban Leiva MD    Date of Service: 10/04/2018    Summary of Stay: Eyal Turner is a 58 year old female with metastatic breast cancer with bone metastases to sternum and rib who started a new chemo regimen (that included switch from Kadcyla to Docetaxel, Herceptin and Perjeta) 5 days prior to admission (about 9/17/18).  In addition, prior medical history is notable for chronic alcohol abuse complicated by malnutrition and HTN who presented on 9/22/2018 with fatigue and shortness of breath.      Initially, Ms. Turner was hypotensive, though that was responsive to IV fluids in the ED. Her labs were notable for significantly elevated LFTs (above her baseline abnormals) with bilirubin of 8, alk phos about 600 and elevated transaminases.  Also was found to be pancytopenic with neutropenia.  Hemoglobin was less than 7 she received 1 unit RBC transfusion in the ED.     After admission she was found to have fever to 101 for which infectrious wrk up was started and chest x-ray showed bilateral infiltrate.  Cefepime was presumptively started for neutropenic fever and azithromycin added subsequently.  A CT abdomen pelvis was obtained that did not show any ascites or obstructive liver disease.    She also developed evidence for acute alcohol withdrawal initially with significant tremor that improved after a few doses of Ativan.  GI was consulted for possible upper GI bleed and for her liver failure.  No EGD planned.  Did start empiric Protonix on admission, but no evidence for ongoing blood loss.    Oncology also was consulted and recommended supportive cares.      Ms. Turner was transferred to ICU on 9/23/2018 for worsening hypoxemia and was intubated 9/24/2018.  Blood culture grew lactobacillus from port.  ID was consulted and the patient was switched to vancomycin and Zosyn.  Micafungin was also  added for oral thrush.  She was also started on Neupogen for neutropenia with good response.     On 9/29, she developed fever to 102 and leukocytosis of 27K (though the rise in white cell count may be partly due to exposure to Neupogen).      The question has been raised re: chemo toxicity as part of the cause of her critical illness.  Cont trial of methylprednisolone 250 mg IV twice daily.  Oncology following.  It is considered most likely that the patient does not have an ongoing bacterial infection and have therefore abx tapered off.     Problem List/Assessment and Plan:   Acute hypoxic respiratory failure, recalcitrant: Initially thought due to severe sepsis and pneumonia.  Possible component of vascular congestion also considered.  Remains stable on the ventilator.    -Pulmonary toxicity related to the patient's recent chemotherapy is considered as it seems likely that we have de facto eliminated other causes of her respiratory failure.  After discussion with Dr. Braun started a trial of moderately high dose steroids and attempt to diuresis as well as possible.  -repeat trial of lasix with albumin flushes q 8 hours  -solumedrol 250 mg IV q12 hours.   -vent day # 11.     Volume overload, iatrogenic: this is primarily a concern as much as it impinges on vent wean  -started 10/2 with higher dose (40 mg q 8 hours IV Lasix) along with albumin infusions.  Improved output with this  -tolerating gentle wean of vent support.     Septic shock and neutropenic fever: Some evidence of pneumonia with possible esophagitis and does have oral candidiasis.  One blood culture positive for lactobacillus which is considered contaminant.  Infectious disease following.     -stopped abx over the past several days  -CT of chest, abdomen, and pelvis on 9/30 shows extensive airspace infiltrates throughout both lungs.  -Repeated blood, urine, and sputum cultures last on 9/30 -- all NTD; diarrhea c dif negative x 2, last on 10/1  -off  [place], remains on Vasopressin at 2.4 U/hr     Advanced liver disease with suspected liver failure.  Abnormal LFTs, thought to be multifactorial: Presented with jaundice an bilirubin was up to 9.8 up from 2.4 three months ago.  Alkaline phosphatase was elevated to 594, AST elevated 264, ALT elevated at 94.  Does drink 2 bottles of wine daily so possible acute alcoholic hepatitis.  Also additionally started on chemotherapy with docetaxel, trastuzumab, and pertuzumab so possibility of drug toxicity although per oncology with this regimen there is not typically significant liver toxicity.  Albumin is low.  Given her alcohol abuse suspect alcohol hepatitis playing a role here.   -Gastroenterology consulted and following peripherally.  -Largely unremarkable right upper quadrant ultrasound.    Empiric trial of steroids/hyperglycemia:  -initiated 10/1, now dose 250 mg q 12 hours with plan to continue for 3 days' trial.   -need to monitor and correct hyperglycemia induced by steroids. Check sugars q 4 hours and cover with high-intensity ISS. Lantus at 7 units in am.  -continues on tube feeds, expected short burst of high-dose steroids      Alcohol dependence with acute withdrawal: Has been drinking 2 bottles of wine daily for years, per family report.  Last use 1-2 days prior to admission.  Presented in alcohol withdrawal with tremor.  -CIWA with Ativan as needed, but not requiring any doses at this time.  -Continue thiamine and folate.  -Now on Precedex and propofol.      Acute on chronic pancytopenia with neutropenia: On admission WBC 0.5 with ANC 0.4, hemoglobin 6.9, and platelets 63.  Hemoglobin was 11.4 three months ago and platelets were 108.  Suspect acute drop is from her recent chemotherapy.  Also possible upper GI bleed as below contributing.  -Hemoglobin stable following transfusion support but gradually drifting down and give 1 more unit of PRBCs today.  -Daily CBC.  -Hematology following.  -Neutropenia resolved  following Neupogen which has now been discontinued.      Metastatic breast cancer: Initially diagnosed in 2000 s/p bilateral mastectomy.  Has undergone multiple rounds of chemotherapy and radiation.  Has known metastatic disease to her sternum and third left rib.  Had recent possible skin infection from the sternum met/mass and received Keflex.  Does not look actively infected at this time.  Has port in place.  Restarted on chemotherapy with docetaxel, trastuzumab, and pertuzumab on 9/17/18.  Follows with Dr. Braun of oncology.  Doing quite poorly following chemotherapy with poor oral intake, weakness, and now acute liver injury.  -[facility] consulted, appreciate recommendations.  -No known metastases except to bone which probably accounts for the chronically elevated alkaline phosphatase    Mucositis versus thrush and possible esophagitis: Patient erosive changes and plaque on the tongue and her soft palate.  Question whether this is mucositis from her docetaxel or Candida infection.  Sore throat for the week PTA possible esophagitis as well.  -GI not planning EGD to investigate for esophagitis.  -Continue micafungin per ID.     Possible UGIB: Reports of vomiting with red blood in it.  Denies any melena or hematochezia.  Anemia and thrombocytopenia could in part be chemotherapy related.  Has required periodic transfusions.  -Twice daily Protonix.  -GI not planning an EGD .      Hypokalemia and hypomagnesemia:   -Potassium and magnesium replacement protocol.      Severe protein calorie malnutrition: Evidence for fat loss on exam likely related to chronic alcohol use, metastatic breast cancer, and recent chemotherapy.  Clearly has severe malnutrition, though per Dr. Braun, this appears chronic.  -Dietitian consult.  -Feeding tube placed and continuing tube feeds.    Diarrhea: repeated C. difficile testing negative.  Rectal tube now in place as neutropenia resolved.    Hypernatremia: adjust free water.    DVT  Prophylaxis: Pneumatic Compression Devices  Code Status: Full Code  Disposition: To be determined   In any case, the overall plan is to cont with current cares and reassess on Friday, after 3 days of high-dose steroids in order to assess response. At that time, will need to discuss extubation v trach.     Interval History   Intubated and sedated.  Stable night.  Slightly weaning FIO2, now to 60%  Discussed plan w/ Dr. Braun, Oncology    -Data reviewed today: I personally reviewed all new labs and imaging results over the last 24 hours.     Physical Exam   Temp: 98.8  F (37.1  C) Temp src: Axillary BP: 124/73   Heart Rate: 125 Resp: 27 SpO2: 97 % O2 Device: Mechanical Ventilator    Vitals:    10/02/18 0500 10/03/18 0400 10/04/18 0200   Weight: 74.9 kg (165 lb 2 oz) 71.4 kg (157 lb 6.5 oz) 71.5 kg (157 lb 10.1 oz)     Vital Signs with Ranges  Temp:  [98.1  F (36.7  C)-99.3  F (37.4  C)] 98.8  F (37.1  C)  Heart Rate:  [] 125  Resp:  [16-44] 27  BP: (124)/(73) 124/73  MAP:  [64 mmHg-90 mmHg] 79 mmHg  Arterial Line BP: (105-146)/(44-63) 131/55  FiO2 (%):  [60 %-70 %] 60 %  SpO2:  [93 %-98 %] 97 %  I/O last 3 completed shifts:  In: 2810.27 [I.V.:1170.27; NG/GT:360]  Out: 5750 [Urine:5250; Stool:500]    GENERAL: Intubated and sedated, still on sedative meds.  Chronically ill with bi-temporal wasting.  HEENT: Normocephalic, atraumatic. Extraocular movements intact.  Icteric.  CARDIOVASCULAR: Regular rate and rhythm without murmurs or rubs. No S3.  PULMONARY: Clear bilaterally.  GASTROINTESTINAL: Soft, moderately distended. Bowel sounds normoactive to mildly hyperactive. No appreciable tenderness, rebound, guarding.  EXTREMITIES: No cyanosis or clubbing. No edema.  NEUROLOGICAL: unable  DERMATOLOGICAL: No rash, ulcer, but mild jaundice.  Significant diffuse bruising.    Medications     dexmedetomidine 0.7 mcg/kg/hr (10/04/18 0800)     IV fluid REPLACEMENT ONLY       HYDROmorphone 0.4 mg/hr (10/04/18 0800)      norepinephrine Stopped (10/02/18 2300)     propofol (DIPRIVAN) infusion Stopped (10/04/18 0746)     sodium chloride 10 mL/hr at 10/04/18 0815     sodium chloride 10 mL/hr at 10/03/18 1805     vasopressin (PITRESSIN) infusion ADULT (40 mL) Stopped (10/04/18 0800)       albumin human  12.5 g Intravenous Q6H     bacitracin   Topical BID     folic acid  1 mg Oral or Feeding Tube Daily     furosemide  40 mg Intravenous Q6H     heparin  5 mL Intracatheter Q28 Days     heparin lock flush  5-10 mL Intracatheter Q24H     insulin aspart  1-12 Units Subcutaneous Q4H     insulin glargine  7 Units Subcutaneous QAM AC     lidocaine visc 2% & maalox/mylanta w/simethicone & diphenhydramine  10 mL Swish & Swallow Q6H     methylPREDNISolone  250 mg Intravenous Q12H     micafungin  100 mg Intravenous Q24H     multivitamins with minerals  15 mL Per Feeding Tube Daily     pantoprazole  40 mg Per Feeding Tube BID     protein modular  2 packet Per Feeding Tube BID w/meals     QUEtiapine  25 mg Oral or Feeding Tube BID     sodium chloride (PF)  3 mL Intracatheter Q8H     Data     Laboratory:    Recent Labs  Lab 10/04/18  0606 10/03/18  0505 10/02/18  0538   WBC 21.4* 20.2* 27.4*   HGB 8.1* 8.4* 8.9*   HCT 26.1* 27.4* 28.9*   MCV 95 97 98    195 209       Recent Labs  Lab 10/04/18  0606 10/03/18  1401 10/03/18  0505 10/02/18  0538   *  --  144 144   POTASSIUM 2.6* 3.5 3.3* 4.6   CHLORIDE 106  --  107 109   CO2 39*  --  34* 33*   ANIONGAP 4  --  3 2*   *  --  205* 141*   BUN 41*  --  48* 40*   CR 0.38*  --  0.42* 0.40*   GFRESTIMATED >90  --  >90 >90   GFRESTBLACK >90  --  >90 >90   REAGAN 8.1*  --  8.0* 8.0*       Recent Labs  Lab 09/30/18  1210 09/30/18  0335 09/30/18  0130 09/30/18  0111 09/29/18  0943 09/29/18  0911 09/28/18  1211   CULT No growth after 3 days  Culture received and in progress.  Positive AFB results are called as soon as detected.  Final report to follow in 7 to 8 weeks.  Assayed at [facility] [address] [place] [id number] [phone]  Presumptive identification:Candida albicans / dubliniensisisolatedCandida albicans and Treva dubliniensis are not routinely speciated*  No growth  Culture negative monitoring continues  Canceled, Test credited  Test reordered as correct code Light growthCandida albicans / dubliniensisCandida albicans and Treva dubliniensis are not routinely speciatedSusceptibility testing not routinely done* No growth after 4 days  No growth No growth after 4 days No growth after 5 days No growth after 5 days No growth       Imaging:  No results found for this or any previous visit (from the past 24 hour(s)).

## 2018-10-04 NOTE — PROGRESS NOTES
Oncology/Hematology Follow Up Note:    Assessment and Plan:  #1 Respiratory failure, due to probable pneumonia and volume overload, cannot rule out lung toxicity from chemotherapy (rare)      - Intubated and mechanically ventilated  - Echo shows no new cardiomyopathy  - BAL studies negative thus far.  - Some improvement today.  Of pressors and FiO2 has decreased to 60%.      PLAN:  - Continue to treat with broad spectrum antibiotics for presumed pneumonia per ID  - Continue to diurese aggressively.  IV Lasix Q6H and albumin  - Continue Solumedrol 500-1000 mg daily, for possible drug-induced pneumonitis.      #2 Hepatotoxicity from alcohol abuse and sepsis, in the setting of chemotherapy      - Baseline LFTs before starting chemotherapy (bilirubin normal- 1.4, normal ALT- 51, elevated AST likely due to chronic alcohol abuse, and chronically elevated Alk Phos due to long standing bone mets)  - ALT, AST, alk phos, and bilirubin relatively stable since admission.  - hepatotoxicity from docetaxel is rare and usually self limited, but most likely accentuated by alcohol abuse.      PLAN:  - Continue to provide support care  - Monitor LFTs daily  - If the patient recovers from this, would use paclitaxel instead of docetaxel with the next cycle, or omit taxanes altogether.      #3 Metastatic breast cancer, ER weakly positive (5%), PA negative, HER2 positive;       - Has sternal metastases and possible metastasis in left 5th rib  - Prior bone mets alone the spine were no longer FDG avid on PET/CT from 7/23  - Has had a good clinical response so far with significant shrinkage of the sternal metastases over the past 2 weeks.  - If the patient recovers from the current hospital stay, would consider switching from docetaxel to paclitaxel given lower risk of hepatotoxicity, and continue with Herceptin/Perjeta vs. Herceptin and Perjeta alone.      #4  Pancytopenia, chemotherapy-induced; Also suspect myelotoxicity due to alcohol  abuse.  -  follow CBC; Improving  -  neupogen discontinued on 9/26.  - Transfuse as needed to keep Hgb >7.0.  In the setting of respiratory failure, could consider targeting a higher Hgb goal.      #5 Code status  #6 Goals of care  - The patient has had a long history of Stage IV HER2 positive metastatic breast cancer (close to 20 years), when most people with this type of cancer only live 3-4 years.  She currently has very limited disease, which has actually responded to the new treatment she received.  We hope she can turn the corner from current ICU issues.       We will continue to aggressively diurese her and give her high dose steroids for the next few days.  Broad spectrum antibiotics should also be continued.  She has made some improvement over the past 24 hours, but if her progress stalls, would have a discussion on goals of care with the patient's family towards the end of this week.    Tentatively scheduled for a family care conference on Friday @ 4PM.      Discussed with the Dr. Shirley this morning.      Richard Braun M.D.  [facility]  [phone]      Subjective:    Eyal has made some improvement over the past 24 hours.  She is now off pressors,and FiO2 has decreased to 60%.  Afebrile. I/O -1.4 L yesterday with Lasix 40 mg IV Q6H along with albumin.    This morning she was able to open her eyes intermittently while on Precedex for sedation.    Scheduled Medications:  Reviewed active medications    Labs:  CBC RESULTS:   Recent Labs   Lab Test  10/04/18   0606  10/03/18   0505  10/02/18   0538   WBC  21.4*  20.2*  27.4*   HGB  8.1*  8.4*  8.9*   HCT  26.1*  27.4*  28.9*   MCV  95  97  98   PLT  196  195  209       CMP  Recent Labs  Lab 10/04/18  0606 10/03/18  1401 10/03/18  0505 10/02/18  0538 10/01/18  0620 09/30/18  0450  09/29/18  0525 09/28/18  1520   *  --  144 144 145* 148*  --  148*  --    POTASSIUM 2.6* 3.5 3.3* 4.6 4.5 3.7  < > 3.2* 3.5   CHLORIDE 106  --  107 109 112* 113*   "--  112*  --    CO2 39*  --  34* 33* 31 29  --  30  --    ANIONGAP 4  --  3 2* 2* 6  --  6  --    *  --  205* 141* 156* 128*  --  125*  --    BUN 41*  --  48* 40* 28 26  --  26  --    CR 0.38*  --  0.42* 0.40* 0.36* 0.41*  --  0.42*  --    GFRESTIMATED >90  --  >90 >90 >90 >90  --  >90  --    GFRESTBLACK >90  --  >90 >90 >90 >90  --  >90  --    REAGAN 8.1*  --  8.0* 8.0* 7.7* 7.9*  --  7.3*  --    MAG  --   --   --   --  2.0 1.9  --   --   --    PHOS  --   --   --   --  3.2 1.5*  --  2.6 2.2*   PROTTOTAL 5.4*  --  5.0* 5.1* 4.9*  --   --   --   --    ALBUMIN 2.5*  --  1.8* 1.6* 1.7*  --   --   --   --    BILITOTAL 10.0*  --  8.8* 8.8* 8.4*  --   --   --   --    ALKPHOS 630*  --  581* 624* 688*  --   --   --   --    *  --  89* 89* 106*  --   --   --   --    *  --  66* 59* 62*  --   --   --   --    < > = values in this interval not displayed.    INR  Recent Labs  Lab 10/01/18  0620   INR 1.23*       Objective/Physical Exam:  Blood pressure 124/73, pulse 123, temperature 98.8  F (37.1  C), temperature source Axillary, resp. rate 29, height 1.676 m (5' 6\"), weight 71.5 kg (157 lb 10.1 oz), SpO2 95 %, not currently breastfeeding.  General intubated and sedated  Skin:  Jaundiced  Eyes:  Scleral icterus noted.  Chest:  Previously noted sternal mets have decreased significantly in size  Lungs:  Coarse breath sounds bilaterally  Abdomen:  Distended.  Decreased BS.  Ext: trace pitting edema noted in bilateral LEs.    Richard Braun MD  [facility]  10/4/2018 7:57 AM        "

## 2018-10-04 NOTE — PLAN OF CARE
Problem: Patient Care Overview  Goal: Plan of Care/Patient Progress Review  Outcome: No Change  ICU End of Shift Summary.  For vital signs and complete assessments, please see documentation flowsheets.     Pertinent assessments: Patient remains intubated. Tolerating tube feeds. Remains off pressors.   Major Shift Events: Able to decrease fio2 to 50%  Plan (Upcoming Events): continue to monitor. Family meeting tomorrow  Discharge/Transfer Needs: to be determined    Bedside Shift Report Completed : yes  Bedside Safety Check Completed:yes

## 2018-10-04 NOTE — PROGRESS NOTES
[facility]  Infectious Disease Progress Note          Assessment and Plan:   IMPRESSION:   1.  A 58-year-old female with widely metastatic long-term breast cancer, ongoing chemotherapy, and now neutropenia.   2.  Acute sepsis and neutropenic fever, some signs of pneumonia, with possible esophagitis and positive blood culture, at risk for multiple infections.   3.  Thrush and some concern for possible esophagitis.   4.  Acute and chronic long-term alcohol abuse and liver dysfunction.   5.  Prior Martha-Stafford tear.   6.  Respiratory failure, now intubated.   7.  Positive blood culture, 1 bottle for diphtheroids.  No further identification.  This could be a contaminant versus line infection.       RECOMMENDATIONS:   1.   Blood culture 1 lactobacillus contaminant wo multiple cultures positive    2.   On Zosyn , 10 days broad coverage, off vanco, not really improving despite full marrow recovery, intermittent fever, sl improvement, ? discontinue zosyn any time  3.  From a thrush and possible fungal infection standpoint, agree fluconazole hold for now, on but will  micafungin instead. 11 days tx  already. Complete 2 weeks as though esophagitis  4.  WBC high(with growth factors), fever resolved mostly   5.  Obviously, prognosis poor overall for a multitude of reasons, primarily malignancy.   6 steroids for ? Med related lung disease        Interval History:   Intubated sedated WBC rising above nl range incl PMNs  No new + cxs resolving LGF on steroids              Medications:       albumin human  12.5 g Intravenous Q6H     bacitracin   Topical BID     folic acid  1 mg Oral or Feeding Tube Daily     furosemide  40 mg Intravenous Q6H     heparin  5 mL Intracatheter Q28 Days     heparin lock flush  5-10 mL Intracatheter Q24H     insulin aspart  1-12 Units Subcutaneous Q4H     insulin glargine  7 Units Subcutaneous QAM AC     lidocaine visc 2% & maalox/mylanta w/simethicone & diphenhydramine  10 mL  "Swish & Swallow Q6H     methylPREDNISolone  250 mg Intravenous Q12H     micafungin  100 mg Intravenous Q24H     multivitamins with minerals  15 mL Per Feeding Tube Daily     pantoprazole  40 mg Per Feeding Tube BID     protein modular  2 packet Per Feeding Tube BID w/meals     QUEtiapine  25 mg Oral or Feeding Tube BID     sodium chloride (PF)  3 mL Intracatheter Q8H                  Physical Exam:   Blood pressure 124/73, pulse 123, temperature 98.8  F (37.1  C), temperature source Axillary, resp. rate (!) 31, height 1.676 m (5' 6\"), weight 71.5 kg (157 lb 10.1 oz), SpO2 99 %, not currently breastfeeding.  Wt Readings from Last 2 Encounters:   10/04/18 71.5 kg (157 lb 10.1 oz)   10/23/17 57.6 kg (127 lb)     Vital Signs with Ranges  Temp:  [98.1  F (36.7  C)-99.3  F (37.4  C)] 98.8  F (37.1  C)  Heart Rate:  [] 130  Resp:  [16-44] 31  BP: (124)/(73) 124/73  MAP:  [64 mmHg-90 mmHg] 75 mmHg  Arterial Line BP: (105-145)/(44-63) 128/54  FiO2 (%):  [60 %-70 %] 60 %  SpO2:  [93 %-99 %] 99 %    Constitutional: Intubated and sedated   Lungs: Clear to auscultation bilaterally, no crackles or wheezing   Cardiovascular: Regular rate and rhythm, normal S1 and S2, and no murmur noted   Abdomen: Normal bowel sounds, soft, non-distended, non-tender   Skin: No rashes, no cyanosis, no edema   Other:           Data:   All microbiology laboratory data reviewed.  Recent Labs   Lab Test  10/04/18   0606  10/03/18   0505  10/02/18   0538   WBC  21.4*  20.2*  27.4*   HGB  8.1*  8.4*  8.9*   HCT  26.1*  27.4*  28.9*   MCV  95  97  98   PLT  196  195  209     Recent Labs   Lab Test  10/04/18   0606  10/03/18   0505  10/02/18   0538   CR  0.38*  0.42*  0.40*     No lab results found.  Recent Labs   Lab Test  09/30/18   1210  09/30/18   0335  09/30/18   0130  09/30/18   0111  09/29/18   0943  09/29/18   0911  09/28/18   1211  09/24/18   0945  09/22/18   1033   CULT  Candida albicans / dubliniensis  isolated  Treva albicans and " Treva dubliniensis are not routinely speciated  *  No growth after 3 days  Culture received and in progress.  Positive AFB results are called as soon as detected.    Final report to follow in 7 to 8 weeks.    Assayed at [facility] [employer] [address] [place] [id number] [phone]  No growth  Culture negative monitoring continues  Canceled, Test credited  Test reordered as correct code  Light growth  Candida albicans / dubliniensis  Candida albicans and Candida dubliniensis are not routinely speciated  Susceptibility testing not routinely done  *  No growth after 4 days  No growth  No growth after 4 days  No growth after 5 days  No growth after 5 days  No growth  Light growth  Candida albicans / dubliniensis  Candida albicans and Candida dubliniensis are not routinely speciated  Susceptibility testing not routinely done  *  No growth

## 2018-10-05 NOTE — PROGRESS NOTES
[facility]  Palliative Care Progress Note  Text Page    Eyal Turner is a 58 year old female who was admitted on 9/22/2018. Palliative care team was consulted to see the patient for goals of care decision making.    Assessment & Plan      1. Decisional Capacity -  Unreliable as she is currently intubated. Patient does not have an advance directive. Per  informed consent policy next of kin should be involved in all consent and decision making. Her  Ashish Turner is next-of-kin. Recommendation for pt to consider completion of HCD identifying her wishes and naming medical decision-makers prior to discharge.     2. Chronic cancer pain - Continue IV Dilaudid 0.2-0.4 mg/hr infusion for comfort. Chronic use of Oxycodone 5 mg per [facility]  (35 tablets obtained 9/21/2018).      3.  Dyspnea - Patient intubated. Plan to continue steroids and begin taper as per Intensivist/Hospitalist/Oncologist. Weaning trials, antibiotics as per Intensivist/Hospitalist. Nursing to increase pt activity as blood pressure stabilizes.     4.  Agitation - Continue Seroquel 25 mg BID and IV Ativan as needed. Receiving sedation as per Intensivist.     5.  Severe Malnutrition - Appreciate input of Registered Dietitian Tiffanie Cronin, RD, LD. Patient intubated with NG Tube Feeding.        6. Diarrhea - C. Diff negative 9/26/2018 and 9/29/2018. Rectal tube started 9/29 as neutropenia and thrombocytopenia resolved.     7. Spiritual Care - Appreciate ongoing support of Chaplains.The patient's  indicates a friend and , David is involved as needed.      8. Care Planning - to be determined as pt condition improves. In addition to deconditioning and recovery from acute illness, pt would benefit from alcohol treatment and nutrition support.     Goal of Care: FULL CODE - Restorative care    Discussed with Dr. Braun, Dr. Palomo, Dr. Mclean, Bedside nurse Ariana, MELIA Silva     Will continue to follow this pt for  "symptom management and support for goals of care with pt and family. Thank you for the opportunity to assist in the care of this patient.     MINISTERIO Gomez, AGCNS, [facility]  Pain and Palliative Care  Pager [phone]  Office [phone]     Time Spent on this Encounter   I spent 75 minutes > 50% in assessment of the patient, counseling and discussion with the patient's family as documented in sections above. The remainder of time in review of chart, documentation, coordination of care and discussion with the health care team.    Interval History   Chart reviewed.  Pt awakens spontaneously, moves spontaneously, and follows some commands.   Remains intubated. FIO2 on vent has been weaned to 50%, remains on full vent support with diprivan and hydromorphone for sedation and pain management. Responding to diuresis. Tolerating enteral feeding.    Albumin 2.7, total protein 5.2, total bilirubin 10.8 - higher, alk phos 549 - improving, alt 127 higher, ast 166 higher.     Discussions Goals of care    10/5/2018 Discussed with MINISTERIO Gomez  4:15-5:10pm  Met in pt room for care conference with pt's son Cezar, son Luis A, and  Luis A, Dr. Richard Braun, and bedside RN Ariana. Request received by pt's parents to attend and shared with pt's family present, but declined by  Luis A, who is pt's decision-maker at this time. \"They can go through me for updates regarding pt's condition\".     Dr. Braun reviewed pt current medical condition.   - pt with advanced metastatic breast cancer who has far surpassed average life expectancy of 3-4 years. Decision recently to change chemotherapy regimen due to disease progression with metastasis involving her sternum and possibly left 5th rib. Received regimen of docetaxel chemotherapy, perjeta and herceptin which are targeted therapies 5 days prior to admission.   - pt with progressive fatigue after regimen, SOB, admitted to hospital with worsening condition.  - intubated " 9/24  Possible contributing factors to respiratory status  - infection  Has been treated for infection. Bronch washings negative so far for infection.   - fluid balance  Pt received large amounts of IV fluids as part of her treatment for sepsis, which can increase the workload of her heart and lungs. Pt has been receiving albumin, which is low in the setting of cancer, malnutrition, liver impairment. Albumin and diuretics will help with fluid balance and removal of excess fluid in the body  - heart  Pt has history of past and present anticancer agents which can harm pt's heart. Testing of heart during hospitalization does not indicate heart damage.  - chemotherapy agents  Risk of lung injury is very rare with current regimen. However, pt's liver enzymes are abnormal indicating some level of impairment, likely related to her alcohol use, can also be caused from harm from medication exposure. This impairment slows the metabolism of pt's chemo drugs, increasing her bodies exposure and toxicities from prolonged exposure. Notably pt had a good response of her cancer from the treatment as evidenced by the decreased size of her mediastinal tumor. Pt is currently receiving doses of steroids to help with inflammation in her lungs possibly caused from exposure to harmful medication effects.   - malnutrition   Family notes pt with poor appetite and taste changes for some time. Pt exposure to chemotherapy agents, factors secreted by her tumor, excessive alcohol use likely contributors. Several agents including marinol, were tried. Pt has also tried methylphenidate to increase energy and help with appetite.     Pt clinical condition seems to be improving. She is no longer requiring pressor support, and ventilator support has decreased, although she has not completed weaning trials yet. Pt is responding to care team and family.    Outcomes of meeting:  -Continue with current plan, hope that pt is able to wean off ventilator over  the weekend. Family asked to consider if pt is extubated, would she want to be reintubated again if her respirator status declined - they all answered yes.    Family to consider  - If pt is not able to wean, or is reintubated, discussions regarding a tracheostomy, PEG tube and likely care at [facility] - would pt want to go through this for more time, without guarantee that she will recover to her PTA condition.  - pt has been treated for alcohol withdrawal. Many reasons for her to stop drinking including nutrition status and liver function which are essential for further cancer treatment and QOL. Would she be willing to receive treatment for this.    Family agree to update pt's daughter. My colleague Chata Huff will follow up with them and with pt next week for ongoing support. Recommend for pt to complete HCD naming decision-makers and her wishes as her mentation improves due to apparent complex dynamics within family as previously documented by care team.    Review of Systems       Unable as patient orally intubated and sedated    Physical Exam   Temp:  [96.8  F (36  C)-100.6  F (38.1  C)] 100.2  F (37.9  C)  Heart Rate:  [] 79  Resp:  [9-30] 20  BP: (89-98)/(55-64) 89/55  MAP:  [60 mmHg-97 mmHg] 60 mmHg  Arterial Line BP: ()/(42-68) 113/42  FiO2 (%):  [50 %] 50 %  SpO2:  [94 %-98 %] 98 %  147 lbs 11.33 oz  GEN:  Orally intubated and sedated.   HEENT: Jaundiced, cachectic. Scleral icteris. Orally intubated.  Cardiovascular: RRR. S1, s2. +3 generalized edema.  Lungs: Not breathing above the ventilator.   M/S moves all extremities.  Abdomin: Distended, semifirm. BS+. Rectal tube in place.  Neuro: TOM  Psych: Somulent/sedated. Follows some commands, awakens spontaneously, appears to recognize family.    Medications     dexmedetomidine 0.6 mcg/kg/hr (10/05/18 1640)     IV fluid REPLACEMENT ONLY       HYDROmorphone 0.4 mg/hr (10/05/18 1600)     propofol (DIPRIVAN) infusion 10 mcg/kg/min (10/05/18 1702)      sodium chloride 10 mL/hr at 10/05/18 1600     sodium chloride 10 mL/hr at 10/05/18 1600       bacitracin   Topical BID     fiber modular (NUTRISOURCE FIBER)  1 packet Per Feeding Tube BID     folic acid  1 mg Oral or Feeding Tube Daily     furosemide  40 mg Intravenous Q12H     heparin  5 mL Intracatheter Q28 Days     heparin lock flush  5-10 mL Intracatheter Q24H     insulin aspart  1-12 Units Subcutaneous Q4H     insulin glargine  7 Units Subcutaneous QAM AC     lidocaine visc 2% & maalox/mylanta w/simethicone & diphenhydramine  10 mL Swish & Swallow Q6H     methylPREDNISolone  250 mg Intravenous Q12H     [START ON 10/6/2018] methylPREDNISolone  62.5 mg Intravenous Q12H     metolazone  5 mg Oral Daily     micafungin  100 mg Intravenous Q24H     multivitamins with minerals  15 mL Per Feeding Tube Daily     pantoprazole  40 mg Per Feeding Tube BID     protein modular  2 packet Per Feeding Tube BID w/meals     QUEtiapine  25 mg Oral or Feeding Tube BID     sodium chloride (PF)  3 mL Intracatheter Q8H       Data   Results for orders placed or performed during the hospital encounter of 09/22/18 (from the past 24 hour(s))   Phosphorus (AM Draw)   Result Value Ref Range    Phosphorus 1.8 (L) 2.5 - 4.5 mg/dL   Glucose by meter   Result Value Ref Range    Glucose 158 (H) 70 - 99 mg/dL   Potassium   Result Value Ref Range    Potassium 3.3 (L) 3.4 - 5.3 mmol/L   Glucose by meter   Result Value Ref Range    Glucose 188 (H) 70 - 99 mg/dL   Phosphorus (AM Draw)   Result Value Ref Range    Phosphorus 2.6 2.5 - 4.5 mg/dL   CBC with platelets   Result Value Ref Range    WBC 24.3 (H) 4.0 - 11.0 10e9/L    RBC Count 2.60 (L) 3.8 - 5.2 10e12/L    Hemoglobin 7.9 (L) 11.7 - 15.7 g/dL    Hematocrit 25.2 (L) 35.0 - 47.0 %    MCV 97 78 - 100 fl    MCH 30.4 26.5 - 33.0 pg    MCHC 31.3 (L) 31.5 - 36.5 g/dL    RDW 22.7 (H) 10.0 - 15.0 %    Platelet Count 187 150 - 450 10e9/L   Comprehensive metabolic panel   Result Value Ref Range    Sodium  153 (H) 133 - 144 mmol/L    Potassium 3.6 3.4 - 5.3 mmol/L    Chloride 109 94 - 109 mmol/L    Carbon Dioxide 39 (H) 20 - 32 mmol/L    Anion Gap 5 3 - 14 mmol/L    Glucose 199 (H) 70 - 99 mg/dL    Urea Nitrogen 38 (H) 7 - 30 mg/dL    Creatinine 0.32 (L) 0.52 - 1.04 mg/dL    GFR Estimate >90 >60 mL/min/1.7m2    GFR Estimate If Black >90 >60 mL/min/1.7m2    Calcium 7.9 (L) 8.5 - 10.1 mg/dL    Bilirubin Total 10.8 (H) 0.2 - 1.3 mg/dL    Albumin 2.7 (L) 3.4 - 5.0 g/dL    Protein Total 5.2 (L) 6.8 - 8.8 g/dL    Alkaline Phosphatase 549 (H) 40 - 150 U/L     (H) 0 - 50 U/L     (H) 0 - 45 U/L   Magnesium   Result Value Ref Range    Magnesium 2.0 1.6 - 2.3 mg/dL   Glucose by meter   Result Value Ref Range    Glucose 200 (H) 70 - 99 mg/dL   XR Chest Port 1 View    Narrative    XR PORTABLE CHEST ONE VIEW  10/5/2018  7:24 AM     HISTORY: Bilateral infiltrates/intubated.    COMPARISON: 9/30/2018 at 1320.      Impression    IMPRESSION: No change in position of tubes and lines. Moderate  improvement of previously seen diffuse bilateral pulmonary  infiltrates. Heart size remains normal.   Glucose by meter   Result Value Ref Range    Glucose 180 (H) 70 - 99 mg/dL   Glucose by meter   Result Value Ref Range    Glucose 196 (H) 70 - 99 mg/dL   Glucose by meter   Result Value Ref Range    Glucose 173 (H) 70 - 99 mg/dL

## 2018-10-05 NOTE — PLAN OF CARE
Problem: Patient Care Overview  Goal: Plan of Care/Patient Progress Review  ICU End of Shift Summary.  For vital signs and complete assessments, please see documentation flowsheets.     Pertinent assessments: Tmax 100.6 axillary. Opens eyes spontaneously. Drowsy on propofol and precedex, restless when attempted to titrate down. Bp's soft in middle of night, titrated sedation down however HR and BP then became elevated. Stool remains liquid throughout night, rectal tube present. Good urine output via goddard throughout night, increased with albumin and lasix. Fragile skin, mepilex dressing on coccyx for protection. Small area on inner buttocks bleeding at times. Edema noted 2-3+ feet, hands, and labia. Phosphorus and K replaced with rechecks this am WNL. Tuned and repositioned throughout night.   Major Shift Events: Titrated sedation. Bath completed.   Plan (Upcoming Events): Care conference scheduled for today. ID and Hem/onc following.   Discharge/Transfer Needs: To be determined pending progress.     Bedside Shift Report Completed : yes  Bedside Safety Check Completed: yes

## 2018-10-05 NOTE — PROGRESS NOTES
Critical Care Progress Note      10/05/2018    Name: Eyal Turner MRN#: [id number]   Age: 58 year old YOB: 1960     Hsptl Day# 13  ICU DAY # 10    MV DAY # 10             Problem List:   1. Metastatic breast cancer-bone  2. Acute hypoxic respiratory failure   A) possible drug-induced pneumonitis  3. Alcohol abuse/dependence  4. Hepatitis, chronic cause unclear    Past 24 Hrs:  Stable from respiratory and hemodynamic standpoint ON.           Summary/Hospital Course:     59 yo female with metastatic breast CA admitted to the ICU. I have personally reviewed the daily labs, imaging studies, cultures and discussed the case with referring physician and consulting physicians.     Neurology/Psychiatry:   1. ETOH Use  2. Analgesia  CT head negative for acute process/bleed.  Plan  1. precedex/propofol for sedation goal RASS of negative 2  -Monitor for signs of ETOH withdrawal  2. Dilaudid gtt for analgesia-bone lesions    Cardiovascular:   1.Hemodynamics -improved echo pretty normal 9/29  - No pressors  - ECHO 9/29: EF 60-65% mild AV stenosis mean gradient 12.6 mm Hg. RVSP wnl  - suspect volume component to lung process  - Diuresing well  Plan  1. Continue diuresis    Pulmonary/Ventilator Management:   1. Airway intubated-improved oxygenation/gas exchange  - Diffuse ground glass opacity on CT  -  bronchoscopy with washings on 9/30-no organisms-c\w DAH  - on HD steroids for above-with marked improvement  - Component of CHF/volume   Plan  1. Start to taper steroids in AM      GI and Nutrition :   1. Concern for protein calorie malnutrition  2.  Waxing and waning abdominal distention   3.  Ultrasound of gallbladder reviewed no evidence of biliary obstruction   Plan  -small bore feeding tube placed continue on tubefeeds      Renal/Fluids/Electrolytes:   1. Pocaojivssboj-fdtwyyktnp-ioqjzp diuresis/nutrition   Plan  1. Diuresing well with albumin/laxis-discontinue albumin  2. Consider metolazone to aid with  diuresis in face of hypernatremia    Infectious Disease:   1. Gram positive in blood - lactobacillus in 1 bottle  2. Cultures negative otherwise  Plan  Appreciate ID consult.   - de-escalating antibiotics     Endocrine:     1. Concern of stress induced hyperglycemia  Plan  - ICU insulin protocol, goal sugar <180      Hematology/Oncology:     1.chronic anemia secondary to CA, no signs, symptoms of active blood loss  2. Metastatic breast cancer for 20+ years, only currently active disease in clavicle/sternum and responded briskly to recent new chemo regimen (docetaxel/Herceptin + Perjeta)  3. hbg 8.4 today  Plan  Follow counts      IV/Access:   1. Venous access -  port  2. Arterial access -  Art line  3.  Plan  - central access required and necessary      ICU Prophylaxis:   1. DVT: mechanical  2. VAP: HOB 30 degrees, chlorhexidine rinse  3. Stress Ulcer: PPI  4. Restraints: Nonviolent soft two point restraints required and necessary for patient safety and continued cares and good effect as patient continues to pull at necessary lines, tubes despite education and distraction. Will readdress daily.   5. Wound care - per unit routine   6. Feeding -continue tube feeds  7. Family Update: yes, anticipate family mtg end of week with palliative and oncology      Key goals for next 24 hours:   1. Continue diuresis-discontinue albumin/add metolazone  2. Decrease steroids to Q 12 in AM 10/6      injury-start slow taper.  3.  Decrease PEEP to 8      Melina Mclean MD  #[id number]  Critical Care Total Time-exclusive of procedures 40 minutes         Interim History:   Pressors down, more awake today         Key Medications:       albumin human  12.5 g Intravenous Q6H     bacitracin   Topical BID     fiber modular (NUTRISOURCE FIBER)  1 packet Per Feeding Tube BID     folic acid  1 mg Oral or Feeding Tube Daily     furosemide  40 mg Intravenous Q6H     heparin  5 mL Intracatheter Q28 Days     heparin lock flush  5-10 mL Intracatheter Q24H      insulin aspart  1-12 Units Subcutaneous Q4H     insulin glargine  7 Units Subcutaneous QAM AC     lidocaine visc 2% & maalox/mylanta w/simethicone & diphenhydramine  10 mL Swish & Swallow Q6H     methylPREDNISolone  62.5 mg Intravenous Q8H     micafungin  100 mg Intravenous Q24H     multivitamins with minerals  15 mL Per Feeding Tube Daily     pantoprazole  40 mg Per Feeding Tube BID     protein modular  2 packet Per Feeding Tube BID w/meals     QUEtiapine  25 mg Oral or Feeding Tube BID     sodium chloride (PF)  3 mL Intracatheter Q8H       dexmedetomidine 0.7 mcg/kg/hr (10/05/18 1200)     IV fluid REPLACEMENT ONLY       HYDROmorphone 0.4 mg/hr (10/05/18 1200)     propofol (DIPRIVAN) infusion 15 mcg/kg/min (10/05/18 1200)     sodium chloride 10 mL/hr at 10/05/18 1200     sodium chloride 10 mL/hr at 10/05/18 1200               Physical Examination:   Temp:  [96.8  F (36  C)-100.6  F (38.1  C)] 96.8  F (36  C)  Heart Rate:  [] 98  Resp:  [9-31] 20  BP: (89-98)/(55-64) 89/55  MAP:  [62 mmHg-97 mmHg] 79 mmHg  Arterial Line BP: ()/(43-68) 135/54  FiO2 (%):  [50 %-55 %] 50 %  SpO2:  [94 %-98 %] 98 %    Intake/Output Summary (Last 24 hours) at 10/05/18 1536  Last data filed at 10/05/18 1400   Gross per 24 hour   Intake          3857  ml   Output             6825ml   Net          -2967.98 ml         Wt Readings from Last 4 Encounters:   10/05/18 67 kg (147 lb 11.3 oz)   10/23/17 57.6 kg (127 lb)   08/09/16 57.6 kg (127 lb)   08/01/16 57.6 kg (127 lb)     Arterial Line BP: ()/(43-68) 135/54  MAP:  [62 mmHg-97 mmHg] 79 mmHg  BP - Mean:  [64-77] 64  Ventilation Mode: CMV/AC  (Continuous Mandatory Ventilation/ Assist Control)  FiO2 (%): 50 %  Rate Set (breaths/minute): 20 breaths/min  Tidal Volume Set (mL): 450 mL  PEEP (cm H2O): 10 cmH2O  Pressure Support (cm H2O): 12 cmH2O  Oxygen Concentration (%): 50 %  Resp: 20    Recent Labs  Lab 10/03/18  0640 10/01/18  1051 09/30/18  1300 09/30/18  0220   PH 7.46*  7.34* 7.36  --    PCO2 48* 57* 51*  --    PO2 117* 83 77*  --    HCO3 34* 31* 29*  --    O2PER 80% 75% 65% 70%       GEN: no acute distress, jaundicedHEENT: head ncat, sclera icteric OP patent, trachea midline   PULM: unlabored synchronous with vent, clear anteriorly ,   CV/COR: RRR S1S2 no gallop,  No rub, no murmur  ABD: soft nontender, hypoactive bowel sounds, no mass  EXT:  Edema   warm  NEURO: grossly intact, chemically sedated does not follow command  SKIN: nodular, fungating skin lesions on sternum  LINES: clean, dry intact         Data:   All data and imaging reviewed     CMP    Recent Labs  Lab 10/05/18  0330 10/04/18  0606 10/03/18  0505 10/02/18  0538 10/01/18  0620 09/30/18  0450   * 149* 144 144 145* 148*   POTASSIUM 3.6 2.6* 3.3* 4.6 4.5 3.7   CHLORIDE 109 106 107 109 112* 113*   CO2 39* 39* 34* 33* 31 29   ANIONGAP 5 4 3 2* 2* 6   * 194* 205* 141* 156* 128*   BUN 38* 41* 48* 40* 28 26   CR 0.32* 0.38* 0.42* 0.40* 0.36* 0.41*   GFRESTIMATED >90 >90 >90 >90 >90 >90   GFRESTBLACK >90 >90 >90 >90 >90 >90   REAGAN 7.9* 8.1* 8.0* 8.0* 7.7* 7.9*   MAG 2.0 1.7  --   --  2.0 1.9   PHOS 2.6 1.6*  --   --  3.2 1.5*   PROTTOTAL 5.2* 5.4* 5.0* 5.1* 4.9*  --    ALBUMIN 2.7* 2.5* 1.8* 1.6* 1.7*  --    BILITOTAL 10.8* 10.0* 8.8* 8.8* 8.4*  --    ALKPHOS 549* 630* 581* 624* 688*  --    * 136* 89* 89* 106*  --    * 101* 66* 59* 62*  --      CBC    Recent Labs  Lab 10/05/18  0330 10/04/18  0606 10/03/18  0505 10/02/18  0538   WBC 24.3* 21.4* 20.2* 27.4*   RBC 2.60* 2.76* 2.84* 2.96*   HGB 7.9* 8.1* 8.4* 8.9*   HCT 25.2* 26.1* 27.4* 28.9*   MCV 97 95 97 98   MCH 30.4 29.3 29.6 30.1   MCHC 31.3* 31.0* 30.7* 30.8*   RDW 22.7* 21.9* 21.6* 22.0*    196 195 209     INR    Recent Labs  Lab 10/01/18  0620   INR 1.23*     Arterial Blood Gas    Recent Labs  Lab 10/03/18  0640 10/01/18  1051 09/30/18  1300 09/30/18  0220   PH 7.46* 7.34* 7.36  --    PCO2 48* 57* 51*  --    PO2 117* 83 77*  --    HCO3  34* 31* 29*  --    O2PER 80% 75% 65% 70%       All cultures:    Recent Labs  Lab 09/30/18  1210 09/30/18  0335 09/30/18  0130 09/30/18  0111 09/29/18  0943 09/29/18  0911   CULT No growth after 5 days  Candida albicans / dubliniensisisolatedCandida albicans and Treva dubliniensis are not routinely speciated*  Culture received and in progress.  Positive AFB results are called as soon as detected.  Final report to follow in 7 to 8 weeks.  Assayed at [facility] [address] [place] [id number] [phone]  No growth  Culture negative monitoring continues  Canceled, Test credited  Test reordered as correct code Light growthCandida albicans / dubliniensisCandida albicans and Treva dubliniensis are not routinely speciatedSusceptibility testing not routinely done* No growth after 5 days  No growth No growth after 5 days No growth No growth

## 2018-10-05 NOTE — PROGRESS NOTES
Oncology/Hematology Follow Up Note:    Assessment and Plan:  #1 Respiratory failure, due to probable pneumonia and volume overload, cannot rule out lung toxicity from chemotherapy (rare) or DAH      - Intubated and mechanically ventilated  - Echo shows no new cardiomyopathy  - BAL studies negative thus far.  - Some improvement over the past 2 days.  Of pressors and FiO2 has decreased to 50%.      PLAN:  - Continue to treat with broad spectrum antibiotics for presumed pneumonia per ID  - Continue to diurese aggressively.  IV Lasix Q8H with albumin  - Continue Solumedrol 500-1000 mg daily, for possible drug-induced pneumonitis/DAH      #2 Hepatotoxicity from alcohol abuse and sepsis, in the setting of chemotherapy      - Baseline LFTs before starting chemotherapy (bilirubin normal- 1.4, normal ALT- 51, elevated AST likely due to chronic alcohol abuse, and chronically elevated Alk Phos due to long standing bone mets)  - ALT, AST, alk phos, and bilirubin relatively stable since admission.  - hepatotoxicity from docetaxel is rare and usually self limited, but most likely accentuated by alcohol abuse.      PLAN:  - Continue to provide support care  - Monitor LFTs daily  - If the patient recovers from this, would use paclitaxel instead of docetaxel with the next cycle, or omit taxanes altogether.      #3 Metastatic breast cancer, ER weakly positive (5%), UT negative, HER2 positive;       - Has sternal metastases and possible metastasis in left 5th rib  - Prior bone mets alone the spine were no longer FDG avid on PET/CT from 7/23  - Has had a good clinical response so far with significant shrinkage of the sternal metastases over the past 2 weeks.    PLAN:  - If the patient recovers from the current hospital stay, would consider switching from docetaxel to paclitaxel given lower risk of hepatotoxicity, and continue with Herceptin/Perjeta vs. Herceptin and Perjeta alone.      #4  Pancytopenia, chemotherapy-induced, now  improved; Also suspect myelotoxicity due to alcohol abuse.  -  follow CBC; Improving  - Anemia due to underlying malignancy, recent treatment, and her critical illness  - Leukocytosis likely due to steroids.  Infection could be contributing.  -  neupogen discontinued on 9/26.  - Transfuse as needed to keep Hgb >7.0.  In the setting of respiratory failure, could consider targeting a higher Hgb goal.      #5 Code status  #6 Goals of care  - The patient has had a long history of Stage IV HER2 positive metastatic breast cancer (close to 20 years), when most people with this type of cancer only live 3-4 years.  She currently has very limited disease, which has actually responded to the new treatment she received.  We hope she can turn the corner from current ICU issues.       We will continue to aggressively diurese her and give her high dose steroids for the next few days.  She has made some improvement over the past 2 days.     Tentatively scheduled for a family care conference on Friday @ 4PM.      Discussed with hospitalist and intensivist this morning.      Richard Braun M.D.  [facility]  [phone]      Subjective:    Eyal had a stable night. Remains off pressors and FiO2 has decreased to 50%. Diuresing well. Was -3L yesterday.    Scheduled Medications:  Reviewed active medications    Labs:  CBC RESULTS:   Recent Labs   Lab Test  10/05/18   0330  10/04/18   0606  10/03/18   0505   WBC  24.3*  21.4*  20.2*   HGB  7.9*  8.1*  8.4*   HCT  25.2*  26.1*  27.4*   MCV  97  95  97   PLT  187  196  195       CMP  Recent Labs  Lab 10/05/18  0330 10/04/18  2112 10/04/18  1749 10/04/18  1407 10/04/18  0606  10/03/18  0505 10/02/18  0538 10/01/18  0620 09/30/18  0450   *  --   --   --  149*  --  144 144 145* 148*   POTASSIUM 3.6 3.3*  --  3.3* 2.6*  < > 3.3* 4.6 4.5 3.7   CHLORIDE 109  --   --   --  106  --  107 109 112* 113*   CO2 39*  --   --   --  39*  --  34* 33* 31 29   ANIONGAP 5  --   --   --  4  --  3 2*  "2* 6   *  --   --   --  194*  --  205* 141* 156* 128*   BUN 38*  --   --   --  41*  --  48* 40* 28 26   CR 0.32*  --   --   --  0.38*  --  0.42* 0.40* 0.36* 0.41*   GFRESTIMATED >90  --   --   --  >90  --  >90 >90 >90 >90   GFRESTBLACK >90  --   --   --  >90  --  >90 >90 >90 >90   REAGAN 7.9*  --   --   --  8.1*  --  8.0* 8.0* 7.7* 7.9*   MAG 2.0  --   --   --  1.7  --   --   --  2.0 1.9   PHOS 2.6  --  1.8*  --  1.6*  --   --   --  3.2 1.5*   PROTTOTAL 5.2*  --   --   --  5.4*  --  5.0* 5.1* 4.9*  --    ALBUMIN 2.7*  --   --   --  2.5*  --  1.8* 1.6* 1.7*  --    BILITOTAL 10.8*  --   --   --  10.0*  --  8.8* 8.8* 8.4*  --    ALKPHOS 549*  --   --   --  630*  --  581* 624* 688*  --    *  --   --   --  136*  --  89* 89* 106*  --    *  --   --   --  101*  --  66* 59* 62*  --    < > = values in this interval not displayed.    INR  Recent Labs  Lab 10/01/18  0620   INR 1.23*       Objective/Physical Exam:  Blood pressure 98/64, pulse 123, temperature 97.7  F (36.5  C), temperature source Axillary, resp. rate 20, height 1.676 m (5' 6\"), weight 67 kg (147 lb 11.3 oz), SpO2 97 %, not currently breastfeeding.  General intubated and sedated  Skin:  Jaundiced  Eyes:  Scleral icterus noted.  Chest:  Previously noted sternal mets have decreased significantly in size  Lungs:  Coarse breath sounds bilaterally  Abdomen:  Distended.  Decreased BS.  Ext: trace pitting edema noted in bilateral LEs.    Richard Braun MD  [facility]  10/5/2018 8:36 AM        "

## 2018-10-05 NOTE — PROGRESS NOTES
[facility]  Infectious Disease Progress Note          Assessment and Plan:   IMPRESSION:   1.  A 58-year-old female with widely metastatic long-term breast cancer, ongoing chemotherapy, and now neutropenia.   2.  Acute sepsis and neutropenic fever, some signs of pneumonia, with possible esophagitis and positive blood culture, at risk for multiple infections.   3.  Thrush and some concern for possible esophagitis.   4.  Acute and chronic long-term alcohol abuse and liver dysfunction.   5.  Prior Martha-Stafford tear.   6.  Respiratory failure, now intubated.   7.  Positive blood culture, 1 bottle for diphtheroids.  No further identification.  This could be a contaminant versus line infection.       RECOMMENDATIONS:   1.   Blood culture 1 lactobacillus contaminant wo multiple cultures positive    2.    Zosyn , 10 +days broad coverage, off vanco, not really improving despite full marrow recovery, intermittent fever, sl improvement, agree discontinue zosyn   3.  From a thrush and possible fungal infection standpoint, agree fluconazole hold for now, on but will  micafungin instead. 12 days tx  already. Complete 2 weeks as though esophagitis by going thru  4.  WBC high(with growth factors), fever resolved mostly but some new LGF  5.  Obviously, prognosis poor overall for a multitude of reasons, primarily malignancy.   6 steroids for ? Med related lung disease  7 call if issues this WE        Interval History:   Intubated sedated WBC rising above nl range incl PMNs  No new + cxs resolving LGF on steroids              Medications:       bacitracin   Topical BID     fiber modular (NUTRISOURCE FIBER)  1 packet Per Feeding Tube BID     folic acid  1 mg Oral or Feeding Tube Daily     furosemide  40 mg Intravenous Q12H     heparin  5 mL Intracatheter Q28 Days     heparin lock flush  5-10 mL Intracatheter Q24H     insulin aspart  1-12 Units Subcutaneous Q4H     insulin glargine  7 Units Subcutaneous QAM AC  "    lidocaine visc 2% & maalox/mylanta w/simethicone & diphenhydramine  10 mL Swish & Swallow Q6H     methylPREDNISolone  250 mg Intravenous Q12H     [START ON 10/6/2018] methylPREDNISolone  62.5 mg Intravenous Q12H     metolazone  5 mg Oral Daily     micafungin  100 mg Intravenous Q24H     multivitamins with minerals  15 mL Per Feeding Tube Daily     pantoprazole  40 mg Per Feeding Tube BID     protein modular  2 packet Per Feeding Tube BID w/meals     QUEtiapine  25 mg Oral or Feeding Tube BID     sodium chloride (PF)  3 mL Intracatheter Q8H                  Physical Exam:   Blood pressure (!) 89/55, pulse 123, temperature 98.7  F (37.1  C), temperature source Oral, resp. rate 19, height 1.676 m (5' 6\"), weight 67 kg (147 lb 11.3 oz), SpO2 98 %, not currently breastfeeding.  Wt Readings from Last 2 Encounters:   10/05/18 67 kg (147 lb 11.3 oz)   10/23/17 57.6 kg (127 lb)     Vital Signs with Ranges  Temp:  [96.8  F (36  C)-100.6  F (38.1  C)] 98.7  F (37.1  C)  Heart Rate:  [] 81  Resp:  [9-30] 19  BP: (89-98)/(55-64) 89/55  MAP:  [60 mmHg-97 mmHg] 63 mmHg  Arterial Line BP: ()/(42-68) 116/43  FiO2 (%):  [50 %] 50 %  SpO2:  [94 %-98 %] 98 %    Constitutional: Intubated and sedated   Lungs: Clear to auscultation bilaterally, no crackles or wheezing   Cardiovascular: Regular rate and rhythm, normal S1 and S2, and no murmur noted   Abdomen: Normal bowel sounds, soft, non-distended, non-tender   Skin: No rashes, no cyanosis, no edema   Other:           Data:   All microbiology laboratory data reviewed.  Recent Labs   Lab Test  10/05/18   0330  10/04/18   0606  10/03/18   0505   WBC  24.3*  21.4*  20.2*   HGB  7.9*  8.1*  8.4*   HCT  25.2*  26.1*  27.4*   MCV  97  95  97   PLT  187  196  195     Recent Labs   Lab Test  10/05/18   0330  10/04/18   0606  10/03/18   0505   CR  0.32*  0.38*  0.42*     No lab results found.  Recent Labs   Lab Test  09/30/18   1210  09/30/18   0335  09/30/18   0130  09/30/18   " 0111  09/29/18   0943  09/29/18   0911  09/28/18   1211  09/24/18   0945  09/22/18   1033   CULT  No growth after 5 days  Candida albicans / dubliniensis  isolated  Candida albicans and Candida dubliniensis are not routinely speciated  *  Culture received and in progress.  Positive AFB results are called as soon as detected.    Final report to follow in 7 to 8 weeks.    Assayed at [facility] [employer] [address] [place] [id number] [phone]  No growth  Culture negative monitoring continues  Canceled, Test credited  Test reordered as correct code  Light growth  Candida albicans / dubliniensis  Candida albicans and Candida dubliniensis are not routinely speciated  Susceptibility testing not routinely done  *  No growth after 5 days  No growth  No growth after 5 days  No growth  No growth  No growth  Light growth  Candida albicans / dubliniensis  Candida albicans and Candida dubliniensis are not routinely speciated  Susceptibility testing not routinely done  *  No growth

## 2018-10-05 NOTE — PROGRESS NOTES
Critical Care Progress Note      10/04/2018    Name: Eyal Turner MRN#: [id number]   Age: 58 year old YOB: 1960     Hsptl Day# 12  ICU DAY # 10    MV DAY # 10             Problem List:   Metastatic breast cancer  Acute hypoxic respiratory failure, possible drug-induced pneumonitis  Alcohol abuse/dependence  Hepatitis, chronic cause unclear    Past 24 Hrs:  Stable from respiratory and hemodynamic standpoint ON.           Summary/Hospital Course:     59 yo female with metastatic breast CA admitted to the ICU. I have personally reviewed the daily labs, imaging studies, cultures and discussed the case with referring physician and consulting physicians.     Neurology/Psychiatry:   1. ETOH Use  2. Analgesia    Plan  - precedex/propofol for sedation goal RASS of negative 2  -Monitor for signs of ETOH withdrawal  - Dilaudid gtt for analgesia-bone lesions  -Continue CIWA  -Head CT negative for acute bleed.    Cardiovascular:   1.Hemodynamics -improved echo pretty normal 9/29  - No pressors  2.Rhythm -  Tachy, sinus    Plan  1. Follow    Pulmonary/Ventilator Management:   1. Airway intubated-improved oxygenation/gas exchange  - on HD steroids  - Diffuse ground glass opacity on CT, bronchoscopy with washings on 9/30   > 25 pms, mixed organisms on Gram  Plan  1. Start to taper steroids in AM      GI and Nutrition :   1. Concern for protein calorie malnutrition  2.  Waxing and waning abdominal distention   3.  Ultrasound of gallbladder reviewed no evidence of biliary obstruction     Plan  -small bore feeding tube placed continue on tubefeeds  -    Renal/Fluids/Electrolytes:   1. Cr 0.42  2. Hypokalemia,   3.  Chronic respiratory acidosis  4.  Patient may be third spacing fluids but remains intravascularly deplete    Plan  -Administer albumin prior to Lasix for improved diuresis  - adjust medications as needed for renal clearance  - follow I/O's   -     Infectious Disease:   1. Gram positive in blood -  lactobacillus in 1 bottle  2. Cultures negative otherwise  Plan  Appreciate ID consult.   - de-escalating antibiotics     Endocrine:     1. Concern of stress induced hyperglycemia    Plan  - ICU insulin protocol, goal sugar <180      Hematology/Oncology:     1.chronic anemia secondary to CA, no signs, symptoms of active blood loss  2. Metastatic breast cancer for 20+ years, only currently active disease in clavicle/sternum and responded briskly to recent new chemo regimen (docetaxel/Herceptin + Perjeta)  3. hbg 8.4 today    Plan  Minimize blood draws      IV/Access:   1. Venous access -  port  2. Arterial access -  Art line  3.  Plan  - central access required and necessary      ICU Prophylaxis:   1. DVT: mechanical  2. VAP: HOB 30 degrees, chlorhexidine rinse  3. Stress Ulcer: PPI  4. Restraints: Nonviolent soft two point restraints required and necessary for patient safety and continued cares and good effect as patient continues to pull at necessary lines, tubes despite education and distraction. Will readdress daily.   5. Wound care - per unit routine   6. Feeding -continue tube feeds  7. Family Update: yes, anticipate family mtg end of week with palliative and oncology      Key goals for next 24 hours:   1.   Continue supportive care  2.   Continue steroids for possible drug-induced lung injury-start slow taper in next 24 hrs.      Melina Mclean MD  #[id number]           Interim History:   Pressors down, more awake today         Key Medications:       albumin human  12.5 g Intravenous Q6H     bacitracin   Topical BID     folic acid  1 mg Oral or Feeding Tube Daily     furosemide  40 mg Intravenous Q6H     heparin  5 mL Intracatheter Q28 Days     heparin lock flush  5-10 mL Intracatheter Q24H     insulin aspart  1-12 Units Subcutaneous Q4H     insulin glargine  7 Units Subcutaneous QAM AC     lidocaine visc 2% & maalox/mylanta w/simethicone & diphenhydramine  10 mL Swish & Swallow Q6H     methylPREDNISolone  250 mg  Intravenous Q12H     micafungin  100 mg Intravenous Q24H     multivitamins with minerals  15 mL Per Feeding Tube Daily     pantoprazole  40 mg Per Feeding Tube BID     protein modular  2 packet Per Feeding Tube BID w/meals     QUEtiapine  25 mg Oral or Feeding Tube BID     sodium chloride (PF)  3 mL Intracatheter Q8H       dexmedetomidine 0.7 mcg/kg/hr (10/04/18 2033)     IV fluid REPLACEMENT ONLY       HYDROmorphone 0.4 mg/hr (10/04/18 2034)     norepinephrine Stopped (10/02/18 2300)     propofol (DIPRIVAN) infusion 15 mcg/kg/min (10/04/18 2034)     sodium chloride 10 mL/hr at 10/04/18 2034     sodium chloride 10 mL/hr at 10/04/18 2034     vasopressin (PITRESSIN) infusion ADULT (40 mL) Stopped (10/04/18 0800)               Physical Examination:   Temp:  [98.1  F (36.7  C)-100.6  F (38.1  C)] 100.1  F (37.8  C)  Heart Rate:  [] 134  Resp:  [16-44] 25  MAP:  [64 mmHg-94 mmHg] 84 mmHg  Arterial Line BP: (105-145)/(44-68) 131/63  FiO2 (%):  [50 %-60 %] 50 %  SpO2:  [94 %-99 %] 95 %    Intake/Output Summary (Last 24 hours) at 10/02/18 1536  Last data filed at 10/02/18 1400   Gross per 24 hour   Intake          3356.51 ml   Output             2205 ml   Net          1151.51 ml         Wt Readings from Last 4 Encounters:   10/04/18 71.5 kg (157 lb 10.1 oz)   10/23/17 57.6 kg (127 lb)   08/09/16 57.6 kg (127 lb)   08/01/16 57.6 kg (127 lb)     Arterial Line BP: (105-145)/(44-68) 131/63  MAP:  [64 mmHg-94 mmHg] 84 mmHg  Ventilation Mode: CMV/AC  (Continuous Mandatory Ventilation/ Assist Control)  FiO2 (%): 50 %  Rate Set (breaths/minute): 20 breaths/min  Tidal Volume Set (mL): 450 mL  PEEP (cm H2O): 10 cmH2O  Pressure Support (cm H2O): 12 cmH2O  Oxygen Concentration (%): 55 %  Resp: 25    Recent Labs  Lab 10/03/18  0640 10/01/18  1051 09/30/18  1300 09/30/18  0220  09/28/18  1042   PH 7.46* 7.34* 7.36  --   --  7.48*   PCO2 48* 57* 51*  --   --  38   PO2 117* 83 77*  --   --  79*   HCO3 34* 31* 29*  --   --  28   O2PER  80% 75% 65% 70%  < > 60%   < > = values in this interval not displayed.    GEN: no acute distress, now   HEENT: head ncat, sclera anicteric, OP patent, trachea midline   PULM: unlabored synchronous with vent, clear anteriorly ,   CV/COR: RRR S1S2 no gallop,  No rub, no murmur  ABD: soft nontender, hypoactive bowel sounds, no mass  EXT:  Edema   warm  NEURO: grossly intact, chemically sedated does not follow command  SKIN: nodular, fungating skin lesions on sternum  LINES: clean, dry intact         Data:   All data and imaging reviewed     ROUTINE ICU LABS (Last four results)  CMP    Recent Labs  Lab 10/04/18  2112 10/04/18  1749 10/04/18  1407 10/04/18  0606 10/03/18  1401 10/03/18  0505 10/02/18  0538 10/01/18  0620 09/30/18  0450   NA  --   --   --  149*  --  144 144 145* 148*   POTASSIUM 3.3*  --  3.3* 2.6* 3.5 3.3* 4.6 4.5 3.7   CHLORIDE  --   --   --  106  --  107 109 112* 113*   CO2  --   --   --  39*  --  34* 33* 31 29   ANIONGAP  --   --   --  4  --  3 2* 2* 6   GLC  --   --   --  194*  --  205* 141* 156* 128*   BUN  --   --   --  41*  --  48* 40* 28 26   CR  --   --   --  0.38*  --  0.42* 0.40* 0.36* 0.41*   GFRESTIMATED  --   --   --  >90  --  >90 >90 >90 >90   GFRESTBLACK  --   --   --  >90  --  >90 >90 >90 >90   REAGAN  --   --   --  8.1*  --  8.0* 8.0* 7.7* 7.9*   MAG  --   --   --  1.7  --   --   --  2.0 1.9   PHOS  --  1.8*  --  1.6*  --   --   --  3.2 1.5*   PROTTOTAL  --   --   --  5.4*  --  5.0* 5.1* 4.9*  --    ALBUMIN  --   --   --  2.5*  --  1.8* 1.6* 1.7*  --    BILITOTAL  --   --   --  10.0*  --  8.8* 8.8* 8.4*  --    ALKPHOS  --   --   --  630*  --  581* 624* 688*  --    AST  --   --   --  136*  --  89* 89* 106*  --    ALT  --   --   --  101*  --  66* 59* 62*  --      CBC    Recent Labs  Lab 10/04/18  0606 10/03/18  0505 10/02/18  0538 10/01/18  0620   WBC 21.4* 20.2* 27.4* 27.0*   RBC 2.76* 2.84* 2.96* 2.82*   HGB 8.1* 8.4* 8.9* 8.4*   HCT 26.1* 27.4* 28.9* 27.5*   MCV 95 97 98 98   MCH 29.3  29.6 30.1 29.8   MCHC 31.0* 30.7* 30.8* 30.5*   RDW 21.9* 21.6* 22.0* 22.3*    195 209 171     INR    Recent Labs  Lab 10/01/18  0620   INR 1.23*     Arterial Blood Gas    Recent Labs  Lab 10/03/18  0640 10/01/18  1051 09/30/18  1300 09/30/18  0220  09/28/18  1042   PH 7.46* 7.34* 7.36  --   --  7.48*   PCO2 48* 57* 51*  --   --  38   PO2 117* 83 77*  --   --  79*   HCO3 34* 31* 29*  --   --  28   O2PER 80% 75% 65% 70%  < > 60%   < > = values in this interval not displayed.    All cultures:    Recent Labs  Lab 09/30/18  1210 09/30/18  0335 09/30/18  0130 09/30/18  0111 09/29/18  0943 09/29/18  0911 09/28/18  1211   CULT No growth after 4 days  Candida albicans / dubliniensisisolatedCandida albicans and Treva dubliniensis are not routinely speciated*  Culture received and in progress.  Positive AFB results are called as soon as detected.  Final report to follow in 7 to 8 weeks.  Assayed at [facility] [employer] [address] [place] [id number] [phone]  No growth  Culture negative monitoring continues  Canceled, Test credited  Test reordered as correct code Light growthCandida albicans / dubliniensisCandida albicans and Treva dubliniensis are not routinely speciatedSusceptibility testing not routinely done* No growth after 4 days  No growth No growth after 4 days No growth after 5 days No growth after 5 days No growth     No results found for this or any previous visit (from the past 24 hour(s)).      Billing: This patient is critically ill: Yes. Total critical care time today 30 min excluding procedures.

## 2018-10-05 NOTE — TELEPHONE ENCOUNTER
Charmaine, pts mom calling regarding the pt. There is a CTC on file.     Pt is in the hospital for the past 14 days now.  They are thinking about taking the pt off of the vent. Pt not coherent, not doing well.     Need to know if we have advanced directives paperwork here. Triage did not notice any type of directive in the pts chart and advised the pts mother to call the honoring choices question number to see what is there.     Jaclyn Dove RN  [facility]

## 2018-10-05 NOTE — PROGRESS NOTES
.Ventilation Mode: CMV/AC  (Continuous Mandatory Ventilation/ Assist Control)  FiO2 (%): 50 %  Rate Set (breaths/minute): 20 breaths/min  Tidal Volume Set (mL): 450 mL  PEEP (cm H2O): 10 cmH2O  Pressure Support (cm H2O): 12 cmH2O  Oxygen Concentration (%): 50 %  Resp: 19    Stable shift. No changes made.

## 2018-10-05 NOTE — PROGRESS NOTES
Patient remains on full vent support with the following settings:  Ventilation Mode: CMV/AC  (Continuous Mandatory Ventilation/ Assist Control)  FiO2 (%): 50 %  Rate Set (breaths/minute): 20 breaths/min  Tidal Volume Set (mL): 450 mL  PEEP (cm H2O): 8 cmH2O  Pressure Support (cm H2O): 12 cmH2O  Oxygen Concentration (%): 50 %  Resp: 19    PEEP drop to +8 from +10 Per MD. SpO2 high 90's, BS coarse, Suction for small thin pale yellow secretions. Will continue to monitor pt's respiratory status closely.    Radha Díaz, RT  10/5/2018 5:58 PM

## 2018-10-05 NOTE — PROGRESS NOTES
[facility] Nurse Inpatient Wound Assessment     Assessment of wound(s) on pt's:   Sacrum/gluteal cleft        Data:   Patient History:        per MD note(s): 58 year old female with PMH including metastatic breast cancer with bone metastases to sternum and rib who started a new chemo regimen 5 days ago, alcohol abuse, malnutrition, pancytopenia, and HTN who presents with fatigue and shortness of breath.      Pt intubated, multiple lines, and drains.       Moisture Management:  Flexi-Seal and Urinary Catheter    Catheter secured? Yes    Current Diet / Nutrition:         Active Diet Order      NPO for Medical/Clinical Reasons Except for: Meds, Ice Chips             Ori Assessment and sub scores:   Ori Score  Av.6  Min: 10  Max: 19     Mattress:  Standard , Low air loss mattress with pulsation   Labs:    Recent Labs   Lab Test  10/05/18   0330   10/02/18   2225   10/01/18   0620   ALBUMIN  2.7*   < >   --    < >  1.7*   HGB  7.9*   < >   --    < >  8.4*   INR   --    --    --    --   1.23*   WBC  24.3*   < >   --    < >  27.0*   A1C   --    --   5.0   --    --    CRP   --    --    --    --   128.0*    < > = values in this interval not displayed.      Wound Assessment (location #1):   Sacrum/gluteal cleft  Wound History:  History of loose stools with impaired liver function and clotting    Specific Dimensions (length x width x depth, in cm):  Bilateral Buttocks has superficial erosion about 2x2x< 0.1cm dry red dermis,     Periwound Skin: irritant dermatitis, and sloughing off    Drainage: none    Odor: none    Pain:  unable to assess          Intervention:     Patient's chart evaluated.      Wound(s) was assessed    Wound Care: was done per POC with staff    Orders  Reviewed    Supplies  reviewed    Discussed plan of care with Nurses          Assessment:       Moisture associated dermatitis from history of  loose, frequent  Stooling, stable and improved with current POC.         Plan:      Nursing to notify the Provider(s) and re-consult the WOC Nurse if wound(s) deteriorate(s) or if the wound care plan needs reevaluation.    Plan of care for wound located  sacrum/gluteal cleft: BID/PRN    Cleanse with skin cleanser    Triad paste in thick layer to affected areas    WOC Nurse will return: Weekly

## 2018-10-05 NOTE — PROGRESS NOTES
SPIRITUAL HEALTH SERVICES Progress Note  [facility]  ICU    Saw pt Rachna and her family for an emotional check-in.  Rachna's mother Liliana was at the bedside.  Liliana reported that there will be a family care conference at 4:00pm today.  She has made several attempts to contact Rachna's  Luis A to request to be a part of the conference, but she has not heard back from him.  Liliana explained that Luis A prefers that she and her  not be present when he is here and has requested that no  visit.  Rachna's friend David continues to provide emotional/spiritual support for Rachna. Encouraged Liliana to attend to her self-care, including asking for what she needs.    Rachna opened her eyes and nodded in agreement to an offer of prayer.  Provided prayer and reassurance of God and family presence.      Plan: I and other chaplains remain available per pt/family request.    Jimbo Kate M.Div., [facility]  Staff   Pager [phone]

## 2018-10-05 NOTE — PROGRESS NOTES
[facility]  Hospitalist Progress Note  Name: Eyal Turner    MRN: [id number]  Provider:  Reagan Palomo MD   Admit 9/22/2018  9:36 AM      Date of Service: 10/05/2018    Summary of Stay: Eyal Turner is a 58 year old female with metastatic breast cancer with bone metastases to sternum and rib who started a new chemo regimen (that included switch from Kadcyla to Docetaxel, Herceptin and Perjeta) 5 days prior to admission (about 9/17/18).  In addition, prior medical history is notable for chronic alcohol abuse complicated by malnutrition and HTN who presented on 9/22/2018 with fatigue and shortness of breath.    Initially, Ms. Turner was hypotensive, though that was responsive to IV fluids in the ED. Her labs were notable for significantly elevated LFTs (above her baseline abnormals) with bilirubin of 8, alk phos about 600 and elevated transaminases.  Also was found to be pancytopenic with neutropenia.  Hemoglobin was less than 7 she received 1 unit RBC transfusion in the ED.     After admission she was found to have fever to 101 for which infectrious wrk up was started and chest x-ray showed bilateral infiltrate.  Cefepime was presumptively started for neutropenic fever and azithromycin added subsequently.  A CT abdomen pelvis was obtained that did not show any ascites or obstructive liver disease.    She also developed evidence for acute alcohol withdrawal initially with significant tremor that improved after a few doses of Ativan.  GI was consulted for possible upper GI bleed and for her liver failure.  No EGD planned.  Did start empiric Protonix on admission, but no evidence for ongoing blood loss.    Oncology also was consulted and recommended supportive cares.      Ms. Turner was transferred to ICU on 9/23/2018 for worsening hypoxemia and was intubated 9/24/2018.  Blood culture grew lactobacillus from port.  ID was consulted and the patient was switched to vancomycin and Zosyn.   Micafungin was also added for oral thrush.  She was also started on Neupogen for neutropenia with good response.     On 9/29, she developed fever to 102 and leukocytosis of 27K (though the rise in white cell count may be partly due to exposure to Neupogen).      The question has been raised re: chemo toxicity as part of the cause of her critical illness.  Cont trial of methylprednisolone 250 mg IV twice daily.  Oncology following.  It is considered most likely that the patient does not have an ongoing bacterial infection and have therefore abx tapered off.       Problem List/Assessment and Plan:   Acute hypoxic respiratory failure, recalcitrant: Initially thought due to severe sepsis and pneumonia.  Possible component of vascular congestion also considered.  Remains stable on the ventilator.    -Pulmonary toxicity related to the patient's recent chemotherapy is considered as it seems likely that we have de facto eliminated other causes of her respiratory failure.  After discussion with Dr. Braun started a trial of moderately high dose steroids and attempt to diuresis as well as possible.  -repeat trial of lasix with albumin flushes q 8 hours  -solumedrol 250 mg IV q12 hours.   -vent day # 12.     Volume overload, iatrogenic: this is primarily a concern as much as it impinges on vent wean  -started 10/2 with higher dose (40 mg q 8 hours IV Lasix) along with albumin infusions.  Improved output with this  -tolerating gentle wean of vent support.     Septic shock and neutropenic fever: Some evidence of pneumonia with possible esophagitis and does have oral candidiasis.  One blood culture positive for lactobacillus which is considered contaminant.  Infectious disease following.     -stopped abx over the past several days  -CT of chest, abdomen, and pelvis on 9/30 shows extensive airspace infiltrates throughout both lungs.  -Repeated blood, urine, and sputum cultures last on 9/30 -- all NTD; diarrhea c dif negative x 2,  last on 10/1  -off Norepi, and Vasopressin      Advanced liver disease with suspected liver failure.  Abnormal LFTs, thought to be multifactorial: Presented with jaundice an bilirubin was up to 9.8 up from 2.4 three months ago.  Alkaline phosphatase was elevated to 594, AST elevated 264, ALT elevated at 94.  Does drink 2 bottles of wine daily so possible acute alcoholic hepatitis.  Also additionally started on chemotherapy with docetaxel, trastuzumab, and pertuzumab so possibility of drug toxicity although per oncology with this regimen there is not typically significant liver toxicity.  Albumin is low.  Given her alcohol abuse suspect alcohol hepatitis playing a role here.   -Gastroenterology consulted and following peripherally.  -Largely unremarkable right upper quadrant ultrasound.    Empiric trial of steroids/hyperglycemia:  -initiated 10/1, now dose 250 mg q 12 hours  for 3 days' tapered by intensivist  -need to monitor and correct hyperglycemia induced by steroids. Check sugars q 4 hours and cover with high-intensity ISS. Lantus at 7 units in am.  -continues on tube feeds, expected short burst of high-dose steroids      Alcohol dependence with acute withdrawal: Has been drinking 2 bottles of wine daily for years, per family report.  Last use 1-2 days prior to admission.  Presented in alcohol withdrawal with tremor.  -Continue thiamine and folate.  -Now on Precedex and propofol.      Acute on chronic pancytopenia with neutropenia: On admission WBC 0.5 with ANC 0.4, hemoglobin 6.9, and platelets 63.  Hemoglobin was 11.4 three months ago and platelets were 108.  Suspect acute drop is from her recent chemotherapy.  Also possible upper GI bleed as below contributing.  -Hemoglobin stable following transfusion support but gradually drifting down and give 1 more unit of PRBCs today.  -Daily CBC.  -Hematology following.  -Neutropenia resolved following Neupogen which has now been discontinued.      Metastatic breast  cancer: Initially diagnosed in 2000 s/p bilateral mastectomy.  Has undergone multiple rounds of chemotherapy and radiation.  Has known metastatic disease to her sternum and third left rib.  Had recent possible skin infection from the sternum met/mass and received Keflex.  Does not look actively infected at this time.  Has port in place.  Restarted on chemotherapy with docetaxel, trastuzumab, and pertuzumab on 9/17/18.  Follows with Dr. Braun of oncology.  Doing quite poorly following chemotherapy with poor oral intake, weakness, and now acute liver injury.  -[facility] oncology consulted, appreciate recommendations.  -No known metastases except to bone which probably accounts for the chronically elevated alkaline phosphatase    Mucositis versus thrush and possible esophagitis: Patient erosive changes and plaque on the tongue and her soft palate.  Question whether this is mucositis from her docetaxel or Candida infection.  Sore throat for the week PTA possible esophagitis as well.  -GI not planning EGD to investigate for esophagitis.  -Continue micafungin per ID.     Possible UGIB: Reports of vomiting with red blood in it.  Denies any melena or hematochezia.  Anemia and thrombocytopenia could in part be chemotherapy related.  Has required periodic transfusions.  -Twice daily Protonix.  -GI not planning an EGD .      Hypokalemia and hypomagnesemia: -Potassium and magnesium replacement protocol.      Severe protein calorie malnutrition: Evidence for fat loss on exam likely related to chronic alcohol use, metastatic breast cancer, and recent chemotherapy.  Clearly has severe malnutrition, though per Dr. Braun, this appears chronic.  -Dietitian consult.  -Feeding tube placed and continuing tube feeds.    Diarrhea: repeated C. difficile testing negative.  Rectal tube now in place as neutropenia resolved.    Hypernatremia: increase  free water.    DVT Prophylaxis: Pneumatic Compression Devices  Code Status: Full Code  Disposition:  To be determined   In any case, the overall plan is to cont with current cares and see if we can extubate, ongoing discussion with family on CODE STATUS and if patient fails again if reintubation +/- trach.  Family conference today at 4 PM w palliative care and oncology       Interval History   Intubated and sedated.      Discussed plan w/ Dr. Braun, Oncology.  Intensivist, patient nurse    -Data reviewed today: I personally reviewed all new labs and imaging results over the last 24 hours.     Physical Exam   Temp: 97.7  F (36.5  C) Temp src: Axillary BP: 98/64   Heart Rate: 88 Resp: 29 SpO2: 98 % O2 Device: Mechanical Ventilator    Vitals:    10/03/18 0400 10/04/18 0200 10/05/18 0310   Weight: 71.4 kg (157 lb 6.5 oz) 71.5 kg (157 lb 10.1 oz) 67 kg (147 lb 11.3 oz)     Vital Signs with Ranges  Temp:  [97.7  F (36.5  C)-100.6  F (38.1  C)] 97.7  F (36.5  C)  Heart Rate:  [] 88  Resp:  [9-31] 29  BP: (98)/(64) 98/64  MAP:  [62 mmHg-97 mmHg] 70 mmHg  Arterial Line BP: ()/(43-68) 120/50  FiO2 (%):  [50 %-55 %] 50 %  SpO2:  [94 %-98 %] 98 %  I/O last 3 completed shifts:  In: 3857.02 [I.V.:1707.02; NG/GT:970]  Out: 6825 [Urine:5625; Stool:1200]    GENERAL: Intubated and sedated, still on sedative meds.  Chronically ill with bi-temporal wasting.  HEENT: Normocephalic, atraumatic. Extraocular movements intact.  Icteric.  CARDIOVASCULAR: Regular rate and rhythm without murmurs or rubs. No S3.  PULMONARY: Clear bilaterally.  GASTROINTESTINAL: Soft, moderately distended. Bowel sounds normoactive to mildly hyperactive. No appreciable tenderness, rebound, guarding.  EXTREMITIES: No cyanosis or clubbing. No edema.  NEUROLOGICAL: unable  DERMATOLOGICAL: No rash, ulcer, but mild jaundice.  Significant diffuse bruising.    Medications     dexmedetomidine 0.7 mcg/kg/hr (10/05/18 1029)     IV fluid REPLACEMENT ONLY       HYDROmorphone 0.4 mg/hr (10/05/18 1029)     propofol (DIPRIVAN) infusion 15 mcg/kg/min (10/05/18 0800)      sodium chloride 10 mL/hr at 10/05/18 1000     sodium chloride 10 mL/hr at 10/05/18 1000       albumin human  12.5 g Intravenous Q6H     bacitracin   Topical BID     folic acid  1 mg Oral or Feeding Tube Daily     furosemide  40 mg Intravenous Q6H     heparin  5 mL Intracatheter Q28 Days     heparin lock flush  5-10 mL Intracatheter Q24H     insulin aspart  1-12 Units Subcutaneous Q4H     insulin glargine  7 Units Subcutaneous QAM AC     lidocaine visc 2% & maalox/mylanta w/simethicone & diphenhydramine  10 mL Swish & Swallow Q6H     methylPREDNISolone  62.5 mg Intravenous Q8H     micafungin  100 mg Intravenous Q24H     multivitamins with minerals  15 mL Per Feeding Tube Daily     pantoprazole  40 mg Per Feeding Tube BID     protein modular  2 packet Per Feeding Tube BID w/meals     QUEtiapine  25 mg Oral or Feeding Tube BID     sodium chloride (PF)  3 mL Intracatheter Q8H     Data     Laboratory:    Recent Labs  Lab 10/05/18  0330 10/04/18  0606 10/03/18  0505   WBC 24.3* 21.4* 20.2*   HGB 7.9* 8.1* 8.4*   HCT 25.2* 26.1* 27.4*   MCV 97 95 97    196 195       Recent Labs  Lab 10/05/18  0330 10/04/18  2112 10/04/18  1407 10/04/18  0606  10/03/18  0505   *  --   --  149*  --  144   POTASSIUM 3.6 3.3* 3.3* 2.6*  < > 3.3*   CHLORIDE 109  --   --  106  --  107   CO2 39*  --   --  39*  --  34*   ANIONGAP 5  --   --  4  --  3   *  --   --  194*  --  205*   BUN 38*  --   --  41*  --  48*   CR 0.32*  --   --  0.38*  --  0.42*   GFRESTIMATED >90  --   --  >90  --  >90   GFRESTBLACK >90  --   --  >90  --  >90   REAGAN 7.9*  --   --  8.1*  --  8.0*   < > = values in this interval not displayed.    Recent Labs  Lab 09/30/18  1210 09/30/18  0335 09/30/18  0130 09/30/18  0111 09/29/18  0943 09/29/18  0911 09/28/18  1211   CULT No growth after 4 days  Candida albicans / dubliniensisisolatedCandida albicans and Treva dubliniensis are not routinely speciated*  Culture received and in progress.  Positive AFB  results are called as soon as detected.  Final report to follow in 7 to 8 weeks.  Assayed at [facility] [employer] [address] [place] [id number] [phone]  No growth  Culture negative monitoring continues  Canceled, Test credited  Test reordered as correct code Light growthCandida albicans / dubliniensisCandida albicans and Treva dubliniensis are not routinely speciatedSusceptibility testing not routinely done* No growth after 5 days  No growth No growth after 5 days No growth No growth No growth       Imaging:  No results found for this or any previous visit (from the past 24 hour(s)).

## 2018-10-06 NOTE — PROGRESS NOTES
[facility]  Hospitalist Progress Note  Name: Eyal Turnre    MRN: [id number]  Provider:  Reagan Palomo MD   Admit 9/22/2018  9:36 AM      Date of Service: 10/06/2018    Summary of Stay: Eyal Turner is a 58 year old female with metastatic breast cancer with bone metastases to sternum and rib who started a new chemo regimen (that included switch from Kadcyla to Docetaxel, Herceptin and Perjeta) 5 days prior to admission (about 9/17/18).  In addition, prior medical history is notable for chronic alcohol abuse complicated by malnutrition and HTN who presented on 9/22/2018 with fatigue and shortness of breath.    Initially, Ms. Turner was hypotensive, though that was responsive to IV fluids in the ED. Her labs were notable for significantly elevated LFTs (above her baseline abnormals) with bilirubin of 8, alk phos about 600 and elevated transaminases.  Also was found to be pancytopenic with neutropenia.  Hemoglobin was less than 7 she received 1 unit RBC transfusion in the ED.     After admission she was found to have fever to 101 for which infectrious wrk up was started and chest x-ray showed bilateral infiltrate.  Cefepime was presumptively started for neutropenic fever and azithromycin added subsequently.  A CT abdomen pelvis was obtained that did not show any ascites or obstructive liver disease.    She also developed evidence for acute alcohol withdrawal initially with significant tremor that improved after a few doses of Ativan.  GI was consulted for possible upper GI bleed and for her liver failure.  No EGD planned.  Did start empiric Protonix on admission, but no evidence for ongoing blood loss.    Oncology also was consulted and recommended supportive cares.      Ms. Turner was transferred to ICU on 9/23/2018 for worsening hypoxemia and was intubated 9/24/2018.  Blood culture grew lactobacillus from port.  ID was consulted and the patient was switched to vancomycin and Zosyn.   Micafungin was also added for oral thrush.  She was also started on Neupogen for neutropenia with good response.     On 9/29, she developed fever to 102 and leukocytosis of 27K (though the rise in white cell count may be partly due to exposure to Neupogen).      The question has been raised re: chemo toxicity as part of the cause of her critical illness.  Cont trial of methylprednisolone 250 mg IV twice daily.  Oncology following.  It is considered most likely that the patient does not have an ongoing bacterial infection and have therefore abx tapered off.       Problem List/Assessment and Plan:   Acute hypoxic respiratory failure, recalcitrant: Initially thought due to severe sepsis and pneumonia.  Possible component of vascular congestion also considered.  Remains stable on the ventilator.    -Pulmonary toxicity related to the patient's recent chemotherapy is considered as it seems likely that we have de facto eliminated other causes of her respiratory failure.  After discussion with Dr. Braun started a trial of moderately high dose steroids and attempt to diuresis as well as possible.  -vent day # 13.   We will do a weaning trial     Volume overload, iatrogenic: this is primarily a concern as much as it impinges on vent wean  -started 10/2 with higher dose (40 mg q 8 hours IV Lasix) along with albumin infusions.  Improved output with this  -tolerating gentle wean of vent support.     Septic shock and neutropenic fever: Some evidence of pneumonia with possible esophagitis and does have oral candidiasis.  One blood culture positive for lactobacillus which is considered contaminant.  Infectious disease following.     -stopped abx over the past several days  -CT of chest, abdomen, and pelvis on 9/30 shows extensive airspace infiltrates throughout both lungs.  -Repeated blood, urine, and sputum cultures last on 9/30 -- all NTD; diarrhea c dif negative x 2, last on 10/1  -off Norepi, and Vasopressin      Advanced liver  disease with suspected liver failure.  Abnormal LFTs, thought to be multifactorial: Presented with jaundice an bilirubin was up to 9.8 up from 2.4 three months ago.  Alkaline phosphatase was elevated to 594, AST elevated 264, ALT elevated at 94.  Does drink 2 bottles of wine daily so possible acute alcoholic hepatitis.  Also additionally started on chemotherapy with docetaxel, trastuzumab, and pertuzumab so possibility of drug toxicity although per oncology with this regimen there is not typically significant liver toxicity.  Albumin is low.  Given her alcohol abuse suspect alcohol hepatitis playing a role here.   -Gastroenterology consulted and following peripherally.  -Largely unremarkable right upper quadrant ultrasound.    Empiric trial of steroids/hyperglycemia:  -initiated 10/1, now dose 250 mg q 12 hours.  Start taper this evening per oncology  -need to monitor and correct hyperglycemia induced by steroids. Check sugars q 4 hours and cover with high-intensity ISS. Lantus increased to 12 units in a.m. due to initiation of D5 for hypernatremia  -continues on tube feeds, expected short burst of high-dose steroids      Alcohol dependence with acute withdrawal: Has been drinking 2 bottles of wine daily for years, per family report.  Last use 1-2 days prior to admission.  Presented in alcohol withdrawal with tremor.  -Continue thiamine and folate.  -Now on Precedex and propofol.      Acute on chronic pancytopenia with neutropenia: On admission WBC 0.5 with ANC 0.4, hemoglobin 6.9, and platelets 63.  Hemoglobin was 11.4 three months ago and platelets were 108.  Suspect acute drop is from her recent chemotherapy.  Also possible upper GI bleed as below contributing.  -Hemoglobin stable following transfusion support but gradually drifting down and give 1 more unit of PRBCs today.  -Daily CBC.  -Hematology following.  -Neutropenia resolved following Neupogen which has now been discontinued.  Leukocytosis likely  explained by use of Neupogen      Metastatic breast cancer: Initially diagnosed in 2000 s/p bilateral mastectomy.  Has undergone multiple rounds of chemotherapy and radiation.  Has known metastatic disease to her sternum and third left rib.  Had recent possible skin infection from the sternum met/mass and received Keflex.  Does not look actively infected at this time.  Has port in place.  Restarted on chemotherapy with docetaxel, trastuzumab, and pertuzumab on 9/17/18.  Follows with Dr. Braun of oncology.  Doing quite poorly following chemotherapy with poor oral intake, weakness, and now acute liver injury.  -[facility] oncology consulted, appreciate recommendations.  -No known metastases except to bone which probably accounts for the chronically elevated alkaline phosphatase    Mucositis versus thrush and possible esophagitis: Patient erosive changes and plaque on the tongue and her soft palate.  Question whether this is mucositis from her docetaxel or Candida infection.  Sore throat for the week PTA possible esophagitis as well.  -GI not planning EGD to investigate for esophagitis.  -Continue micafungin .  Can be discontinued on Monday with have received 2 weeks course per ID     Possible UGIB: Reports of vomiting with red blood in it.  Denies any melena or hematochezia.  Anemia and thrombocytopenia could in part be chemotherapy related.  Has required periodic transfusions.  -Twice daily Protonix.  -GI not planning an EGD .      Hypokalemia and hypomagnesemia: -Potassium and magnesium replacement protocol.  Schedule potassium enteral and replace per protocol      Severe protein calorie malnutrition: Evidence for fat loss on exam likely related to chronic alcohol use, metastatic breast cancer, and recent chemotherapy.  Clearly has severe malnutrition, though per Dr. Braun, this appears chronic.  -Dietitian consult.  -Feeding tube placed and continuing tube feeds.    Diarrhea: repeated C. difficile testing negative.  Rectal  tube now in place as neutropenia resolved.    Hypernatremia: Worsening despite increasing enteral free water.  Enteral free water is going to the difficult with this patient with low albumin receiving enteral feeding which is usually hyperosmolar, discussed with intensivist, will try D5 50 cc/h, increased free water enteral to 150 every 4 hours, check in and out tomorrow to determine the need for increased diuresis  Also Lantus increased from 7-12 units in the morning, closely monitor blood sugar with decrease in the dose of steroid        DVT Prophylaxis: Pneumatic Compression Devices  Code Status: Full Code  Disposition: To be determined   In any case, the overall plan is to cont with current cares and see if we can extubate, ongoing discussion with family on CODE STATUS and if patient fails again if reintubation +/- trach.   For now continue full code    Interval History   Intubated and sedated.      Discussed plan w/  Intensivist, patient nurse    -Data reviewed today: I personally reviewed all new labs and imaging results over the last 24 hours.     Physical Exam   Temp: 98.7  F (37.1  C) Temp src: Oral BP: 94/65   Heart Rate: 80 Resp: 26 SpO2: 100 % O2 Device: Mechanical Ventilator    Vitals:    10/04/18 0200 10/05/18 0310 10/06/18 0030   Weight: 71.5 kg (157 lb 10.1 oz) 67 kg (147 lb 11.3 oz) 65.6 kg (144 lb 10 oz)     Vital Signs with Ranges  Temp:  [96.8  F (36  C)-100.2  F (37.9  C)] 98.7  F (37.1  C)  Heart Rate:  [] 80  Resp:  [10-29] 26  BP: ()/(55-75) 94/65  MAP:  [60 mmHg-93 mmHg] 65 mmHg  Arterial Line BP: (113-156)/(42-64) 113/47  FiO2 (%):  [50 %] 50 %  SpO2:  [97 %-100 %] 100 %  I/O last 3 completed shifts:  In: 3361.45 [I.V.:1081.45; NG/GT:1195]  Out: 4525 [Urine:3525; Stool:1000]    GENERAL: Intubated and sedated, still on sedative meds.  Chronically ill with bi-temporal wasting.  HEENT: Normocephalic, atraumatic. Extraocular movements intact.  Icteric.  CARDIOVASCULAR: Regular rate  and rhythm without murmurs or rubs. No S3.  PULMONARY: Mechanical ventilation effort, scattered rhonchi bilateral and basal rales  GASTROINTESTINAL: Soft, moderately distended. Bowel sounds normoactive to mildly hyperactive. No appreciable tenderness, rebound, guarding.  EXTREMITIES: No cyanosis or clubbing. No edema.  NEUROLOGICAL: unable  DERMATOLOGICAL: No rash, ulcer, but mild jaundice.  Significant diffuse bruising.    Medications     dexmedetomidine 0.5 mcg/kg/hr (10/06/18 0744)     IV fluid REPLACEMENT ONLY       D5W       HYDROmorphone 0.4 mg/hr (10/06/18 0744)     propofol (DIPRIVAN) infusion 10 mcg/kg/min (10/06/18 0744)     sodium chloride 10 mL/hr at 10/06/18 0700     sodium chloride 10 mL/hr at 10/06/18 0700       bacitracin   Topical BID     fiber modular (NUTRISOURCE FIBER)  1 packet Per Feeding Tube BID     folic acid  1 mg Oral or Feeding Tube Daily     furosemide  40 mg Intravenous Q12H     heparin  5 mL Intracatheter Q28 Days     heparin lock flush  5-10 mL Intracatheter Q24H     insulin aspart  1-12 Units Subcutaneous Q4H     insulin glargine  12 Units Subcutaneous QAM AC     lidocaine visc 2% & maalox/mylanta w/simethicone & diphenhydramine  10 mL Swish & Swallow Q6H     methylPREDNISolone  250 mg Intravenous Q12H     methylPREDNISolone  62.5 mg Intravenous Q12H     metolazone  5 mg Oral Daily     micafungin  100 mg Intravenous Q24H     multivitamins with minerals  15 mL Per Feeding Tube Daily     pantoprazole  40 mg Per Feeding Tube BID     potassium chloride  20 mEq Oral BID     protein modular  2 packet Per Feeding Tube BID w/meals     QUEtiapine  25 mg Oral or Feeding Tube BID     sodium chloride (PF)  3 mL Intracatheter Q8H     Data     Laboratory:    Recent Labs  Lab 10/06/18  0555 10/05/18  0330 10/04/18  0606   WBC 25.7* 24.3* 21.4*   HGB 8.3* 7.9* 8.1*   HCT 26.4* 25.2* 26.1*   MCV 95 97 95    187 196       Recent Labs  Lab 10/06/18  0555 10/05/18  0330 10/04/18  2112   10/04/18  0606   * 153*  --   --  149*   POTASSIUM 2.6* 3.6 3.3*  < > 2.6*   CHLORIDE 107 109  --   --  106   CO2 39* 39*  --   --  39*   ANIONGAP 7 5  --   --  4   * 199*  --   --  194*   BUN 47* 38*  --   --  41*   CR 0.36* 0.32*  --   --  0.38*   GFRESTIMATED >90 >90  --   --  >90   GFRESTBLACK >90 >90  --   --  >90   REAGAN 7.6* 7.9*  --   --  8.1*   < > = values in this interval not displayed.    Recent Labs  Lab 09/30/18  1210 09/30/18  0335 09/30/18  0130 09/30/18  0111 09/29/18  0943   CULT No growth after 5 days  Candida albicans / dubliniensisisolatedCandida albicans and Treva dubliniensis are not routinely speciated*  Culture received and in progress.  Positive AFB results are called as soon as detected.  Final report to follow in 7 to 8 weeks.  Assayed at [email or web address] [address] [place] [id number] [phone]  No growth  Culture negative monitoring continues  Canceled, Test credited  Test reordered as correct code Light growthCandida albicans / dubliniensisCandida albicans and Treva dubliniensis are not routinely speciatedSusceptibility testing not routinely done* No growth  No growth No growth No growth       Imaging:  No results found for this or any previous visit (from the past 24 hour(s)).

## 2018-10-06 NOTE — PLAN OF CARE
Problem: Patient Care Overview  Goal: Plan of Care/Patient Progress Review  Outcome: No Change  ICU End of Shift Summary.  For vital signs and complete assessments, please see documentation flowsheets.      Pertinent assessments: CPOT 0, RASS -1. Tele SR/ST, BP soft. +2 to +3 dependent edema. LS crackles posterior bases, coarse throughout. Sat high 90's on 50% FiO2. Minimal secretions from ETT/orally. Thrush improving, tolerating oral cares. Diuresing well with lasix. Rectal tube with liquid brown stool. Skin assessed with WOC at bedside.   Major Shift Events: Family Care conference. Edema slightly improved.   Plan (Upcoming Events): Wean sedation as tolerated. Monitor BP, soft at times. Labs.  Discharge/Transfer Needs: LTACH, chem dep    Bedside Shift Report Completed : y  Bedside Safety Check Completed: y

## 2018-10-06 NOTE — PLAN OF CARE
Problem: Patient Care Overview  Goal: Plan of Care/Patient Progress Review  Outcome: No Change  ICU End of Shift Summary.  For vital signs and complete assessments, please see documentation flowsheets.     Pertinent assessments: RASS 0 to -1, CPOT 0-2. Tele SR/ST, BP soft. LS coarse, crackles to bases in AM. Sats mid 90's on 50% FiO2, SBP x 1hr. Diuresing well, uop 200cc/hr for shift. Decrease in dependent edema. Rectal tube replaced, large leak prior to tube replacement. 350cc watery stool measured. Dressing intact to wounds. More awake, inconsistently following commands.  Major Shift Events: K, Phos replaced. Propofol off. SBT.   Plan (Upcoming Events): Replace K+ per protocol, SBT 3x/day, Wean sedation as able.   Discharge/Transfer Needs: TBD    Bedside Shift Report Completed : y  Bedside Safety Check Completed: y

## 2018-10-06 NOTE — PROGRESS NOTES
RT- Pt remains on full vent support on the following settings:    Ventilation Mode: CMV/AC  (Continuous Mandatory Ventilation/ Assist Control)  FiO2 (%): 50 %  Rate Set (breaths/minute): 20 breaths/min  Tidal Volume Set (mL): 450 mL  PEEP (cm H2O): 8 cmH2O  Pressure Support (cm H2O): 12 cmH2O  Oxygen Concentration (%): 50 %  Resp: 19    Bs coarse and suctioned for scant yellow. Spo2 98%. RT will continue to follow.

## 2018-10-06 NOTE — PROGRESS NOTES
ICU STAFF CRITICAL CARE PROGRESS NOTE:    I saw and examined the patient and personally reviewed the lab and Xrays and past record    57 yo woman with metastatic breast CA admitted 9/22/18 with ? EtOH withdrawal, weakness, and FTT and treated for neutropenic fever and sepsis with ? pneumonia.  Moved to ICU 9/23 with intubation 9/24 for acute respiratory failure    PMH  Metastatic breast CA with bone (sternum/rib) involvement  EtOH abuse/dependence - drinks 2 bottles wine/day  Chronic Hepatitis - ? Etiology  H/o Martha-Stafford tear    Objective/Exam  Intubated woman on mechanical ventilation ETT#  ACMV 450 20 PEEP 8 50%  IO -1.1L yesterday;  + 0.4L so far today  N/C;  Anicteric  Lungs - no wheezes, crackles rub anteriorly  RRR nl S1 and S2 w/o m/g/r  Abd distended hypoactive no mass or tenderness  Ext warm with 1-2+ edema  No rash  Sternal lesion    Placed on PSV 10/PEEP 5 and have Vt ~ 400 with RR 24 and no distress/paradox. O2 Sats 97% after 5-10 minutes    Labs  Hgb 8.3  WBC 25.7 Plts 188K  Na 153;  K 2.6;  HCO3 39  BUN/creat 447/0.34  Bili 11.9 AlkPhos 548; AST//172  Ca 7.6;  Alb 2.4; nl Mg/PO4  ABG 7.46/57 /95 on 50%  CXR:  Diffuse hazy opacities  Limited Echo 9/29:  Mild AV stenosis;  Mild MVR & TVR  Bronch lavage 9/30 - no specific organism    Acute Respiratory Failure / CHF  P Continue toTaper steroids  Continue diuresis  Decrease PEEP to 5  Start PSV trials on 10 cm H20 PSV 20-30 2-3x/day    Metabolic Alkalemia  May limit respiratory drive and weaning  Needs aggressive KCl repletion (200 mEq K deficit and more with diuresis)    Neuro / EtOH  Correct hypernatremia with free water    Abnl LFTs/Chronic Hepatitis  abd U/S - no biliary obstruction    Hypernatremia  needs increased free water    Leukocytosis  Cultures negative except 1 bottle lactobacillus  ID following    Metastatic Breast Ca - bone  Recent docetaxel/ herceptin perieta      (Critical Care 30 minutes cumulative thus far today)    Nathanael  MD Ivan  [facility] Staff  [phone]

## 2018-10-06 NOTE — PROGRESS NOTES
Oncology/Hematology Follow Up Note:    Assessment and Plan:    1 Respiratory failure, due to probable pneumonia and volume overload, cannot rule out lung toxicity from chemotherapy (rare) or DAH      - Intubated and mechanically ventilated  - Echo shows no new cardiomyopathy  - BAL studies negative thus far.  - Some improvement over the past 2 days.  Of pressors and FiO2 has decreased to 50%.      PLAN:  - Continue to treat with broad spectrum antibiotics for presumed pneumonia per ID  - Continue to diurese aggressively.  IV Lasix Q8H with albumin  - Continue Solumedrol 500-1000 mg daily, for possible drug-induced pneumonitis/DAH      #2 Hepatotoxicity from alcohol abuse and sepsis, in the setting of chemotherapy      - Baseline LFTs before starting chemotherapy (bilirubin normal- 1.4, normal ALT- 51, elevated AST likely due to chronic alcohol abuse, and chronically elevated Alk Phos due to long standing bone mets)  - ALT, AST, alk phos, and bilirubin relatively stable since admission.  - hepatotoxicity from docetaxel is rare and usually self limited, but most likely accentuated by alcohol abuse.      PLAN:  - Continue to provide support care  - Monitor LFTs daily  - If the patient recovers from this, would use paclitaxel instead of docetaxel with the next cycle, or omit taxanes altogether.      #3 Metastatic breast cancer, ER weakly positive (5%), SD negative, HER2 positive;       - Has sternal metastases and possible metastasis in left 5th rib  - Prior bone mets alone the spine were no longer FDG avid on PET/CT from 7/23  - Has had a good clinical response so far with significant shrinkage of the sternal metastases over the past 2 weeks.     PLAN:  - If the patient recovers from the current hospital stay, would consider switching from docetaxel to paclitaxel given lower risk of hepatotoxicity, and continue with Herceptin/Perjeta vs. Herceptin and Perjeta alone.      #4  Pancytopenia, chemotherapy-induced, now  improved; Also suspect myelotoxicity due to alcohol abuse.  -  follow CBC; Improving  - Anemia due to underlying malignancy, recent treatment, and her critical illness  - Leukocytosis likely due to steroids.  Infection could be contributing.  -  neupogen discontinued on 9/26.  - Transfuse as needed to keep Hgb >7.0.  In the setting of respiratory failure, could consider targeting a higher Hgb goal.      #5 Code status  #6 Goals of care  - The patient has had a long history of Stage IV HER2 positive metastatic breast cancer (close to 20 years), when most people with this type of cancer only live 3-4 years.  She currently has very limited disease, which has actually responded to the new treatment she received.  We hope she can turn the corner from current ICU issues.   - Had family conference, plan to continue current cares.                Subjective:     Intubated, eyes open, alert, awake.    Scheduled Medications:  Reviewed active medications    Labs:  CBC RESULTS:   Recent Labs   Lab Test  10/06/18   0555  10/05/18   0330  10/04/18   0606   WBC  25.7*  24.3*  21.4*   HGB  8.3*  7.9*  8.1*   HCT  26.4*  25.2*  26.1*   MCV  95  97  95   PLT  188  187  196       CMP  Recent Labs  Lab 10/06/18  0555 10/05/18  0330 10/04/18  2112 10/04/18  1749 10/04/18  1407 10/04/18  0606  10/03/18  0505  10/01/18  0620   * 153*  --   --   --  149*  --  144  < > 145*   POTASSIUM 2.6* 3.6 3.3*  --  3.3* 2.6*  < > 3.3*  < > 4.5   CHLORIDE 107 109  --   --   --  106  --  107  < > 112*   CO2 39* 39*  --   --   --  39*  --  34*  < > 31   ANIONGAP 7 5  --   --   --  4  --  3  < > 2*   * 199*  --   --   --  194*  --  205*  < > 156*   BUN 47* 38*  --   --   --  41*  --  48*  < > 28   CR 0.36* 0.32*  --   --   --  0.38*  --  0.42*  < > 0.36*   GFRESTIMATED >90 >90  --   --   --  >90  --  >90  < > >90   GFRESTBLACK >90 >90  --   --   --  >90  --  >90  < > >90   REAGAN 7.6* 7.9*  --   --   --  8.1*  --  8.0*  < > 7.7*   MAG 2.2 2.0  --    --   --  1.7  --   --   --  2.0   PHOS 2.4* 2.6  --  1.8*  --  1.6*  --   --   --  3.2   PROTTOTAL 4.9* 5.2*  --   --   --  5.4*  --  5.0*  < > 4.9*   ALBUMIN 2.4* 2.7*  --   --   --  2.5*  --  1.8*  < > 1.7*   BILITOTAL 11.9* 10.8*  --   --   --  10.0*  --  8.8*  < > 8.4*   ALKPHOS 548* 549*  --   --   --  630*  --  581*  < > 688*   * 166*  --   --   --  136*  --  89*  < > 106*   * 127*  --   --   --  101*  --  66*  < > 62*   < > = values in this interval not displayed.    INR  Recent Labs  Lab 10/01/18  0620   INR 1.23*

## 2018-10-06 NOTE — PLAN OF CARE
Problem: Patient Care Overview  Goal: Plan of Care/Patient Progress Review  Outcome: No Change  ICU End of Shift Summary.  For vital signs and complete assessments, please see documentation flowsheets.     Pertinent assessments: VSS, LG temp at times.  Vent at 50%.  CPOT-0.  Does not respond to commands, spontaneously opens eyes and wiggles feet.  Tele reads SR/ST.  LS-coarse, minimal secretions.  Positive bowel sounds, tube feed maintained.  Multiple skin wounds see charting. Gray patent, good output.  Rectal tube, watery stool, leaking at times.  Major Shift Events: Propofol/precedex/dilaudid maintained. RASS goal -1 to -2  Plan (Upcoming Events): palliative/WOC/ID following, wean sedation as able  Discharge/Transfer Needs: TBD    Bedside Shift Report Completed : yes  Bedside Safety Check Completed: yes

## 2018-10-06 NOTE — PROVIDER NOTIFICATION
Md notified. Hem/Onc placed ordered for high dose solumedrol (250mg) to continue to two more doses. Request that I hold higher dose until hospitalist reviews order and oks.    @1815, Hospitalist called to confirm that he is ok with 250mg Solumedrol x2 doses(q12hrs)  and will wait to decrease dose to 62.5mg until 10/7.  Note sent to pharmacy to confirm that solumedrol 62.5mg q 12hrs will not start until 10/7 @ 2100.

## 2018-10-06 NOTE — PLAN OF CARE
Problem: Patient Care Overview  Goal: Plan of Care/Patient Progress Review  ICU End of Shift Summary.  For vital signs and complete assessments, please see documentation flowsheets.   4 hour ([id number])  Pertinent assessments:  Afebrile. Mildly sedated on propofol and precedex, rass scoring 0- -1. Opens eyes spontaneously. Unable to squeeze hands to command but did wiggle toes multiple times to command. Scoring 0 for pain on CPOT scoring. Less resistant to oral cares than last night. Good urine output via goddard, increased after lasix given. Still having liquid diarrhea via rectal tube. Jaundice eyes and skin noted.   Major Shift Events: Turned and repositioned q 2 hours.   Plan (Upcoming Events): To be determined pending progress.    Discharge/Transfer Needs:     Bedside Shift Report Completed : yes  Bedside Safety Check Completed: yes

## 2018-10-07 NOTE — PROGRESS NOTES
RT- Pt remains on full vent support on the following settings:     Ventilation Mode: CMV/AC  (Continuous Mandatory Ventilation/ Assist Control)  FiO2 (%): 50 %  Rate Set (breaths/minute): 20 breaths/min  Tidal Volume Set (mL): 450 mL  PEEP (cm H2O): 5 cmH2O  Pressure Support (cm H2O): 10 cmH2O  Oxygen Concentration (%): 45 %  Resp: 22    Pt placed on pressure support trial 10/+5 x2 for 45min. Pt tolerated well. Bs coarse. Suctioned for small thick pale yellow secretions. Spo2 97%. RT will continue to follow.

## 2018-10-07 NOTE — PLAN OF CARE
Problem: Patient Care Overview  Goal: Plan of Care/Patient Progress Review  Outcome: No Change  ICU End of Shift Summary.  For vital signs and complete assessments, please see documentation flowsheets.     Pertinent assessments: RASS -1 to 1, CPOT 0-2. Tmax 100.9. Tele ST, HR sustained in 140's at end of shift. MAPs>65. LS coarse. Sats mid 90's on 45% FiO2, SBP x 2 Diuresing well, uop 200cc/hr for shift. Decrease in dependent edema. 500cc watery stool to rectal tube. Dressings intact to wounds. Sedation off for several hours, tolerated well. Increased RR, HR +anxious toward end of shift, resumed Precedex. Treated pain with improvement in agitation but with no affect to RR, HR. Restraints continued to protect lines and tubes. Family updated at bedside.  Major Shift Events: Sedation off for several hours. Extubation ordered, pt nodded yes to plan. At time of extubation, pt became anxious with increase in HR and RR. Family consulted with pt and agreed that they would prefer planned extubation tomorrow if pt's status warrants. Phos, K replaced. NS bolus for sustained HR in 140's.  Plan (Upcoming Events): Monitor HR, electrolytes. Possible extubation tomorrow.  Discharge/Transfer Needs: TBD    Bedside Shift Report Completed : y   Bedside Safety Check Completed: y

## 2018-10-07 NOTE — PROGRESS NOTES
ONCOLOGY CC:    No family in room when I visited. Noted plans for possible extubation.  Previous family discussion has happened on Friday evening.  No new oncology recommendations.

## 2018-10-07 NOTE — PLAN OF CARE
Problem: Restraint for Non-Violent/Non-Self-Destructive Behavior  Goal: Prevent/Manage Potential Problems  Maintain safety of patient and others during period of restraint.  Promote psychological and physical wellbeing.  Prevent injury to skin and involved body parts.  Promote nutrition, hydration, and elimination.    Outcome: No Change  Right wrist and Left wrist restraints continued 10/7/2018    Clinical Justification: Pulling lines, pulling tubes, and pulling equipment  Less Restrictive Alternative: Repositioning, Pain management, Reorientation  Attending Physician Notified: Yes, Attending Physician's Name: Armand   New orders placed YES  Length of Order: 1 Day      Ariana Gray

## 2018-10-07 NOTE — PLAN OF CARE
Problem: Patient Care Overview  Goal: Plan of Care/Patient Progress Review  Outcome: Improving  ICU End of Shift Summary.  For vital signs and complete assessments, please see documentation flowsheets.     Pertinent assessments: VSS, afebrile.  Vent at 45%, tolerated weaning trial in evening well.  LS-coarse, minimal secretions.  TF maintained.  Gray patent, good output.  Some output from rectal tube, no leaks this shift.  Multiple skin issues, see charting.   Major Shift Events: Propofol remains off, precedex and dilaudid drips maintained. RASS this shift 1 to -1.  Soft wrist restraints initiated this shift, pt pulling at ET tube.  Reports pain by shaking head yes at times when asked.    PRN fentanyl given x2 for pain.  Pt follows some commands and can answer some yes/no questions.  Tubing set for Llewellyn changed this shift.    Plan (Upcoming Events): continue weaning sedation, continue K+ replacement, monitor labs, continue vent weaning trials   Discharge/Transfer Needs: TBD    Bedside Shift Report Completed : yes  Bedside Safety Check Completed: yes

## 2018-10-07 NOTE — PROGRESS NOTES
[facility]  Hospitalist Progress Note  Josse Acuna, DO 10/07/2018    Reason for Stay (Diagnosis): Acute hypoxic respiratory failure.         Assessment and Plan:      Summary of Stay: Eyal Turner is a 58 year old female with metastatic breast cancer with bone metastases to sternum and rib who started a new chemo regimen (that included switch from Kadcyla to Docetaxel, Herceptin and Perjeta) 5 days prior to admission (about 9/17/18).  In addition, prior medical history is notable for chronic alcohol abuse complicated by malnutrition and HTN who presented on 9/22/2018 with fatigue and shortness of breath.    Initially, Ms. Turner was hypotensive, though that was responsive to IV fluids in the ED. Her labs were notable for significantly elevated LFTs (above her baseline abnormals) with bilirubin of 8, alk phos about 600 and elevated transaminases.  Also was found to be pancytopenic with neutropenia.  Hemoglobin was less than 7 she received 1 unit RBC transfusion in the ED.      After admission she was found to have fever to 101 for which infectrious wrk up was started and chest x-ray showed bilateral infiltrate.  Cefepime was presumptively started for neutropenic fever and azithromycin added subsequently.  A CT abdomen pelvis was obtained that did not show any ascites or obstructive liver disease.    She also developed evidence for acute alcohol withdrawal initially with significant tremor that improved after a few doses of Ativan.  GI was consulted for possible upper GI bleed and for her liver failure.  No EGD planned.  Did start empiric Protonix on admission, but no evidence for ongoing blood loss.     Oncology also was consulted and recommended supportive cares.       Ms. Turner was transferred to ICU on 9/23/2018 for worsening hypoxemia and was intubated 9/24/2018.  Blood culture grew lactobacillus from port.  ID was consulted and the patient was switched to vancomycin and Zosyn.   Micafungin was also added for oral thrush.  She was also started on Neupogen for neutropenia with good response.      On 9/29, she developed fever to 102 and leukocytosis of 27K (though the rise in white cell count may be partly due to exposure to Neupogen).     Problem List:   1. Acute hypoxic restaurant failure.  Unclear etiology.  Initially thought to be sepsis due to pneumonia with possible fluid overload.  Pulmonary toxicity due to chemotherapy now thought to be likely.  Continue IV methylprednisolone.  Oncology following.  Initially was on IV Zosyn.  Infectious disease is following.  Zosyn course completed.  Continue ventilator support.  2. Possible drug-induced pneumonitis.  Continue IV methylprednisolone.  3. Sepsis.  Initially with neutropenic fevers.  Had initially been on IV cefepime and IV Vanco.  Did complete a course of IV Zosyn.  Currently on IV micafungin.  Infectious disease following.  4. Suspected pneumonia.  Appreciate infectious disease input.  Completed a course of IV Zosyn.  Currently on micafungin.  5. Volume overload.  Continue IV furosemide and oral metolazone.  6. Mucositis.  Possible esophagitis.  Continue IV micafungin.  7. Possible GI bleed.  Continue pantoprazole 40 mg twice a day.  8. Metastatic breast cancer.  Oncology following.  9. Hepatitis.  Due to ongoing alcohol abuse.  Possibly worsened by sepsis and chemotherapy.  Recheck LFTs tomorrow.  Check INR tomorrow.  10. Malnutrition.  Continue tube feedings.  11. Hypokalemia.  Continue scheduled potassium replacement and potassium replacement protocol.  12. Hypomagnesemia.  Magnesium replacement protocol.  13. Hyponatremia.  Continue free water down feeding tube.  Registered dietitian following.  14. Hyperglycemia.  Continue Lantus and NovoLog sliding scale.  15. Alcohol abuse.  Will need further recommendations for long-term alcohol cessation once able to be extubated.  Continue vitamins.  16. Alcohol withdrawals.  Currently  "remains on Precedex.  17. Anemia.  Initially with pancytopenia.  Hemoglobin has now been stable.  18. GI prophylaxis.  Pantoprazole.  DVT Prophylaxis: Pneumatic Compression Devices GI prophylaxis.  Code Status: Full Code  Discharge Dispo: Continue ICU care.  Estimated Disch Date / # of Days until Disch: 4+.        Interval History (Subjective):      Intubated and sedated.                  Physical Exam:      Last Vital Signs:  /68  Pulse 123  Temp 97.6  F (36.4  C) (Axillary)  Resp 19  Ht 1.676 m (5' 6\")  Wt 65.9 kg (145 lb 4.5 oz)  SpO2 96%  BMI 23.45 kg/m2    Gen: Intubated and sedated.  Eyes:  PERRL, + scleral icterus.  OP:  ET tube in place.  CV:  Regular, no murmurs.  Lung: Ventilator sounds b/l, crackles at bases bilaterally.  Ab:  Mild distension, +BS, soft.  Skin:  Warm, dry to touch.  No rash.  + jaundice.  Ext:  1+ pitting edema LE b/l.           Medications:      All current medications were reviewed with changes reflected in problem list.         Data:      All new lab and imaging data was reviewed.   Labs:    Recent Labs  Lab 10/07/18  0430   *   POTASSIUM 3.0*   CHLORIDE 107   CO2 37*   ANIONGAP 7   *   BUN 47*   CR 0.38*   GFRESTIMATED >90   GFRESTBLACK >90   REAGAN 7.8*       Recent Labs  Lab 10/07/18  0430   WBC 26.4*   HGB 8.7*   HCT 27.1*   MCV 94         Imaging:   No results found for this or any previous visit (from the past 24 hour(s)).    "

## 2018-10-07 NOTE — PLAN OF CARE
Problem: Restraint for Non-Violent/Non-Self-Destructive Behavior  Goal: Prevent/Manage Potential Problems  Maintain safety of patient and others during period of restraint.  Promote psychological and physical wellbeing.  Prevent injury to skin and involved body parts.  Promote nutrition, hydration, and elimination.   Outcome: No Change  Order obtained for soft wrist restraints.  Pt pulling on ET tube as sedation is being titrated down.

## 2018-10-07 NOTE — PROGRESS NOTES
Patient remains on mechanical ventilation with current settings cmv 20/450/+5/45%. Patient and patient's son did not want extubation to take place today. MD notified, will readdress extubation tomorrow. RT will continue to follow.

## 2018-10-07 NOTE — PROVIDER NOTIFICATION
MD paged. HR trending up through afternoon, sustained in 140's for last 20 min. Tried pain meds and resumed sedation with no improvement.    Addendum @0618. MD ordered 250mL NS Bolus.

## 2018-10-07 NOTE — PROGRESS NOTES
ICU STAFF CRITICAL CARE PROGRESS NOTE:     I saw and examined the patient and personally reviewed the lab and Xrays and past record     59 yo woman with metastatic breast CA admitted 9/22/18 with ? EtOH withdrawal, weakness, and FTT and treated for neutropenic fever and sepsis with ? pneumonia.  Moved to ICU 9/23 with intubation 9/24 for acute respiratory failure     PMH  Metastatic breast CA with bone (sternum/rib) involvement  EtOH abuse/dependence - drinks 2 bottles wine/day  Chronic Hepatitis - ? Etiology  H/o Martha-Stafford tear     Objective/Exam  Intubated woman on mechanical ventilation ETT#7; Tm 100.2 oral this am  On Dilaudid and precedex (0.3, was 0.4 overnight; got some fentanyl overnight for pain)  Sleepy but can respond appropriately to at least some questions (sometimes hard to tell)  ACMV 450 20 PEEP 5 50%  IO -0.8L yesterday;  + 0.3L so far today  N/C;  Anicteric  Lungs - no wheezes, crackles rub anteriorly  RRR nl S1 and S2 w/o m/g/r  Abd distended hypoactive no mass or tenderness  Ext warm with 1-2+ edema  No rash  Sternal lesion - due to tumor met     Reportedly did well on formal PSV trial this AM  On my exam on PSV10 she had RR 20-24 and Vt 350; no abd paradox or accessory muscle use     Labs  Hgb 8.7  WBC 26.4 (25.7 yest) Plts 1879  Na 151 (153 yest);  K 3.0 (2.6);  HCO3 37  BUN/creat 47/0.38  LFTs all elevated  Ca 7.8;  Alb 2.4; Mg 2.0 /PO4 2.4  CXR:  yesterday Diffuse hazy opacities;  Today's is pending  Limited Echo 9/29:  Mild AV stenosis;  Mild MVR & TVR  Bronch lavage 9/30 - no specific organism     Acute Respiratory Failure / CHF  P Continue toTaper steroids  Continue diuresis  Doing well with PSV and ready for extubation, BUT first need to define whether she would or would not want reintubation if necessary  Discussed at bedside with pt and was also d/w pt's spouse last night by staff.  She wants to communicate with her  about this today and make decision;  Then can proceed with  extubation       Metabolic Alkalemia / Hypokalemia / Borderline hypophosphatemia  May limit respiratory drive and weaning  Needs continued aggressive KCl repletion (200 mEq K deficit and more with diuresis)  KPO4 ordered today     Neuro / EtOH  Continue to correct hypernatremia with free water     Abnl LFTs/Chronic Hepatitis  abd U/S - no biliary obstruction     Hypernatremia  needs increased free water   Continue to correct hypernatremia with free water     Leukocytosis  Cultures negative except 1 bottle lactobacillus  ID following     Metastatic Breast Ca - bone  Recent docetaxel/ herceptin perieta        (Critical Care 30 minutes cumulative thus far today)     Nathanael Love MD  MICU Staff  [phone]

## 2018-10-08 NOTE — PLAN OF CARE
Problem: Patient Care Overview  Goal: Plan of Care/Patient Progress Review  Outcome: No Change  ICU End of Shift Summary.  For vital signs and complete assessments, please see documentation flowsheets.     Pertinent assessments: RASS -1. CPOT 0. Pt following some commands. Tele ST. -140. TMAX 101.5. Lungs coarse. Minimal secretions. Abd distended. Tolerating tube feed. Gray in place with adequate urine output. Rectal tube in place with moderate output.  Major Shift Events: K replaced.  Plan (Upcoming Events): Wean from vent as able. Follow K  Discharge/Transfer Needs: TBD    Bedside Shift Report Completed : Y  Bedside Safety Check Completed: Y

## 2018-10-08 NOTE — PROGRESS NOTES
ICU Staff:  I examined the patient, Eyal Turner, in the [facility] ICU. I reviewed the patient's medical record and the progress notes. I participated in the Multidisciplinary ICU Care Rounds; and I agree with the assessment and the plans documented by the other members of the [facility] ICU team. I discussed the patient's history, physical findings, labs, and imaging studies with the other members of the [facility] ICU care team.   The patient is 58 year old woman originally admitted to the ICU for neutropenic fever, sepsis, and possible pneumonia failure to thrive, possible ETOH withdrawal, and cardio-respiratory failure.   The patient has a history of metastatic breast CA; Oncology service is following the patient.       PAST MEDICAL HISTORY:  Past Medical History:   Diagnosis Date     Anemia      Breast cancer metastasized to bone (H) 2005     sees Dr. Zach Romo/Dr. Toño REED q3mos - herceptin, zometa      ETOH abuse      History of blood transfusion      Hypertension     lisinopril 5mg = dizziness      Invasive ductal carcinoma of breast (H) 2000    recurred 3x since.      Martha-Stafford tear 10/12/2015    with hematemesis - hospitalized     S/P mastectomy, bilateral 2000        PAST SURGICAL HISTORY:  Past Surgical History:   Procedure Laterality Date     APPENDECTOMY  1999 - age 39      ESOPHAGOSCOPY, GASTROSCOPY, DUODENOSCOPY (EGD), COMBINED N/A 10/13/2015    Procedure: COMBINED ESOPHAGOSCOPY, GASTROSCOPY, DUODENOSCOPY (EGD);  Surgeon: Rashad Rossi MD;  Location: [facility] GI     EXCISE MASS TRUNK Right 8/9/2016    Procedure: EXCISE MASS TRUNK;  Surgeon: Danie Forrester MD;  Location: [facility] OR     HYSTERECTOMY, NORIS  2002    with BSO      MASTECTOMY MODIFIED RADICAL BILATERAL  2000     REMOVE CATHETER VASCULAR ACCESS Right 8/9/2016    Procedure: REMOVE CATHETER VASCULAR ACCESS;  Surgeon: Danie Forrester MD;  Location: [facility] OR        MEDICATIONS:  Current Facility-Administered Medications    Medication     0.9% sodium chloride BOLUS     bacitracin ointment     dexmedetomidine (PRECEDEX) 400 mcg in sodium chloride 0.9 % 100 mL infusion     dextrose 10 % 1,000 mL infusion     dextrose 5% infusion     glucose gel 15-30 g    Or     dextrose 50 % injection 25-50 mL    Or     glucagon injection 1 mg     fentaNYL (PF) (SUBLIMAZE) injection 50 mcg     fiber modular (NUTRISOURCE FIBER) (NUTRISOURCE FIBER) packet 1 packet     folic acid (FOLVITE) tablet 1 mg     heparin 100 UNIT/ML injection 5 mL     heparin lock flush 10 UNIT/ML injection 3 mL     heparin lock flush 10 UNIT/ML injection 5-10 mL     heparin lock flush 10 UNIT/ML injection 5-10 mL     HYDROmorphone (DILAUDID) 1 mg/mL infusion ADULT/PEDS GREATER than or EQUAL to 20 kg     hypromellose-dextran (ARTIFICAL TEARS) 0.1-0.3 % ophthalmic solution 2-3 drop     insulin aspart (NovoLOG) inj (RAPID ACTING)     insulin glargine (LANTUS) injection 12 Units     lidocaine (LMX4) cream     lidocaine 1 % 1 mL     LORazepam (ATIVAN) injection 0.5 mg     magic mouthwash suspension (diphenhydramine, lidocaine, aluminum-magnesium & simethicone)     magnesium sulfate 4 g in 100 mL sterile water (premade)     methylPREDNISolone sodium succinate (solu-MEDROL) injection 62.5 mg     metolazone (ZAROXOLYN) tablet 5 mg     micafungin (MYCAMINE) 100 mg in sodium chloride 0.9 % 100 mL intermittent infusion     multivitamins with minerals (CERTAVITE/CEROVITE) liquid 15 mL     naloxone (NARCAN) injection 0.1-0.4 mg     ondansetron (ZOFRAN-ODT) ODT tab 4 mg    Or     ondansetron (ZOFRAN) injection 4 mg     pantoprazole (PROTONIX) 2 mg/mL suspension 40 mg     polyethylene glycol (MIRALAX/GLYCOLAX) Packet 17 g     potassium chloride (KLOR-CON) Packet 20 mEq     potassium chloride (KLOR-CON) Packet 20-40 mEq     potassium chloride 10 mEq in 100 mL intermittent infusion with 10 mg lidocaine     potassium chloride 10 mEq in 100 mL sterile water intermittent infusion (premix)      potassium chloride 20 mEq in 50 mL intermittent infusion     potassium chloride SA (K-DUR/KLOR-CON M) CR tablet 20-40 mEq     potassium phosphate 15 mmol in D5W 250 mL intermittent infusion     potassium phosphate 20 mmol in D5W 250 mL intermittent infusion     potassium phosphate 20 mmol in D5W 500 mL intermittent infusion     potassium phosphate 25 mmol in D5W 500 mL intermittent infusion     prochlorperazine (COMPAZINE) injection 10 mg    Or     prochlorperazine (COMPAZINE) tablet 10 mg    Or     prochlorperazine (COMPAZINE) Suppository 25 mg     propofol (DIPRIVAN) infusion     protein modular (PROSource TF) 2 packet     QUEtiapine (SEROquel) tablet 25 mg     sodium chloride (PF) 0.9% PF flush 10-20 mL     sodium chloride (PF) 0.9% PF flush 3 mL     sodium chloride (PF) 0.9% PF flush 3 mL     sodium chloride 0.9% infusion     sodium chloride 0.9% infusion     thiamine tablet 100 mg        ALLERGIES:  No Known Allergies     SOCIAL HISTORY:  Social History     Social History     Marital status:      Spouse name: Ashish Turner     Number of children: 3     Years of education: 14     Occupational History     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     Social History Main Topics     Smoking status: Former Smoker     Packs/day: 0.50     Years: 15.00     Types: Cigarettes     Quit date: 3/18/1990     Smokeless tobacco: Never Used     Alcohol use 3.5 oz/week     7 Glasses of wine per week      Comment: drinks probably  a glass of wine a night      Drug use: No      Comment: takes 5-HTP.             Sexual activity: Not Currently     Other Topics Concern     Parent/Sibling W/ Cabg, Mi Or Angioplasty Before 65f 55m? No     Social History Narrative       FAMILY HISTORY:  Family History   Problem Relation Age of Onset     Cancer Mother      hx of lung cancer - survived      Hypertension Mother      Circulatory Mother      s/p valve replacement      Diabetes Father      prediabetes      Prostate Cancer  "Father      Psychotic Disorder Daughter      ? adhd - maternal cousin with same         PHYSICAL EXAM:  Vital Signs: /56  Pulse 123  Temp 99.7  F (37.6  C) (Oral)  Resp 19  Ht 1.676 m (5' 6\")  Wt 65.1 kg (143 lb 8.3 oz)  SpO2 98%  BMI 23.16 kg/m2  HEENT: the patient is intubated and sedated.    Cor: RRR  Pulm: course BS bilaterally.  Tumor on the sternum due to metastatic cancer.    Abd: distended; soft, no masses.  Ascites present by exam.    Ext: marked muscle wasting and edema present.      A/P:  The patient is 58 year old woman admitted to the ICU for neutropenic fever, sepsis, and possible pneumonia failure to thrive, possible ETOH withdrawal, and cardio-respiratory failure.   The patient has a history of metastatic breast CA.  I discussed the differential diagnosis and a I formulated a best diagnosis and therapeutic ICU care plan for the patient with the other members of the [facility] ICU care team.   Neuro: the patient has metastatic breast cancer.  Will follow MS exam.    Pulm: acute respiratory failure likely means to pneumonia.  Will continue antibiotic therapy per ID.  Possible drug-induced pneumonitis; will continue solumedrol 62.5 mg BID.  Will continue with PS trials.  Cardiac: cardiac ECHO demonstrates CHF with new cardiomyopathy; will resume Lasix.    : will resume Lasix.    GI: hepatic failure due to ETOH and septic shock.  Will follow LFTs  HEME:  pancytopenia due to chemo has improved; will transfuse to keep Hgb >7.0   Will follow CBC.      The patient remains critically ill due to respiratory failure and ETOH withdrawal, the patient still requires full vent support witth twice a day PS trials now to wean patient from the vent. The patient is critically ill and she requires physiologic supportive care in the ICU. 35 min of Critical Care Time exclusive of any time needed to perform bedside procedures.     Oniel Ram MD, PhD    "

## 2018-10-08 NOTE — PROGRESS NOTES
RT- Pt remains on full vent support on the following settings:      Ventilation Mode: CMV/AC  (Continuous Mandatory Ventilation/ Assist Control)  FiO2 (%): 50 %  Rate Set (breaths/minute): 20 breaths/min  Tidal Volume Set (mL): 450 mL  PEEP (cm H2O): 5 cmH2O  Pressure Support (cm H2O): 10 cmH2O  Oxygen Concentration (%): 45 %  Resp: 20    Bs coarse/diminished. Suctioned for scant cloudy thick secretions. Spo2 96%. RT will continue to follow.     Pt placed on pressure support trial 10/+5 for 45 min.     Camron Osorio, RT on 10/8/2018 at 6:29 AM

## 2018-10-08 NOTE — PROGRESS NOTES
[facility]    Hospitalist Progress Note    Date of Service (when I saw the patient): 10/08/2018  Provider:  Rodney Jacob MD     Initial presenting complaint/issue to hospital (Diagnosis): Shortness of breath  Assessment & Plan    Eyal Turner is a 58 year old female with metastatic breast cancer with bone metastases to sternum and rib who started a new chemo regimen (that included switch from Kadcyla to Docetaxel, Herceptin and Perjeta) 5 days prior to admission (about 9/17/18).  Other medical problems are chronic alcohol abuse complicated by malnutrition and essential hypertension.   She presented on 9/22/2018 with fatigue and shortness of breath.  She was hypotensive, though responsive to IV fluids in the ED. Her labs were notable for significantly elevated LFTs (above her baseline abnormals) with bilirubin of 8, alk phos about 600 and elevated transaminases.  Other abnormalities were pancytopenia with neutropenia, severe anemia with hemoglobin less than 7 (she received 1 unit RBC transfusion in the ED).       After admission she spiked fever to 101 for which infectrious wrk up was started and chest x-ray showed bilateral infiltrates.  Cefepime was presumptively started for neutropenic fever and azithromycin added subsequently.  A CT abdomen pelvis was obtained that did not show any ascites or obstructive liver disease.    She also developed evidence for acute alcohol withdrawal with significant tremor that improved after a few doses of Ativan.  GI was consulted for possible upper GI bleed and for her liver failure.  No EGD planned.  Did start empiric Protonix on admission, but no evidence for ongoing blood loss.      Oncology has also been consulted and recommended supportive cares.        Ms. Turner was transferred to ICU on 9/23/2018 for worsening hypoxemia and was intubated 9/24/2018.  Blood culture grew lactobacillus from port.  ID was consulted and the patient was switched to  vancomycin and Zosyn.  Micafungin was also added for oral thrush.  She was also started on Neupogen for neutropenia with good response.       On 9/29, she developed fever to 102 and leukocytosis of 27K (though the rise in white cell count may be partly due to exposure to Neupogen).     Problem List:   1. Acute hypoxemic restaurant failure.  Unclear etiology.  Initially thought to be sepsis due to pneumonia with possible fluid overload.  Pulmonary toxicity due to chemotherapy now thought to be likely.  Continue IV methylprednisolone.  Oncology following.  Initially was on IV Zosyn.  Infectious disease is following.  Zosyn course completed.  Continue ventilator support.  2. Possible drug-induced pneumonitis.  Continue IV methylprednisolone.  3. Sepsis.  Initially with neutropenic fevers.  Had initially been on IV cefepime and IV Vanco.  Did complete a course of IV Zosyn.  Currently on IV micafungin.  Infectious disease following.  4. Suspected pneumonia.  Appreciate infectious disease input.  Completed a course of IV Zosyn.  Currently on micafungin.  5. Volume overload.  Continue IV furosemide and oral metolazone.  6. Mucositis.  Possible esophagitis.  Continue IV micafungin.  7. Possible GI bleed.  Continue pantoprazole 40 mg twice a day.  8. Metastatic breast cancer.  Oncology following.  9. Hepatitis.  Due to ongoing alcohol abuse.  Possibly worsened by sepsis and chemotherapy.  Recheck LFTs tomorrow.  Check INR tomorrow.  10. Malnutrition.  Continue tube feedings.  11. Hypokalemia.  Continue scheduled potassium replacement and potassium replacement protocol.  12. Hypomagnesemia.  Magnesium replacement protocol.  13. Hyponatremia.  Continue free water down feeding tube.  Registered dietitian following.  14. Hyperglycemia.  Continue Lantus and NovoLog sliding scale.  15. Alcohol abuse.  Will need further recommendations for long-term alcohol cessation once able to be extubated.  Continue vitamins.  16. Alcohol  withdrawals.  Currently remains on Precedex.  17. Anemia.  Initially with pancytopenia.  Hemoglobin has now been stable.  18. GI prophylaxis.  Pantoprazole.           # Pain Assessment:  Current Pain Score 10/8/2018   Patient currently in pain? denies   Pain score (0-10) -   Pain location -   Pain descriptors -   CPOT pain score 0   Eyal goldsmith pain level was assessed and she currently denies pain.        DVT Prophylaxis: Pneumatic Compression Devices  Code Status: Full Code    Disposition: Continue ICU care.  Discharge TBD.    Interval History   Mechanical ventilation    -Data reviewed today: I reviewed all new labs and imaging results over the last 24 hours. I personally reviewed the EKG tracing showing Sinus tachycardia.    Physical Exam   Temp: 99.7  F (37.6  C) Temp src: Oral BP: 95/62   Heart Rate: 115 Resp: 20 SpO2: 97 % O2 Device: Mechanical Ventilator    Vitals:    10/06/18 0030 10/07/18 0000 10/08/18 0517   Weight: 65.6 kg (144 lb 10 oz) 65.9 kg (145 lb 4.5 oz) 65.1 kg (143 lb 8.3 oz)     Vital Signs with Ranges  Temp:  [98.6  F (37  C)-101.5  F (38.6  C)] 99.7  F (37.6  C)  Heart Rate:  [103-149] 115  Resp:  [13-44] 20  BP: (83-98)/(47-63) 95/62  MAP:  [58 mmHg-85 mmHg] 64 mmHg  Arterial Line BP: ()/(44-66) 103/48  FiO2 (%):  [45 %] 45 %  SpO2:  [92 %-98 %] 97 %  I/O last 3 completed shifts:  In: 4666.55 [I.V.:2156.55; NG/GT:1430]  Out: 4070 [Urine:3570; Stool:500]    GEN: On mechanical ventilation, appears comfortable, NAD.  HEENT:  Normocephalic/atraumatic, scleral icterus ++, no nasal discharge, mouth moist.  ETT in place  CV:  Regular rate and rhythm, no murmur or JVD.  S1 + S2 noted, no S3 or S4.  LUNGS: Ventilatory sounds superimpose to coarse sounds audible to auscultation bilaterally without rales/rhonchi/wheezing/retractions.  Symmetric chest rise on inhalation noted.  ABD:  Active bowel sounds, soft, non-tender/non-distended.  No rebound/guarding/rigidity.  EXT: Bilateral lower extremity  edema, pitting 1+, no cyanosis.  No joint synovitis noted.  SKIN:  Dry to touch, no exanthems noted in the visualized areas.       Medications     dexmedetomidine 0.3 mcg/kg/hr (10/08/18 1300)     IV fluid REPLACEMENT ONLY       D5W 50 mL/hr at 10/08/18 1300     HYDROmorphone 0.4 mg/hr (10/08/18 1300)     propofol (DIPRIVAN) infusion Stopped (10/06/18 1600)     sodium chloride 10 mL/hr at 10/08/18 0600     sodium chloride 10 mL/hr at 10/08/18 1300       bacitracin   Topical BID     fiber modular (NUTRISOURCE FIBER)  1 packet Per Feeding Tube BID     folic acid  1 mg Oral or Feeding Tube Daily     heparin  5 mL Intracatheter Q28 Days     heparin lock flush  5-10 mL Intracatheter Q24H     insulin aspart  1-12 Units Subcutaneous Q4H     insulin glargine  12 Units Subcutaneous QAM AC     lidocaine visc 2% & maalox/mylanta w/simethicone & diphenhydramine  10 mL Swish & Swallow Q6H     methylPREDNISolone  62.5 mg Intravenous Q12H     metolazone  5 mg Oral Daily     micafungin  100 mg Intravenous Q24H     multivitamins with minerals  15 mL Per Feeding Tube Daily     pantoprazole  40 mg Per Feeding Tube BID     potassium chloride  20 mEq Oral BID     protein modular  2 packet Per Feeding Tube BID w/meals     QUEtiapine  25 mg Oral or Feeding Tube BID     sodium chloride (PF)  3 mL Intracatheter Q8H     vitamin  B-1  100 mg Per Feeding Tube Daily       Data     Recent Labs  Lab 10/08/18  0515 10/08/18  0024 10/07/18  1130 10/07/18  0430  10/06/18  0555   WBC 24.4*  --   --  26.4*  --  25.7*   HGB 8.4*  --   --  8.7*  --  8.3*   MCV 95  --   --  94  --  95   *  --   --  179  --  188   INR 1.27*  --   --   --   --   --    *  --   --  151*  --  153*   POTASSIUM 3.4 2.9* 3.4 3.0*  < > 2.6*   CHLORIDE 109  --   --  107  --  107   CO2 34*  --   --  37*  --  39*   BUN 48*  --   --  47*  --  47*   CR 0.37*  --   --  0.38*  --  0.36*   ANIONGAP 5  --   --  7  --  7   REAGAN 7.5*  --   --  7.8*  --  7.6*   *  --   --   213*  --  210*   ALBUMIN 2.1*  --   --  2.4*  --  2.4*   PROTTOTAL 4.5*  --   --  4.9*  --  4.9*   BILITOTAL 17.3*  --   --  13.9*  --  11.9*   ALKPHOS 482*  --   --  500*  --  548*   *  --   --  172*  --  157*   *  --   --  164*  --  172*   < > = values in this interval not displayed.    No results found for this or any previous visit (from the past 24 hour(s)).          Disclaimer: This note consists of symbols derived from keyboarding, dictation and/or voice recognition software. As a result, there may be errors in the script that have gone undetected. Please consider this when interpreting information found in this chart.

## 2018-10-08 NOTE — PROGRESS NOTES
"[facility] ICU RESPIRATORY NOTE  Date of Admission: 09/22/0218  Date of Intubation (most recent): 09/24/2018  Reason for Mechanical Ventilation: Worsening hypoxemia  Number of Days on Mechanical Ventilation: 16  Met Criteria for Pressure Support Trial: yes  Length of Pressure Support Trial: 80 mins  Reason for Stopping Pressure Support Trial: An increase in RR and HR  Reason for No Pressure Support Trial:  Significant Events Today:  ABG Results: 7.46/57/95/40  On 10/06/2018 @ 0555  ETT appearance on chest x-ray: A the level of the clavicular heads    Plan:  Continue to monitor and assess the pt's current respiratory needs, in hopes of liberating her from the ventilator.    Ventilation Mode: CMV/AC  (Continuous Mandatory Ventilation/ Assist Control)  FiO2 (%): 45 %  Rate Set (breaths/minute): 20 breaths/min  Tidal Volume Set (mL): 450 mL  PEEP (cm H2O): 5 cmH2O  Pressure Support (cm H2O): 12 cmH2O  Oxygen Concentration (%): 45 %  Resp: 33    Vital signs:  Temp: 99.8  F (37.7  C) Temp src: Oral BP: 129/58   Heart Rate: 124 Resp: (!) 33 SpO2: 96 % O2 Device: Mechanical Ventilator Oxygen Delivery: 15 LPM Height: 167.6 cm (5' 6\") Weight: 65.1 kg (143 lb 8.3 oz)  Estimated body mass index is 23.16 kg/(m^2) as calculated from the following:    Height as of this encounter: 1.676 m (5' 6\").    Weight as of this encounter: 65.1 kg (143 lb 8.3 oz).      Recent Labs  Lab 10/06/18  0555 10/03/18  0640   PH 7.46* 7.46*   PCO2 57* 48*   PO2 95 117*   HCO3 40* 34*   O2PER 50% 80%     PAST MEDICAL HISTORY:   Past Medical History:   Diagnosis Date     Anemia      Breast cancer metastasized to bone (H) 2005     sees Dr. Zach Romo/Dr. Toño REED q3mos - herceptin, zometa      ETOH abuse      History of blood transfusion      Hypertension     lisinopril 5mg = dizziness      Invasive ductal carcinoma of breast (H) 2000    recurred 3x since.      Martha-Stafford tear 10/12/2015    with hematemesis - hospitalized     S/P mastectomy, bilateral " 2000       PAST SURGICAL HISTORY:   Past Surgical History:   Procedure Laterality Date     APPENDECTOMY  1999 - age 39      ESOPHAGOSCOPY, GASTROSCOPY, DUODENOSCOPY (EGD), COMBINED N/A 10/13/2015    Procedure: COMBINED ESOPHAGOSCOPY, GASTROSCOPY, DUODENOSCOPY (EGD);  Surgeon: Rashad Rossi MD;  Location: [facility] GI     EXCISE MASS TRUNK Right 8/9/2016    Procedure: EXCISE MASS TRUNK;  Surgeon: Danie Forrester MD;  Location: [facility] OR     HYSTERECTOMY, NORIS  2002    with BSO      MASTECTOMY MODIFIED RADICAL BILATERAL  2000     REMOVE CATHETER VASCULAR ACCESS Right 8/9/2016    Procedure: REMOVE CATHETER VASCULAR ACCESS;  Surgeon: Danie Forrester MD;  Location: [facility] OR       FAMILY HISTORY:   Family History   Problem Relation Age of Onset     Cancer Mother      hx of lung cancer - survived      Hypertension Mother      Circulatory Mother      s/p valve replacement      Diabetes Father      prediabetes      Prostate Cancer Father      Psychotic Disorder Daughter      ? adhd - maternal cousin with same        SOCIAL HISTORY:   Social History   Substance Use Topics     Smoking status: Former Smoker     Packs/day: 0.50     Years: 15.00     Types: Cigarettes     Quit date: 3/18/1990     Smokeless tobacco: Never Used     Alcohol use 3.5 oz/week     7 Glasses of wine per week      Comment: drinks probably  a glass of wine a night      Clint Camargo RT  10/8/2018

## 2018-10-08 NOTE — PROGRESS NOTES
Oncology/Hematology Follow Up Note:    Assessment and Plan:  #1 Respiratory failure, due to probable pneumonia and volume overload, cannot rule out lung toxicity from chemotherapy (rare) or DAH      - Intubated and mechanically ventilated  - Echo shows no new cardiomyopathy  - BAL studies negative  - Some improvement over the past 5 days.  Off pressors and FiO2 has decreased to 45-50%.      PLAN:  - Antibiotics per ID  - Slightly net positive over the weekend.  Off Lasix now.  Would endorse continued diuresis given clinical improvement last week, but would defer to hospitalist and ICU team.  - Continue Solumedrol 62.5 mg BID (tapered from 250 mg BID last week), for possible drug-induced pneumonitis/DAH      #2 Hepatotoxicity from alcohol abuse and sepsis, in the setting of chemotherapy      - Baseline LFTs before starting chemotherapy (bilirubin normal- 1.4, normal ALT- 51, elevated AST likely due to chronic alcohol abuse, and chronically elevated Alk Phos due to long standing bone mets)  - ALT, AST, alk phos, and bilirubin initially decreased, but have gone up again over the weekend.  - hepatotoxicity from docetaxel is rare and usually self limited, but most likely accentuated by alcohol abuse.      PLAN:  - Since the LFTs improved initially and subsequently worsened, and it has been 3 weeks since the patient received docetaxel/Herceptin/Perjeta, would try to eliminate hepatotoxic medications from her medication list, if possible.  - Monitor LFTs daily  - If the patient recovers from this, would use paclitaxel instead of docetaxel with the next cycle, or omit taxanes altogether.      #3 Metastatic breast cancer, ER weakly positive (5%), CA negative, HER2 positive;       - Has sternal metastases and possible metastasis in left 5th rib  - Prior bone mets alone the spine were no longer FDG avid on PET/CT from 7/23  - Has had a good clinical response so far with significant shrinkage of the sternal metastases over the  past 3 weeks.     PLAN:  - If the patient recovers from the current hospital stay, would consider switching from docetaxel to paclitaxel given lower risk of hepatotoxicity, and continue with Herceptin/Perjeta vs. Herceptin and Perjeta alone.      #4  Pancytopenia, chemotherapy-induced, now improved; Also suspect myelotoxicity due to alcohol abuse.  -  follow CBC; Improving  - Anemia due to underlying malignancy, recent treatment, and her critical illness  - Leukocytosis likely due to steroids.  Infection could be contributing.  -  neupogen discontinued on 9/26.  - Transfuse as needed to keep Hgb >7.0.  In the setting of respiratory failure, could consider targeting a higher Hgb goal.      #5 Code status  #6 Goals of care  - The patient has had a long history of Stage IV HER2 positive metastatic breast cancer (close to 20 years), when most people with this type of cancer only live 3-4 years.  She currently has very limited disease, which has actually responded to the new treatment she received.  We hope she can turn the corner from current ICU issues.       We had a care conference with the patient, her , and her two sons last Friday.  She is still critically ill, but we are encouraged by her progress over the past week.  Will continue to diurese her and give her steroids for the next few days.  The ICU team will also attempt to spontaneous breathing trials in anticipation to potential extubation.      Richard Braun M.D.  [facility]  [phone]      Subjective:    Eyal has remained relatively stable since I saw her on Friday.  She was able to tolerate a spontaneous breathing trial for 45 minutes this morning.  She is on Precedex and Dilaudid for sedation.      Scheduled Medications:  Reviewed active medications    Labs:  CBC RESULTS:   Recent Labs   Lab Test  10/08/18   0515  10/07/18   0430  10/06/18   0555   WBC  24.4*  26.4*  25.7*   HGB  8.4*  8.7*  8.3*   HCT  26.2*  27.1*  26.4*   MCV  95  94   "95   PLT  149*  179  188       CMP  Recent Labs  Lab 10/08/18  0515 10/08/18  0024 10/07/18  1505 10/07/18  1130 10/07/18  0430  10/06/18  1830  10/06/18  0555 10/05/18  0330   *  --   --   --  151*  --   --   --  153* 153*   POTASSIUM 3.4 2.9*  --  3.4 3.0*  < >  --   < > 2.6* 3.6   CHLORIDE 109  --   --   --  107  --   --   --  107 109   CO2 34*  --   --   --  37*  --   --   --  39* 39*   ANIONGAP 5  --   --   --  7  --   --   --  7 5   *  --   --   --  213*  --   --   --  210* 199*   BUN 48*  --   --   --  47*  --   --   --  47* 38*   CR 0.37*  --   --   --  0.38*  --   --   --  0.36* 0.32*   GFRESTIMATED >90  --   --   --  >90  --   --   --  >90 >90   GFRESTBLACK >90  --   --   --  >90  --   --   --  >90 >90   REAGAN 7.5*  --   --   --  7.8*  --   --   --  7.6* 7.9*   MAG 2.1  --   --   --  2.0  --   --   --  2.2 2.0   PHOS 1.9*  --  3.4  --  2.4*  --  2.6  --  2.4* 2.6   PROTTOTAL 4.5*  --   --   --  4.9*  --   --   --  4.9* 5.2*   ALBUMIN 2.1*  --   --   --  2.4*  --   --   --  2.4* 2.7*   BILITOTAL 17.3*  --   --   --  13.9*  --   --   --  11.9* 10.8*   ALKPHOS 482*  --   --   --  500*  --   --   --  548* 549*   *  --   --   --  164*  --   --   --  172* 166*   *  --   --   --  172*  --   --   --  157* 127*   < > = values in this interval not displayed.    INR  Recent Labs  Lab 10/08/18  0515   INR 1.27*       Objective/Physical Exam:  Blood pressure 95/62, pulse 123, temperature 98.9  F (37.2  C), temperature source Oral, resp. rate 27, height 1.676 m (5' 6\"), weight 65.1 kg (143 lb 8.3 oz), SpO2 96 %, not currently breastfeeding.  General intubated and sedated.  Able to open her eyes intermittently and follow some commands.  Skin:  Jaundiced  Eyes:  Scleral icterus noted.  Chest:  Previously noted sternal mets have decreased significantly in size  Lungs:  Coarse breath sounds bilaterally  Abdomen:  Distended.  Decreased BS.    Richard Braun MD  [facility]  10/8/2018 10:08 " AM

## 2018-10-09 NOTE — PROGRESS NOTES
[facility]  Infectious Disease Progress Note          Assessment and Plan:   IMPRESSION:   1.  A 58-year-old female with widely metastatic long-term breast cancer, ongoing chemotherapy, and now neutropenia.   2.  Acute sepsis and neutropenic fever, some signs of pneumonia, with possible esophagitis and positive blood culture, at risk for multiple infections.   3.  Thrush and some concern for possible esophagitis.   4.  Acute and chronic long-term alcohol abuse and liver dysfunction.   5.  Prior Martha-Stafford tear.   6.  Respiratory failure, now intubated.   7.  Positive blood culture, 1 bottle for diphtheroids.  No further identification.  This could be a contaminant versus line infection.       RECOMMENDATIONS:   1.   off abx, some LGF, watching for now  Will follow peripherally        Interval History:   Intubated sedated WBC rising above nl range incl PMNs  No new + cxs , some LGF on steroids              Medications:       albumin human  50 g Intravenous Q8H     bacitracin   Topical BID     fiber modular (NUTRISOURCE FIBER)  1 packet Per Feeding Tube BID     folic acid  1 mg Oral or Feeding Tube Daily     furosemide  20 mg Intravenous Once     heparin  5 mL Intracatheter Q28 Days     heparin lock flush  5-10 mL Intracatheter Q24H     insulin aspart  4 Units Subcutaneous Q4H     insulin aspart  1-6 Units Subcutaneous Q4H     [START ON 10/10/2018] insulin glargine  15 Units Subcutaneous QAM AC     lidocaine visc 2% & maalox/mylanta w/simethicone & diphenhydramine  10 mL Swish & Swallow Q6H     methylPREDNISolone  62.5 mg Intravenous Q12H     metolazone  5 mg Oral Daily     multivitamins with minerals  15 mL Per Feeding Tube Daily     pantoprazole  40 mg Per Feeding Tube BID     potassium chloride  20 mEq Oral BID     protein modular  1 packet Per Feeding Tube BID w/meals     QUEtiapine  25 mg Oral or Feeding Tube BID     sodium chloride (PF)  3 mL Intracatheter Q8H                  Physical  "Exam:   Blood pressure 110/50, pulse 123, temperature 98.8  F (37.1  C), temperature source Oral, resp. rate 23, height 1.676 m (5' 6\"), weight 67 kg (147 lb 11.3 oz), SpO2 95 %, not currently breastfeeding.  Wt Readings from Last 2 Encounters:   10/09/18 67 kg (147 lb 11.3 oz)   10/23/17 57.6 kg (127 lb)     Vital Signs with Ranges  Temp:  [96.7  F (35.9  C)-98.9  F (37.2  C)] 98.8  F (37.1  C)  Heart Rate:  [] 93  Resp:  [0-61] 23  BP: ()/(50-69) 110/50  MAP:  [56 mmHg-103 mmHg] 61 mmHg  Arterial Line BP: ()/(41-73) 100/43  FiO2 (%):  [45 %] 45 %  SpO2:  [89 %-99 %] 95 %    Constitutional: Intubated and sedated   Lungs: Clear to auscultation bilaterally, no crackles or wheezing   Cardiovascular: Regular rate and rhythm, normal S1 and S2, and no murmur noted   Abdomen: Normal bowel sounds, soft, non-distended, non-tender   Skin: No rashes, no cyanosis, no edema   Other:           Data:   All microbiology laboratory data reviewed.  Recent Labs   Lab Test  10/08/18   0515  10/07/18   0430  10/06/18   0555   WBC  24.4*  26.4*  25.7*   HGB  8.4*  8.7*  8.3*   HCT  26.2*  27.1*  26.4*   MCV  95  94  95   PLT  149*  179  188     Recent Labs   Lab Test  10/08/18   0515  10/07/18   0430  10/06/18   0555   CR  0.37*  0.38*  0.36*     No lab results found.  Recent Labs   Lab Test  09/30/18   1210  09/30/18   0335  09/30/18   0130  09/30/18   0111  09/29/18   0943  09/29/18   0911  09/28/18   1211  09/24/18   0945  09/22/18   1033   CULT  No growth after 9 days  Candida albicans / dubliniensis  isolated  Candida albicans and Candida dubliniensis are not routinely speciated  *  Culture received and in progress.  Positive AFB results are called as soon as detected.    Final report to follow in 7 to 8 weeks.    Assayed at [facility] [address] [place] [id number] [phone]  No growth  Culture negative monitoring continues  Canceled, Test credited  Test reordered as correct code  Light " growth  Treva albicans / dubliniensis  Candida albicans and Candida dubliniensis are not routinely speciated  Susceptibility testing not routinely done  *  No growth  No growth  No growth  No growth  No growth  No growth  Light growth  Candida albicans / dubliniensis  Candida albicans and Candida dubliniensis are not routinely speciated  Susceptibility testing not routinely done  *  No growth

## 2018-10-09 NOTE — PLAN OF CARE
Problem: Restraint for Non-Violent/Non-Self-Destructive Behavior  Goal: Prevent/Manage Potential Problems  Maintain safety of patient and others during period of restraint.  Promote psychological and physical wellbeing.  Prevent injury to skin and involved body parts.  Promote nutrition, hydration, and elimination.    Outcome: No Change  .Right wrist and Left wrist restraints continued 10/9/2018    Clinical Justification: Pulling lines, pulling tubes, and pulling equipment  Less Restrictive Alternative: Repositioning, Re-evaluate equipment, Disguise equipment, De-escalation, Reorientation  Attending Physician Notified: Yes, Attending Physician's Name: Dr. Jacob   New orders placed No  Length of Order: 1 Day      Sherry Modi

## 2018-10-09 NOTE — PROGRESS NOTES
"[facility] ICU RESPIRATORY NOTE  Date of Admission: 09/22/0218  Date of Intubation (most recent): 09/24/2018  Reason for Mechanical Ventilation: Worsening hypoxemia  Number of Days on Mechanical Ventilation: 17  Met Criteria for Pressure Support Trial: yes  Length of Pressure Support Trial:4 hrs  Reason for Stopping Pressure Support Trial: An increase in RR, HR and tiring out  Reason for No Pressure Support Trial:  Significant Events Today:  ABG Results: 7.46/57/95/40  On 10/06/2018 @ 0555  ETT appearance on chest x-ray: A the level of the clavicular heads     Plan:  Continue to monitor and assess the pt's current respiratory needs, in hopes of liberating her from the ventilator.     Ventilation Mode: CMV/AC  (Continuous Mandatory Ventilation/ Assist Control)  FiO2 (%): 45 %  Rate Set (breaths/minute): 20 breaths/min  Tidal Volume Set (mL): 450 mL  PEEP (cm H2O): 5 cmH2O  Pressure Support (cm H2O): 10 cmH2O  Oxygen Concentration (%): 45 %  Resp: 23    Vital signs:  Temp: 98.8  F (37.1  C) Temp src: Oral BP: 110/50   Heart Rate: 93 Resp: 23 SpO2: 95 % O2 Device: Mechanical Ventilator Oxygen Delivery: 15 LPM Height: 167.6 cm (5' 6\") Weight: 67 kg (147 lb 11.3 oz)  Estimated body mass index is 23.84 kg/(m^2) as calculated from the following:    Height as of this encounter: 1.676 m (5' 6\").    Weight as of this encounter: 67 kg (147 lb 11.3 oz).      Recent Labs  Lab 10/06/18  0555 10/03/18  0640   PH 7.46* 7.46*   PCO2 57* 48*   PO2 95 117*   HCO3 40* 34*   O2PER 50% 80%     PAST MEDICAL HISTORY:   Past Medical History:   Diagnosis Date     Anemia      Breast cancer metastasized to bone (H) 2005     sees Dr. Zach Romo/Dr. Toño REED q3mos - herceptin, zometa      ETOH abuse      History of blood transfusion      Hypertension     lisinopril 5mg = dizziness      Invasive ductal carcinoma of breast (H) 2000    recurred 3x since.      Martha-Stafford tear 10/12/2015    with hematemesis - hospitalized     S/P mastectomy, bilateral " 2000       PAST SURGICAL HISTORY:   Past Surgical History:   Procedure Laterality Date     APPENDECTOMY  1999 - age 39      ESOPHAGOSCOPY, GASTROSCOPY, DUODENOSCOPY (EGD), COMBINED N/A 10/13/2015    Procedure: COMBINED ESOPHAGOSCOPY, GASTROSCOPY, DUODENOSCOPY (EGD);  Surgeon: Rashad Rossi MD;  Location: [facility] GI     EXCISE MASS TRUNK Right 8/9/2016    Procedure: EXCISE MASS TRUNK;  Surgeon: Danie Forrester MD;  Location: [facility] OR     HYSTERECTOMY, NORIS  2002    with BSO      MASTECTOMY MODIFIED RADICAL BILATERAL  2000     REMOVE CATHETER VASCULAR ACCESS Right 8/9/2016    Procedure: REMOVE CATHETER VASCULAR ACCESS;  Surgeon: Danie Forrester MD;  Location: [facility] OR       FAMILY HISTORY:   Family History   Problem Relation Age of Onset     Cancer Mother      hx of lung cancer - survived      Hypertension Mother      Circulatory Mother      s/p valve replacement      Diabetes Father      prediabetes      Prostate Cancer Father      Psychotic Disorder Daughter      ? adhd - maternal cousin with same        SOCIAL HISTORY:   Social History   Substance Use Topics     Smoking status: Former Smoker     Packs/day: 0.50     Years: 15.00     Types: Cigarettes     Quit date: 3/18/1990     Smokeless tobacco: Never Used     Alcohol use 3.5 oz/week     7 Glasses of wine per week      Comment: drinks probably  a glass of wine a night      Clint Camargo RT  10/9/2018

## 2018-10-09 NOTE — PLAN OF CARE
Problem: Patient Care Overview  Goal: Plan of Care/Patient Progress Review  Outcome: No Change  .ICU End of Shift Summary.  For vital signs and complete assessments, please see documentation flowsheets.     Pertinent assessments: Patient remains intubated.  Precedex for sedation; alert and awake for most of the shift.  Follow simple commands.  Afebrile.  Tele SR/ST.  Vasopressin started per Tele Hub MD.  Levophed started later and currently is off.  BP fluctuates.  LS coarse; suctioned small amount of thick creamy secretions.  D5@ 50 ml/hr.  BG elevated.  TF at goal 45 ml/hr with water flushes 150 ml every 4 hours.  BS active.  Rectal tube remains in place; still putting out moderate amount of watery stool.  Gray patent with good urine output.   Major Shift Events: Vasopressin and Levophed started.  K+ 2.6 this morning; currently replacing.    Plan (Upcoming Events): Continue to monitor BP; continue current care.  Discharge/Transfer Needs: TBD    Bedside Shift Report Completed :   Bedside Safety Check Completed:

## 2018-10-09 NOTE — PROGRESS NOTES
Oncology/Hematology Follow Up Note:    Assessment and Plan:  #1 Respiratory failure, due to probable pneumonia and volume overload, cannot rule out lung toxicity from chemotherapy (rare) or DAH      - Intubated and mechanically ventilated  - Echo shows no new cardiomyopathy  - BAL studies negative  - Some improvement over the past week.  Attempting to extubate this week      PLAN:  - Off Lasix now given drop in BP overnight.  Would endorse continued diuresis given clinical improvement last week.  I am personally in favor of albumin infusions along with Lasix.  She diuresed well with this approach last week, and was   - Would minimize crystalloids  - Continue Solumedrol 62.5 mg BID (tapered from 250 mg BID last week), for possible drug-induced pneumonitis/DAH  - Continue to attempt SBTs      #2 Hepatotoxicity from alcohol abuse and sepsis, in the setting of chemotherapy      - Baseline LFTs before starting chemotherapy (bilirubin normal- 1.4, normal ALT- 51, elevated AST likely due to chronic alcohol abuse, and chronically elevated Alk Phos due to long standing bone mets)  - ALT, AST, alk phos, and bilirubin initially decreased, but have gone up again over the weekend.  - hepatotoxicity from docetaxel is rare and usually self limited, but most likely accentuated by alcohol abuse.      PLAN:  - Since the LFTs improved initially and subsequently worsened, and it has been 3 weeks since the patient received docetaxel/Herceptin/Perjeta, would try to eliminate hepatotoxic medications from her medication list, if possible.  - Monitor LFTs daily  - If the patient recovers from this, would use paclitaxel instead of docetaxel with the next cycle, or omit taxanes altogether.      #3 Metastatic breast cancer, ER weakly positive (5%), MI negative, HER2 positive;       - Has sternal metastases and possible metastasis in left 5th rib  - Prior bone mets alone the spine were no longer FDG avid on PET/CT from 7/23  - Has had a good  clinical response so far with significant shrinkage of the sternal metastases over the past 3 weeks.      PLAN:  - If the patient recovers from the current hospital stay, would consider switching from docetaxel to paclitaxel given lower risk of hepatotoxicity, and continue with Herceptin/Perjeta vs. Herceptin and Perjeta alone.      #4  Pancytopenia, chemotherapy-induced, now improved; Also suspect myelotoxicity due to alcohol abuse.  -  follow CBC; Improving  - Anemia due to underlying malignancy, recent treatment, and her critical illness  - Leukocytosis likely due to steroids.  Infection could be contributing.  -  neupogen discontinued on 9/26.  - Transfuse as needed to keep Hgb >7.0.  In the setting of respiratory failure, could consider targeting a higher Hgb goal.      #5 Code status  #6 Goals of care  - The patient has had a long history of Stage IV HER2 positive metastatic breast cancer (close to 20 years), when most people with this type of cancer only live 3-4 years.  She currently has very limited disease, which has actually responded to the new treatment she received.  We hope she can turn the corner from current ICU issues.       We had a care conference with the patient, her , and her two sons last Friday.  She is still critically ill, but we are encouraged by her progress over the past week.  Will continue to diurese her and give her steroids for the next few days.  The ICU team will also continue to attempt spontaneous breathing trials in anticipation to potential extubation.      Richard Braun M.D.  [facility]  [phone]      Subjective:    Rachna tolerated 1 hour and 20 mins of SBT yesterday afternoon.  She was back on spontaneous when I visited this morning.  Her BP trended down overnight, and she was restarted on Vasopressin.    Scheduled Medications:  Reviewed active medications    Labs:  CBC RESULTS:   Recent Labs   Lab Test  10/08/18   0515  10/07/18   0430  10/06/18   0555   WBC   "24.4*  26.4*  25.7*   HGB  8.4*  8.7*  8.3*   HCT  26.2*  27.1*  26.4*   MCV  95  94  95   PLT  149*  179  188       CMP  Recent Labs  Lab 10/09/18  0525 10/08/18  0515 10/08/18  0024 10/07/18  1505 10/07/18  1130 10/07/18  0430  10/06/18  0555 10/05/18  0330   NA  --  148*  --   --   --  151*  --  153* 153*   POTASSIUM 2.6* 3.4 2.9*  --  3.4 3.0*  < > 2.6* 3.6   CHLORIDE  --  109  --   --   --  107  --  107 109   CO2  --  34*  --   --   --  37*  --  39* 39*   ANIONGAP  --  5  --   --   --  7  --  7 5   GLC  --  212*  --   --   --  213*  --  210* 199*   BUN  --  48*  --   --   --  47*  --  47* 38*   CR  --  0.37*  --   --   --  0.38*  --  0.36* 0.32*   GFRESTIMATED  --  >90  --   --   --  >90  --  >90 >90   GFRESTBLACK  --  >90  --   --   --  >90  --  >90 >90   REAGAN  --  7.5*  --   --   --  7.8*  --  7.6* 7.9*   MAG 2.2 2.1  --   --   --  2.0  --  2.2 2.0   PHOS 2.0* 1.9*  --  3.4  --  2.4*  < > 2.4* 2.6   PROTTOTAL  --  4.5*  --   --   --  4.9*  --  4.9* 5.2*   ALBUMIN  --  2.1*  --   --   --  2.4*  --  2.4* 2.7*   BILITOTAL  --  17.3*  --   --   --  13.9*  --  11.9* 10.8*   ALKPHOS  --  482*  --   --   --  500*  --  548* 549*   AST  --  137*  --   --   --  164*  --  172* 166*   ALT  --  163*  --   --   --  172*  --  157* 127*   < > = values in this interval not displayed.    INR  Recent Labs  Lab 10/08/18  0515   INR 1.27*       Objective/Physical Exam:  Blood pressure 117/52, pulse 123, temperature 98.3  F (36.8  C), temperature source Oral, resp. rate (!) 33, height 1.676 m (5' 6\"), weight 67 kg (147 lb 11.3 oz), SpO2 95 %, not currently breastfeeding.  General intubated and sedated.  Able to open her eyes intermittently and follow some commands.  Skin:  Jaundiced  Eyes:  Scleral icterus noted.  Chest:  Previously noted sternal mets have decreased significantly in size  Lungs:  Coarse breath sounds bilaterally  Abdomen:  Distended.  Decreased BS.  Ext:  Trace edema in bilateral LEs.    Richard Braun MD  [facility]  10/9/2018 12:09 PM

## 2018-10-09 NOTE — PROGRESS NOTES
CLINICAL NUTRITION SERVICES - REASSESSMENT NOTE      Recommendations Ordered by Registered Dietitian (RD):   Isosource 1.5 at 55 mL/hr  Prosource BID  Nutrisource fiber BID  Discontinue thiamine   Malnutrition:   % Weight Loss: Weight loss does not meet criteria for malnutrition --> r/t diuresis at this time  % Intake: Decreased intake does not meet criteria --> now enteral reliant  Subcutaneous Fat Loss:Orbital region moderate to severe depletion, Upper arm region severe depletion and Thoracic region moderate depletion  Muscle Loss:Temporal region severe depletion, Clavicle bone region moderate to severe, Acromion bone region moderate depletion, Dorsal hand region depletion masked by edema; lower extremities masked by fluid (previously 10/4: Patellar region moderate depletion, Anterior thigh region moderate or greater depletion and Posterior calf region moderate or greater depletion)  Fluid Retention: Trace to moderate      Malnutrition Diagnosis: Severe malnutrition  In Context of:  Chronic illness or disease and Environmental or social circumstances     EVALUATION OF PROGRESS TOWARD GOALS/NEW FINDINGS   Progress towards goals will be monitored and evaluated per protocol and Practice Guidelines    Patient remains on TF as follows:     Type of Feeding Tube: ND (10 fr, bridle, 9/24)    Enteral Frequency:  Continuous    Enteral Regimen: Isosource at 45 mL/hr    Total Enteral Provisions: 1080 mL provides 1620 kcal (27 kcal/kg), 73 gm protein, 190 g CHO, 15 g fiber and 821 mL H20.    Meets >100% of DRI's --> continue Certavite given etoh hx    2 pkts Prosource BID to provide additional 22 g protein daily (to total 95 g - 1.6 g/kg)    Fiber BID for an additional 6 grams    Free Water Flush: 150 mL q4 hours    Total EN volume received:  10/8: 1035 mL  10/7: 1125 mL  10/6: 990 mL  10/5: 1035 mL  10/4: 1080 mL  3 day average: 1053 mL, 98% of prescribed volume received over 5 days        BM: 1000 mL stool output in last 24  hours (750 mL, 550 mL, 1600 mL, 400 mL over previous 24 hour periods)    Wounds/WOCN: sacrum/gluteal cleft with 2x2x<0.1 cm dry red dermis, from frequent stooling; blanchable cocccyx    Fluid: +1-3 BUE, BLE and generalized/dependent edema    Weight: 66 kg - down from peak of 75 kg with diuresis, up ~7 kg since admit    Palliative: goals of care planning, family conference today    Ori nutrition score: 2; total score: 13    Meds: Folic acid 1 mg, Certavite, thiamine 100 mg; solumedrol, Precedex; Lantus 15 units; D5 IVF at 50 mL per hour provides 204 kcal, 60 gm CHO; vasopressin, levo now off    Labs:   Recent Labs   Lab Test  10/09/18   0525  10/08/18   0515  10/08/18   0024  10/07/18   1130  10/07/18   0430   POTASSIUM  2.6*  3.4  2.9*  3.4  3.0*     Recent Labs   Lab Test  10/09/18   0525  10/08/18   0515  10/07/18   1505  10/07/18   0430  10/06/18   1830   PHOS  2.0*  1.9*  3.4  2.4*  2.6     Recent Labs   Lab Test  10/09/18   0525  10/08/18   0515  10/07/18   0430  10/06/18   0555  10/05/18   0330   MAG  2.2  2.1  2.0  2.2  2.0     Recent Labs   Lab Test  10/08/18   0515  10/07/18   0430  10/06/18   0555  10/05/18   0330  10/04/18   0606   NA  148*  151*  153*  153*  149*     Recent Labs   Lab Test  10/08/18   0515  10/07/18   0430  10/06/18   0555  10/05/18   0330  10/04/18   0606   CR  0.37*  0.38*  0.36*  0.32*  0.38*       Recent Labs  Lab 10/08/18  0515 10/07/18  0430 10/06/18  0555 10/05/18  0330 10/04/18  0606 10/03/18  0505   * 213* 210* 199* 194* 205*     Lab Results   Component Value Date    A1C 5.0 10/02/2018    A1C 5.1 10/14/2015    A1C 5.2 10/13/2015       ASSESSED NUTRITION NEEDS  (PER APPROVED PRACTICE GUIDELINES, Dosing weight: 60 kg):  Estimated Energy Needs: 2727-1243 kcals (25-35 Kcal/Kg)  Justification: vented  Estimated Protein Needs:  grams protein (1.5-2 g pro/Kg)  Justification: hypercatabolism with critical illness  Estimated Fluid Needs: >1 mL/Kcal  Justification:  maintenance    Previous Goals:   EN to meet % needs while NPO.  Evaluation: Met    Previous Nutrition Diagnosis:   Predicted suboptimal nutrient intake (energy/protein) related to enteral reliant since 9/27/2018 and potential for interruptions with prolonged hospitalization  Evaluation: Ongoing, updated below    MALNUTRITION (Assessed 10/9)  % Weight Loss: Weight loss does not meet criteria for malnutrition --> r/t diuresis at this time  % Intake: Decreased intake does not meet criteria --> now enteral reliant  Subcutaneous Fat Loss:Orbital region moderate to severe depletion, Upper arm region severe depletion and Thoracic region moderate depletion  Muscle Loss:Temporal region severe depletion, Clavicle bone region moderate to severe, Acromion bone region moderate depletion, Dorsal hand region depletion masked by edema; lower extremities masked by fluid (previously 10/4: Patellar region moderate depletion, Anterior thigh region moderate or greater depletion and Posterior calf region moderate or greater depletion)  Fluid Retention: Trace to moderate      Malnutrition Diagnosis: Severe malnutrition  In Context of:  Chronic illness or disease and Environmental or social circumstances    CURRENT NUTRITION DIAGNOSIS  Increased nutrient needs (protein energy) related to critical illness and wound healing as evidenced by 17 days vented, needs estimated above and EN reliance    INTERVENTIONS  Recommendations / Nutrition Prescription  Slight increase to TF regimen:    Type of Feeding Tube: ND (10 fr, bridle, 9/24)    Enteral Frequency:  Continuous    Enteral Regimen: Isosource 1.5 at 55 mL/hr    Total Enteral Provisions: 1320 mL provides 1980 kcal, 90 gm protein, 232 gm CHO, 20 gm fiber and 1003 mL H20.    Meets >100% of DRI's --> continue micronutrients per CIWA protocol (ok to discontinue thiamine)    Continue Nutrisource Fiber BID to total 26 grams fiber daily, Prosource BID to total 112 grams protein daily    Free  Water Flush: per MD order    Continue electrolyte replacement protocol    Implementation  EN Composition, EN Schedule and Feeding Tube Flush: Entered orders to reflect regimen outlined above  Collaboration and Referral of care: Discussed patient during interdisciplinary care rounds this morning    Goals  TF at goal rate to meet % of estimated needs while NPO      MONITORING AND EVALUATION:  Progress towards goals will be monitored and evaluated per protocol and Practice Guidelines      Tiffanie Cronin RDN, LD, CNSC  Pager - 3rd floor/ICU: [phone]  Pager - All other floors: [phone]  Pager - Weekend/holiday: [phone]  Office: [phone]

## 2018-10-09 NOTE — PROGRESS NOTES
Tele ICU    Called by nurse reporting dropping blood pressures    Will stop the lasix drip and start levophed drip with goal to maintain MAPs 60-70.

## 2018-10-09 NOTE — PROGRESS NOTES
[facility] Nurse Inpatient Wound Assessment     Assessment of wound(s) on pt's:   Sacrum/gluteal cleft        Data:   Patient History:        per MD note(s): 58 year old female with PMH including metastatic breast cancer with bone metastases to sternum and rib who started a new chemo regimen 5 days ago, alcohol abuse, malnutrition, pancytopenia, and HTN who presents with fatigue and shortness of breath.      Pt intubated, multiple lines, and drains.       Moisture Management:  Flexi-Seal and Urinary Catheter patent    Catheter secured? Yes    Current Diet / Nutrition:         Active Diet Order      NPO for Medical/Clinical Reasons Except for: Meds, Ice Chips             Ori Assessment and sub scores:   Ori Score  Av.6  Min: 10  Max: 19     Mattress:  Standard , Low air loss mattress with pulsation  Pulsate  Labs:    Recent Labs   Lab Test  10/08/18   0515   10/02/18   2225   ALBUMIN  2.1*   < >   --    HGB  8.4*   < >   --    INR  1.27*   --    --    WBC  24.4*   < >   --    A1C   --    --   5.0   CRP  52.6*   --    --     < > = values in this interval not displayed.      Wound Assessment (location #1):   Sacrum/gluteal cleft  Wound History:  History of loose stools with impaired liver function and clotting    Specific Dimensions (length x width x depth, in cm):  Bilateral Buttocks has superficial erosion about Right buttock slightly larger 3x2x 0.2cm moist red bloody dermis     Left buttock: 2x2x< 0.1cm dry red dermis,     Periwound Skin: resolving irritant dermatitis with minimal superficial erosion; extremely bony and protruding coccyx and sacral area with muscle wasting of buttocks    Drainage: scant bloody with dressing removal    Odor: none    Pain:  unable to assess          Intervention:     Patient's chart evaluated.      Wound(s) was assessed    Wound Care: was done per POC with staff and new POC    Orders  Reviewed and updated    Supplies  reviewed and brought to room      Discussed plan of care with Nurses; no family present          Assessment:       Moisture associated dermatitis from history of  loose, frequent stooling, wounds are slightly larger this week but appear stable. Will discontinue the Triad paste and begin Sacral mepilex to pad and protect the entire area. FMS is patent without leakage and foam may provide more protection.   Nursing staff are providing excellent pressure injury protection with all methods available and are vigilant with the turn schedule. Will order Rooke Boots today to prevent foot drop.    Improving old skin tear was noted to left upper posterior thoracic: 1 x 2.8 x< 0.1cm pink/ white dermis, small bloody drainage, applied small amount of AquaCel Ag and covered with Mepilex 4x4.     Pt continues to be critically ill, very low Albumen; low Ori; intubated, multiple lines and tubes, goddard, FMS. Multiple bruises noted to arms and legs, jaundiced skin with edematous labia, and feet 2-3+.         Plan:     Nursing to notify the Provider(s) and re-consult the [facility] Nurse if wound(s) deteriorate(s) or if the wound care plan needs reevaluation.    Plan of care for wound located  sacrum/gluteal cleft: every 3 days    1. Gently cleanse with skin cleanser and pat dry;     2. Dust the open moist wounds with Stoma powder, dab with No Sting barrier to seal the powder    3. Apply Sacral Mepilex dressing, date; swab perimeter of dressing with No Sting film        Wound care to Left Posterior upper back: skin tear: every 3 days    1. Cleanse with wound spray, pat dry    2. Cut small piece of AquaCel AG (in med drawer) to fit over wound, spritz with wound spray     3. Cover with Mepilex dressing, date     WO Nurse will return: Weekly

## 2018-10-09 NOTE — PLAN OF CARE
Problem: Patient Care Overview  Goal: Plan of Care/Patient Progress Review  Outcome: Improving  Pt intermittently follows commands. PERRL 2mm. Gtt infusing as ordered. -130s. BP labile throughout day. Tele hub contacted r\t MAPs 60-65. Discussed lasix gtt. Awaiting response from Tele hub. See flowsheets for A-line/cuff MAPs. CMV settings. Large amount of think creamy secretions throughout the day. Lungs coarse throughout. PS x 1 hr today. Good UOP with diuresis. Large Rectal tube output. TF at goal. Education provided to patient and patient's  today. Cont with current POC.     Response from Tele hub approx 2130. Lasix gtt stopped.

## 2018-10-09 NOTE — PROGRESS NOTES
"[facility]  Palliative Care Progress Note  Text Page    Family care conference with the patient's  Luis A Turner; Intensivist Oniel Ram MD; bedside nurse Ariana Gray RN and medical student in the ICU conference room.  Dr. Ram explained the terminal nature of Rachna's liver and pulmonary failure. He acknowledged that Rachna is not at liver transplant candidate as she has not had established sobriety for 6 months. He explained that Rachna's cancer is not the primary concern at this time as her hepatic and pulmonary failure are the primary problems. Dr. Ram encouraged Luis A to consider a DNR status. Dr. Ram acknowledged the high risk in pursuing tracheostomy and advised a focus on comfort. Luis A was appreciative of everyone's input and is hopeful for a miracle. He acknowledged \"the problems with her liver and her lungs\" and explained \"Rachna's not the same as other people. She wasn't supposed to live this long\". Luis A reiterated his strong belief in prayer. \"The last time David had everyone around her bed praying for her. The next day Rachna pulled the tube right out on her own.\" Luis A acknowledged that their 3 children have different coping styles and it will be \"a problem for me to explain her liver and lung problems.\"      Assessment & Plan      1. Decisional Capacity -  Unreliable as she intubated and agitated when sedation is lifted . Patient does not have an advance directive. Per  informed consent policy next of kin should be involved in all consent and decision making. Her  Ashish Turner is next-of-kin.       2. Chronic cancer pain - Continuous IV Dilaudid at 0.4 mg/hr since 10/1/2018. Continue infusion for comfort.       3.  Dyspnea - Patient intubated since 9/24/2018 (17 days).    4.  Agitation - Continue Seroquel 25 mg BID and IV Ativan as needed. Receiving sedation as per Intensivist.      5.  Severe Malnutrition - Appreciate input of Registered Dietitian Tiffanie Cronin, RD, LD in " management of tube feedings.        6.  Diarrhea - C. Diff negative 9/26/2018 and 10/1/2018. Rectal tube started 9/29.      7. Spiritual Care - The patient's  indicates a 78 year old friend/, David is involved as needed.      8. Care Planning - Patient is in a high risk situation with anticipated poor outcome due to liver and pulmonary failure. Continue to support her  and family in decision making.      Goal of Care: FULL CODE - Restorative care    Chata Huff MS, RN, CNS, APRN, ACHPN, FAACVPR  Pain and Palliative Care  Pager [phone]  Office [phone]       Time Spent on this Encounter   I spent 60 minutes (11:05 AM -12:05 PM) in assessment of the patient, counseling and discussion with the patient's  as documented in sections above. Another 20 minutes in review of chart, documentation, coordination of care and discussion with the health care team.    Interval History   Chart reviewed - patient continues to require ventilator and pressor support     Review of Systems    Unable as patient is orally intubated and sedated.     Physical Exam   Temp:  [96.7  F (35.9  C)-99.8  F (37.7  C)] 98.3  F (36.8  C)  Heart Rate:  [] 111  Resp:  [0-40] 33  BP: ()/(55-69) 101/62  MAP:  [56 mmHg-103 mmHg] 73 mmHg  Arterial Line BP: ()/(41-73) 121/51  FiO2 (%):  [45 %] 45 %  SpO2:  [89 %-99 %] 95 %  147 lbs 11.33 oz  GEN:  Frail jaundiced orally intubated  female. Opens eyes but unable to follow one step commands.   HEENT:  Normocephalic/atraumatic, scleral icterus, no nasal discharge, mouth moist.      Medications     dexmedetomidine 0.3 mcg/kg/hr (10/09/18 1000)     IV fluid REPLACEMENT ONLY       D5W 50 mL/hr at 10/09/18 0600     HYDROmorphone 0.4 mg/hr (10/09/18 1000)     norepinephrine Stopped (10/09/18 0505)     sodium chloride 10 mL/hr at 10/08/18 0600     sodium chloride 10 mL/hr at 10/08/18 1300     vasopressin (PITRESSIN) infusion ADULT (40 mL) 2.4 Units/hr  (10/09/18 1000)       bacitracin   Topical BID     fiber modular (NUTRISOURCE FIBER)  1 packet Per Feeding Tube BID     folic acid  1 mg Oral or Feeding Tube Daily     heparin  5 mL Intracatheter Q28 Days     heparin lock flush  5-10 mL Intracatheter Q24H     insulin aspart  3 Units Subcutaneous Q4H     insulin aspart  1-6 Units Subcutaneous Q4H     [START ON 10/10/2018] insulin glargine  15 Units Subcutaneous QAM AC     lidocaine visc 2% & maalox/mylanta w/simethicone & diphenhydramine  10 mL Swish & Swallow Q6H     methylPREDNISolone  62.5 mg Intravenous Q12H     metolazone  5 mg Oral Daily     multivitamins with minerals  15 mL Per Feeding Tube Daily     pantoprazole  40 mg Per Feeding Tube BID     potassium chloride  20 mEq Oral BID     protein modular  2 packet Per Feeding Tube BID w/meals     QUEtiapine  25 mg Oral or Feeding Tube BID     sodium chloride (PF)  3 mL Intracatheter Q8H     vitamin  B-1  100 mg Per Feeding Tube Daily       Data   Results for orders placed or performed during the hospital encounter of 09/22/18 (from the past 24 hour(s))   Glucose by meter   Result Value Ref Range    Glucose 228 (H) 70 - 99 mg/dL   Glucose by meter   Result Value Ref Range    Glucose 217 (H) 70 - 99 mg/dL   Glucose by meter   Result Value Ref Range    Glucose 183 (H) 70 - 99 mg/dL   Glucose by meter   Result Value Ref Range    Glucose 216 (H) 70 - 99 mg/dL   Glucose by meter   Result Value Ref Range    Glucose 208 (H) 70 - 99 mg/dL   Magnesium   Result Value Ref Range    Magnesium 2.2 1.6 - 2.3 mg/dL   Potassium   Result Value Ref Range    Potassium 2.6 (LL) 3.4 - 5.3 mmol/L   Phosphorus   Result Value Ref Range    Phosphorus 2.0 (L) 2.5 - 4.5 mg/dL   Glucose by meter   Result Value Ref Range    Glucose 205 (H) 70 - 99 mg/dL

## 2018-10-09 NOTE — PROVIDER NOTIFICATION
Called to verify with Tele Hub MD; start Vasopressin now and use Levophed when BP MAP gets below goal.

## 2018-10-09 NOTE — PROGRESS NOTES
[facility]    Hospitalist Progress Note    Date of Service (when I saw the patient): 10/09/2018  Provider:  Rodney Jacob MD     Initial presenting complaint/issue to hospital (Diagnosis): Shortness of breath  Assessment & Plan    Eyal Turner is a 58 year old female with metastatic breast cancer with bone metastases to sternum and rib who started a new chemo regimen (that included switch from Kadcyla to Docetaxel, Herceptin and Perjeta) 5 days prior to admission (about 9/17/18).  Other medical problems are chronic alcohol abuse complicated by malnutrition and essential hypertension.   She presented on 9/22/2018 with fatigue and shortness of breath.  She was hypotensive, though responsive to IV fluids in the ED. Her labs were notable for significantly elevated LFTs (above her baseline abnormals) with bilirubin of 8, alk phos about 600 and elevated transaminases.  Other abnormalities were pancytopenia with neutropenia, severe anemia with hemoglobin less than 7 (she received 1 unit RBC transfusion in the ED).       After admission she spiked fever to 101 for which infectrious wrk up was started and chest x-ray showed bilateral infiltrates.  Cefepime was presumptively started for neutropenic fever and azithromycin added subsequently.  A CT abdomen pelvis was obtained that did not show any ascites or obstructive liver disease.    She also developed evidence for acute alcohol withdrawal with significant tremor that improved after a few doses of Ativan.  GI was consulted for possible upper GI bleed and for her liver failure.  No EGD planned.  Did start empiric Protonix on admission, but no evidence for ongoing blood loss.      Oncology has also been consulted and recommended supportive cares.        Ms. Turner was transferred to ICU on 9/23/2018 for worsening hypoxemia and was intubated 9/24/2018.  Blood culture grew lactobacillus from port.  ID was consulted and the patient was switched to  vancomycin and Zosyn.  Micafungin was also added for oral thrush.  She was also started on Neupogen for neutropenia with good response.       On 9/29, she developed fever to 102 and leukocytosis of 27K (though the rise in white cell count may be partly due to exposure to Neupogen).     Problem List:   1. Acute hypoxemic respiratory failure.  Unclear etiology.  Initially thought to be sepsis due to pneumonia with possible fluid overload.  Pulmonary toxicity due to chemotherapy now thought to be likely.  Continue IV methylprednisolone.  Oncology following.  Initially was on IV Zosyn.  Infectious disease is following.  Zosyn course completed.  Continue ventilator support.  2. Possible drug-induced pneumonitis.  Continue IV methylprednisolone.  3. Sepsis.  Initially with neutropenic fevers.  Had initially been on IV cefepime and IV Vanco.  Did complete a course of IV Zosyn.  Currently on IV micafungin.  Infectious disease following.  4. Suspected pneumonia.  Appreciate infectious disease input.  Completed a course of IV Zosyn.  Currently on micafungin.  5. Volume overload.  Continue IV furosemide and oral metolazone.  6. Mucositis.  Possible esophagitis.  Continue IV micafungin.  7. Possible GI bleed.  Continue pantoprazole 40 mg twice a day.  8. Metastatic breast cancer.  Oncology following.  9. Hepatitis.  Due to ongoing alcohol abuse.  Possibly worsened by sepsis and chemotherapy.  Recheck LFTs tomorrow.  Check INR tomorrow.  10. Malnutrition.  Continue tube feedings.  11. Hypokalemia.  Continue scheduled potassium replacement and potassium replacement protocol.  12. Hypomagnesemia.  Magnesium replacement protocol.  13. Hyponatremia.  Continue free water down feeding tube.  Registered dietitian following.  14. Hyperglycemia.  Continue Lantus and NovoLog sliding scale. Lantus 15 units AM, Novolog 4 units q 4 hours and medium sliding scale insulin for corrections.   15. Alcohol abuse.  Will need further recommendations  for long-term alcohol cessation once able to be extubated.  Continue vitamins.  16. Alcohol withdrawals.  Currently remains on Precedex.  17. Anemia.  Initially with pancytopenia.  Hemoglobin has now been stable.  18. GI prophylaxis.  Pantoprazole.           # Pain Assessment:  Current Pain Score 10/9/2018   Patient currently in pain? -   Pain score (0-10) -   Pain location -   Pain descriptors -   CPOT pain score 3   Eyal goldsmith pain level was assessed and she currently denies pain.        DVT Prophylaxis: Pneumatic Compression Devices  Code Status: Full Code  Disposition: Continue ICU care.  Discharge TBD.    Interval History   Mechanical ventilation    -Data reviewed today: I reviewed all new labs and imaging results over the last 24 hours. I personally reviewed the EKG tracing showing Sinus tachycardia.    Physical Exam   Temp: 98.4  F (36.9  C) Temp src: Oral BP: 110/50   Heart Rate: 102 Resp: 18 SpO2: 96 % O2 Device: Mechanical Ventilator    Vitals:    10/07/18 0000 10/08/18 0517 10/09/18 0145   Weight: 65.9 kg (145 lb 4.5 oz) 65.1 kg (143 lb 8.3 oz) 67 kg (147 lb 11.3 oz)     Vital Signs with Ranges  Temp:  [96.7  F (35.9  C)-99.8  F (37.7  C)] 98.4  F (36.9  C)  Heart Rate:  [] 102  Resp:  [0-61] 18  BP: ()/(50-69) 110/50  MAP:  [56 mmHg-103 mmHg] 74 mmHg  Arterial Line BP: ()/(41-73) 117/53  FiO2 (%):  [45 %] 45 %  SpO2:  [89 %-99 %] 96 %  I/O last 3 completed shifts:  In: 4266.78 [I.V.:1971.78; NG/GT:1260]  Out: 4380 [Urine:3080; Stool:1300]    GEN: On mechanical ventilation, appears comfortable, NAD.  HEENT:  Normocephalic/atraumatic, scleral icterus ++, no nasal discharge, mouth moist.  ETT in place  CV:  Regular rate and rhythm, no murmur or JVD.  S1 + S2 noted, no S3 or S4.  LUNGS: Ventilatory sounds superimpose to coarse sounds audible to auscultation bilaterally without rales/rhonchi/wheezing/retractions.  Symmetric chest rise on inhalation noted.  ABD:  Active bowel sounds, soft,  non-tender/non-distended.  No rebound/guarding/rigidity.  EXT: Bilateral lower extremity edema, pitting 1+, no cyanosis.  No joint synovitis noted.  SKIN:  Dry to touch, no exanthems noted in the visualized areas.       Medications     dexmedetomidine 0.3 mcg/kg/hr (10/09/18 1418)     IV fluid REPLACEMENT ONLY       D5W 50 mL/hr at 10/09/18 1200     HYDROmorphone 0.4 mg/hr (10/09/18 1418)     norepinephrine Stopped (10/09/18 0505)     sodium chloride 10 mL/hr at 10/08/18 0600     sodium chloride 10 mL/hr at 10/08/18 1300     vasopressin (PITRESSIN) infusion ADULT (40 mL) 2.4 Units/hr (10/09/18 1418)       albumin human  50 g Intravenous Q8H     bacitracin   Topical BID     fiber modular (NUTRISOURCE FIBER)  1 packet Per Feeding Tube BID     folic acid  1 mg Oral or Feeding Tube Daily     heparin  5 mL Intracatheter Q28 Days     heparin lock flush  5-10 mL Intracatheter Q24H     insulin aspart  4 Units Subcutaneous Q4H     insulin aspart  1-6 Units Subcutaneous Q4H     [START ON 10/10/2018] insulin glargine  15 Units Subcutaneous QAM AC     lidocaine visc 2% & maalox/mylanta w/simethicone & diphenhydramine  10 mL Swish & Swallow Q6H     methylPREDNISolone  62.5 mg Intravenous Q12H     metolazone  5 mg Oral Daily     multivitamins with minerals  15 mL Per Feeding Tube Daily     pantoprazole  40 mg Per Feeding Tube BID     potassium chloride  20 mEq Oral BID     protein modular  1 packet Per Feeding Tube BID w/meals     QUEtiapine  25 mg Oral or Feeding Tube BID     sodium chloride (PF)  3 mL Intracatheter Q8H       Data     Recent Labs  Lab 10/09/18  1318 10/09/18  0525 10/08/18  0515  10/07/18  0430  10/06/18  0555   WBC  --   --  24.4*  --  26.4*  --  25.7*   HGB  --   --  8.4*  --  8.7*  --  8.3*   MCV  --   --  95  --  94  --  95   PLT  --   --  149*  --  179  --  188   INR  --   --  1.27*  --   --   --   --    NA  --   --  148*  --  151*  --  153*   POTASSIUM 3.3* 2.6* 3.4  < > 3.0*  < > 2.6*   CHLORIDE  --   --   109  --  107  --  107   CO2  --   --  34*  --  37*  --  39*   BUN  --   --  48*  --  47*  --  47*   CR  --   --  0.37*  --  0.38*  --  0.36*   ANIONGAP  --   --  5  --  7  --  7   REAGAN  --   --  7.5*  --  7.8*  --  7.6*   GLC  --   --  212*  --  213*  --  210*   ALBUMIN  --   --  2.1*  --  2.4*  --  2.4*   PROTTOTAL  --   --  4.5*  --  4.9*  --  4.9*   BILITOTAL  --   --  17.3*  --  13.9*  --  11.9*   ALKPHOS  --   --  482*  --  500*  --  548*   ALT  --   --  163*  --  172*  --  157*   AST  --   --  137*  --  164*  --  172*   < > = values in this interval not displayed.    No results found for this or any previous visit (from the past 24 hour(s)).          Disclaimer: This note consists of symbols derived from keyboarding, dictation and/or voice recognition software. As a result, there may be errors in the script that have gone undetected. Please consider this when interpreting information found in this chart.

## 2018-10-09 NOTE — PROGRESS NOTES
ICU Staff:  I examined the patient, Eyal Turner, in the [facility] ICU. I reviewed the patient's medical record and the progress notes. I participated in the Multidisciplinary ICU Care Rounds; and I agree with the assessment and the plans documented by the other members of the [facility] ICU team. I discussed the patient's history, physical findings, labs, and imaging studies with the other members of the [facility] ICU care team.   The patient is 58 year old woman originally admitted to the ICU for neutropenic fever, sepsis, and possible pneumonia failure to thrive, possible ETOH withdrawal, and cardio-respiratory failure.   Family meeting today with the patient's , the bedside nurse, the Palliative care team , and the medical student.  The information regarding the patient's ICU course was reviewed with the patient's family.  All of the family member's questions and concerns were addressed.    Bili is 17 today.    PAST MEDICAL HISTORY:  Past Medical History:   Diagnosis Date     Anemia      Breast cancer metastasized to bone (H) 2005     sees Dr. Zach Romo/Dr. Toño REED q3mos - herceptin, zometa      ETOH abuse      History of blood transfusion      Hypertension     lisinopril 5mg = dizziness      Invasive ductal carcinoma of breast (H) 2000    recurred 3x since.      Martha-Stafford tear 10/12/2015    with hematemesis - hospitalized     S/P mastectomy, bilateral 2000        PAST SURGICAL HISTORY:  Past Surgical History:   Procedure Laterality Date     APPENDECTOMY  1999 - age 39      ESOPHAGOSCOPY, GASTROSCOPY, DUODENOSCOPY (EGD), COMBINED N/A 10/13/2015    Procedure: COMBINED ESOPHAGOSCOPY, GASTROSCOPY, DUODENOSCOPY (EGD);  Surgeon: Rashad Rossi MD;  Location: [facility] GI     EXCISE MASS TRUNK Right 8/9/2016    Procedure: EXCISE MASS TRUNK;  Surgeon: Danie Forrester MD;  Location: [facility] OR     HYSTERECTOMY, NORIS  2002    with BSO      MASTECTOMY MODIFIED RADICAL BILATERAL  2000     REMOVE  CATHETER VASCULAR ACCESS Right 8/9/2016    Procedure: REMOVE CATHETER VASCULAR ACCESS;  Surgeon: Danie Forrester MD;  Location:  OR        MEDICATIONS:  Current Facility-Administered Medications   Medication     0.9% sodium chloride BOLUS     albumin human 25 % injection 50 g     bacitracin ointment     dexmedetomidine (PRECEDEX) 400 mcg in sodium chloride 0.9 % 100 mL infusion     dextrose 10 % 1,000 mL infusion     dextrose 5% infusion     glucose gel 15-30 g    Or     dextrose 50 % injection 25-50 mL    Or     glucagon injection 1 mg     fentaNYL (PF) (SUBLIMAZE) injection 50 mcg     fiber modular (NUTRISOURCE FIBER) (NUTRISOURCE FIBER) packet 1 packet     folic acid (FOLVITE) tablet 1 mg     furosemide (LASIX) 100 mg in sodium chloride 0.9 % 100 mL infusion     furosemide (LASIX) injection 20 mg     heparin 100 UNIT/ML injection 5 mL     heparin lock flush 10 UNIT/ML injection 3 mL     heparin lock flush 10 UNIT/ML injection 5-10 mL     heparin lock flush 10 UNIT/ML injection 5-10 mL     HYDROmorphone (DILAUDID) 1 mg/mL infusion ADULT/PEDS GREATER than or EQUAL to 20 kg     hypromellose-dextran (ARTIFICAL TEARS) 0.1-0.3 % ophthalmic solution 2-3 drop     insulin aspart (NovoLOG) inj (RAPID ACTING)     insulin aspart (NovoLOG) inj (RAPID ACTING)     [START ON 10/10/2018] insulin glargine (LANTUS) injection 15 Units     lidocaine (LMX4) cream     lidocaine 1 % 1 mL     LORazepam (ATIVAN) injection 0.5 mg     magic mouthwash suspension (diphenhydramine, lidocaine, aluminum-magnesium & simethicone)     magnesium sulfate 4 g in 100 mL sterile water (premade)     methylPREDNISolone sodium succinate (solu-MEDROL) injection 62.5 mg     metolazone (ZAROXOLYN) tablet 5 mg     multivitamins with minerals (CERTAVITE/CEROVITE) liquid 15 mL     naloxone (NARCAN) injection 0.1-0.4 mg     norepinephrine (LEVOPHED) 16 mg in D5W 250 mL infusion     norepinephrine (LEVOPHED) 16 mg in D5W 250 mL infusion     ondansetron  (ZOFRAN-ODT) ODT tab 4 mg    Or     ondansetron (ZOFRAN) injection 4 mg     pantoprazole (PROTONIX) 2 mg/mL suspension 40 mg     polyethylene glycol (MIRALAX/GLYCOLAX) Packet 17 g     potassium chloride (KLOR-CON) Packet 20 mEq     potassium chloride (KLOR-CON) Packet 20-40 mEq     potassium chloride 10 mEq in 100 mL intermittent infusion with 10 mg lidocaine     potassium chloride 10 mEq in 100 mL sterile water intermittent infusion (premix)     potassium chloride 20 mEq in 50 mL intermittent infusion     potassium chloride SA (K-DUR/KLOR-CON M) CR tablet 20-40 mEq     potassium phosphate 15 mmol in D5W 250 mL intermittent infusion     potassium phosphate 20 mmol in D5W 250 mL intermittent infusion     potassium phosphate 20 mmol in D5W 500 mL intermittent infusion     potassium phosphate 25 mmol in D5W 500 mL intermittent infusion     prochlorperazine (COMPAZINE) injection 10 mg    Or     prochlorperazine (COMPAZINE) tablet 10 mg    Or     prochlorperazine (COMPAZINE) Suppository 25 mg     protein modular (PROSource TF) 1 packet     QUEtiapine (SEROquel) tablet 25 mg     sodium chloride (PF) 0.9% PF flush 10-20 mL     sodium chloride (PF) 0.9% PF flush 3 mL     sodium chloride (PF) 0.9% PF flush 3 mL     sodium chloride 0.9% infusion     sodium chloride 0.9% infusion     vasopressin (VASOSTRICT) 40 Units in D5W 40 mL infusion        ALLERGIES:  No Known Allergies     SOCIAL HISTORY:  Social History     Social History     Marital status:      Spouse name: Ashish Turner     Number of children: 3     Years of education: 14     Occupational History     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     Social History Main Topics     Smoking status: Former Smoker     Packs/day: 0.50     Years: 15.00     Types: Cigarettes     Quit date: 3/18/1990     Smokeless tobacco: Never Used     Alcohol use 3.5 oz/week     7 Glasses of wine per week      Comment: drinks probably  a glass of wine a night      Drug use: No  "     Comment: takes 5-HTP.             Sexual activity: Not Currently     Other Topics Concern     Parent/Sibling W/ Cabg, Mi Or Angioplasty Before 65f 55m? No     Social History Narrative       FAMILY HISTORY:  Family History   Problem Relation Age of Onset     Cancer Mother      hx of lung cancer - survived      Hypertension Mother      Circulatory Mother      s/p valve replacement      Diabetes Father      prediabetes      Prostate Cancer Father      Psychotic Disorder Daughter      ? adhd - maternal cousin with same         PHYSICAL EXAM:  Vital Signs: /50  Pulse 123  Temp 98.4  F (36.9  C) (Oral)  Resp 20  Ht 1.676 m (5' 6\")  Wt 67 kg (147 lb 11.3 oz)  SpO2 95%  BMI 23.84 kg/m2  HEENT: the patient is intubated and sedated.    Cor: RRR  Pulm: course BS bilaterally.  Tumor on the sternum due to metastatic cancer.    Abd: distended; soft, no masses.  Ascites present by exam.    Ext: marked muscle wasting and edema present.       A/P:  The patient is 58 year old woman admitted to the ICU for neutropenic fever, sepsis, and possible pneumonia failure to thrive, possible ETOH withdrawal, and cardio-respiratory failure.   The patient has a history of metastatic breast CA.  I discussed the differential diagnosis and a I formulated a best diagnosis and therapeutic ICU care plan for the patient with the other members of the [facility] ICU care team.   Bili is 17 today.  Neuro: the patient has metastatic breast cancer.  Will follow MS exam.    Pulm: acute respiratory failure likely means to pneumonia.  Will continue antibiotic therapy per ID.  Possible drug-induced pneumonitis; will continue solumedrol 62.5 mg BID.  Will continue with PS trials.  Cardiac: cardiac ECHO demonstrates CHF with new cardiomyopathy; will resume Lasix.    : will resume Lasix.    GI: hepatic failure due to ETOH and septic shock.  Will follow LFTs.  Bili is 17 today.  HEME:  pancytopenia due to chemo has improved; will transfuse to keep Hgb " >7.0   Will follow CBC.       The patient remains critically ill due to respiratory failure and ETOH withdrawal, the patient still requires full vent support with twice a day PS trials now to wean patient from the vent. The patient is critically ill and she requires physiologic supportive care in the ICU. 45 min of Critical Care Time exclusive of any time needed to perform bedside procedures.      Oniel Ram MD, PhD

## 2018-10-09 NOTE — PROGRESS NOTES
[facility] RESPIRATORY NOTE  Date of Admission: 9/22/18  Date of Intubation (most recent): 9/24/18  Number of Days on Mechanical Ventilation: 17  Reason for Mechanical Ventilation: Worsening hypoxemia  Met Criteria for Pressure Support Trial: yes  Length of Pressure Support Trial:  Reason for Stopping Pressure Support Trial:  Reason for No Pressure Support Trial:  Significant Events Today:    Ventilation Mode: CMV/AC  (Continuous Mandatory Ventilation/ Assist Control)  FiO2 (%): 45 %  Rate Set (breaths/minute): 20 breaths/min  Tidal Volume Set (mL): 450 mL  PEEP (cm H2O): 5 cmH2O  Pressure Support (cm H2O): 12 cmH2O  Oxygen Concentration (%): 45 %  Resp: 21    ABG Results: Last Arterial Blood Gas:  pH Arterial   Date Value Ref Range Status   10/06/2018 7.46 (H) 7.35 - 7.45 pH Final     pCO2 Arterial   Date Value Ref Range Status   10/06/2018 57 (H) 35 - 45 mm Hg Final     pO2 Arterial   Date Value Ref Range Status   10/06/2018 95 80 - 105 mm Hg Final     Bicarbonate Arterial   Date Value Ref Range Status   10/06/2018 40 (H) 21 - 28 mmol/L Final     Base Excess Art   Date Value Ref Range Status   10/06/2018 14.4 mmol/L Final     Comment:     Abnormal Result, Ref range: -9.0 to 1.8       ETT appearance on chest x-ray: 10/07/18 @ 0947: No change in position     Plan: Continue to monitor and support the patient's respiratory needs    Nikita Syed, RT on 10/9/2018 at 1:07 AM

## 2018-10-10 NOTE — PROGRESS NOTES
"SPIRITUAL HEALTH SERVICES  SPIRITUAL ASSESSMENT Progress Note  [facility] ICU     PRIMARY FOCUS:     Goals of care    Emotional/spiritual/Evangelical distress    Support for coping    ILLNESS CIRCUMSTANCES:   Reviewed documentation. Reflective conversation shared with pt's (Rachna) family including spouse, Luis A; dtr, Mary; parents Liliana and Mani, which integrated elements of illness and family narratives.     Context of Serious Illness/Symptom(s) - Rachna has a 20 year hx of breast cancer and ongoing chemo treatment. She was admitted with fatigue/SOB and developed breathing difficulties and was intubated. Pt also septic. Pt now vent supported and critically ill. Care conference was held earlier today with family and they are considering options to change code states to DNR; and how aggressive with cares they want to be at this point.     Resources for Support - Family as noted above; Rastafari gretchen. Also spiritual support thru family friend \"David\" whom has provided devotionals, prayer shawls, and other spiritual support.     DISTRESS:     Emotional/Existential/Relational Distress - Luis A shares that he is still hoping for a miracle because \"her whole life has been a miracle, I am hoping for another.\" Luis A notes that he is afraid of what life might look like without Rachna. Processed together. Wondered together about the miracle of new life in [place] and what that might look like after one's death. Luis A notes, \"That's the best miracle of all.\"     Spiritual/Mandaen Distress - None expressed. Rachna's parents are Denominational and raised Rachna Denominational. She and Luis A consider themselves \"Born Again.\" When Luis A met me his first question was \"Are you born again?\"     Social/Cultural/Economic Distress - None discussed.     SPIRITUAL/Church COPING:     Religious/Gretchen - Rastafari. Luis A describes themselves as \"born again believers.\"     Spiritual Practice(s) - Prayer, devotionals. Luis A asked for prayer and then decided he would read the prayers " "that I find - read Adam 43:1-4 and Michelle Childers's \"Prayer for Peace and Calm\" which Luis A asked for a copy of.     Emotional/Existential/Relational Connections - Nature, reading, watching movies, family time.   GOALS OF CARE:    Goals of Care - In discussion with care team, currently still full code and considering changing status to DNR.     Meaning/Sense-Making - Luis A reflected on their adventures together:    Of trips to \"[place] and the most sacred places we could find in nature.\"     Love of family and the miracle that they had three children after being told they couldn't have children.     Importance of fiordaliza and the support of their friend David.     Outside of room, met christopher Ford and Mani, Rachna's parents and they shared how they are \"getting along better with Luis A, as we all are trying to support Rachna\" . They shared a bit about their Hinduism fiordaliza and processed the events of the last few days. They note, \"Rachna is having a rough day, she is working so hard.\" They shared that they have been saying the Rosary and appreciate \"all the prayers that can be prayed for Rachna.\"     PLAN: Will continue to follow daily for ongoing emotional/spiritual support. Oriented family to  support and availability overnight as needed.     Nathanael Jefferson M.Div.  Staff   Pager [phone]  "

## 2018-10-10 NOTE — PLAN OF CARE
Problem: Restraint for Non-Violent/Non-Self-Destructive Behavior  Goal: Prevent/Manage Potential Problems  Maintain safety of patient and others during period of restraint.  Promote psychological and physical wellbeing.  Prevent injury to skin and involved body parts.  Promote nutrition, hydration, and elimination.    Outcome: No Change  Right wrist and Left wrist restraints continued 10/10/2018    Clinical Justification: Pulling lines, pulling tubes, and pulling equipment  Less Restrictive Alternative: Repositioning, Pain management, Reorientation  Attending Physician Notified: Yes, Attending Physician's Name: Cecil   New orders placed Yes    Length of Order: 1 Day      Ariana Gray

## 2018-10-10 NOTE — PROGRESS NOTES
[facility]  Palliative Care Progress Note  Text Page    Family care conference again today with  Luis A, son Cezar, son Luis A, daughter Mary, bedside RN Ariana, Dr. Ram, medical student Josefina and Palliative Care.    Dr. Ram thanked family for meeting again today, and acknowledged that the information shared is not easy to hear. He reviewed pt medical status.    Pt's cancer has responded to her latest cancer treatment.  Pt's liver is driving much of what is currently happening to her now. Dr. Ram explained in great detail many of the functions of the liver.   Most concerning for pt is her continued rise in total bilirubin. She is up from 17.3 yesterday to 20.5 today. All transaminases are abnormal. Pt's bilirubin is remaining in her blood, instead of being broken down by her liver and excreted in her stool. She also is having fluid move from her vascular system into her tissues due to low albumin and protein, also mostly due to her liver function. The cause of her liver failure is not confirmed, possible contributors include drugs that she has taken to manage medical conditions including her cancer treatments, and drinking significant amounts of alcohol over time. Pt is not a candidate for liver transplant due to her cancer, and she would need to abstain from alcohol for 6 months to be eligible. Aggressive restorative cares, and recommendations by specialists including oncology have been put in place, and there would be no benefit to transferring pt to the  for other care.     Pt's breathing is deteriorating. Her oxygen requirement has increased from 45% to 80%. Yesterday she tolerated her weaning trial for a significant amount of time. Today, she is unable to tolerate weaning trial for more than a few minutes. She continues to be on pressor support.    Historically, pt does not want to be in the hospital. She has spent 20 years in clinics and hospitals, and several times discharged AMA  "because she wanted to be home.    Recommendations per Dr. Ram -  Do not want to take away hope. Pt has defied the odds several times during her life and illness.  - continue with cares. Likelihood of a catastrophic event is high. Her survival with this level of care is very likely limited to weeks  - consider not doing options that would likely cause her great discomfort, and not benefit her, such as DNR.  - move toward comfort care, consider withdrawal of care including ventilator support.  Family -  Pt wants to survive for her kids. She didn't let her illness get in the way of her kids. They told stories of her taking her kids to a remote cabin, and dragging oxygen tanks with her. \"We are a close family. She is the tree trunk and we are the branches. . . Maybe we've been selfish\".     Questions answered:  Mary - is there any chance that her liver will recover or plateau and not get worse? If pt had a heart attack, would we just stand there and do nothing?  Luis A (son) - not sure he is ok with pt going through CPR  Cezar - how long will pt survive if we continue, how long will pt survive if she is comfort care? What happens to pt if they decide for pt to become comfort care?   Luis A () - I have heard this information yesterday. It is hard for me to explain to the family. We have a lot to talk about.   Family plan to talk about all that they have heard and inform the care team of their decision tomorrow.    Offered support, offered , reassured family that care team are open to their questions and concerns, and they are encouraged to ask questions. They acknowledge this and are grateful for the time that the care team has spent with them in discussions. They decline .  Family returned to pt room to spend time with her, place lizzie juarezwl over her. Family  had visited this am. Later, pt's parents and family friend visited.     Later today returned to room after RN reported to Dr. Ram pt " "had \"maxed\" out her blood pressure support, and remained hypotensive, not doing well. Offered support, answered questions. Offered . Pt's parents agreed to this. Appreciate Chaplain Huffman support of family. Currently no change to POC.     Assessment & Plan      1. Decisional Capacity -  Unreliable as she intubated and agitated when sedation is lifted . Patient does not have an advance directive. Per  informed consent policy next of kin should be involved in all consent and decision making. Her  Ashish Turner is next-of-kin.       2. Chronic cancer pain - Continuous IV Dilaudid at 0.4 mg/hr since 10/1/2018. Continue infusion for comfort.       3.  Dyspnea - Patient intubated since 9/24/2018 (18 days).    4.  Agitation - Continue Seroquel 25 mg BID and IV Ativan as needed. Receiving sedation as per Intensivist.      5.  Severe Malnutrition - Appreciate input of Registered Dietitian Tiffanie Cronin, RD, LD in management of tube feedings.        6.  Diarrhea - C. Diff negative 9/26/2018 and 10/1/2018. Rectal tube started 9/29.      7. Spiritual Care - The patient's  indicates a 78 year old friend/, David is involved as needed. shannen Huffman called to support family 10/10.      8. Care Planning - Patient is in a high risk situation with anticipated poor outcome due to liver and pulmonary failure. Continue to support her  and family in decision making.      Goal of Care: FULL CODE - Restorative care    Lauren Sanders, APRN, AGCNS, ACHPN  Pain and Palliative Care  Pager [phone]  Office [phone]       Time Spent on this Encounter   I spent 105 minutes >50% in assessment of the patient, counseling and discussion with the patient's family as documented in sections above. The rest of time in review of chart, documentation, coordination of care and discussion with the health care team.    Interval History   Chart reviewed - patient continues to require ventilator and pressor " support. Requiring increasing FIO2 to maintain her o2 saturations. Breathing above the vent at 44-50 times per minute. Sedated but responds to voice.     Total bilirubin 20.5, transaminases elevated. INR 1.27  Wbc 17.7, Hgb 7, platelets 107,000. Cultures of a-line, urine, blood repeated today.    Review of Systems    Unable as patient is orally intubated and sedated.     Physical Exam   Temp:  [97.7  F (36.5  C)-100.3  F (37.9  C)] 98.6  F (37  C)  Heart Rate:  [] 107  Resp:  [20-53] 29  BP: (103-167)/(45-73) 103/45  MAP:  [57 mmHg-100 mmHg] 90 mmHg  Arterial Line BP: ()/(39-68) 170/59  FiO2 (%):  [45 %-80 %] 80 %  SpO2:  [88 %-99 %] 99 %  149 lbs 11.08 oz  GEN:  Frail jaundiced orally intubated  female. Opens eyes but unable to follow one step commands.   HEENT:  Normocephalic/atraumatic, scleral icterus, no nasal discharge, mouth moist. Orally intubated, Keofeed tube in place.  CV: Tachy. PP and DP pulses +3. Generalized edema.  Lungs: Coarse RH bilat. + cough.   Abdomin: Distended, firm and tender, especially upper quadrants. BS+  SKIN: Warm, sl diaphoretic to touch. Significant jaundiced.  NEURO: Opens eyes spontaneously, turns head toward voice, does not follow commands. Receiving sedation.    Medications     dexmedetomidine 0.6 mcg/kg/hr (10/10/18 1700)     IV fluid REPLACEMENT ONLY       D5W 50 mL/hr at 10/10/18 1352     furosemide (LASIX) infusion ADULT STANDARD 5 mg/hr (10/10/18 1700)     HYDROmorphone 0.4 mg/hr (10/10/18 1700)     norepinephrine 0.35 mcg/kg/min (10/10/18 1731)     sodium chloride 10 mL/hr at 10/08/18 0600     sodium chloride 10 mL/hr at 10/10/18 1000     vasopressin (PITRESSIN) infusion ADULT (40 mL) 2.4 Units/hr (10/10/18 1700)       albumin human  50 g Intravenous Q8H     bacitracin   Topical BID     calcium gluconate  1 g Intravenous Once     fiber modular (NUTRISOURCE FIBER)  1 packet Per Feeding Tube BID     folic acid  1 mg Oral or Feeding Tube Daily     heparin   5 mL Intracatheter Q28 Days     heparin lock flush  5-10 mL Intracatheter Q24H     insulin aspart  4 Units Subcutaneous Q4H     insulin aspart  1-6 Units Subcutaneous Q4H     insulin glargine  15 Units Subcutaneous QAM AC     lidocaine visc 2% & maalox/mylanta w/simethicone & diphenhydramine  10 mL Swish & Swallow Q6H     methylPREDNISolone  40 mg Intravenous Q12H     metolazone  5 mg Oral Daily     multivitamins with minerals  15 mL Per Feeding Tube Daily     pantoprazole  40 mg Per Feeding Tube BID     piperacillin-tazobactam  3.375 g Intravenous Q8H GT     potassium chloride  20 mEq Oral BID     potassium chloride  20 mEq Intravenous Once     protein modular  1 packet Per Feeding Tube BID w/meals     QUEtiapine  25 mg Oral or Feeding Tube BID     sodium chloride (PF)  3 mL Intracatheter Q8H     vancomycin (VANCOCIN) IV  1,000 mg Intravenous Q8H       Data   Results for orders placed or performed during the hospital encounter of 09/22/18 (from the past 24 hour(s))   Glucose by meter   Result Value Ref Range    Glucose 195 (H) 70 - 99 mg/dL   Potassium   Result Value Ref Range    Potassium 3.0 (L) 3.4 - 5.3 mmol/L   Sodium   Result Value Ref Range    Sodium 142 133 - 144 mmol/L   Phosphorus   Result Value Ref Range    Phosphorus 2.3 (L) 2.5 - 4.5 mg/dL   Glucose by meter   Result Value Ref Range    Glucose 196 (H) 70 - 99 mg/dL   Glucose by meter   Result Value Ref Range    Glucose 219 (H) 70 - 99 mg/dL   CBC with platelets differential   Result Value Ref Range    WBC 17.7 (H) 4.0 - 11.0 10e9/L    RBC Count 2.31 (L) 3.8 - 5.2 10e12/L    Hemoglobin 7.0 (L) 11.7 - 15.7 g/dL    Hematocrit 21.8 (L) 35.0 - 47.0 %    MCV 94 78 - 100 fl    MCH 30.3 26.5 - 33.0 pg    MCHC 32.1 31.5 - 36.5 g/dL    RDW 25.1 (H) 10.0 - 15.0 %    Platelet Count 107 (L) 150 - 450 10e9/L    Diff Method Manual Differential     % Neutrophils 97.0 %    % Lymphocytes 1.0 %    % Monocytes 2.0 %    % Eosinophils 0.0 %    % Basophils 0.0 %    Absolute  Neutrophil 17.2 (H) 1.6 - 8.3 10e9/L    Absolute Lymphocytes 0.2 (L) 0.8 - 5.3 10e9/L    Absolute Monocytes 0.4 0.0 - 1.3 10e9/L    Absolute Eosinophils 0.0 0.0 - 0.7 10e9/L    Absolute Basophils 0.0 0.0 - 0.2 10e9/L    Anisocytosis Marked     Target Cells Slight     Platelet Estimate       Automated count confirmed.  Platelet morphology is normal.   Comprehensive metabolic panel   Result Value Ref Range    Sodium 141 133 - 144 mmol/L    Potassium 2.7 (L) 3.4 - 5.3 mmol/L    Chloride 102 94 - 109 mmol/L    Carbon Dioxide 32 20 - 32 mmol/L    Anion Gap 7 3 - 14 mmol/L    Glucose 230 (H) 70 - 99 mg/dL    Urea Nitrogen 45 (H) 7 - 30 mg/dL    Creatinine 0.30 (L) 0.52 - 1.04 mg/dL    GFR Estimate >90 >60 mL/min/1.7m2    GFR Estimate If Black >90 >60 mL/min/1.7m2    Calcium 8.2 (L) 8.5 - 10.1 mg/dL    Bilirubin Total 20.5 (HH) 0.2 - 1.3 mg/dL    Albumin 3.3 (L) 3.4 - 5.0 g/dL    Protein Total 5.3 (L) 6.8 - 8.8 g/dL    Alkaline Phosphatase 333 (H) 40 - 150 U/L    ALT 94 (H) 0 - 50 U/L    AST 67 (H) 0 - 45 U/L   Magnesium (AM Draw)   Result Value Ref Range    Magnesium 2.1 1.6 - 2.3 mg/dL   Phosphorus (AM Draw)   Result Value Ref Range    Phosphorus 2.1 (L) 2.5 - 4.5 mg/dL   Bilirubin direct   Result Value Ref Range    Bilirubin Direct 15.0 (H) 0.0 - 0.2 mg/dL   Glucose by meter   Result Value Ref Range    Glucose 237 (H) 70 - 99 mg/dL   Glucose by meter   Result Value Ref Range    Glucose 185 (H) 70 - 99 mg/dL   Phosphorus   Result Value Ref Range    Phosphorus 2.3 (L) 2.5 - 4.5 mg/dL   Potassium   Result Value Ref Range    Potassium 2.7 (L) 3.4 - 5.3 mmol/L   Glucose by meter   Result Value Ref Range    Glucose 241 (H) 70 - 99 mg/dL

## 2018-10-10 NOTE — PROGRESS NOTES
Oncology/Hematology Follow Up Note:    Assessment and Plan:  #1 Respiratory failure, due to probable pneumonia and volume overload, cannot rule out lung toxicity from chemotherapy (rare) or DAH      - Intubated and mechanically ventilated  - Echocardiogram shows no new cardiomyopathy  - BAL studies from 10 days ago negative  - Some improvement over the past week with diuresis and steroids.  Attempting to extubate this week      PLAN:  - Would continue with diuresis with Lasix drip and albumin given clinical improvement last week.  - Would discontinue crystalloids  - Continue Solumedrol 62.5 mg BID (tapered from 250 mg BID last week), for possible drug-induced pneumonitis/DAH  - Continue to attempt SBTs  - Off antibiotics.  Would ask ID if repeat micro studies from respiratory secretions would be appropriate.      #2 Hepatotoxicity from alcohol abuse and sepsis, in the setting of chemotherapy.      - Baseline LFTs before starting chemotherapy (bilirubin normal- 1.4, normal ALT- 51, elevated AST likely due to chronic alcohol abuse, and chronically elevated Alk Phos due to long standing bone mets)  - ALT, AST, alk phos, and bilirubin were significantly elevated at the time of admission.  They initially decreased, but have gone up again over the fast few days.  - hepatotoxicity from docetaxel is rare and usually self limited, but most likely accentuated by alcohol abuse.      PLAN:  - Since the LFTs improved initially and subsequently worsened, and it has been 3 weeks since the patient received docetaxel/Herceptin/Perjeta, would try to eliminate hepatotoxic medications from her medication list, if possible.  - Monitor LFTs on routine basis  - Unclear how much of the hepatotoxicity is reversible.  Could get GI (saw pt initially) to comment on this.  - If the patient recovers from this, would use paclitaxel instead of docetaxel with the next cycle, or omit taxanes altogether.      #3 Metastatic breast cancer, ER weakly  positive (5%), SD negative, HER2 positive;       - Has sternal metastases and possible metastasis in left 5th rib  - Prior bone mets alone the spine were no longer FDG avid on PET/CT from 7/23  - Has had a good clinical response so far with significant shrinkage of the sternal metastases over the past 3 weeks.      PLAN:  - If the patient recovers from the current hospital stay, would consider switching from docetaxel to paclitaxel given lower risk of hepatotoxicity, and continue with Herceptin/Perjeta vs. Herceptin and Perjeta alone.      #4  Pancytopenia, chemotherapy-induced, now improved; Also suspect myelotoxicity due to alcohol abuse.  -  follow CBC; Improving  - Anemia due to underlying malignancy, recent treatment, and her critical illness  - Leukocytosis likely due to steroids.  Infection could be contributing.  -  neupogen discontinued on 9/26.  - Transfuse as needed to keep Hgb >7.0.  In the setting of respiratory failure, could consider targeting a higher Hgb goal.      #5 Code status  #6 Goals of care  - The patient has had a long history of Stage IV HER2 positive metastatic breast cancer (close to 20 years), when most people with this type of cancer only live 3-4 years.  She currently has very limited disease, which has actually responded to the new treatment she received.  We still hope she can turn the corner from current ICU issues.       We had a care conference with the patient, her , and her two sons last Friday.  She is still critically ill, but we are encouraged by her progress over the past week.  Will continue to diurese her and give her steroids for the next few days.  The ICU team will also continue to attempt spontaneous breathing trials in anticipation to potential extubation.    Since some of the hepatotoxicity is potentially reversible, would manage her respiratory issues, including SBTs and plan for extubation independent of liver dysfunction.    I am available for another care  conference after 4pm Thurs/Fri of this week, if needed.      Richard Braun M.D.  [facility]  [phone]      Subjective:    No significant events overnight.  The patient was able to tolerate spontaneous breathing trial for 4 hours yesterday per RT.  Still on Vasopressin for BP support.  On Lasix drip for diuresis.    Scheduled Medications:  Reviewed active medications    Labs:  CBC RESULTS:   Recent Labs   Lab Test  10/10/18   0515  10/08/18   0515  10/07/18   0430   WBC  17.7*  24.4*  26.4*   HGB  7.0*  8.4*  8.7*   HCT  21.8*  26.2*  27.1*   MCV  94  95  94   PLT  107*  149*  179       CMP  Recent Labs  Lab 10/10/18  0515 10/09/18  2255 10/09/18  1318 10/09/18  0525 10/08/18  0515  10/07/18  0430  10/06/18  0555    142  --   --  148*  --  151*  --  153*   POTASSIUM 2.7* 3.0* 3.3* 2.6* 3.4  < > 3.0*  < > 2.6*   CHLORIDE 102  --   --   --  109  --  107  --  107   CO2 32  --   --   --  34*  --  37*  --  39*   ANIONGAP 7  --   --   --  5  --  7  --  7   *  --   --   --  212*  --  213*  --  210*   BUN 45*  --   --   --  48*  --  47*  --  47*   CR 0.30*  --   --   --  0.37*  --  0.38*  --  0.36*   GFRESTIMATED >90  --   --   --  >90  --  >90  --  >90   GFRESTBLACK >90  --   --   --  >90  --  >90  --  >90   REAGAN 8.2*  --   --   --  7.5*  --  7.8*  --  7.6*   MAG 2.1  --   --  2.2 2.1  --  2.0  --  2.2   PHOS 2.1* 2.3*  --  2.0* 1.9*  < > 2.4*  < > 2.4*   PROTTOTAL 5.3*  --   --   --  4.5*  --  4.9*  --  4.9*   ALBUMIN 3.3*  --   --   --  2.1*  --  2.4*  --  2.4*   BILITOTAL 20.5*  --   --   --  17.3*  --  13.9*  --  11.9*   ALKPHOS 333*  --   --   --  482*  --  500*  --  548*   AST 67*  --   --   --  137*  --  164*  --  172*   ALT 94*  --   --   --  163*  --  172*  --  157*   < > = values in this interval not displayed.    INR  Recent Labs  Lab 10/08/18  0515   INR 1.27*       Objective/Physical Exam:  Blood pressure 167/52, pulse 123, temperature 99  F (37.2  C), temperature source Oral, resp. rate  "(!) 37, height 1.676 m (5' 6\"), weight 67.9 kg (149 lb 11.1 oz), SpO2 95 %, not currently breastfeeding.  General intubated and sedated.  Able to open her eyes intermittently and follow some commands.  Skin:  Jaundiced  Eyes:  Scleral icterus noted.  Chest:  Previously noted sternal mets have decreased significantly in size  Lungs:  Coarse breath sounds bilaterally  Abdomen:  Distended.  Ext:  Trace edema in bilateral LEs.    Richard Braun MD  [facility]  10/10/2018 10:17 AM        "

## 2018-10-10 NOTE — PROGRESS NOTES
[facility]    Hospitalist Progress Note    Date of Service (when I saw the patient): 10/10/2018  Provider:  Rodney Jacob MD     Initial presenting complaint/issue to hospital (Diagnosis): Shortness of breath  Assessment & Plan    Eyal Turner is a 58 year old female with metastatic breast cancer with bone metastases to sternum and rib who started a new chemo regimen (that included switch from Kadcyla to Docetaxel, Herceptin and Perjeta) 5 days prior to admission (about 9/17/18).  Other medical problems are chronic alcohol abuse complicated by malnutrition and essential hypertension.   She presented on 9/22/2018 with fatigue and shortness of breath.  She was hypotensive, though responsive to IV fluids in the ED. Her labs were notable for significantly elevated LFTs (above her baseline abnormals) with bilirubin of 8, alk phos about 600 and elevated transaminases.  Other abnormalities were pancytopenia with neutropenia, severe anemia with hemoglobin less than 7 (she received 1 unit RBC transfusion in the ED).       After admission she spiked fever to 101 for which infectrious wrk up was started and chest x-ray showed bilateral infiltrates.  Cefepime was presumptively started for neutropenic fever and azithromycin added subsequently.  A CT abdomen pelvis was obtained that did not show any ascites or obstructive liver disease.    She also developed evidence for acute alcohol withdrawal with significant tremor that improved after a few doses of Ativan.  GI was consulted for possible upper GI bleed and for her liver failure.  No EGD planned.  Did start empiric Protonix on admission, but no evidence for ongoing blood loss.      Oncology has also been consulted and recommended supportive cares.        Ms. Turner was transferred to ICU on 9/23/2018 for worsening hypoxemia and was intubated 9/24/2018.  Blood culture grew lactobacillus from port.  ID was consulted and the patient was switched to  vancomycin and Zosyn.  Micafungin was also added for oral thrush.  She was also started on Neupogen for neutropenia with good response.       On 9/29, she developed fever to 102 and leukocytosis of 27K (though the rise in white cell count may be partly due to exposure to Neupogen).     Problem List:   1. Acute hypoxemic respiratory failure.  Unclear etiology.  Initially thought to be sepsis due to pneumonia with possible fluid overload.  Pulmonary toxicity due to chemotherapy now thought to be likely.  Continue IV methylprednisolone.  Oncology following.  Initially was on IV Zosyn.  Infectious disease is following.  Zosyn course completed.  Continue ventilator support.  2. Possible drug-induced pneumonitis.  Continue IV methylprednisolone.  3. Sepsis.  Initially with neutropenic fevers.  Had initially been on IV cefepime and IV Vanco.  Did complete a course of IV Zosyn.  Completed IV micafungin.  Infectious disease following.  4. Suspected pneumonia.  Appreciate infectious disease input.  Completed a course of IV Zosyn & micafungin.  5. Volume overload.  Continue IV furosemide and oral metolazone.  6. Mucositis.  Possible esophagitis.     7. Possible GI bleed.  Continue pantoprazole 40 mg twice a day.  8. Metastatic breast cancer.  Oncology following.  9. Hepatitis.  Due to ongoing alcohol abuse.  Possibly worsened by sepsis and chemotherapy.   10. Hyperbilirubinemia. Bilirrubin > 20, direct 15.  INR 1.27.  11. Malnutrition.  Continue tube feedings.  12. Hypokalemia.  Continue scheduled potassium replacement and potassium replacement protocol.  13. Hypomagnesemia.  Magnesium replacement protocol.  14. Hyponatremia.  Continue free water down feeding tube.  Registered dietitian following.  15. Hyperglycemia.  Continue Lantus and NovoLog sliding scale. Lantus 15 units AM, Novolog 4 units q 4 hours and medium sliding scale insulin for corrections.   16. Alcohol abuse.  Will need further recommendations for long-term  alcohol cessation once able to be extubated.  Continue vitamins.  17. Alcohol withdrawals.  Currently remains on Precedex.  18. Anemia.  Initially with pancytopenia.  Follow up hgb.  19. GI prophylaxis.  Pantoprazole.           # Pain Assessment:  Current Pain Score 10/10/2018   Patient currently in pain? -   Pain score (0-10) -   Pain location -   Pain descriptors -   CPOT pain score 3   Eyal goldsmith pain level was assessed and she currently denies pain.        DVT Prophylaxis: Pneumatic Compression Devices  Code Status: Full Code  Disposition: Continue ICU care.  Discharge TBD.    Interval History   Mechanical ventilation    -Data reviewed today: I reviewed all new labs and imaging results over the last 24 hours. I personally reviewed the EKG tracing showing Sinus tachycardia.    Physical Exam   Temp: 98.6  F (37  C) Temp src: Oral BP: 103/45   Heart Rate: 107 Resp: 29 SpO2: 99 % O2 Device: Mechanical Ventilator    Vitals:    10/08/18 0517 10/09/18 0145 10/10/18 0400   Weight: 65.1 kg (143 lb 8.3 oz) 67 kg (147 lb 11.3 oz) 67.9 kg (149 lb 11.1 oz)     Vital Signs with Ranges  Temp:  [97.7  F (36.5  C)-100.3  F (37.9  C)] 98.6  F (37  C)  Heart Rate:  [] 107  Resp:  [20-53] 29  BP: (103-167)/(45-73) 103/45  MAP:  [57 mmHg-100 mmHg] 90 mmHg  Arterial Line BP: ()/(39-68) 170/59  FiO2 (%):  [45 %-80 %] 80 %  SpO2:  [88 %-99 %] 99 %  I/O last 3 completed shifts:  In: 5240.06 [I.V.:2700.06; NG/GT:1060]  Out: 6100 [Urine:5600; Stool:500]    GEN: On mechanical ventilation, appears comfortable, NAD.  HEENT:  Normocephalic/atraumatic, scleral icterus ++, no nasal discharge, mouth moist.  ETT in place  CV:  Regular rate and rhythm, no murmur or JVD.  S1 + S2 noted, no S3 or S4.  LUNGS: Ventilatory sounds superimpose to coarse sounds audible to auscultation bilaterally without rales/rhonchi/wheezing/retractions.  Symmetric chest rise on inhalation noted.  ABD:  Active bowel sounds, soft, non-tender/non-distended.  No  rebound/guarding/rigidity.  EXT: Bilateral lower extremity edema, pitting 1+, no cyanosis.  No joint synovitis noted.  SKIN:  Dry to touch, no exanthems noted in the visualized areas.       Medications     dexmedetomidine 0.6 mcg/kg/hr (10/10/18 1700)     IV fluid REPLACEMENT ONLY       D5W 50 mL/hr at 10/10/18 1352     furosemide (LASIX) infusion ADULT STANDARD 5 mg/hr (10/10/18 1700)     HYDROmorphone 0.4 mg/hr (10/10/18 1700)     norepinephrine 0.35 mcg/kg/min (10/10/18 1731)     sodium chloride 10 mL/hr at 10/08/18 0600     sodium chloride 10 mL/hr at 10/10/18 1000     vasopressin (PITRESSIN) infusion ADULT (40 mL) 2.4 Units/hr (10/10/18 1700)       albumin human  50 g Intravenous Q8H     bacitracin   Topical BID     calcium gluconate  1 g Intravenous Once     fiber modular (NUTRISOURCE FIBER)  1 packet Per Feeding Tube BID     folic acid  1 mg Oral or Feeding Tube Daily     heparin  5 mL Intracatheter Q28 Days     heparin lock flush  5-10 mL Intracatheter Q24H     insulin aspart  4 Units Subcutaneous Q4H     insulin aspart  1-6 Units Subcutaneous Q4H     insulin glargine  15 Units Subcutaneous QAM AC     lidocaine visc 2% & maalox/mylanta w/simethicone & diphenhydramine  10 mL Swish & Swallow Q6H     methylPREDNISolone  40 mg Intravenous Q12H     metolazone  5 mg Oral Daily     multivitamins with minerals  15 mL Per Feeding Tube Daily     pantoprazole  40 mg Per Feeding Tube BID     piperacillin-tazobactam  3.375 g Intravenous Q8H GT     potassium chloride  20 mEq Oral BID     potassium chloride  20 mEq Intravenous Once     protein modular  1 packet Per Feeding Tube BID w/meals     QUEtiapine  25 mg Oral or Feeding Tube BID     sodium chloride (PF)  3 mL Intracatheter Q8H     vancomycin (VANCOCIN) IV  1,000 mg Intravenous Q8H       Data     Recent Labs  Lab 10/10/18  1204 10/10/18  0515 10/09/18  2255  10/08/18  0515  10/07/18  0430   WBC  --  17.7*  --   --  24.4*  --  26.4*   HGB  --  7.0*  --   --  8.4*  --   8.7*   MCV  --  94  --   --  95  --  94   PLT  --  107*  --   --  149*  --  179   INR  --   --   --   --  1.27*  --   --    NA  --  141 142  --  148*  --  151*   POTASSIUM 2.7* 2.7* 3.0*  < > 3.4  < > 3.0*   CHLORIDE  --  102  --   --  109  --  107   CO2  --  32  --   --  34*  --  37*   BUN  --  45*  --   --  48*  --  47*   CR  --  0.30*  --   --  0.37*  --  0.38*   ANIONGAP  --  7  --   --  5  --  7   REAGAN  --  8.2*  --   --  7.5*  --  7.8*   GLC  --  230*  --   --  212*  --  213*   ALBUMIN  --  3.3*  --   --  2.1*  --  2.4*   PROTTOTAL  --  5.3*  --   --  4.5*  --  4.9*   BILITOTAL  --  20.5*  --   --  17.3*  --  13.9*   ALKPHOS  --  333*  --   --  482*  --  500*   ALT  --  94*  --   --  163*  --  172*   AST  --  67*  --   --  137*  --  164*   < > = values in this interval not displayed.    No results found for this or any previous visit (from the past 24 hour(s)).          Disclaimer: This note consists of symbols derived from keyboarding, dictation and/or voice recognition software. As a result, there may be errors in the script that have gone undetected. Please consider this when interpreting information found in this chart.

## 2018-10-10 NOTE — PROGRESS NOTES
RT end of shift note:      Patient remains on mechanical ventilator support.  Settings: CMV/AC rate of 20, tidal volume 450, peep of 5 and 45% FIO2. Saturations has been in the low 90's on these settings. Tolerating mechanical ventilator and treatments well. Breath sounds are coarse pre and post treatment. Suctioned large amount of creamy thick secretions.     Will continue to follow and monitor.      Lori Santos, RRT

## 2018-10-10 NOTE — PLAN OF CARE
Problem: Restraint for Non-Violent/Non-Self-Destructive Behavior  Goal: Prevent/Manage Potential Problems  Maintain safety of patient and others during period of restraint.  Promote psychological and physical wellbeing.  Prevent injury to skin and involved body parts.  Promote nutrition, hydration, and elimination.    Outcome: No Change  Right wrist and Left wrist restraints continued 10/9/2018    Clinical Justification: Pulling lines, pulling tubes, and pulling equipment  Less Restrictive Alternative: Repositioning, Pain management, Reorientation  Attending Physician Notified: Yes, Attending Physician's Name: Yo   New orders placed Yes    Length of Order: 1 Day      Ariana Gray

## 2018-10-10 NOTE — PROGRESS NOTES
"[facility] ICU RESPIRATORY NOTE  Date of Admission: 09/22/0218  Date of Intubation (most recent): 09/24/2018  Reason for Mechanical Ventilation: Worsening hypoxemia  Number of Days on Mechanical Ventilation: 18  Met Criteria for Pressure Support Trial: No  Length of Pressure Support Trial:  Reason for Stopping Pressure Support Trial:   Reason for No Pressure Support Trial: Pt's critical  respiratory needs  Significant Events Today: Suctioning out a copious amounts of thick tan/yellow secretions  ABG Results: 7.46/57/95/40  On 10/06/2018 @ 0555  ETT appearance on chest x-ray: A the level of the clavicular heads    Plan:  Continue to monitor and assess the pt's current respiratory needs, in hopes of liberating her from the ventilator.      Ventilation Mode: CMV/AC  (Continuous Mandatory Ventilation/ Assist Control)  FiO2 (%): 80 %  Rate Set (breaths/minute): 20 breaths/min  Tidal Volume Set (mL): 450 mL  PEEP (cm H2O): 5 cmH2O  Pressure Support (cm H2O): 10 cmH2O  Oxygen Concentration (%): 80 %  Resp: 29    Vital signs:  Temp: 98.6  F (37  C) Temp src: Oral BP: 103/45   Heart Rate: 107 Resp: 29 SpO2: 99 % O2 Device: Mechanical Ventilator Oxygen Delivery: 15 LPM Height: 167.6 cm (5' 6\") Weight: 67.9 kg (149 lb 11.1 oz)  Estimated body mass index is 24.16 kg/(m^2) as calculated from the following:    Height as of this encounter: 1.676 m (5' 6\").    Weight as of this encounter: 67.9 kg (149 lb 11.1 oz).      PAST MEDICAL HISTORY:   Past Medical History:   Diagnosis Date     Anemia      Breast cancer metastasized to bone (H) 2005     sees Dr. Zach Romo/Dr. Toño REED q3mos - herceptin, zometa      ETOH abuse      History of blood transfusion      Hypertension     lisinopril 5mg = dizziness      Invasive ductal carcinoma of breast (H) 2000    recurred 3x since.      Martha-Stafford tear 10/12/2015    with hematemesis - hospitalized     S/P mastectomy, bilateral 2000       PAST SURGICAL HISTORY:   Past Surgical History: "   Procedure Laterality Date     APPENDECTOMY  1999 - age 39      ESOPHAGOSCOPY, GASTROSCOPY, DUODENOSCOPY (EGD), COMBINED N/A 10/13/2015    Procedure: COMBINED ESOPHAGOSCOPY, GASTROSCOPY, DUODENOSCOPY (EGD);  Surgeon: Rashad Rossi MD;  Location: [facility]     EXCISE MASS TRUNK Right 8/9/2016    Procedure: EXCISE MASS TRUNK;  Surgeon: Danie Forrester MD;  Location: [facility] OR     HYSTERECTOMY, NORIS  2002    with BSO      MASTECTOMY MODIFIED RADICAL BILATERAL  2000     REMOVE CATHETER VASCULAR ACCESS Right 8/9/2016    Procedure: REMOVE CATHETER VASCULAR ACCESS;  Surgeon: Danie Forrester MD;  Location: [facility] OR       FAMILY HISTORY:   Family History   Problem Relation Age of Onset     Cancer Mother      hx of lung cancer - survived      Hypertension Mother      Circulatory Mother      s/p valve replacement      Diabetes Father      prediabetes      Prostate Cancer Father      Psychotic Disorder Daughter      ? adhd - maternal cousin with same        SOCIAL HISTORY:   Social History   Substance Use Topics     Smoking status: Former Smoker     Packs/day: 0.50     Years: 15.00     Types: Cigarettes     Quit date: 3/18/1990     Smokeless tobacco: Never Used     Alcohol use 3.5 oz/week     7 Glasses of wine per week      Comment: drinks probably  a glass of wine a night      Clint Camargo RT  10/10/2018

## 2018-10-10 NOTE — PLAN OF CARE
Problem: Patient Care Overview  Goal: Plan of Care/Patient Progress Review  ICU End of Shift Summary.  For vital signs and complete assessments, please see documentation flowsheets.     Pertinent assessments: patient sedated at a rass of -1, drowsy. Precedex, lasix, dilaudid and versed infusiing. MAPS have been borderline. Tele ST. Vent fio2 45%.  Phos replaced. Art line patent. Gray in place, good uo. Rectal tube in place, some leaking around insertion site.  Wounds on buttocks, dressings in place, CDI. Wound on sternum, dressing CDI.  Restraints in place. R chest port, iv's infusing.   Major Shift Events: Bath done, repositioned, oral care. RT attempted wean from vent but patient did not tolerate, RR increased to 40's.   Plan (Upcoming Events): TBD  Discharge/Transfer Needs: TBD    Bedside Shift Report Completed : yes  Bedside Safety Check Completed: yes

## 2018-10-10 NOTE — PLAN OF CARE
Problem: Patient Care Overview  Goal: Plan of Care/Patient Progress Review  Right wrist and Left wrist restraints continued 10/10/2018    Clinical Justification: Pulling lines, pulling tubes, and pulling equipment  Less Restrictive Alternative: Repositioning, Re-evaluate equipment, Disguise equipment  Attending Physician Notified: Yes, Attending Physician's Name: dalia   New orders placed Yes  Length of Order: 1 Day      Marcella Hinojosa

## 2018-10-10 NOTE — PLAN OF CARE
Problem: Patient Care Overview  Goal: Plan of Care/Patient Progress Review  Outcome: No Change  ICU End of Shift Summary.  For vital signs and complete assessments, please see documentation flowsheets.     Pertinent assessments: RASS 0 to -1, CPOT 0-2, Tele SR, occas PVCs. BP soft requiring vasopressin. LS crackles to bases, sats mid 90's. ETT secretions thick, creamy yellow. UOP slow, diuresing well with lasix gtt.  Watery stool 100mL through rectal tube. TF rate increased to 55/hr. Lantus dose and scheduled short acting insulin + sliding scale. BGs continue in 200s. Family updated at bedside and in family conference. Restraints continued to protect lines and tubes.  Major Shift Events: Lasix gtt resumed, Vasopressin continued. Phos, K replaced. Family care conference  Plan (Upcoming Events): Recheck electrolytes, SBT, Wean pressors as able  Discharge/Transfer Needs: TBD    Bedside Shift Report Completed :  y  Bedside Safety Check Completed: y

## 2018-10-10 NOTE — PHARMACY-VANCOMYCIN DOSING SERVICE
Pharmacy Vancomycin Initial Note  Date of Service October 10, 2018  Patient's  1960  58 year old, female    Indication: Aspiration Pneumonia    Current estimated CrCl = Estimated Creatinine Clearance: 219.1 mL/min (based on Cr of 0.3).    Creatinine for last 3 days  10/8/2018:  5:15 AM Creatinine 0.37 mg/dL  10/10/2018:  5:15 AM Creatinine 0.30 mg/dL    Recent Vancomycin Level(s) for last 3 days  No results found for requested labs within last 72 hours.      Vancomycin IV Administrations (past 72 hours)      No vancomycin orders with administrations in past 72 hours.                Nephrotoxins and other renal medications (Future)    Start     Dose/Rate Route Frequency Ordered Stop    10/10/18 1400  piperacillin-tazobactam (ZOSYN) infusion 3.375 g      3.375 g  100 mL/hr over 30 Minutes Intravenous EVERY 8 HOURS SCHEDULED 10/10/18 1216      10/10/18 1245  vancomycin (VANCOCIN) 1000 mg in dextrose 5% 200 mL PREMIX      1,000 mg  200 mL/hr over 1 Hours Intravenous EVERY 8 HOURS 10/10/18 1233      10/10/18 1230  furosemide (LASIX) injection 20 mg      20 mg  over 1-2 Minutes Intravenous ONCE 10/10/18 1218      10/10/18 1230  furosemide (LASIX) 100 mg in sodium chloride 0.9 % 100 mL infusion      5 mg/hr  5 mL/hr  Intravenous CONTINUOUS 10/10/18 1218 10/12/18 1229    10/09/18 0900  vasopressin (VASOSTRICT) 40 Units in D5W 40 mL infusion      2.4 Units/hr  2.4 mL/hr  Intravenous CONTINUOUS 10/09/18 0850            Contrast Orders - past 72 hours     None                Plan:  1.  Start vancomycin  1000 mg IV q8h.   2.  Goal Trough Level: 15-20 mg/L   3.  Pharmacy will check trough levels as appropriate in 1-3 Days.    4. Serum creatinine levels will be ordered daily for the first week of therapy and at least twice weekly for subsequent weeks.    5. Deary method utilized to dose vancomycin therapy: Method 1    Makayla Arora

## 2018-10-11 NOTE — PROGRESS NOTES
"RT end of shift note:        Patient remains on mechanical ventilator support.  Settings: CMV/AC rate of 20, tidal volume 450, peep of 5 and 100% FIO2. Saturations has been in the high 80's to low 90's on these settings. Breath sounds have been coarse crackles throughout. Due to very thick secretions, patient has been bagged, lavaged suctioned x3 with large to copious amounts of very thick creamy yellow tan secretions. Peak inspiratory pressure has been in the 40's to 70's during this shift. Along with respiratory rate 30's to 40's and occasional low 50's. Patient is \"guppy\" breathing in there using accessory muscles. Tele-hub has been notified of status. Suggested change of vent settings IE PC or introducing mucolytics. Tele-hub ordered Veletri when patient's FIO2 need escalated to 100% FIO2. Veletri was started.    Patient's  agreed for DNR status.    BP 91/49  Pulse 123  Temp 97.8  F (36.6  C) (Axillary)  Resp (!) 31  Ht 1.676 m (5' 6\")  Wt 67.9 kg (149 lb 11.1 oz)  SpO2 (!) 89%  BMI 24.16 kg/m2       Will continue to follow and monitor.        Lori Santos, RRT     "

## 2018-10-11 NOTE — PLAN OF CARE
Problem: Restraint for Non-Violent/Non-Self-Destructive Behavior  Goal: Prevent/Manage Potential Problems  Maintain safety of patient and others during period of restraint.  Promote psychological and physical wellbeing.  Prevent injury to skin and involved body parts.  Promote nutrition, hydration, and elimination.    Outcome: Declining  Right wrist and Left wrist restraints discontinued at 8:04 AM on 10/11/2018.    Restraint discontinue criteria met, patient is calm, cooperative and safe. Restraints removed.     Patient's Response: No evidence of learning  Family Notification: Spouse/significant other  Attending Physician Notified: MD ordered restraint, Attending Physician's Name: ebn Gray

## 2018-10-11 NOTE — PROGRESS NOTES
"[facility] Progress Note  Critical access hospital ICU    S/H f/u visit per plan of care after accompanying family last night as pt has been declining.   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 They shared stories of Rachna's good care and how much they have enjoyed being in relationship with Rachna. Pt's friend, Luis A, shared reflections of when his cousin was on hospice and how \"he was awake until just before he .\" Pt's spouse, Luis A, reflected on events overnight and shares that \"Rachna is deep in the alex....and still there.\" Of note is that pt was made DNR overnight.   Then met with pt's mother, Liliana, outside of room. She shared stories of the death of two of her five children, her only two sons, and how she has wrestled with God and how \"God could let this happen, and now it is happening again.\" Provided emotional/spiritual support, reflected on the Psalms that reflect these wonderings of \"Where is God\" making space for these questions/emotions, and shared in prayer.    Will continue to follow.     MARLIN Aquino.  Staff    Pager #[phone]     "

## 2018-10-11 NOTE — PROGRESS NOTES
[facility]    Hospitalist Progress Note    Date of Service (when I saw the patient): 10/11/2018  Provider:  Jeffry Rivera MD    Initial presenting complaint/issue to hospital (Diagnosis): Shortness of breath  Assessment & Plan    Eyal Turner is a 58 year old female with metastatic breast cancer with bone metastases to sternum and rib who started a new chemo regimen (that included switch from Kadcyla to Docetaxel, Herceptin and Perjeta) 5 days prior to admission (about 9/17/18).  Other medical problems are chronic alcohol abuse complicated by malnutrition and essential hypertension.   She presented on 9/22/2018 with fatigue and shortness of breath. She was hypotensive, though responsive to IV fluids in the ED. Her labs were notable for significantly elevated LFTs (above her baseline abnormals) with bilirubin of 8, alk phos about 600 and elevated transaminases. Other abnormalities were pancytopenia with neutropenia, severe anemia with hemoglobin less than 7 (she received 1 unit RBC transfusion in the ED).      After admission she spiked fever to 101 for which infectrious work up was started and chest x-ray showed bilateral infiltrates.  Cefepime was started for suspected neutropenic fever and azithromycin added subsequently.  A CT abdomen pelvis was obtained that did not show any ascites or obstructive liver disease.    She also developed evidence for acute alcohol withdrawal with significant tremor that improved after a few doses of Ativan.  GI was consulted for possible upper GI bleed and for her liver failure.  No EGD planned. Did start empiric Protonix on admission, but no evidence for ongoing blood loss.    Oncology has also been consulted and recommended supportive cares.        Ms. Turner was transferred to ICU on 9/23/2018 for worsening hypoxemia and was intubated 9/24/2018.  Blood culture grew lactobacillus from port.  ID was consulted and the patient was switched to vancomycin and  Zosyn.  Micafungin was also added for oral thrush.  She was also started on Neupogen for neutropenia with good response.       On 9/29, she developed fever to 102 and leukocytosis of 27K (though the rise in white cell count may be partly due to exposure to Neupogen).     Problem List:     1. Acute hypoxemic respiratory failure. On vent since 9/24  -- Unclear etiology.  Initially thought to be sepsis due to pneumonia with possible fluid overload.  Pulmonary toxicity due to chemotherapy now thought to be likely.    --Continue IV methylprednisolone.  Oncology following. On zosyn. FiO2 increased to 100% overnight. Vent management per intensivist.     2. Possible drug-induced pneumonitis. Continue IV methylprednisolone  40 mg every 12 hours.    3. Sepsis.  Initially with neutropenic fevers.  Had initially been on IV cefepime and IV Vanco. On  IV Zosyn.  Completed IV micafungin.  Infectious disease following. Appreciate input.    4. Suspected pneumonia. Appreciate infectious disease input. On zosyn.    5. Volume overload. Continue IV furosemide and oral metolazone.    6. Mucositis.  Possible esophagitis.       7. Possible GI bleed. Continue pantoprazole 40 mg twice a day.    8. Metastatic breast cancer. Oncology following.    9. Hepatitis. Due to ongoing alcohol abuse. Possibly worsened by sepsis and chemotherapy.     10. Hyperbilirubinemia. Bilirrubin > 20, direct 15.  INR 1.27.    11. Malnutrition. Continue tube feedings.    12. Hypokalemia. Continue scheduled potassium replacement and potassium replacement protocol.    13. Hypomagnesemia. Magnesium replacement protocol.    14. Hyponatremia.  Continue free water down feeding tube.  Registered dietitian following.    15. Hyperglycemia. Continue Lantus and NovoLog sliding scale. Lantus 15 units AM, Novolog 4 units q 4 hours and medium sliding scale insulin for corrections.     16. Alcohol abuse. Will need further recommendations for long-term alcohol cessation once able  to be extubated. Continue vitamins.    17. Alcohol withdrawals. Currently remains on Precedex.    18. Anemia.  Initially with pancytopenia.  Follow up hgb.    19. GI prophylaxis.  Pantoprazole.    DVT Prophylaxis: Pneumatic Compression Devices  Code Status: DNR  Disposition: Continue ICU care.  Discharge TBD.    Interval History      Patient is currently on mechanical ventilation. Sedated. Not following commands.  at bedside and updated.     -Data reviewed today: I reviewed all new labs and imaging results over the last 24 hours. I personally reviewed the EKG tracing showing Sinus tachycardia.    Physical Exam   Temp: 101.1  F (38.4  C) (ice to cool) Temp src: Oral BP: 101/65   Heart Rate: 123 Resp: (!) 57 SpO2: 90 % O2 Device: Mechanical Ventilator    Vitals:    10/08/18 0517 10/09/18 0145 10/10/18 0400   Weight: 65.1 kg (143 lb 8.3 oz) 67 kg (147 lb 11.3 oz) 67.9 kg (149 lb 11.1 oz)     Vital Signs with Ranges  Temp:  [97.6  F (36.4  C)-101.1  F (38.4  C)] 101.1  F (38.4  C)  Heart Rate:  [] 123  Resp:  [0-87] 57  BP: ()/(45-65) 101/65  MAP:  [57 mmHg-109 mmHg] 67 mmHg  Arterial Line BP: ()/() 93/49  FiO2 (%):  [80 %-100 %] 100 %  SpO2:  [85 %-99 %] 90 %  I/O last 3 completed shifts:  In: 7170.94 [I.V.:3741.77; NG/GT:1160]  Out: 3172 [Urine:2672; Stool:500]    GEN: On mechanical ventilation, appears comfortable, NAD.  HEENT:  Normocephalic/atraumatic, scleral icterus ++, no nasal discharge, mouth moist.  ETT in place  CV:  Regular rate and rhythm, no murmur or JVD.  S1 + S2 noted, no S3 or S4.  LUNGS: Ventilatory sounds superimpose to coarse sounds audible to auscultation bilaterally without rales/rhonchi/wheezing/retractions.  Symmetric chest rise on inhalation noted.  ABD:  Active bowel sounds, soft, non-tender/non-distended.  No rebound/guarding/rigidity.  EXT: Bilateral lower extremity edema, pitting 1+, no cyanosis.  No joint synovitis noted.  SKIN:  Dry to touch, no exanthems  noted in the visualized areas.     Medications     dexmedetomidine 0.7 mcg/kg/hr (10/11/18 1000)     IV fluid REPLACEMENT ONLY       D5W 50 mL/hr at 10/11/18 1000     epoprostenol (VELETRI) 20 mcg/mL in sterile water inhalation solution 20 ng/kg/min (10/11/18 0834)     furosemide (LASIX) infusion ADULT STANDARD 5 mg/hr (10/11/18 1000)     HYDROmorphone 0.8 mg/hr (10/11/18 1027)     midazolam 3 mg/hr (10/11/18 1130)     norepinephrine 0.4 mcg/kg/min (10/11/18 1000)     phenylephrine IV infusion ADULT 1 mcg/kg/min (10/11/18 1058)     sodium chloride 10 mL/hr at 10/11/18 0315     sodium chloride 10 mL/hr at 10/11/18 1000     vasopressin (PITRESSIN) infusion ADULT (40 mL) 2.4 Units/hr (10/11/18 1000)       bacitracin   Topical BID     fiber modular (NUTRISOURCE FIBER)  1 packet Per Feeding Tube BID     folic acid  1 mg Oral or Feeding Tube Daily     heparin  5 mL Intracatheter Q28 Days     heparin lock flush  5-10 mL Intracatheter Q24H     insulin aspart  1-12 Units Subcutaneous Q4H     insulin aspart  5 Units Subcutaneous Q4H     [START ON 10/12/2018] insulin glargine  20 Units Subcutaneous QAM AC     lidocaine visc 2% & maalox/mylanta w/simethicone & diphenhydramine  10 mL Swish & Swallow Q6H     methylPREDNISolone  40 mg Intravenous Q12H     [START ON 10/12/2018] metolazone  5 mg Oral or Feeding Tube Daily     multivitamins with minerals  15 mL Per Feeding Tube Daily     pantoprazole  40 mg Per Feeding Tube BID     piperacillin-tazobactam  3.375 g Intravenous Q8H GT     potassium chloride  20 mEq Oral or Feeding Tube BID     protein modular  1 packet Per Feeding Tube BID w/meals     QUEtiapine  25 mg Oral or Feeding Tube BID     sodium chloride (PF)  3 mL Intracatheter Q8H     vancomycin (VANCOCIN) IV  1,000 mg Intravenous Q8H       Data     Recent Labs  Lab 10/11/18  0450 10/11/18  0200 10/10/18  2334 10/10/18  1815  10/10/18  0515 10/09/18  2255  10/08/18  0515  10/07/18  0430   WBC  --   --   --   --   --  17.7*   --   --  24.4*  --  26.4*   HGB 7.6*  --  6.5* 7.6*  --  7.0*  --   --  8.4*  --  8.7*   MCV  --   --   --   --   --  94  --   --  95  --  94   PLT  --   --   --   --   --  107*  --   --  149*  --  179   INR  --   --   --   --   --   --   --   --  1.27*  --   --      --   --   --   --  141 142  --  148*  --  151*   POTASSIUM 4.9 4.8  --  2.5*  < > 2.7* 3.0*  < > 3.4  < > 3.0*   CHLORIDE 99  --   --   --   --  102  --   --  109  --  107   CO2 25  --   --   --   --  32  --   --  34*  --  37*   BUN 65*  --   --   --   --  45*  --   --  48*  --  47*   CR 0.69  --   --   --   --  0.30*  --   --  0.37*  --  0.38*   ANIONGAP 12  --   --   --   --  7  --   --  5  --  7   REAGAN 7.4*  --   --   --   --  8.2*  --   --  7.5*  --  7.8*   *  --   --   --   --  230*  --   --  212*  --  213*   ALBUMIN 3.4  --   --   --   --  3.3*  --   --  2.1*  --  2.4*   PROTTOTAL 5.2*  --   --   --   --  5.3*  --   --  4.5*  --  4.9*   BILITOTAL 23.4*  --   --   --   --  20.5*  --   --  17.3*  --  13.9*   ALKPHOS 300*  --   --   --   --  333*  --   --  482*  --  500*   ALT 86*  --   --   --   --  94*  --   --  163*  --  172*   *  --   --   --   --  67*  --   --  137*  --  164*   < > = values in this interval not displayed.    No results found for this or any previous visit (from the past 24 hour(s)).

## 2018-10-11 NOTE — PLAN OF CARE
Problem: Patient Care Overview  Goal: Plan of Care/Patient Progress Review  Outcome: Declining  ICU End of Shift Summary.  For vital signs and complete assessments, please see documentation flowsheets.     Pertinent assessments: RASS -1, CPOT 0-2. Sedation increased, + restless with rapid RR, HR. Tele ST, + ectopy. BP soft requiring pressors. LS coarse crackles throughout, Inspiratory wheeze LLL. ETT secretions thick/tenacious, tan. Minimal oral secretions. Sats low 90's despite increase in FiO2 from 45% to 80% by end of shift. Diuresing well with lasix gtt. 500cc watery tan stool from rectal tube. Dressing changed to coccyx.  Major Shift Events: High RR, tachycardic entire shift. K, Phos, Mg, Ca replaced. Levophed and Vasopressin at max dose. Family Care conference. BC, UC, Sputum Cx collected. Zosyn and Vanco started.  Plan (Upcoming Events): Wean sedation, vasopressors as able. Treat pain.   Discharge/Transfer Needs: TBD    Bedside Shift Report Completed : y  Bedside Safety Check Completed: y

## 2018-10-11 NOTE — PHARMACY-VANCOMYCIN DOSING SERVICE
Pharmacy Vancomycin Note  Date of Service 2018  Patient's  1960   58 year old, female    Indication: Aspiration Pneumonia  Goal Trough Level: 15-20 mg/L  Day of Therapy: 2  Current Vancomycin regimen:  1000 mg IV q8h    Current estimated CrCl = Estimated Creatinine Clearance: 95.3 mL/min (based on Cr of 0.69).    Creatinine for last 3 days  10/10/2018:  5:15 AM Creatinine 0.30 mg/dL  10/11/2018:  4:50 AM Creatinine 0.69 mg/dL    Recent Vancomycin Levels (past 3 days)  10/11/2018:  4:10 PM Vancomycin Level 35.9 mg/L    Vancomycin IV Administrations (past 72 hours)                   vancomycin (VANCOCIN) 1000 mg in dextrose 5% 200 mL PREMIX (mg) 1,000 mg New Bag 10/11/18 0900     1,000 mg New Bag 10/10/18 2354     1,000 mg New Bag  1447                Nephrotoxins and other renal medications (Future)    Start     Dose/Rate Route Frequency Ordered Stop    10/12/18 0900  vancomycin (VANCOCIN) 750 mg in sodium chloride 0.9 % 250 mL intermittent infusion      750 mg  over 90 Minutes Intravenous EVERY 12 HOURS 10/11/18 1753      10/10/18 2315  phenylephrine (MARIA ISABEL-SYNEPHRINE) 50 mg in sodium chloride 0.9 % 250 mL infusion      0.5-6 mcg/kg/min × 67.9 kg  10.2-122.2 mL/hr  Intravenous CONTINUOUS 10/10/18 2300      10/10/18 1615  norepinephrine (LEVOPHED) 16 mg in D5W 250 mL infusion      0.03-0.4 mcg/kg/min × 67 kg  1.9-25.1 mL/hr  Intravenous CONTINUOUS 10/10/18 1605      10/10/18 1400  piperacillin-tazobactam (ZOSYN) infusion 3.375 g      3.375 g  100 mL/hr over 30 Minutes Intravenous EVERY 8 HOURS SCHEDULED 10/10/18 1216      10/10/18 1230  furosemide (LASIX) 100 mg in sodium chloride 0.9 % 100 mL infusion      5 mg/hr  5 mL/hr  Intravenous CONTINUOUS 10/10/18 1218 10/12/18 1229    10/09/18 0900  vasopressin (VASOSTRICT) 40 Units in D5W 40 mL infusion      2.4 Units/hr  2.4 mL/hr  Intravenous CONTINUOUS 10/09/18 0850               Contrast Orders - past 72 hours     None          Interpretation of  levels and current regimen:  Trough level is  Supratherapeutic    Has serum creatinine changed > 50% in last 72 hours: No    Urine output:  diminished urine output    Renal Function: Stable    Plan:  1.  Decrease Dose to 750mg IV q12h   2.  Pharmacy will check trough levels as appropriate in 1-3 Days.    3. Serum creatinine levels will be ordered daily for the first week of therapy and at least twice weekly for subsequent weeks.      Patty Fenton        .

## 2018-10-11 NOTE — PROGRESS NOTES
"ICU Staff:  10/11/18    I examined the patient, Eyal Turner, in the [facility] ICU. I reviewed the patient's medical record and the progress notes. I participated in the Multidisciplinary ICU Care Rounds; and I agree with the assessment and the plans documented by the other members of the [facility] ICU team. I discussed the patient's history, physical findings, labs, and imaging studies with the other members of the [facility] ICU care team.   The patient is 58 year old woman originally admitted to the ICU for neutropenic fever, sepsis, and possible pneumonia failure to thrive, possible ETOH withdrawal, and cardio-respiratory failure.      Last 24 hours:  Worsening resp status with increasing airway pressures (plateau pressure of 40), now on 3 pressors, decreasing urine output.    Ventilation Mode: CMV/AC  (Continuous Mandatory Ventilation/ Assist Control)  FiO2 (%): 100 %  Rate Set (breaths/minute): 20 breaths/min  Tidal Volume Set (mL): 450 mL  PEEP (cm H2O): 5 cmH2O  Pressure Support (cm H2O): 10 cmH2O  Oxygen Concentration (%): 100 %  Resp: 43    Intake/Output Summary (Last 24 hours) at 10/11/18 1054  Last data filed at 10/11/18 1027   Gross per 24 hour   Intake          6840.99 ml   Output             2227 ml   Net          4613.99 ml     Physical examination;  Vital Signs: /65 (BP Location: Left arm)  Pulse 123  Temp 101.1  F (38.4  C) (Oral)  Resp (!) 43  Ht 1.676 m (5' 6\")  Wt 67.9 kg (149 lb 11.1 oz)  SpO2 90%  BMI 24.16 kg/m2  Gen: patient sedated on the the vent.    HEENT: the patient is intubated and sedated. Marked juandice.   Cor: RRR  Pulm: course BS bilaterally.  Tumor on the sternum due to metastatic cancer.    Abd: distended; soft, no masses.  Ascites present by exam.    Ext: marked muscle wasting and edema present.        Recent Labs  Lab 10/11/18  0450 10/10/18  2334 10/10/18  1815 10/10/18  0515 10/08/18  0515 10/07/18  0430   WBC  --   --   --  17.7* 24.4* 26.4*   RBC  " --   --   --  2.31* 2.77* 2.88*   HGB 7.6* 6.5* 7.6* 7.0* 8.4* 8.7*   HCT  --   --   --  21.8* 26.2* 27.1*   PLT  --   --   --  107* 149* 179       Recent Labs  Lab 10/11/18  0450 10/11/18  0200 10/10/18  1815  10/10/18  0515 10/09/18  2255  10/08/18  0515     --   --   --  141 142  --  148*   POTASSIUM 4.9 4.8 2.5*  < > 2.7* 3.0*  < > 3.4   CHLORIDE 99  --   --   --  102  --   --  109   CO2 25  --   --   --  32  --   --  34*   BUN 65*  --   --   --  45*  --   --  48*   CR 0.69  --   --   --  0.30*  --   --  0.37*   *  --   --   --  230*  --   --  212*   REAGAN 7.4*  --   --   --  8.2*  --   --  7.5*   < > = values in this interval not displayed.    Recent Labs  Lab 10/11/18  0759 10/11/18  0450 10/11/18  0404 10/10/18  2333 10/10/18  1943 10/10/18  1545 10/10/18  1155  10/10/18  0515  10/08/18  0515  10/07/18  0430  10/06/18  0555  10/05/18  0330   GLC  --  275*  --   --   --   --   --   --  230*  --  212*  --  213*  --  210*  --  199*   *  --  233* 192* 189* 241* 185*  < >  --   < >  --   < >  --   < >  --   < >  --    < > = values in this interval not displayed.       Assessment and Plan:  The patient is 58 year old woman admitted to the ICU for neutropenic fever, sepsis, and possible pneumonia failure to thrive, possible ETOH withdrawal, and cardio-respiratory failure.   The patient has a history of metastatic breast CA.  I discussed the differential diagnosis and a I formulated a best diagnosis and therapeutic ICU care plan for the patient with the other members of the [facility] ICU care team.   Bili is 20.8 today.    Neuro: the patient has metastatic breast cancer.  Well sedated w precede and versed    Pulm: acute respiratory failure likely due to pneumonia and has been vented since 9/24th. Worsening overnight w increasing FiO2 need. Will continue antibiotic therapy (vanc and zosyn) per ID.  Possible drug-induced pneumonitis; will continue solumedrol 40 mg BID.      Cardiac: septic shock and now  on 3 pressors, will check lactate and trend if elevated. TTE 9/29 w normal EF    GI: hepatic failure due to ETOH and septic shock.  Will follow LFTs.  Bili is 23 today, increasing from 20    HEME:  pancytopenia due to chemo has improved; will transfuse to keep Hgb >7.0   Monitor    Renal: normal creat but decreasing UOP and now being 3 pressors will likely develop ATN    Dispo: Palliative care is following. Discussed w  bed site and indicated her dismal prognosis. He and his family fully understands. DNR as of yesterday. Indicated him that it is not unreasonable to transitioning cares to comfort only if no improvement or continued deterioration in next 24-48 hours which the  is in agreement     The patient remains critically ill due to respiratory failure and ETOH withdrawal, the patient still requires full vent support    Critical care time: 35 minutes    Christie Maynard MD

## 2018-10-11 NOTE — PROGRESS NOTES
"[facility]  Palliative Care Progress Note  Text Page    Discussion with Rachna's mother who has posted herself just outside the patient's room. She is aware of Rachna terminal state. \"I know she isn't going to make it. We talked about this. I know she wouldn't want this. Her son's are coming around to this reality.\" She is aware that Rachna's  is decision maker. \"I know it's up to him. Rachna wouldn't want to be suffering like this.\"     Met with the patient's  Luis A at bedside. He acknowledged \"I know it's getting worse. I was hoping for a miracle. Doesn't look like we will get that.\"  Luis A acknowledged his discussion with Hospitalist Pako Humphries MD. \"He talked with me in the middle of the night. [place] maybe he talked with me more than 20 minutes, but I was half asleep. I know we shouldn't do all this stuff when it's time. It's soon, isn't it\". I acknowledged that Rachna has visible changed significantly since I saw her on Tuesday and Luis A agreed that \"her numbers are worse\".     Assessment & Plan      1. Decisional Capacity -  Unreliable as she intubated. Patient does not have an advance directive. Per  informed consent policy next of kin should be involved in all consent and decision making. Her  Ashish Turner is next-of-kin.       2. Chronic cancer pain - Liberalize continuous IV Dilaudid us 1.0 mg/hr      3.  Dyspnea - Patient intubated since 9/24/2018.     4.  Agitation - Continue Seroquel 25 mg BID and IV Ativan as needed. Receiving sedation as per Intensivist.      5.  Severe Malnutrition - Not tolerating tube feedings.      6.  Diarrhea - C. Diff negative 9/26/2018 and 10/1/2018. Rectal tube started 9/29.      7. Spiritual Care - Appreciate input of shannen Huffman.Ronny      8. Care Planning - Patient actively dying on the ventilator.       Goal of Care: DNR - Restorative care    Chata Huff MS, RN, CNS, APRN, ACHPN, FAACVPR  Pain and Palliative Care  Pager [phone]  Office " [phone]       Time Spent on this Encounter   I spent 20 minutes (9:40 AM-10 AM) in assessment of the patient, counseling and discussion with the family as documented in sections above. Another 20 minutes in review of chart, documentation, coordination of care and discussion with the health care team.    Interval History   Chart reviewed    Review of Systems    Unable as patient unresponsive on the ventilator    Physical Exam   Temp:  [97.6  F (36.4  C)-101.1  F (38.4  C)] 101  F (38.3  C)  Heart Rate:  [] 124  Resp:  [24-61] 48  BP: ()/(45-54) 91/49  MAP:  [57 mmHg-109 mmHg] 74 mmHg  Arterial Line BP: ()/() 123/50  FiO2 (%):  [60 %-100 %] 100 %  SpO2:  [86 %-99 %] 89 %  149 lbs 11.08 oz  GEN:  Cachetic frail, severely jaundiced female.   CV:  Tachycardic, S1, S2; rub.  +1 DP/PT pulses bilaterally; trace edema bilaterally.  RESP:  Clear to auscultation bilaterally without rales/rhonchi/wheezing/retractions.  Symmetric chest rise on inhalation noted.  Normal respiratory effort.  ABD:  Rounded, firm.  +BS   SKIN:  Severely jaundiced.    Medications     dexmedetomidine 0.7 mcg/kg/hr (10/11/18 0756)     IV fluid REPLACEMENT ONLY       D5W 50 mL/hr at 10/11/18 0835     epoprostenol (VELETRI) 20 mcg/mL in sterile water inhalation solution 20 ng/kg/min (10/11/18 0834)     furosemide (LASIX) infusion ADULT STANDARD 5 mg/hr (10/11/18 0756)     HYDROmorphone 0.8 mg/hr (10/11/18 0756)     midazolam 2 mg/hr (10/11/18 0756)     norepinephrine 0.4 mcg/kg/min (10/11/18 0756)     phenylephrine IV infusion ADULT 1 mcg/kg/min (10/11/18 0756)     sodium chloride 10 mL/hr at 10/11/18 0315     sodium chloride 10 mL/hr at 10/11/18 0800     vasopressin (PITRESSIN) infusion ADULT (40 mL) 2.4 Units/hr (10/11/18 0756)       bacitracin   Topical BID     fiber modular (NUTRISOURCE FIBER)  1 packet Per Feeding Tube BID     folic acid  1 mg Oral or Feeding Tube Daily     heparin  5 mL Intracatheter Q28 Days      heparin lock flush  5-10 mL Intracatheter Q24H     insulin aspart  1-12 Units Subcutaneous Q4H     insulin aspart  5 Units Subcutaneous Q4H     [START ON 10/12/2018] insulin glargine  20 Units Subcutaneous QAM AC     insulin glargine  5 Units Subcutaneous Once     lidocaine visc 2% & maalox/mylanta w/simethicone & diphenhydramine  10 mL Swish & Swallow Q6H     methylPREDNISolone  40 mg Intravenous Q12H     [START ON 10/12/2018] metolazone  5 mg Oral or Feeding Tube Daily     multivitamins with minerals  15 mL Per Feeding Tube Daily     pantoprazole  40 mg Per Feeding Tube BID     piperacillin-tazobactam  3.375 g Intravenous Q8H GT     potassium chloride  20 mEq Oral or Feeding Tube BID     protein modular  1 packet Per Feeding Tube BID w/meals     QUEtiapine  25 mg Oral or Feeding Tube BID     sodium chloride (PF)  3 mL Intracatheter Q8H     vancomycin (VANCOCIN) IV  1,000 mg Intravenous Q8H       Data   Results for orders placed or performed during the hospital encounter of 09/22/18 (from the past 24 hour(s))   Glucose by meter   Result Value Ref Range    Glucose 185 (H) 70 - 99 mg/dL   Phosphorus   Result Value Ref Range    Phosphorus 2.3 (L) 2.5 - 4.5 mg/dL   Potassium   Result Value Ref Range    Potassium 2.7 (L) 3.4 - 5.3 mmol/L   Blood culture   Result Value Ref Range    Specimen Description Arterial line Right Radial     Special Requests Aerobic and anaerobic bottles received     Culture Micro No growth after 13 hours    Urine Culture Aerobic Bacterial   Result Value Ref Range    Specimen Description Unspecified Urine     Special Requests Specimen received in preservative     Culture Micro PENDING    Blood culture   Result Value Ref Range    Specimen Description Blood Port     Special Requests Aerobic and anaerobic bottles received     Culture Micro No growth after 6 hours    Glucose by meter   Result Value Ref Range    Glucose 241 (H) 70 - 99 mg/dL   Magnesium (AM Draw)   Result Value Ref Range    Magnesium  2.3 1.6 - 2.3 mg/dL   Potassium   Result Value Ref Range    Potassium 2.5 (LL) 3.4 - 5.3 mmol/L   Hemoglobin   Result Value Ref Range    Hemoglobin 7.6 (L) 11.7 - 15.7 g/dL   Glucose by meter   Result Value Ref Range    Glucose 189 (H) 70 - 99 mg/dL   Sputum Culture Aerobic Bacterial   Result Value Ref Range    Specimen Description Sputum Endotracheal     Culture Micro PENDING    Glucose by meter   Result Value Ref Range    Glucose 192 (H) 70 - 99 mg/dL   Hemoglobin   Result Value Ref Range    Hemoglobin 6.5 (LL) 11.7 - 15.7 g/dL   Type and Screen (AM Draw)   Result Value Ref Range    Units Ordered 1     ABO A     RH(D) Pos     Antibody Screen Neg     Test Valid Only At [facility]        Specimen Expires 10/14/2018     Crossmatch Red Blood Cells    Blood component   Result Value Ref Range    Unit Number [id number]     Blood Component Type Red Blood Cells LeukoReduced Irradiated     Division Number 00     Status of Unit Released to care unit 10/11/2018 0052     Blood Product Code [id number]     Unit Status ISS    Potassium   Result Value Ref Range    Potassium 4.8 3.4 - 5.3 mmol/L   Glucose by meter   Result Value Ref Range    Glucose 233 (H) 70 - 99 mg/dL   Magnesium (AM Draw)   Result Value Ref Range    Magnesium 2.2 1.6 - 2.3 mg/dL   Comprehensive metabolic panel   Result Value Ref Range    Sodium 136 133 - 144 mmol/L    Potassium 4.9 3.4 - 5.3 mmol/L    Chloride 99 94 - 109 mmol/L    Carbon Dioxide 25 20 - 32 mmol/L    Anion Gap 12 3 - 14 mmol/L    Glucose 275 (H) 70 - 99 mg/dL    Urea Nitrogen 65 (H) 7 - 30 mg/dL    Creatinine 0.69 0.52 - 1.04 mg/dL    GFR Estimate 87 >60 mL/min/1.7m2    GFR Estimate If Black >90 >60 mL/min/1.7m2    Calcium 7.4 (L) 8.5 - 10.1 mg/dL    Bilirubin Total 23.4 (HH) 0.2 - 1.3 mg/dL    Albumin 3.4 3.4 - 5.0 g/dL    Protein Total 5.2 (L) 6.8 - 8.8 g/dL    Alkaline Phosphatase 300 (H) 40 - 150 U/L    ALT 86 (H) 0 - 50 U/L     (H) 0 - 45 U/L   Hemoglobin   Result  Value Ref Range    Hemoglobin 7.6 (L) 11.7 - 15.7 g/dL   Phosphorus   Result Value Ref Range    Phosphorus 3.9 2.5 - 4.5 mg/dL   Glucose by meter   Result Value Ref Range    Glucose 212 (H) 70 - 99 mg/dL

## 2018-10-11 NOTE — PROGRESS NOTES
X cover: called to assess pt as she is declining despite maximal efforts. Chart reviewed. Pt seen and examined. Pt declining despite max efforts with 3 pressors and ventilatory support requiring 100% fio2. Tele-ICU aware of pt. Grave prognosis.  at the bedside and aware of prognosis. Now agreeable to DNR status. Adding veletri in attempt to improve oxygenation. Will defer vent mgt to tele-intensivist.

## 2018-10-11 NOTE — PLAN OF CARE
"Problem: Patient Care Overview  Goal: Plan of Care/Patient Progress Review  Outcome: Declining  ICU End of Shift Summary.  For vital signs and complete assessments, please see documentation flowsheets.     Pertinent assessments: TMAX 101.1. Tele -140. Robbin added to achieve MAP greater than 65. \"Guppy\" breathing on vent. Versed added for synchrony with vent. PIP high. RR 30-40. SPO2 87-92%. Thick yellow/tan secretions suctioned. Breath sounds coarse and crackles. Decreased urine output. Rectal tube with loose stools. Telehub notified of pt condition.  Major Shift Events: Robbin-synephrine, veletri, versed started. K replaced. 1 unit PRBCs given.  Plan (Upcoming Events): support family and pt. Wean from pressors as able. Decrease FIO2 needs  Discharge/Transfer Needs: TBD    Bedside Shift Report Completed : Y  Bedside Safety Check Completed: Y      "

## 2018-10-11 NOTE — PROGRESS NOTES
Cross cover:     Notified re: gastric distension, concern re: possible ileus vs other issue (?placement) w/ tube feeds.      --hold tube feeds/flushes overnight  --continue D5W fluids  --abd XR in the morning.    Liban Leiva MD

## 2018-10-11 NOTE — PROGRESS NOTES
Oncology/Hematology Follow Up Note:    Assessment and Plan:  #1 Acute respiratory failure, due to probable pneumonia and volume overload, cannot rule out lung toxicity from chemotherapy (rare), and now with probable VAP      - Intubated and mechanically ventilated  - Echocardiogram showed no new cardiomyopathy  - BAL studies from 2 weeks ago negative  - Some improvement over the past week with diuresis and steroids, but subsequently deteriorated over the past 24 hours likely in part due to VAP.      PLAN:  - OK to hold diuresis in the setting of hypotension requiring multiple pressors  - Continue Solumedrol 40 mg BID (tapered from 250 mg BID last week), for possible drug-induced pneumonitis/DAH  - Continue antibiotics for possible VAP.  - Follow up on new respiratory secretion and blood cultures.  Would consider checking for PCP pneumonia and other atypical organisms if not already done.      #2 Hepatotoxicity from alcohol abuse and sepsis, in the setting of chemotherapy.      - Baseline LFTs before starting chemotherapy (bilirubin normal- 1.4, normal ALT- 51, elevated AST likely due to chronic alcohol abuse, and chronically elevated Alk Phos due to long standing bone mets)  - ALT, AST, alk phos, and bilirubin were significantly elevated at the time of admission.  They initially decreased, but have gone up again over the fast few days.  - hepatotoxicity from docetaxel is rare and usually self limited, but most likely accentuated by alcohol abuse.  - Unclear how much of the hepatotoxicity is potentially reversible.      PLAN:  - Since the LFTs improved initially and subsequently worsened, and it has been 3 weeks since the patient received docetaxel/Herceptin/Perjeta, would try to eliminate hepatotoxic medications from her medication list, if possible.  - Monitor LFTs on routine basis  - If the patient recovers from this (unlikely), would use paclitaxel instead of docetaxel with the next cycle, or omit taxanes  altogether.      #3 Metastatic breast cancer, ER weakly positive (5%), PA negative, HER2 positive;       - Has sternal metastases and possible metastasis in left 5th rib  - Prior bone mets alone the spine were no longer FDG avid on PET/CT from 7/23  - Has had a good clinical response so far with significant shrinkage of the sternal metastases over the past 3 weeks.      PLAN:  - If the patient recovers from the current hospital stay, would consider switching from docetaxel to paclitaxel given lower risk of hepatotoxicity, and continue with Herceptin/Perjeta vs. Herceptin and Perjeta alone.      #4  Pancytopenia, chemotherapy-induced, now improved; Also suspect myelotoxicity due to alcohol abuse and critical illness.  -  follow CBC  - Anemia due to underlying malignancy, recent treatment, and her critical illness  - Leukocytosis likely due to steroids.  Infection could be contributing.  -  neupogen discontinued on 9/26.  - Transfuse as needed to keep Hgb >7.0.  In the setting of respiratory failure, could consider targeting a higher Hgb goal.      #5 Code status  #6 Goals of care  - The patient has had a long history of Stage IV HER2 positive metastatic breast cancer (close to 20 years), when most people with this type of cancer only live 3-4 years.  She currently has very limited disease, which has actually responded to the new treatment she received.      Despite encouraging signs of improvement last week, the patient's condition has significantly deteriorated over the past 24-48 hours.  She is back on multiple pressors, is febrile, and her FiO2 has increased to 100%.    She has now been intubated for 16-17 days.  I spoke to Eyal's  this morning, and he understood that his wife is critically ill, and short of unexpected dramatic improvement over the next 1-2 days, we would need to discuss possible withdrawal of care.  I will continue with work with the primary team and the ICU team to make sure we have  tried everything we can, and there's nothing else we're missing.    Case discussed with Dr. Maynard this morning.    I am available for another care conference after 4pm Thurs/Fri of this week, if needed.      Richard Braun M.D.  [facility]  [phone]      Subjective:    The patient has had more thick respiratory secretions over the past 24 hours.  She also spiked a fever to 101.  She is back on multiple pressors and her O2 requirement has increased.    Scheduled Medications:  Reviewed active medications    Labs:  CBC RESULTS:   Recent Labs   Lab Test  10/11/18   0450  10/10/18   2334  10/10/18   1815  10/10/18   0515  10/08/18   0515  10/07/18   0430   WBC   --    --    --   17.7*  24.4*  26.4*   HGB  7.6*  6.5*  7.6*  7.0*  8.4*  8.7*   HCT   --    --    --   21.8*  26.2*  27.1*   MCV   --    --    --   94  95  94   PLT   --    --    --   107*  149*  179       CMP  Recent Labs  Lab 10/11/18  0450 10/11/18  0200 10/10/18  1815 10/10/18  1204 10/10/18  0515 10/09/18  2255  10/09/18  0525 10/08/18  0515  10/07/18  0430     --   --   --  141 142  --   --  148*  --  151*   POTASSIUM 4.9 4.8 2.5* 2.7* 2.7* 3.0*  < > 2.6* 3.4  < > 3.0*   CHLORIDE 99  --   --   --  102  --   --   --  109  --  107   CO2 25  --   --   --  32  --   --   --  34*  --  37*   ANIONGAP 12  --   --   --  7  --   --   --  5  --  7   *  --   --   --  230*  --   --   --  212*  --  213*   BUN 65*  --   --   --  45*  --   --   --  48*  --  47*   CR 0.69  --   --   --  0.30*  --   --   --  0.37*  --  0.38*   GFRESTIMATED 87  --   --   --  >90  --   --   --  >90  --  >90   GFRESTBLACK >90  --   --   --  >90  --   --   --  >90  --  >90   REAGAN 7.4*  --   --   --  8.2*  --   --   --  7.5*  --  7.8*   MAG 2.2  --  2.3  --  2.1  --   --  2.2 2.1  --  2.0   PHOS 3.9  --   --  2.3* 2.1* 2.3*  --  2.0* 1.9*  < > 2.4*   PROTTOTAL 5.2*  --   --   --  5.3*  --   --   --  4.5*  --  4.9*   ALBUMIN 3.4  --   --   --  3.3*  --   --   --  2.1*  --   "2.4*   BILITOTAL 23.4*  --   --   --  20.5*  --   --   --  17.3*  --  13.9*   ALKPHOS 300*  --   --   --  333*  --   --   --  482*  --  500*   *  --   --   --  67*  --   --   --  137*  --  164*   ALT 86*  --   --   --  94*  --   --   --  163*  --  172*   < > = values in this interval not displayed.    INR  Recent Labs  Lab 10/08/18  0515   INR 1.27*       Objective/Physical Exam:  Blood pressure 101/65, pulse 123, temperature 101.1  F (38.4  C), temperature source Oral, resp. rate (!) 37, height 1.676 m (5' 6\"), weight 67.9 kg (149 lb 11.1 oz), SpO2 91 %, not currently breastfeeding.  General intubated and sedated.  Skin:  Jaundiced  Chest:  Previously noted sternal mets have decreased significantly in size  Lungs:  Coarse breath sounds bilaterally  Abdomen:  Distended.  Ext:  Trace edema in bilateral LEs.    Richard Braun MD  [facility]  10/11/2018 12:14 PM        "

## 2018-10-11 NOTE — PROGRESS NOTES
Respiratory Therapy Note         Pt remains on full vent support and is not ready for a PS attempt at this time due to her need or multiple pressors and 100% FiO2. She continues on Veletri at 20 ng/kg/min.  Fair synchrony with the vent.  For much of the day her RR has been around 40.  Plat pressures have been at 42 cmH2O; MD notified.  Breath sounds are coarse with crackles and wheezing. Suctioning back small to moderate amounts of amounts of thick pink sputum.    October 11, 2018.5:10 PM  Manjit Morel

## 2018-10-11 NOTE — PROGRESS NOTES
ICU Staff:    I examined the patient, Eyal Turner, in the [facility] ICU. I reviewed the patient's medical record and the progress notes. I participated in the Multidisciplinary ICU Care Rounds; and I agree with the assessment and the plans documented by the other members of the [facility] ICU team. I discussed the patient's history, physical findings, labs, and imaging studies with the other members of the [facility] ICU care team.   The patient is 58 year old woman originally admitted to the ICU for neutropenic fever, sepsis, and possible pneumonia failure to thrive, possible ETOH withdrawal, and cardio-respiratory failure.   Family meeting today with the patient's , the patient's three children, the bedside nurse, the Palliative care team , and the medical student.  The information regarding the patient's ICU course was reviewed with the patient's family.  All of the family member's questions and concerns were addressed.    Bili is 20.8 today.      PAST MEDICAL HISTORY:  Past Medical History:   Diagnosis Date     Anemia      Breast cancer metastasized to bone (H) 2005     sees Dr. Zach Romo/Dr. Toño REED q3mos - herceptin, zometa      ETOH abuse      History of blood transfusion      Hypertension     lisinopril 5mg = dizziness      Invasive ductal carcinoma of breast (H) 2000    recurred 3x since.      Martha-Stafford tear 10/12/2015    with hematemesis - hospitalized     S/P mastectomy, bilateral 2000        PAST SURGICAL HISTORY:  Past Surgical History:   Procedure Laterality Date     APPENDECTOMY  1999 - age 39      ESOPHAGOSCOPY, GASTROSCOPY, DUODENOSCOPY (EGD), COMBINED N/A 10/13/2015    Procedure: COMBINED ESOPHAGOSCOPY, GASTROSCOPY, DUODENOSCOPY (EGD);  Surgeon: Rashad Rossi MD;  Location: [facility] GI     EXCISE MASS TRUNK Right 8/9/2016    Procedure: EXCISE MASS TRUNK;  Surgeon: Danie Forrester MD;  Location: [facility] OR     HYSTERECTOMY, NORIS  2002    with BSO      MASTECTOMY MODIFIED  RADICAL BILATERAL  2000     REMOVE CATHETER VASCULAR ACCESS Right 8/9/2016    Procedure: REMOVE CATHETER VASCULAR ACCESS;  Surgeon: Danie Forrester MD;  Location: [facility] OR        MEDICATIONS:  Current Facility-Administered Medications   Medication     0.9% sodium chloride BOLUS     albumin human 25 % injection 50 g     bacitracin ointment     calcium gluconate 1 g in D5W 100 mL intermittent infusion     dexmedetomidine (PRECEDEX) 400 mcg in sodium chloride 0.9 % 100 mL infusion     dextrose 10 % 1,000 mL infusion     dextrose 5% infusion     glucose gel 15-30 g    Or     dextrose 50 % injection 25-50 mL    Or     glucagon injection 1 mg     fentaNYL (PF) (SUBLIMAZE) injection 50 mcg     fiber modular (NUTRISOURCE FIBER) (NUTRISOURCE FIBER) packet 1 packet     folic acid (FOLVITE) tablet 1 mg     furosemide (LASIX) 100 mg in sodium chloride 0.9 % 100 mL infusion     heparin 100 UNIT/ML injection 5 mL     heparin lock flush 10 UNIT/ML injection 3 mL     heparin lock flush 10 UNIT/ML injection 5-10 mL     heparin lock flush 10 UNIT/ML injection 5-10 mL     HYDROmorphone (DILAUDID) 1 mg/mL infusion ADULT/PEDS GREATER than or EQUAL to 20 kg     hypromellose-dextran (ARTIFICAL TEARS) 0.1-0.3 % ophthalmic solution 2-3 drop     insulin aspart (NovoLOG) inj (RAPID ACTING)     insulin aspart (NovoLOG) inj (RAPID ACTING)     insulin glargine (LANTUS) injection 15 Units     lidocaine (LMX4) cream     lidocaine 1 % 1 mL     LORazepam (ATIVAN) injection 0.5 mg     magic mouthwash suspension (diphenhydramine, lidocaine, aluminum-magnesium & simethicone)     magnesium sulfate 4 g in 100 mL sterile water (premade)     methylPREDNISolone sodium succinate (solu-MEDROL) injection 40 mg     metolazone (ZAROXOLYN) tablet 5 mg     multivitamins with minerals (CERTAVITE/CEROVITE) liquid 15 mL     naloxone (NARCAN) injection 0.1-0.4 mg     norepinephrine (LEVOPHED) 16 mg in D5W 250 mL infusion     ondansetron (ZOFRAN-ODT) ODT tab 4 mg    Or      ondansetron (ZOFRAN) injection 4 mg     pantoprazole (PROTONIX) 2 mg/mL suspension 40 mg     piperacillin-tazobactam (ZOSYN) infusion 3.375 g     polyethylene glycol (MIRALAX/GLYCOLAX) Packet 17 g     potassium chloride (KLOR-CON) Packet 20 mEq     potassium chloride (KLOR-CON) Packet 20-40 mEq     potassium chloride 10 mEq in 100 mL intermittent infusion with 10 mg lidocaine     potassium chloride 10 mEq in 100 mL sterile water intermittent infusion (premix)     potassium chloride 20 mEq in 50 mL intermittent infusion     potassium chloride SA (K-DUR/KLOR-CON M) CR tablet 20-40 mEq     potassium phosphate 15 mmol in D5W 250 mL intermittent infusion     potassium phosphate 20 mmol in D5W 250 mL intermittent infusion     potassium phosphate 20 mmol in D5W 500 mL intermittent infusion     potassium phosphate 25 mmol in D5W 500 mL intermittent infusion     prochlorperazine (COMPAZINE) injection 10 mg    Or     prochlorperazine (COMPAZINE) tablet 10 mg    Or     prochlorperazine (COMPAZINE) Suppository 25 mg     protein modular (PROSource TF) 1 packet     QUEtiapine (SEROquel) tablet 25 mg     sodium chloride (PF) 0.9% PF flush 10-20 mL     sodium chloride (PF) 0.9% PF flush 3 mL     sodium chloride (PF) 0.9% PF flush 3 mL     sodium chloride 0.9% infusion     sodium chloride 0.9% infusion     vancomycin (VANCOCIN) 1000 mg in dextrose 5% 200 mL PREMIX     vasopressin (VASOSTRICT) 40 Units in D5W 40 mL infusion        ALLERGIES:  No Known Allergies     SOCIAL HISTORY:  Social History     Social History     Marital status:      Spouse name: Ashish Turner     Number of children: 3     Years of education: 14     Occupational History     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     Social History Main Topics     Smoking status: Former Smoker     Packs/day: 0.50     Years: 15.00     Types: Cigarettes     Quit date: 3/18/1990     Smokeless tobacco: Never Used     Alcohol use 3.5 oz/week     7 Glasses of  "wine per week      Comment: drinks probably  a glass of wine a night      Drug use: No      Comment: takes 5-HTP.             Sexual activity: Not Currently     Other Topics Concern     Parent/Sibling W/ Cabg, Mi Or Angioplasty Before 65f 55m? No     Social History Narrative       FAMILY HISTORY:  Family History   Problem Relation Age of Onset     Cancer Mother      hx of lung cancer - survived      Hypertension Mother      Circulatory Mother      s/p valve replacement      Diabetes Father      prediabetes      Prostate Cancer Father      Psychotic Disorder Daughter      ? adhd - maternal cousin with same         PHYSICAL EXAM:  Vital Signs: /45  Pulse 123  Temp 98.6  F (37  C) (Oral)  Resp (!) 43  Ht 1.676 m (5' 6\")  Wt 67.9 kg (149 lb 11.1 oz)  SpO2 97%  BMI 24.16 kg/m2  Gen: patient sedated on the the vent.    HEENT: the patient is intubated and sedated. Marked juandice.   Cor: RRR  Pulm: course BS bilaterally.  Tumor on the sternum due to metastatic cancer.    Abd: distended; soft, no masses.  Ascites present by exam.    Ext: marked muscle wasting and edema present.       A/P:  The patient is 58 year old woman admitted to the ICU for neutropenic fever, sepsis, and possible pneumonia failure to thrive, possible ETOH withdrawal, and cardio-respiratory failure.   The patient has a history of metastatic breast CA.  I discussed the differential diagnosis and a I formulated a best diagnosis and therapeutic ICU care plan for the patient with the other members of the [facility] ICU care team.   Bili is 20.8 today.    Neuro: the patient has metastatic breast cancer.  Will follow MS exam.    Pulm: acute respiratory failure likely means to pneumonia.  Will continue antibiotic therapy per ID.  Possible drug-induced pneumonitis; will continue solumedrol 62.5 mg BID.  Will continue with PS trials.  Cardiac: cardiac ECHO demonstrates CHF with new cardiomyopathy; will resume Lasix.    : will resume Lasix.    GI: " hepatic failure due to ETOH and septic shock.  Will follow LFTs.  Bili is 20.8 today.  HEME:  pancytopenia due to chemo has improved; will transfuse to keep Hgb >7.0   Will follow CBC.       The patient remains critically ill due to respiratory failure and ETOH withdrawal, the patient still requires full vent support with twice a day PS trials now to wean patient from the vent. The patient is critically ill and she requires physiologic supportive care in the ICU. 55 min of Critical Care Time exclusive of any time needed to perform bedside procedures.     Oniel Ram MD, PhD

## 2018-10-12 NOTE — PROGRESS NOTES
Pt noted to have no pressure per arterial line, and asystole noted on monitor at 1053. Pressors continue to run at maximum dosages and ventilator support continues at full support.  RN in to assess pt. Pt had no pulse upon ausculation and no pulse palpable when assessed. MD notified of pt status, MD at bedside to pronounce pt expiration. See MD note for further details.

## 2018-10-12 NOTE — PROGRESS NOTES
Pt disposition to [facility] at approximately 1245 via [facility] [employer] cart. Pt belongings sent with family, no further belongings present in room at time of patients. Family will notify supervisor when  home  Is chose. Pt not a candidate for eye/tissue/organ donation per donation coordinator. See flowsheet for additional assessment information.

## 2018-10-12 NOTE — PLAN OF CARE
Problem: Pneumonia (Adult)  Goal: Signs and Symptoms of Listed Potential Problems Will be Absent, Minimized or Managed (Pneumonia)  Signs and symptoms of listed potential problems will be absent, minimized or managed by discharge/transition of care (reference Pneumonia (Adult) CPG).   Outcome: Declining  Pt suctioned orally for moderate amounts of bloody secretions. Multiple large clots noted by RN in oral cavity. Pt continues to decline. Is on full vent support and life support with pressors at maximum dosage. FIO2 continues to be 100% however saturations in mid 70's as they had been throughout the NOC. Also MAP sustained in 40s range as it had been through the night as well. Family and  at bedside. MD updated of new bleeding and pt status declining further. No orders at this time. See flowsheet for additional assessment information.

## 2018-10-12 NOTE — PROGRESS NOTES
RT end of shift note:      Patient remains on mechanical ventilator support.  Settings: CMV/AC rate of 20, tidal volume 450, peep of 5 and 100% FIO2. Breath sounds are coarse crackles throughout. Suctioned scant amount of pink thin secretions. Accessory muscle use. Peak pressures still in the 40's. Veletri still running through mechanical ventilator.    Temp: 100.9  F (38.3  C) Temp src: Axillary BP: 101/65   Heart Rate: 105 Resp: 25 SpO2: (!) 61 % O2 Device: Mechanical Ventilator      Will continue to follow and monitor.      Lori Santos, RRT

## 2018-10-12 NOTE — PROGRESS NOTES
"SPIRITUAL HEALTH SERVICES Progress Note  [facility]  ICU    Responded to on-call page from Nursing Supervisor, family requesting  support.  Pt's  Luis A, sons Cezar and Luis A, and daughter Mary were at the bedside.  Pt's parents Liliana and Mani, sister Claudette, and niece Mitali were outside the pt's room.    Invited Luis A and pt's children to reflect on pt's personality and to share memories of her.  Offered prayer, which they welcomed and gathered around her bed in prayer.    Met with pt's parents, sister, and niece in family waiting room.  Offered reflective conversation to facilitate the processing of thoughts and feelings and to invite storytelling.  They welcomed prayer.    Return to the pt's room to ask Luis A if pt's extended family could come into the room to be with pt, and RN brought the family into the room.  Encouraged the pt's children to share more stories about the things pt enjoyed.  They talked about pt's love of [place]; her cooking; her love of hunting, fishing, and mushroom hunting; and her generosity and hospitality to others.  Pt's mother joined in the storytelling.  After awhile the extended family left.    Luis A and the children reported that they wanted some family time alone \"to make some decisions.\"  RN asked if they wanted to meet with the physician to discuss pt's condition and options for care, which family declined.  Invited family to discern what pt would want.  Luis A emphasized pt's fiordaliza in miracles.  Each of pt's children shared their uncertainties, reflecting that pt is \"a fighter\" and had a deep fiordaliza in God's will.  They acknowledged that pt did not share her thoughts about end-of-life care because she wanted \"to protect them.\"  Her daughter shared pt's recent desire \"to get strong and go home;\" she questioned if \"the different medications are helping\" and acknowledging that pt \"is not afraid to die.\"    Reviewed how they can request further  support.    Plan: Unit "  will continue to follow pt and family to provide ongoing emotional/spiritual support.    Jimbo Kate M.Div., [facility]  Staff   Pager [phone]

## 2018-10-12 NOTE — PLAN OF CARE
Problem: Patient Care Overview  Goal: Plan of Care/Patient Progress Review  Outcome: Declining  ICU End of Shift Summary.  For vital signs and complete assessments, please see documentation flowsheets.     Pertinent assessments: RASS -4. Tele ST, + ectopy. -130's. LS coarse crackles, ETT secretions tenacious. FiO2 100% sats 88-89%. UOP 70cc for shift total.  Rectal tube filled with gas. Abd distended, firm. TF solution backed up into mouth with reposition. Unable to turn pt, BP dropped rapidly with slight shifts in weight.  Tmax 102.4, ice paks to axilla, groin and neck. Restraints d/c'd. Family updated throughout shift.  Encouraged family to gather at bedside.  Major Shift Events: TF stopped. Increasing Vasopressor support req'd. Increasing edema. Anuric. Elevated lactic, Procalcitonin. Vanco dose held, level too high. K+ level increasing.   Plan (Upcoming Events): Family considering transition to [facility]  Discharge/Transfer Needs: TBD    Bedside Shift Report Completed : y   Bedside Safety Check Completed: y

## 2018-10-12 NOTE — PROGRESS NOTES
Called by RN to pronounce patient dead. She was seen right away. She has no pulse, no heart beat, pupils are dilated and fixed. She was pronounced dead on 10/12/2018 at 11:02 am. Family at bedside and comforted.   Cause of death: multiorgan failure secondary to acute hypoxic respiratory failure, pneumonia, sepsis, metastatic breast cancer

## 2018-10-12 NOTE — PROGRESS NOTES
Oncology/Hematology Follow Up Note:    Assessment and Plan:  #1 Acute respiratory failure, due to probable pneumonia and volume overload, cannot rule out lung toxicity from chemotherapy (rare), and now with probable VAP  #2 Multiorgan failure  #3 Hepatotoxicity from alcohol abuse and sepsis, in the setting of chemotherapy.  #4 Metastatic breast cancer, ER weakly positive (5%), IL negative, HER2 positive;   #5  Anemia, due to myelotoxicity from chemotherapy, alcohol abuse and critical illness.  #6 Code status  #7 Goals of care      Despite encouraging signs of improvement last week, the patient's condition has significantly deteriorated over the past 2-3 days. Her MAP is low despite being maxed out on pressors.  O2 sat low despite FiO2 of 100%.  Her organs are starting to shut down.  She has had minimal urine output over the past 24 hours.     I had a conversation with the patient's  Luis A this morning, and he understands that Eyal is critically ill, and it is extremely unlikely that her condition will improve.  He knows we're approaching the time when we need to make a decision about withdrawal of care.     Case discussed with Dr. Rivera this morning.         Richard Braun M.D.  [facility]  [phone]    Subjective:    Unfortunately, Eyal has deteriorated further over the past 24 hours.  MAP remains low despite being maxed out on pressors.  Poor urine output, and Cr has increased to 1.45.  O2 sat low despite FiO2 of 100%.      Labs:  All labs reviewed    CBC  Recent Labs  Lab 10/12/18  0528 10/11/18  0450 10/10/18  2334  10/10/18  0515 10/08/18  0515   WBC 31.7*  --   --   --  17.7* 24.4*   HGB 7.1* 7.6* 6.5*  < > 7.0* 8.4*   *  --   --   --  94 95   *  --   --   --  107* 149*   < > = values in this interval not displayed.    CMP  Recent Labs  Lab 10/12/18  0528 10/11/18  1900 10/11/18  0450 10/11/18  0200 10/10/18  1815 10/10/18  1204 10/10/18  0515 10/09/18  2255  10/08/18  0515   10/07/18  0430   *  --  136  --   --   --  141 142  --  148*  --  151*   POTASSIUM 5.7* 5.3 4.9 4.8 2.5* 2.7* 2.7* 3.0*  < > 3.4  < > 3.0*   CHLORIDE 95  --  99  --   --   --  102  --   --  109  --  107   CO2 15*  --  25  --   --   --  32  --   --  34*  --  37*   ANIONGAP 22*  --  12  --   --   --  7  --   --  5  --  7   GLC 80  --  275*  --   --   --  230*  --   --  212*  --  213*   BUN 88*  --  65*  --   --   --  45*  --   --  48*  --  47*   CR 1.45*  --  0.69  --   --   --  0.30*  --   --  0.37*  --  0.38*   GFRESTIMATED 37*  --  87  --   --   --  >90  --   --  >90  --  >90   GFRESTBLACK 45*  --  >90  --   --   --  >90  --   --  >90  --  >90   REAGAN 6.5*  --  7.4*  --   --   --  8.2*  --   --  7.5*  --  7.8*   MAG 2.3 2.1 2.2  --  2.3  --  2.1  --   < > 2.1  --  2.0   PHOS  --   --  3.9  --   --  2.3* 2.1* 2.3*  < > 1.9*  < > 2.4*   PROTTOTAL  --   --  5.2*  --   --   --  5.3*  --   --  4.5*  --  4.9*   ALBUMIN  --   --  3.4  --   --   --  3.3*  --   --  2.1*  --  2.4*   BILITOTAL  --   --  23.4*  --   --   --  20.5*  --   --  17.3*  --  13.9*   ALKPHOS  --   --  300*  --   --   --  333*  --   --  482*  --  500*   AST  --   --  137*  --   --   --  67*  --   --  137*  --  164*   ALT  --   --  86*  --   --   --  94*  --   --  163*  --  172*   < > = values in this interval not displayed.    INR  Recent Labs  Lab 10/08/18  0515   INR 1.27*       Blood Culture  Recent Labs  Lab 10/10/18  2005 10/10/18  1435 10/10/18  1415 10/10/18  1410   CULT Light growthNon lactose fermenting gram negative rods*  Culture in progress No growth after 1 day >100,000 colonies/mLmixed urogenital floraSusceptibility testing not routinely done No growth after 2 days         Richard Braun MD  [facility]  10/12/2018 9:10 AM

## 2018-10-12 NOTE — PLAN OF CARE
Problem: Patient Care Overview  Goal: Plan of Care/Patient Progress Review  Outcome: Declining  ICU End of Shift Summary.  For vital signs and complete assessments, please see documentation flowsheets.     Pertinent assessments: Telehub updated throughout shift on pt condition. Tele ST. TMAX 101.1. Albumin x1. Vaso, levo, and Robbin unable to maintain MAPs greater than 65. Low SPO2. 100% FIO2. No/minimal pink secretions. Abd distended. 10mls urine output for shift. 2 low blood sugars. Family supported. Offers to have MD come discuss options with family, declined multiple times. Dany here to visit with family. Family kept updated on pt condition.  Major Shift Events: declining  Plan (Upcoming Events): Support to family and pt  Discharge/Transfer Needs: TBD    Bedside Shift Report Completed : Y  Bedside Safety Check Completed: Y

## 2018-10-12 NOTE — PROGRESS NOTES
"[facility]  Palliative Care Progress Note  Text Page    Rachna was surrounded by her  Luis A, son Luis A and her parents. Agonal heart rhythm on the monitor which diminished from 20 to time of death. Appreciate  Nathanael Jefferson for attending to the family. Her  Luis A was having difficulty in accepting her passing. Appreciate input of bedside nurse Bernadette Phelps RN in supporting the family as Rachna approached the last moments of life. The patient's  Luis A was asking \"can't we suction her so she's alive when David gets here?\". Explained that risk of continued suctioning given ongoing hemoptysis.  Appreciate Intensivist Oniel Ram MD and Hospitalist Jeffry Rivera MD for supporting the family in announcing Rachna's passing.      Chata Huff MS, RN, CNS, APRN, ACHPN, FAACVPR  Pain and Palliative Care  Pager [phone]  Office [phone]       Time Spent on this Encounter   I spent 20 minutes (10:30 AM-10:55 AM) in assessment of the patient, counseling and discussion with the family as documented in sections below. Another 20 minutes in review of chart, documentation, coordination of care and discussion with the health care team.    Interval History   Patient actively dying    Physical Exam   Temp:  [98.8  F (37.1  C)-102.4  F (39.1  C)] 99.1  F (37.3  C)  Heart Rate:  [105-130] 112  Resp:  [0-94] 27  MAP:  [45 mmHg-72 mmHg] 46 mmHg  Arterial Line BP: ()/(35-53) 66/37  FiO2 (%):  [100 %] 100 %  SpO2:  [61 %-92 %] 76 %  166 lbs 7.16 oz      Medications     dexmedetomidine 0.7 mcg/kg/hr (10/12/18 0808)     IV fluid REPLACEMENT ONLY       D5W 50 mL/hr at 10/12/18 0600     epoprostenol (VELETRI) 20 mcg/mL in sterile water inhalation solution 20 ng/kg/min (10/12/18 0605)     furosemide (LASIX) infusion ADULT STANDARD 5 mg/hr (10/12/18 0630)     HYDROmorphone 1 mg/hr (10/12/18 0630)     midazolam 2 mg/hr (10/12/18 0630)     norepinephrine 0.4 mcg/kg/min (10/12/18 0642)     " phenylephrine IV infusion ADULT 6 mcg/kg/min (10/12/18 0824)     sodium chloride 10 mL/hr at 10/11/18 0315     sodium chloride 10 mL/hr at 10/12/18 0600     vasopressin (PITRESSIN) infusion ADULT (40 mL) 2.4 Units/hr (10/12/18 0949)       bacitracin   Topical BID     heparin  5 mL Intracatheter Q28 Days     heparin lock flush  5-10 mL Intracatheter Q24H     insulin aspart  1-12 Units Subcutaneous Q4H     insulin aspart  5 Units Subcutaneous Q4H     insulin glargine  20 Units Subcutaneous QAM AC     lidocaine visc 2% & maalox/mylanta w/simethicone & diphenhydramine  10 mL Swish & Swallow Q6H     methylPREDNISolone  40 mg Intravenous Q12H     metolazone  5 mg Oral or Feeding Tube Daily     pantoprazole  40 mg Per Feeding Tube BID     piperacillin-tazobactam  3.375 g Intravenous Q8H GT     potassium chloride  20 mEq Oral or Feeding Tube BID     protein modular  1 packet Per Feeding Tube BID w/meals     QUEtiapine  25 mg Oral or Feeding Tube BID     sodium chloride (PF)  3 mL Intracatheter Q8H       Data   Results for orders placed or performed during the hospital encounter of 09/22/18 (from the past 24 hour(s))   Procalcitonin   Result Value Ref Range    Procalcitonin 177.85 (HH) ng/ml   Glucose by meter   Result Value Ref Range    Glucose 167 (H) 70 - 99 mg/dL   Lactic acid whole blood   Result Value Ref Range    Lactic Acid 3.8 (H) 0.7 - 2.0 mmol/L   Glucose by meter   Result Value Ref Range    Glucose 198 (H) 70 - 99 mg/dL   Vancomycin level   Result Value Ref Range    Vancomycin Level 35.9 (HH) mg/L   Magnesium (AM Draw)   Result Value Ref Range    Magnesium 2.1 1.6 - 2.3 mg/dL   Potassium   Result Value Ref Range    Potassium 5.3 3.4 - 5.3 mmol/L   Glucose by meter   Result Value Ref Range    Glucose 129 (H) 70 - 99 mg/dL   Glucose by meter   Result Value Ref Range    Glucose 65 (L) 70 - 99 mg/dL   Lactic acid whole blood   Result Value Ref Range    Lactic Acid 7.6 (HH) 0.7 - 2.0 mmol/L   Glucose by meter    Result Value Ref Range    Glucose 169 (H) 70 - 99 mg/dL   Glucose by meter   Result Value Ref Range    Glucose 59 (L) 70 - 99 mg/dL   Glucose by meter   Result Value Ref Range    Glucose 157 (H) 70 - 99 mg/dL   Glucose by meter   Result Value Ref Range    Glucose 78 70 - 99 mg/dL   Magnesium (AM Draw)   Result Value Ref Range    Magnesium 2.3 1.6 - 2.3 mg/dL   Lactic acid whole blood   Result Value Ref Range    Lactic Acid 14.5 (HH) 0.7 - 2.0 mmol/L   CBC with platelets differential   Result Value Ref Range    WBC 31.7 (H) 4.0 - 11.0 10e9/L    RBC Count 2.38 (L) 3.8 - 5.2 10e12/L    Hemoglobin 7.1 (L) 11.7 - 15.7 g/dL    Hematocrit 24.7 (L) 35.0 - 47.0 %     (H) 78 - 100 fl    MCH 29.8 26.5 - 33.0 pg    MCHC 28.7 (L) 31.5 - 36.5 g/dL    RDW 26.6 (H) 10.0 - 15.0 %    Platelet Count 115 (L) 150 - 450 10e9/L    Diff Method Manual Differential     % Neutrophils 94.0 %    % Lymphocytes 1.0 %    % Monocytes 0.0 %    % Eosinophils 0.0 %    % Basophils 0.0 %    % Metamyelocytes 5.0 %    Nucleated RBCs 1 (H) 0 /100    Absolute Neutrophil 29.8 (H) 1.6 - 8.3 10e9/L    Absolute Lymphocytes 0.3 (L) 0.8 - 5.3 10e9/L    Absolute Monocytes 0.0 0.0 - 1.3 10e9/L    Absolute Eosinophils 0.0 0.0 - 0.7 10e9/L    Absolute Basophils 0.0 0.0 - 0.2 10e9/L    Absolute Metamyelocytes 1.6 (H) 0 10e9/L    Absolute Nucleated RBC 0.3     Anisocytosis Marked     Bomoseen Cells Slight     Microcytes Present     Macrocytes Present     Platelet Estimate       Automated count confirmed.  Platelet morphology is normal.   Basic metabolic panel   Result Value Ref Range    Sodium 132 (L) 133 - 144 mmol/L    Potassium 5.7 (H) 3.4 - 5.3 mmol/L    Chloride 95 94 - 109 mmol/L    Carbon Dioxide 15 (L) 20 - 32 mmol/L    Anion Gap 22 (H) 3 - 14 mmol/L    Glucose 80 70 - 99 mg/dL    Urea Nitrogen 88 (H) 7 - 30 mg/dL    Creatinine 1.45 (H) 0.52 - 1.04 mg/dL    GFR Estimate 37 (L) >60 mL/min/1.7m2    GFR Estimate If Black 45 (L) >60 mL/min/1.7m2    Calcium 6.5  (L) 8.5 - 10.1 mg/dL   Glucose by meter   Result Value Ref Range    Glucose 34 (LL) 70 - 99 mg/dL   Glucose by meter   Result Value Ref Range    Glucose 82 70 - 99 mg/dL

## 2018-10-12 NOTE — PROGRESS NOTES
"SPIRITUAL HEALTH SERVICES Progress Note  [facility] ICU 3rd     support provided to accompany and support family as they petit alongside pt this morning until pt's death. Present were Luis A (spouse), Luis A (son), Mani (Father), Liliana (Mothre). Shortly after pt's death, family's friend \" David\" arrived and provided support as well.   Family requested prayers, Scripture reading, blessings, and devotional readings that were provided throughout the morning.   Family engaged in story telling, reflecting on pt's passions in life, her love of the outdoors, love for her children, and intense fiordaliza and trust she has in God.   Following pt's death provided brief, initial grief and self care counseling. Also provided names of  homes in the area per their request.   No other needs expressed. I and the other chaplains remain available per family/staff need or request.     MARLIN Aquino.  Staff    Pager #[phone]     "

## 2018-10-12 NOTE — DISCHARGE SUMMARY
[facility]    Death Summary  Hospitalist    Date of Admission:  9/22/2018  Date of Death:         10/12/2018  Provider Completing Death Summary: Jeffry Rivera MD  Date of Service (when I saw the patient): 10/12/18    Discharge Diagnoses     1. Acute hypoxemic respiratory failure secondary to pneumonia, drug induced pneumonitis     2. Drug-induced pneumonitis     3. Sepsis     4. Suspected pneumonia     5. Volume overload     6. Mucositis     7. Possible GI bleed     8. Metastatic breast cancer     9. Hepatitis     10. Hyperbilirubinemia. Bilirrubin > 20, direct 15.  INR 1.27.     11. Electrolyte abnormalities.     12. Alcohol abuse     13. Alcohol withdrawals     14. Anemia       History of Present Illness   Eyal Turner is an 58 year old female who presented with respiratory failure. Please see H&P and hospital course for details.     Hospital Course    Eyal Turner is a 58 year old female with metastatic breast cancer with bone metastases to sternum and rib who started a new chemo regimen (that included switch from Kadcyla to Docetaxel, Herceptin and Perjeta) 5 days prior to admission (about 9/17/18).  Other medical problems are chronic alcohol abuse complicated by malnutrition and essential hypertension.   She presented on 9/22/2018 with fatigue and shortness of breath. She was hypotensive, though responsive to IV fluids in the ED. Her labs were notable for significantly elevated LFTs (above her baseline abnormals) with bilirubin of 8, alk phos about 600 and elevated transaminases. Other abnormalities were pancytopenia with neutropenia, severe anemia with hemoglobin less than 7 (she received 1 unit RBC transfusion in the ED).  After admission she spiked fever to 101 for which infectrious work up was started and chest x-ray showed bilateral infiltrates. Cefepime was started for suspected neutropenic fever and azithromycin added subsequently.  A CT abdomen pelvis was obtained that  did not show any ascites or obstructive liver disease. She also developed evidence for acute alcohol withdrawal with significant tremor that improved after a few doses of Ativan. GI was consulted for possible upper GI bleed and for her liver failure.  No EGD planned. Did start empiric Protonix on admission, but no evidence for ongoing blood loss. Oncology has also been consulted and recommended supportive cares.    Ms. Turner was transferred to ICU on 2018 for worsening hypoxemia and was intubated 2018.  Blood culture grew lactobacillus from port.  ID was consulted and the patient was switched to vancomycin and Zosyn. Micafungin was also added for oral thrush. She was also started on Neupogen for neutropenia with good response.      On , she developed fever to 102 and leukocytosis of 27K (though the rise in white cell count may be partly due to exposure to Neupogen).     Problem List:      1. Acute hypoxemic respiratory failure. On vent since . This was initially thought to be sepsis due to pneumonia with possible fluid overload. Pulmonary toxicity due to chemotherapy now thought to be likely. She was put on IV solumedrol for pneumonitis.she was also treated with IV antibiotics for pneumonia.  She was managed on mechanical ventilation. She continued to decline and required 100% FIO2. She was treated woContinue IV methylprednisolone. Oncology following. On zosyn. FiO2 increased to 100% overnight. Vent management per intensivist. She became critical and required three pressors. She  on 10/12/2018 at 11:02am     2. Possible drug-induced pneumonitis. Treated with methylprednisolone  40 mg every 12 hours with no improvement. She      3. Sepsis.  Initially with neutropenic fevers.  Had initially been on IV cefepime and IV Vanco, zosyn.  Completed IV micafungin. She completed antibiotics but no improvement.      4. Suspected pneumonia. Followed by ID during hospital course. She had no  improvement.      5. Volume overload. Continue IV furosemide and oral metolazone.     6. Mucositis. Possible esophagitis.        7. Possible GI bleed. Continue pantoprazole 40 mg twice a day.     8. Metastatic breast cancer. She was followed by oncology. Prognosis was discussed with family.      9. Hepatitis. Due to ongoing alcohol abuse. Possibly worsened by sepsis and chemotherapy.      10. Hyperbilirubinemia. Bilirrubin > 20, direct 15.  INR 1.27.     11. Multiple electrolyte abnormalities.     Patient declined and  on 10/12 at 11:02 am. Family comforted.       Cause of death:   Multiorgan failure,          Pending Results   Unresulted Labs Ordered in the Past 30 Days of this Admission     Date and Time Order Name Status Description    10/10/2018 1441 Blood culture Preliminary     10/10/2018 1216 Blood culture Preliminary     10/10/2018 1216 Sputum Culture Aerobic Bacterial Preliminary     2018 1323 Nocardia culture - BAL Site 1 Preliminary     2018 1323 Fungus Culture, non-blood - BAL Site 1 Preliminary     2018 1323 AFB Culture Non Blood - BAL site 1 Preliminary     2018 1210 Legionella culture Preliminary           Primary Care Physician   Angie Heredia    Consultations This Hospital Stay   GASTROENTEROLOGY IP CONSULT  PHYSICAL THERAPY ADULT IP CONSULT  OCCUPATIONAL THERAPY ADULT IP CONSULT  NUTRITION SERVICES ADULT IP CONSULT  HEMATOLOGY & ONCOLOGY IP CONSULT  PHARMACY TO DOSE VANCO  NUTRITION SERVICES ADULT IP CONSULT  INFECTIOUS DISEASES IP CONSULT  INFECTIOUS DISEASES IP CONSULT  NUTRITION SERVICES ADULT IP CONSULT  NUTRITION SERVICES ADULT IP CONSULT  PHARMACY IP CONSULT  PHARMACY IP CONSULT  WOUND OSTOMY CONTINENCE NURSE  IP CONSULT  PALLIATIVE CARE ADULT IP CONSULT  PHARMACY TO DOSE VANCO  NUTRITION SERVICES ADULT IP CONSULT  NUTRITION SERVICES ADULT IP CONSULT    Time Spent on this Encounter   ILDEFONSO, Jeffry Rivera MD, personally saw the patient today and spent  greater than 30 minutes discharging this patient.    Data   Most Recent 3 CBC's:  Recent Labs   Lab Test  10/12/18   0528  10/11/18   0450  10/10/18   2334   10/10/18   0515  10/08/18   0515   WBC  31.7*   --    --    --   17.7*  24.4*   HGB  7.1*  7.6*  6.5*   < >  7.0*  8.4*   MCV  104*   --    --    --   94  95   PLT  115*   --    --    --   107*  149*    < > = values in this interval not displayed.      Most Recent 3 BMP's:  Recent Labs   Lab Test  10/12/18   0528  10/11/18   1900  10/11/18   0450   10/10/18   0515   NA  132*   --   136   --   141   POTASSIUM  5.7*  5.3  4.9   < >  2.7*   CHLORIDE  95   --   99   --   102   CO2  15*   --   25   --   32   BUN  88*   --   65*   --   45*   CR  1.45*   --   0.69   --   0.30*   ANIONGAP  22*   --   12   --   7   REAGAN  6.5*   --   7.4*   --   8.2*   GLC  80   --   275*   --   230*    < > = values in this interval not displayed.     Most Recent 2 LFT's:  Recent Labs   Lab Test  10/11/18   0450  10/10/18   0515   AST  137*  67*   ALT  86*  94*   ALKPHOS  300*  333*   BILITOTAL  23.4*  20.5*     Most Recent INR's and Anticoagulation Dosing History:  Anticoagulation Dose History     Recent Dosing and Labs Latest Ref Rng & Units 5/28/2015 10/11/2015 10/12/2015 6/15/2018 9/22/2018 10/1/2018 10/8/2018    INR 0.86 - 1.14 0.98 0.98 1.07 0.92 1.15(H) 1.23(H) 1.27(H)        Most Recent 3 Troponin's:  Recent Labs   Lab Test  06/15/18   1826  06/15/18   1825  05/28/15   1215  05/28/15   1200  05/16/13   0641   TROPI   --   <0.015   --   <0.015  The 99th percentile for upper reference range is 0.045 ug/L.  Troponin values in   the range of 0.045 - 0.120 ug/L may be associated with risks of adverse   clinical events.    <0.012   TROPONIN  0.00   --   0.00   --    --      Most Recent Cholesterol Panel:  Recent Labs   Lab Test  10/06/18   0555   07/25/16   1044   CHOL   --    --   245*   LDL   --    --   127*   HDL   --    --   108   TRIG  225*   < >  52    < > = values in this  interval not displayed.     Most Recent 6 Bacteria Isolates From Any Culture (See EPIC Reports for Culture Details):  Recent Labs   Lab Test  10/10/18   2005  10/10/18   1435  10/10/18   1415  10/10/18   1410  09/30/18   1210  09/30/18   0335   CULT  Light growth  Pseudomonas aeruginosa  Susceptibility testing in progress  *  Light growth  Candida albicans / dubliniensis  Candida albicans and Candida dubliniensis are not routinely speciated  Susceptibility testing not routinely done  *  No growth after 1 day  >100,000 colonies/mL  mixed urogenital zahra  Susceptibility testing not routinely done    No growth after 2 days  No growth after 12 days  Candida albicans / dubliniensis  isolated  Candida albicans and Candida dubliniensis are not routinely speciated  *  Culture received and in progress.  Positive AFB results are called as soon as detected.    Final report to follow in 7 to 8 weeks.    Assayed at [facility] [address] [place] [id number] [phone]  No growth  Culture negative monitoring continues  Canceled, Test credited  Test reordered as correct code  Light growth  Candida albicans / dubliniensis  Candida albicans and Candida dubliniensis are not routinely speciated  Susceptibility testing not routinely done  *     Most Recent TSH, T4 and A1c Labs:  Recent Labs   Lab Test  10/02/18   2225  07/25/16   1044   TSH   --   0.03*   T4   --   1.01   A1C  5.0   --

## 2018-10-14 LAB
BACTERIA SPEC CULT: ABNORMAL
BACTERIA SPEC CULT: ABNORMAL
BACTERIA SPEC CULT: NORMAL
SPECIMEN SOURCE: ABNORMAL
SPECIMEN SOURCE: NORMAL

## 2018-10-16 LAB
BACTERIA SPEC CULT: NO GROWTH
Lab: NORMAL
SPECIMEN SOURCE: NORMAL

## 2018-10-17 LAB
BACTERIA SPEC CULT: NO GROWTH
Lab: NORMAL
SPECIMEN SOURCE: NORMAL

## 2018-10-29 LAB
BACTERIA SPEC CULT: NORMAL
FUNGUS SPEC CULT: ABNORMAL
FUNGUS SPEC CULT: ABNORMAL
SPECIMEN SOURCE: ABNORMAL
SPECIMEN SOURCE: NORMAL

## 2018-11-24 LAB
MYCOBACTERIUM SPEC CULT: NORMAL
MYCOBACTERIUM SPEC CULT: NORMAL
SPECIMEN SOURCE: NORMAL

## 2024-04-20 NOTE — PROGRESS NOTES
"[facility]  Infectious Disease Progress Note          Assessment and Plan:   IMPRESSION:   1.  A 58-year-old female with widely metastatic long-term breast cancer, ongoing chemotherapy, and now neutropenia.   2.  Acute sepsis and neutropenic fever, some signs of pneumonia, with possible esophagitis and positive blood culture, at risk for multiple infections.   3.  Thrush and some concern for possible esophagitis.   4.  Acute and chronic long-term alcohol abuse and liver dysfunction.   5.  Prior Martha-Stafford tear.   6.  Respiratory failure, now intubated.   7.  Positive blood culture, 1 bottle for diphtheroids further identified as Lactobacillus.        RECOMMENDATIONS:   1. Continue  vancomycin and Zosyn   2.  From a thrush and possible fungal infection on micafungin.    3. No new micro data, bronch noted           Interval History:   Doing very poorly with blood pressure dropping, intubated               Medications:       bacitracin   Topical BID     chlorhexidine  15 mL Mouth/Throat BID     fiber modular (NUTRISOURCE FIBER)  1 packet Per Feeding Tube BID     folic acid  1 mg Oral or Feeding Tube Daily     heparin  5 mL Intracatheter Q28 Days     heparin lock flush  5-10 mL Intracatheter Q24H     lidocaine visc 2% & maalox/mylanta w/simethicone & diphenhydramine  10 mL Swish & Swallow Q6H     micafungin  100 mg Intravenous Q24H     multivitamins with minerals  15 mL Per Feeding Tube Daily     nystatin  500,000 Units Swish & Spit 4x Daily     pantoprazole  40 mg Per Feeding Tube BID     piperacillin-tazobactam  3.375 g Intravenous Q6H     protein modular  2 packet Per Feeding Tube BID w/meals     QUEtiapine  25 mg Oral or Feeding Tube BID     vancomycin (VANCOCIN) IV  1,500 mg Intravenous Q8H                  Physical Exam:   Blood pressure 114/68, pulse 123, temperature 98.6  F (37  C), temperature source Axillary, resp. rate 20, height 1.676 m (5' 6\"), weight 69.3 kg (152 lb 12.5 oz), " SpO2 100 %, not currently breastfeeding.  Wt Readings from Last 2 Encounters:   09/30/18 69.3 kg (152 lb 12.5 oz)   10/23/17 57.6 kg (127 lb)     Vital Signs with Ranges  Temp:  [98.6  F (37  C)-102.1  F (38.9  C)] 98.6  F (37  C)  Heart Rate:  [] 93  Resp:  [9-84] 20  BP: ()/(46-74) 114/68  MAP:  [56 mmHg-76 mmHg] 76 mmHg  Arterial Line BP: ()/(52-53) 115/53  FiO2 (%):  [50 %-70 %] 60 %  SpO2:  [88 %-100 %] 100 %    Constitutional: Intubated and sedated   Lungs: Clear to auscultation bilaterally, no crackles or wheezing   Cardiovascular: Regular rate and rhythm, normal S1 and S2, and no murmur noted   Abdomen: Normal bowel sounds, soft, non-distended, non-tender   Skin: No rashes, no cyanosis, no edema   Other:           Data:   All microbiology laboratory data reviewed.  Recent Labs   Lab Test  09/30/18   0450  09/29/18   0525  09/28/18   0620   WBC  20.9*  13.2*  10.8   HGB  7.3*  7.8*  7.8*   HCT  24.4*  24.4*  24.2*   MCV  99  95  95   PLT  142*  103*  96*     Recent Labs   Lab Test  09/30/18   0450  09/29/18   0525  09/28/18   0620   CR  0.41*  0.42*  0.34*     No lab results found.  Recent Labs   Lab Test  09/30/18   0130  09/30/18   0111  09/29/18   0943  09/29/18   0911  09/28/18   1211  09/24/18   0945  09/22/18   1033  09/22/18   1021  06/15/18   1908   CULT  No growth after 3 hours  PENDING  No growth after 5 hours  No growth after 15 hours  No growth after 15 hours  No growth  Light growth  Candida albicans / dubliniensis  Candida albicans and Candida dubliniensis are not routinely speciated  Susceptibility testing not routinely done  *  No growth  Cultured on the 2nd day of incubation:  Lactobacillus species  Identification obtained by MALDI-TOF mass spectrometry research use only database. Test   characteristics determined and verified by the [facility]   ([facility]) [place], [place].  *  Critical Value/Significant Value, preliminary result only, called to  and read back by  Kinjal Woodward RN at 1145 on 9/24/18 by JOSE ANTONIO.    >100,000 colonies/mL  mixed urogenital zahra  Susceptibility testing not routinely done          complains of pain/discomfort

## 2025-01-13 NOTE — PROGRESS NOTES
[facility]  Infectious Disease Progress Note          Assessment and Plan:   IMPRESSION:   1.  A 58-year-old female with widely metastatic long-term breast cancer, ongoing chemotherapy, and now neutropenia.   2.  Acute sepsis and neutropenic fever, some signs of pneumonia, with possible esophagitis and positive blood culture, at risk for multiple infections.   3.  Thrush and some concern for possible esophagitis.   4.  Acute and chronic long-term alcohol abuse and liver dysfunction.   5.  Prior Martha-Stafford tear.   6.  Respiratory failure, now intubated.   7.  Positive blood culture, 1 bottle for diphtheroids.  No further identification.  This could be a contaminant versus line infection.       RECOMMENDATIONS:   1.   off abx, some LGF, watching for now  Discussed with Dr Ram         Interval History:   Intubated sedated WBC rising above nl range incl PMNs  No new + cxs , some LGF on steroids              Medications:       bacitracin   Topical BID     fiber modular (NUTRISOURCE FIBER)  1 packet Per Feeding Tube BID     folic acid  1 mg Oral or Feeding Tube Daily     furosemide  20 mg Intravenous Once     heparin  5 mL Intracatheter Q28 Days     heparin lock flush  5-10 mL Intracatheter Q24H     insulin aspart  2 Units Subcutaneous Q4H     insulin aspart  1-6 Units Subcutaneous Q4H     insulin glargine  12 Units Subcutaneous QAM AC     lidocaine visc 2% & maalox/mylanta w/simethicone & diphenhydramine  10 mL Swish & Swallow Q6H     magnesium sulfate  2 g Intravenous Once     methylPREDNISolone  62.5 mg Intravenous Q12H     metolazone  5 mg Oral Daily     multivitamins with minerals  15 mL Per Feeding Tube Daily     pantoprazole  40 mg Per Feeding Tube BID     potassium chloride  20 mEq Oral BID     protein modular  2 packet Per Feeding Tube BID w/meals     QUEtiapine  25 mg Oral or Feeding Tube BID     sodium chloride (PF)  3 mL Intracatheter Q8H     vitamin  B-1  100 mg Per Feeding Tube  "Daily                  Physical Exam:   Blood pressure 121/56, pulse 123, temperature 99.7  F (37.6  C), temperature source Oral, resp. rate 19, height 1.676 m (5' 6\"), weight 65.1 kg (143 lb 8.3 oz), SpO2 98 %, not currently breastfeeding.  Wt Readings from Last 2 Encounters:   10/08/18 65.1 kg (143 lb 8.3 oz)   10/23/17 57.6 kg (127 lb)     Vital Signs with Ranges  Temp:  [98.6  F (37  C)-101.5  F (38.6  C)] 99.7  F (37.6  C)  Heart Rate:  [103-149] 121  Resp:  [13-44] 19  BP: ()/(47-69) 121/56  MAP:  [58 mmHg-85 mmHg] 71 mmHg  Arterial Line BP: ()/(44-66) 115/52  FiO2 (%):  [45 %] 45 %  SpO2:  [95 %-98 %] 98 %    Constitutional: Intubated and sedated   Lungs: Clear to auscultation bilaterally, no crackles or wheezing   Cardiovascular: Regular rate and rhythm, normal S1 and S2, and no murmur noted   Abdomen: Normal bowel sounds, soft, non-distended, non-tender   Skin: No rashes, no cyanosis, no edema   Other:           Data:   All microbiology laboratory data reviewed.  Recent Labs   Lab Test  10/08/18   0515  10/07/18   0430  10/06/18   0555   WBC  24.4*  26.4*  25.7*   HGB  8.4*  8.7*  8.3*   HCT  26.2*  27.1*  26.4*   MCV  95  94  95   PLT  149*  179  188     Recent Labs   Lab Test  10/08/18   0515  10/07/18   0430  10/06/18   0555   CR  0.37*  0.38*  0.36*     No lab results found.  Recent Labs   Lab Test  09/30/18   1210  09/30/18   0335  09/30/18   0130  09/30/18   0111  09/29/18   0943  09/29/18   0911  09/28/18   1211  09/24/18   0945  09/22/18   1033   CULT  No growth after 8 days  Candida albicans / dubliniensis  isolated  Candida albicans and Candida dubliniensis are not routinely speciated  *  Culture received and in progress.  Positive AFB results are called as soon as detected.    Final report to follow in 7 to 8 weeks.    Assayed at [facility] [address] [place] [id number] [phone]  No growth  Culture negative monitoring continues  Canceled, Test credited  Test " reordered as correct code  Light growth  Candida albicans / dubliniensis  Candida albicans and Candida dubliniensis are not routinely speciated  Susceptibility testing not routinely done  *  No growth  No growth  No growth  No growth  No growth  No growth  Light growth  Candida albicans / dubliniensis  Candida albicans and Candida dubliniensis are not routinely speciated  Susceptibility testing not routinely done  *  No growth        [Follow-Up] : a follow-up visit

## 2025-04-29 NOTE — ANESTHESIA CARE TRANSFER NOTE
Patient: Eyal Turner    * No procedures listed *    Diagnosis: * No pre-op diagnosis entered *  Diagnosis Additional Information: No value filed.    Anesthesia Type:   No value filed.     Note:  Airway :ETT  Patient transferred to:ICU  Comments: ETT to vent, RT and RN at bedside. VSS. ICU Handoff: Call for PAUSE to initiate/utilize ICU HANDOFF, Identified Patient, Identified Responsible Provider, Reviewed the Pertinent Medical History, Discussed Surgical Course, Reviewed Intra-OP Anesthesia Management and Issues during Anesthesia, Set Expectations for Post Procedure Period and Allowed Opportunity for Questions and Acknowledgement of Understanding      Vitals: (Last set prior to Anesthesia Care Transfer)              Electronically Signed By: MINISTERIO Azar CRNA  September 24, 2018  9:36 AM   2